# Patient Record
Sex: FEMALE | Race: WHITE | NOT HISPANIC OR LATINO | Employment: OTHER | URBAN - METROPOLITAN AREA
[De-identification: names, ages, dates, MRNs, and addresses within clinical notes are randomized per-mention and may not be internally consistent; named-entity substitution may affect disease eponyms.]

---

## 2017-01-04 ENCOUNTER — THERAPY VISIT (OUTPATIENT)
Dept: PHYSICAL THERAPY | Facility: CLINIC | Age: 67
End: 2017-01-04
Payer: COMMERCIAL

## 2017-01-04 DIAGNOSIS — M25.511 ACUTE PAIN OF RIGHT SHOULDER: Primary | ICD-10-CM

## 2017-01-04 PROCEDURE — 97035 APP MDLTY 1+ULTRASOUND EA 15: CPT | Mod: GP

## 2017-01-04 PROCEDURE — 97140 MANUAL THERAPY 1/> REGIONS: CPT | Mod: GP

## 2017-01-04 PROCEDURE — 97110 THERAPEUTIC EXERCISES: CPT | Mod: GP

## 2017-01-11 ENCOUNTER — THERAPY VISIT (OUTPATIENT)
Dept: PHYSICAL THERAPY | Facility: CLINIC | Age: 67
End: 2017-01-11
Payer: COMMERCIAL

## 2017-01-11 DIAGNOSIS — M25.511 ACUTE PAIN OF RIGHT SHOULDER: Primary | ICD-10-CM

## 2017-01-11 PROCEDURE — 97110 THERAPEUTIC EXERCISES: CPT | Mod: GP

## 2017-01-11 PROCEDURE — 97035 APP MDLTY 1+ULTRASOUND EA 15: CPT | Mod: GP

## 2017-01-11 PROCEDURE — 97140 MANUAL THERAPY 1/> REGIONS: CPT | Mod: GP

## 2017-01-18 ENCOUNTER — THERAPY VISIT (OUTPATIENT)
Dept: PHYSICAL THERAPY | Facility: CLINIC | Age: 67
End: 2017-01-18
Payer: COMMERCIAL

## 2017-01-18 DIAGNOSIS — M25.511 ACUTE PAIN OF RIGHT SHOULDER: Primary | ICD-10-CM

## 2017-01-18 PROCEDURE — 97110 THERAPEUTIC EXERCISES: CPT | Mod: GP | Performed by: PHYSICAL THERAPIST

## 2017-01-18 PROCEDURE — 97035 APP MDLTY 1+ULTRASOUND EA 15: CPT | Mod: GP | Performed by: PHYSICAL THERAPIST

## 2017-01-18 PROCEDURE — 97112 NEUROMUSCULAR REEDUCATION: CPT | Mod: GP | Performed by: PHYSICAL THERAPIST

## 2017-01-25 ENCOUNTER — THERAPY VISIT (OUTPATIENT)
Dept: PHYSICAL THERAPY | Facility: CLINIC | Age: 67
End: 2017-01-25
Payer: COMMERCIAL

## 2017-01-25 DIAGNOSIS — M25.511 ACUTE PAIN OF RIGHT SHOULDER: Primary | ICD-10-CM

## 2017-01-25 PROCEDURE — 97112 NEUROMUSCULAR REEDUCATION: CPT | Mod: GP

## 2017-01-25 PROCEDURE — 97110 THERAPEUTIC EXERCISES: CPT | Mod: GP

## 2017-01-25 PROCEDURE — 97035 APP MDLTY 1+ULTRASOUND EA 15: CPT | Mod: GP

## 2017-02-15 ENCOUNTER — THERAPY VISIT (OUTPATIENT)
Dept: PHYSICAL THERAPY | Facility: CLINIC | Age: 67
End: 2017-02-15
Payer: COMMERCIAL

## 2017-02-15 DIAGNOSIS — M25.511 ACUTE PAIN OF RIGHT SHOULDER: ICD-10-CM

## 2017-02-15 PROCEDURE — 97110 THERAPEUTIC EXERCISES: CPT | Mod: GP | Performed by: PHYSICAL THERAPIST

## 2017-02-15 PROCEDURE — 97035 APP MDLTY 1+ULTRASOUND EA 15: CPT | Mod: GP | Performed by: PHYSICAL THERAPIST

## 2017-02-15 PROCEDURE — 97112 NEUROMUSCULAR REEDUCATION: CPT | Mod: GP | Performed by: PHYSICAL THERAPIST

## 2017-02-15 NOTE — MR AVS SNAPSHOT
After Visit Summary   2/15/2017    Isabel Carroll    MRN: [id number]           Patient Information     Date Of Birth          1950        Visit Information        Provider Department      2/15/2017 11:30 AM Liliana Tolentino PT [facility]        Today's Diagnoses     Acute pain of right shoulder           Follow-ups after your visit        Your next 10 appointments already scheduled     Apr 13, 2017  1:30 PM CDT   Office Visit with PFT LAB   [facility]    [address] [place] [address] [id number] [phone]           Bring a current list of meds and any records pertaining to this visit.  For Physicals, please bring immunization records and any forms needing to be filled out.  Please arrive 10 minutes early to complete paperwork.            Apr 13, 2017  2:30 PM CDT   New Visit with Natalia Morrow MD   [facility]    [address] [place] [address] [phone]              Who to contact     If you have questions or need follow up information about today's clinic visit or your schedule please contact [facility] directly at [phone].  Normal or non-critical lab and imaging results will be communicated to you by MyChart, letter or phone within 4 business days after the clinic has received the results. If you do not hear from us within 7 days, please contact the clinic through MyChart or phone. If you have a critical or abnormal lab result, we will notify you by phone as soon as possible.  Submit refill requests through Search to Phone or call your pharmacy and they will forward the refill request to us. Please allow 3 business days for your refill to be completed.          Additional Information About Your Visit        Search to Phone Information     Search to Phone gives you secure access to your electronic  health record. If you see a primary care provider, you can also send messages to your care team and make appointments. If you have questions, please call your primary care clinic.  If you do not have a primary care provider, please call [phone] and they will assist you.        Care EveryWhere ID     This is your Care EveryWhere ID. This could be used by other organizations to access your [place] medical records  [id number]         Blood Pressure from Last 3 Encounters:   12/15/16 130/83   11/04/16 132/80   10/24/16 115/79    Weight from Last 3 Encounters:   10/24/16 68.9 kg (152 lb)   10/12/16 67.9 kg (149 lb 9.6 oz)   10/03/16 67.8 kg (149 lb 6.4 oz)              We Performed the Following     MARILY PROGRESS NOTES REPORT     NEUROMUSCULAR RE-EDUCATION     THERAPEUTIC EXERCISES     ULTRASOUND THERAPY        Primary Care Provider Office Phone # Fax #    Marry Naida Gonzalez [phone]       [facility] [address] [facility] [address]        Thank you!     Thank you for choosing [facility]  for your care. Our goal is always to provide you with excellent care. Hearing back from our patients is one way we can continue to improve our services. Please take a few minutes to complete the written survey that you may receive in the mail after your visit with us. Thank you!             Your Updated Medication List - Protect others around you: Learn how to safely use, store and throw away your medicines at www.disposemymeds.org.          This list is accurate as of: 2/15/17 12:34 PM.  Always use your most recent med list.                   Brand Name Dispense Instructions for use    aspirin 81 MG tablet      Take 1 tablet by mouth daily.       CALCIUM + D PO      Take  by mouth.       diclofenac 1 % Gel topical gel    VOLTAREN    100 g    2 grams to hands four times daily using enclosed dosing card.       FISH OIL          fluticasone 50 MCG/ACT  spray    FLONASE    1 Package    Spray 1-2 sprays into both nostrils daily       guaiFENesin-codeine 100-10 MG/5ML Soln solution    ROBITUSSIN AC    120 mL    Take 5-10 mLs by mouth nightly as needed for cough       metoprolol 25 MG 24 hr tablet    TOPROL-XL    90 tablet    Take 1 tablet (25 mg) by mouth daily       MULTIVITAMIN & MINERAL PO      Take  by mouth.       simvastatin 10 MG tablet    ZOCOR    90 tablet    Take 1 tablet (10 mg) by mouth At Bedtime       SUDAFED 12 HOUR PO      Take  by mouth.

## 2017-02-15 NOTE — PROGRESS NOTES
Subjective:    HPI                    Objective:    System    Physical Exam    General     ROS    Assessment/Plan:      DISCHARGE REPORT    Progress reporting period is from 12/26/16 to 2/15/17.       SUBJECTIVE  Subjective changes noted by patient:   Pt notes that she still has trouble sleeping/laying on her R side, wakes sometimes w/PLs of 6-7/10, but not sure how long she had been laying on her R side by then, either.    Current pain level is  2/10.     Previous pain level was   Initial Pain level: 9/10 (at worst).   Changes in function:  Yes (See Goal flowsheet attached for changes in current functional level)  Adverse reaction to treatment or activity: None    OBJECTIVE  Changes noted in objective findings:     R shoulder AROM:  WNL for flex, abd, ER, mild restrictions w/IR, pain only w/the IR movement.  Strength grossly 4+/5 to 5/5 in R shoulder/arm.  Worst pain elicited w/positioning her arm in combination of ext and abd.     ASSESSMENT/PLAN  Updated problem list and treatment plan: Diagnosis 1:  R shoulder RC strain  Pain -  self management and home program  Impaired muscle performance - home program  STG/LTGs have been met or progress has been made towards goals:  Yes (See Goal flow sheet completed today.)  Assessment of Progress: The patient has met all of their long term goals.  Self Management Plans:  Patient has been instructed in a home treatment program.  Patient is independent in a home treatment program.  Patient  has been instructed in self management of symptoms.  Patient is independent in self management of symptoms.    Isabel continues to require the following intervention to meet STG and LTG's:  PT intervention is no longer required to meet STG/LTG.    Recommendations:  This patient is ready to be discharged from therapy and continue their home treatment program.    Please refer to the daily flowsheet for treatment today, total treatment time and time spent performing 1:1 timed codes.

## 2017-02-23 ENCOUNTER — OFFICE VISIT (OUTPATIENT)
Dept: FAMILY MEDICINE | Facility: CLINIC | Age: 67
End: 2017-02-23
Payer: COMMERCIAL

## 2017-02-23 VITALS
OXYGEN SATURATION: 96 % | BODY MASS INDEX: 27.6 KG/M2 | TEMPERATURE: 97.2 F | HEIGHT: 62 IN | HEART RATE: 72 BPM | SYSTOLIC BLOOD PRESSURE: 120 MMHG | WEIGHT: 150 LBS | DIASTOLIC BLOOD PRESSURE: 82 MMHG

## 2017-02-23 DIAGNOSIS — S00.81XA ABRASION OF FOREHEAD, INITIAL ENCOUNTER: Primary | ICD-10-CM

## 2017-02-23 PROCEDURE — 99213 OFFICE O/P EST LOW 20 MIN: CPT | Performed by: INTERNAL MEDICINE

## 2017-02-23 RX ORDER — MUPIROCIN CALCIUM 20 MG/G
CREAM TOPICAL 2 TIMES DAILY
Qty: 30 G | Refills: 5 | Status: SHIPPED | OUTPATIENT
Start: 2017-02-23 | End: 2017-04-13

## 2017-02-23 NOTE — NURSING NOTE
"Chief Complaint   Patient presents with     Musculoskeletal Problem     hit forehead with car door       Initial /82 (BP Location: Left arm, Patient Position: Chair, Cuff Size: Adult Regular)  Pulse 72  Temp 97.2  F (36.2  C) (Oral)  Ht 5' 1.75\" (1.568 m)  Wt 150 lb (68 kg)  SpO2 96%  BMI 27.66 kg/m2 Estimated body mass index is 27.66 kg/(m^2) as calculated from the following:    Height as of this encounter: 5' 1.75\" (1.568 m).    Weight as of this encounter: 150 lb (68 kg).  Medication Reconciliation: complete     Cuong Fraga MA      "

## 2017-02-23 NOTE — PATIENT INSTRUCTIONS
This summary includes the important diagnoses, test, medications and other important parts of your medical history.  Below are a few good we sites you can use to learn more about these.     Www.THE ICONIC.org : Up to date and easily searchable information on multiple topics.  Www.THE ICONIC.org/Pharmacy/c_539084.asp : [facility] $4.99 medications  Www.medlineplus.gov : medication info, interactive tutorials, watch real surgeries online  Www.familydoctor.org : good info from the Academy of Family Physicians  Www.mayoPÃºbliKoinic.com : good info from the [facility]  Www.cdc.gov : public health info, travel advisories, epidemics (H1N1)  Www.aap.org : children's health info, normal development, vaccinations  Www.health.[facility].[place].us : [place] dept of heat, public health issues in [place], N1N1    Based on your medical history and these are the current health maintenance or preventive care services that you are due for (some may have been done at this visit:)  There are no preventive care reminders to display for this patient.  =================================================================================  Normal Values   Blood pressure  <140/90 for most adults    <130/80 for some chronic diseases (ask your care team about yours)    BMI (body mass index)  18.5-25 kg/m2 (based on height and weight)     Thank you for visiting [facility]    Normal or non-critical lab and imaging results will be communicated to you by MyChart, letter or phone within 7 days.  If you do not hear from us within 10 days, please call the clinic. If you have a critical or abnormal lab result, we will notify you by phone as soon as possible.     If you have any questions regarding your visit please contact:     Team Comfort:   Clinic Hours Telephone Number   Dr. Earle Edwards   7am-5pm  Monday - Friday [phone]  Will [facility]   Pharmacy 8am-8pm  Monday-Thursday      8am-6pm Friday  9am-5pm Saturday-Sunday [phone]   Urgent Care 11am-8pm Monday-Friday        9am-5pm Saturday-Sunday [phone]     After hours, weekend or if you need to make an appointment with your primary provider please call [phone].   After Hours nurse advise: call [place] [facility]: [phone]    Medication Refills:  Call your pharmacy and they will forward the refill to us. Please allow 3 business days for your refills to be completed.    Use [employer] (secure email communication and access to your chart) to send your primary care provider a message or make an appointment. Ask someone on your Team how to sign up for [employer]. To log on to CleanBeeBaby or for more information in NearbyNow please visit the website at [email or web address].  As of October 8, 2013, all password changes, disabled accounts, or ID changes in [employer]/MyHealth will be done by our Access Services Department.   If you need help with your account or password, call: [phone]. Clinic staff no longer has the ability to change passwords.

## 2017-02-23 NOTE — MR AVS SNAPSHOT
After Visit Summary   2/23/2017    Isabel Carroll    MRN: [id number]           Patient Information     Date Of Birth          1950        Visit Information        Provider Department      2/23/2017 1:40 PM Guillermo Franklin MD [facility]        Today's Diagnoses     Abrasion of forehead, initial encounter    -  1      Care Instructions    This summary includes the important diagnoses, test, medications and other important parts of your medical history.  Below are a few good we sites you can use to learn more about these.     Www.Attunity.i.Meter : Up to date and easily searchable information on multiple topics.  Www.Attunity.i.Meter/Pharmacy/c_539084.asp : [facility] $4.99 medications  Www.Westcrete.gov : medication info, interactive tutorials, watch real surgeries online  Www.familydoctor.org : good info from the Academy of Family Physicians  Www.op5.ADstruc : good info from the [facility]  Www.cdc.gov : public health info, travel advisories, epidemics (H1N1)  Www.aap.org : children's health info, normal development, vaccinations  Www.health.[facility].[place].us : [place] dept of heatlh, public health issues in [place], N1N1    Based on your medical history and these are the current health maintenance or preventive care services that you are due for (some may have been done at this visit:)  There are no preventive care reminders to display for this patient.  =================================================================================  Normal Values   Blood pressure  <140/90 for most adults    <130/80 for some chronic diseases (ask your care team about yours)    BMI (body mass index)  18.5-25 kg/m2 (based on height and weight)     Thank you for visiting [facility]    Normal or non-critical lab and imaging results will be communicated to you by MyChart, letter or phone within 7 days.  If you do not hear from us within 10 days, please call the clinic. If  you have a critical or abnormal lab result, we will notify you by phone as soon as possible.     If you have any questions regarding your visit please contact:     Team Comfort:   Clinic Hours Telephone Number   Dr. Earle Alatorre  [facility]   7am-5pm  Monday - Friday [phone]  [facility]   Pharmacy 8am-8pm Monday-Thursday      8am-6pm Friday  9am-5pm Saturday-Sunday [phone]   Urgent Care 11am-8pm Monday-Friday        9am-5pm Saturday-Sunday [phone]     After hours, weekend or if you need to make an appointment with your primary provider please call [phone].   After Hours nurse advise: call [place] [facility]: [phone]    Medication Refills:  Call your pharmacy and they will forward the refill to us. Please allow 3 business days for your refills to be completed.    Use [employer] (secure email communication and access to your chart) to send your primary care provider a message or make an appointment. Ask someone on your Team how to sign up for [employer]. To log on to LSAT Freedom or for more information in Seeder please visit the website at [email or web address].  As of October 8, 2013, all password changes, disabled accounts, or ID changes in [employer]/MyHealth will be done by our Access Services Department.   If you need help with your account or password, call: [phone]. Clinic staff no longer has the ability to change passwords.           Follow-ups after your visit        Your next 10 appointments already scheduled     Apr 13, 2017  1:30 PM CDT   [facility]FT LAB   [facility]    [id number] [place] [id number] [phone]           Bring a current list of meds and any records pertaining to this visit.  For Physicals, please bring immunization records and any forms needing to be filled out.  Please arrive 10 minutes early to complete paperwork.  "           Apr 13, 2017  2:30 PM CDT   New Visit with Natalia Morrow MD   [facility] ([facility])    [address] [place] [id number] [phone]              Who to contact     If you have questions or need follow up information about today's clinic visit or your schedule please contact [facility] directly at [phone].  Normal or non-critical lab and imaging results will be communicated to you by RACTIVhart, letter or phone within 4 business days after the clinic has received the results. If you do not hear from us within 7 days, please contact the clinic through RACTIVhart or phone. If you have a critical or abnormal lab result, we will notify you by phone as soon as possible.  Submit refill requests through Cardiva Medical or call your pharmacy and they will forward the refill request to us. Please allow 3 business days for your refill to be completed.          Additional Information About Your Visit        RACTIVharBeneStream Information     Cardiva Medical gives you secure access to your electronic health record. If you see a primary care provider, you can also send messages to your care team and make appointments. If you have questions, please call your primary care clinic.  If you do not have a primary care provider, please call [phone] and they will assist you.        Care EveryWhere ID     This is your Care EveryWhere ID. This could be used by other organizations to access your [place] medical records  [id number]        Your Vitals Were     Pulse Temperature Height Pulse Oximetry BMI (Body Mass Index)       72 97.2  F (36.2  C) (Oral) 5' 1.75\" (1.568 m) 96% 27.66 kg/m2        Blood Pressure from Last 3 Encounters:   02/23/17 120/82   12/15/16 130/83   11/04/16 132/80    Weight from Last 3 Encounters:   02/23/17 150 lb (68 kg)   10/24/16 152 lb (68.9 kg)   10/12/16 149 lb 9.6 oz (67.9 kg)              Today, you had the following     No orders " found for display         Today's Medication Changes          These changes are accurate as of: 2/23/17  2:37 PM.  If you have any questions, ask your nurse or doctor.               Start taking these medicines.        Dose/Directions    mupirocin 2 % cream   Commonly known as:  BACTROBAN   Used for:  Abrasion of forehead, initial encounter   Started by:  Guillermo Franklin MD        Apply topically 2 times daily Apply occlusive dressing.   Quantity:  30 g   Refills:  5            Where to get your medicines      These medications were sent to [facility] [address] [place] [address] [address] [facility]  [address] [facility] [address]     Phone:  [phone]     mupirocin 2 % cream                Primary Care Provider Office Phone # Fax #    Marry Naida Gonzalez [phone]       [facility] [address]  [facility] [address]        Thank you!     Thank you for choosing [facility]  for your care. Our goal is always to provide you with excellent care. Hearing back from our patients is one way we can continue to improve our services. Please take a few minutes to complete the written survey that you may receive in the mail after your visit with us. Thank you!             Your Updated Medication List - Protect others around you: Learn how to safely use, store and throw away your medicines at www.disposemymeds.org.          This list is accurate as of: 2/23/17  2:37 PM.  Always use your most recent med list.                   Brand Name Dispense Instructions for use    aspirin 81 MG tablet      Take 1 tablet by mouth daily.       CALCIUM + D PO      Take  by mouth.       diclofenac 1 % Gel topical gel    VOLTAREN    100 g    2 grams to hands four times daily using enclosed dosing card.       FISH OIL          fluticasone 50 MCG/ACT spray    FLONASE    1 Package    Spray 1-2 sprays into both nostrils daily       guaiFENesin-codeine 100-10 MG/5ML  Soln solution    ROBITUSSIN AC    120 mL    Take 5-10 mLs by mouth nightly as needed for cough       metoprolol 25 MG 24 hr tablet    TOPROL-XL    90 tablet    Take 1 tablet (25 mg) by mouth daily       MULTIVITAMIN & MINERAL PO      Take  by mouth.       mupirocin 2 % cream    BACTROBAN    30 g    Apply topically 2 times daily Apply occlusive dressing.       simvastatin 10 MG tablet    ZOCOR    90 tablet    Take 1 tablet (10 mg) by mouth At Bedtime       SUDAFED 12 HOUR PO      Take  by mouth.

## 2017-02-23 NOTE — PROGRESS NOTES
SUBJECTIVE:                                                    Isabel Carroll is a 66 year old female who presents to clinic today for the following health issues:    Joint Pain     Onset: yesterday    Description:   Location: right side forehead  Character: Dull ache    Intensity: mild    Progression of Symptoms: better    Accompanying Signs & Symptoms:  Other symptoms: none   History:   Previous similar pain: no       Precipitating factors:   Trauma or overuse: YES- hit forehead on car door when trying to shut it while carrying multiple groceries.    Alleviating factors:  Improved by: nothing       Therapies Tried and outcome: neosporin, little relief          Problem list and histories reviewed & adjusted, as indicated.  Additional history: as documented    Patient Active Problem List   Diagnosis     Overweight     Chronic rhinitis     Primary osteoarthritis of both hands     Basal cell carcinoma of skin     Coronary atherosclerosis due to calcified coronary lesion     Elevated blood pressure (not hypertension)     Hyperlipidemia     Impaired fasting glucose     Benign neoplasm of colon     Chorioretinal scar of left eye     Sinus tachycardia     Advance care planning     Pes planus     Right shoulder strain, initial encounter     Bronchiectasis without complication (H)     Past Surgical History   Procedure Laterality Date      section       x 2     Liver surgery       Alex large cyst     Mini arc sling operation for stress incontinence  2007     Salpingo-oophorectomy bilateral  ,      Benign prophylactic for FHx ov cancer     Lengthen tendon achilles  2011     Procedure:LENGTHEN TENDON ACHILLES; Surgeon:AARON CÁRDENAS; Location:[facility]     Osteotomy ankle  2011     Procedure:OSTEOTOMY ANKLE; Calcaneous,  Flexor Digitorum Longus Transfer       Colonoscopy       3 polyps       Social History   Substance Use Topics     Smoking status: Former Smoker     Types:  Cigarettes     Smokeless tobacco: Former User     Quit date: 1/1/1982     Alcohol use 0.0 oz/week     0 Standard drinks or equivalent per week      Comment: 2-7 glasses of wine per week     Family History   Problem Relation Age of Onset     Other Cancer Sister 51     ovarian cancer     CANCER Sister      Colon Cancer Father      Macular Degeneration Mother      DIABETES Maternal Aunt      DIABETES Maternal Uncle      CANCER Paternal Aunt      DIABETES Maternal Grandmother      Hypertension No family hx of      CEREBROVASCULAR DISEASE No family hx of      Thyroid Disease No family hx of      Glaucoma No family hx of          Allergies   Allergen Reactions     Codeine Phosphate Nausea and Vomiting     Pentazocine      Propoxyphene      Darvon     Penicillins Rash     Recent Labs   Lab Test  10/14/16   0815  05/22/16   1708  10/06/15   0818  04/01/14 09/28/13 09/22/12 08/17/12   A1C  5.5  5.8   --    --    --   5.7  6.0   --    LDL  106*   --   113   --   80   --   111   --    HDL  48*   --   47*   --   40*   --   54   --    TRIG  210*   --   209*   --   180*   --   156*   --    ALT   --   39   --    --    --    --    --    --    CR  0.74  0.63  0.72   < >   --    --    --    --    GFRESTIMATED  78  >90  Non  GFR Calc    82   < >   --    --    --    --    GFRESTBLACK  >90   GFR Calc    >90   GFR Calc    >90   GFR Calc     < >   --    --    --    --    POTASSIUM   --   4.1  4.1   --    --    --    --    --    TSH   --    --    --    --    --    --   0.506  0.829    < > = values in this interval not displayed.      BP Readings from Last 3 Encounters:   02/23/17 120/82   12/15/16 130/83   11/04/16 132/80    Wt Readings from Last 3 Encounters:   02/23/17 150 lb (68 kg)   10/24/16 152 lb (68.9 kg)   10/12/16 149 lb 9.6 oz (67.9 kg)                  Labs reviewed in EPIC  Problem list, Medication list, Allergies, and Medical/Social/Surgical histories reviewed in  "EPIC and updated as appropriate.    ROS:  C: NEGATIVE for fever, chills, change in weight  INTEGUMENTARY/SKIN: As above.  E: NEGATIVE for vision changes or irritation  E/M: NEGATIVE for ear, mouth and throat problems  R: NEGATIVE for significant cough or SOB  B: NEGATIVE for masses, tenderness or discharge  CV: NEGATIVE for chest pain, palpitations or peripheral edema  GI: NEGATIVE for nausea, abdominal pain, heartburn, or change in bowel habits  : NEGATIVE for frequency, dysuria, or hematuria  M: NEGATIVE for significant arthralgias or myalgia  N: NEGATIVE for weakness, dizziness or paresthesias  E: NEGATIVE for temperature intolerance, skin/hair changes  H: NEGATIVE for bleeding problems  P: NEGATIVE for changes in mood or affect    OBJECTIVE:                                                    /82 (BP Location: Left arm, Patient Position: Chair, Cuff Size: Adult Regular)  Pulse 72  Temp 97.2  F (36.2  C) (Oral)  Ht 5' 1.75\" (1.568 m)  Wt 150 lb (68 kg)  SpO2 96%  BMI 27.66 kg/m2  Body mass index is 27.66 kg/(m^2).  GENERAL: healthy, alert and no distress  EYES: Eyes grossly normal to inspection  SKIN: Abrasion at right side of forehead that approximately measures 2.5 cm.  NEURO: Normal strength and tone, mentation intact and speech normal  PSYCH: mentation appears normal, affect normal/bright    Diagnostic Test Results:  none      ASSESSMENT/PLAN:                                                            (S00.81XA) Abrasion of forehead, initial encounter  (primary encounter diagnosis)  Comment: The patient is mainly worried about cosmetic implications of her forehead abrasion. I explained to the patient not to scrape her abrasion and apply different antibiotic ointment, with occlusive dressing. Continue over-the-counter Neosporin ointment due to higher risk of allergic reaction to it.  Plan: mupirocin (BACTROBAN) 2 % cream          Follow-up visit if condition worsens.        Guillermo Frnaklin, " MD  [facility]

## 2017-02-26 PROBLEM — S00.81XA ABRASION OF FOREHEAD, INITIAL ENCOUNTER: Status: ACTIVE | Noted: 2017-02-26

## 2017-02-28 ENCOUNTER — TELEPHONE (OUTPATIENT)
Dept: FAMILY MEDICINE | Facility: CLINIC | Age: 67
End: 2017-02-28

## 2017-02-28 NOTE — TELEPHONE ENCOUNTER
Reason for Call:  Other     Detailed comments: Pt was seen on 2/23/2017 for gash on forehead and asking if she needs to keep Band-Aid on the cut?    Phone Number Patient can be reached at: Home number on file [phone] (home)    Best Time: after 2 pm is best    Can we leave a detailed message on this number? YES    Call taken on 2/28/2017 at 10:51 AM by Padmini Rock

## 2017-03-24 ENCOUNTER — PRE VISIT (OUTPATIENT)
Dept: PULMONOLOGY | Facility: CLINIC | Age: 67
End: 2017-03-24

## 2017-03-24 NOTE — TELEPHONE ENCOUNTER
PULMONARY NEW PATIENT PRE-VISIT ASSESSMENT    Date Patient Contacted: 3/24/17    Confirmed appointment times and location     1. Patient is scheduled to see Dr. Morrow on 4/13/17  2. Reason for visit: Bronchiectasis without complication (H) [J47.9]   3. Referring provider PCP - Dr. Gonzalez    4. Has patient seen previous specialist? Yes.  Name of provider Dr. King Noel, Clinic/Facility [facility]. Pt states she saw for several years -  from approximately 2008 to 2012. She states she has not seen anyone since that time. Pt states [facility] and will call them to fax us records. Advised her that we can also have her sign a Cloak form when she gets here if no records have been received.      5. Previous Imaging: In Epic: Last CXR done: none, Last Chest CT done: none      Outside Imaging: Location/Date/Type/Status (Requested, Received, Patient will hand carry disc, Pushed to [employer]read, Disc will be mailed) Chest CT done 12/5/16 at [facility] - report in Cloak, image in PACs. Pt reports she had Chest CT done at the same place in 7/13/10 - report is in Cloak. Encounter routed to admin coordinators to request image be pushed to our system.        6.  Pulmonary Function Tests: Scheduled at  4/13/17       Outside PFT Lab:  Location/Date/Status (Requested, Received, Patient will hand carry) Pt denies.    Oxygen? Pt denies    Previsit review complete.  Patient will see provider at future scheduled appointment.     Patient Reminders Given:  Please, make sure you bring an updated list of your medications.   If you need to cancel or reschedule, please call [phone].      Prudence Reyes, RN, BSN  Pulmonary Care Coordinator

## 2017-03-27 NOTE — TELEPHONE ENCOUNTER
Spoke with Imaging Archive team at Pending sale to [facility].     Image of Chest CT from 7-13-10 has been pushed to our system.  Faxed report and archive form has been filled out and given to our imaging team in [place]. Priscilla Valdivia CMA,

## 2017-04-12 ENCOUNTER — OFFICE VISIT (OUTPATIENT)
Dept: PODIATRY | Facility: CLINIC | Age: 67
End: 2017-04-12
Payer: COMMERCIAL

## 2017-04-12 VITALS — HEART RATE: 80 BPM | WEIGHT: 150 LBS | BODY MASS INDEX: 27.66 KG/M2 | OXYGEN SATURATION: 98 %

## 2017-04-12 DIAGNOSIS — M67.40 GANGLION OF JOINT: Primary | ICD-10-CM

## 2017-04-12 PROCEDURE — 20612 ASPIRATE/INJ GANGLION CYST: CPT | Mod: RT | Performed by: PODIATRIST

## 2017-04-12 PROCEDURE — 99203 OFFICE O/P NEW LOW 30 MIN: CPT | Mod: 25 | Performed by: PODIATRIST

## 2017-04-12 NOTE — PATIENT INSTRUCTIONS
We wish you continued good healing. If you have any questions or concerns, please do not hesitate to contact us at [phone].      Please remember to call and schedule a follow up appointment if one was recommended at your earliest convenience.   PODIATRY CLINIC HOURS  TELEPHONE NUMBER    Dr. Anil Balderas D.P.M [facility]    Clinics:  [facility]        Ann Cárdenas MA  Medical Assistant  Tuesday 1PM-6PM  [facility]  Wednesday 7AM-2PM  [place]  Thursday 10AM-6PM  [place] Friday 7AM-345PM  [place]  Specialty schedulers:   [phone] to make an appointment with any Specialty Provider.        Urgent Care locations:    [facility] Monday-Friday 5 pm - 9 pm. Saturday-Sunday 9 am -5pm    Monday-Friday 11 am - 9 pm Saturday 9 am - 5 pm     Monday-Sunday 12 noon-8PM [phone]     If you need a medication refill, please contact us you may need lab work and/or a follow up visit prior to your refill (i.e. Antifungal medications).    FonJaxhart (secure e-mail communication and access to your chart) to send a message or to make an appointment.    If MRI needed please call [facility] at [phone]        Weight management plan: Patient was referred to their PCP to discuss a diet and exercise plan.

## 2017-04-12 NOTE — MR AVS SNAPSHOT
After Visit Summary   4/12/2017    Isabel Carroll    MRN: [id number]           Patient Information     Date Of Birth          1950        Visit Information        Provider Department      4/12/2017 1:00 PM Anil Balderas DPM [facility]        Care Instructions    We wish you continued good healing. If you have any questions or concerns, please do not hesitate to contact us at [phone].      Please remember to call and schedule a follow up appointment if one was recommended at your earliest convenience.   PODIATRY CLINIC HOURS  TELEPHONE NUMBER    Dr. Anil Balderas D.P.M [facility]    Clinics:  [facility]        Ann Cárdenas MA  Medical Assistant  Tuesday 1PM-6PM  [facility]  Wednesday 7AM-2PM  [place]  Thursday 10AM-6PM  [place] Friday 7AM-345PM  [place]  Specialty schedulers:   [phone] to make an appointment with any Specialty Provider.        Urgent Care locations:    [facility] Monday-Friday 5 pm - 9 pm. Saturday-Sunday 9 am -5pm    Monday-Friday 11 am - 9 pm Saturday 9 am - 5 pm     Monday-Sunday 12 noon-8PM [phone]     If you need a medication refill, please contact us you may need lab work and/or a follow up visit prior to your refill (i.e. Antifungal medications).    Will[employer] Mediahart (secure e-mail communication and access to your chart) to send a message or to make an appointment.    If MRI needed please call [facility] at [phone]        Weight management plan: Patient was referred to their PCP to discuss a diet and exercise plan.          Follow-ups after your visit        Your next 10 appointments already scheduled     Apr 13, 2017  1:30 PM CDT   Office Visit with PFT LAB   [facility]    [id number] [place] [id number]    [phone]           Bring a current list of meds and any records pertaining to this visit.  For Physicals, please bring immunization records and any forms needing to be filled out.  Please arrive 10 minutes early to complete paperwork.            Apr 13, 2017  2:30 PM CDT   New Visit with Natalia Morrow MD   [facility]    [address] [place] [id number] [phone]              Who to contact     If you have questions or need follow up information about today's clinic visit or your schedule please contact [facility] directly at [phone].  Normal or non-critical lab and imaging results will be communicated to you by MyChart, letter or phone within 4 business days after the clinic has received the results. If you do not hear from us within 7 days, please contact the clinic through ShootHomehart or phone. If you have a critical or abnormal lab result, we will notify you by phone as soon as possible.  Submit refill requests through Odnoklassniki or call your pharmacy and they will forward the refill request to us. Please allow 3 business days for your refill to be completed.          Additional Information About Your Visit        MyCharTaxizu Information     Odnoklassniki gives you secure access to your electronic health record. If you see a primary care provider, you can also send messages to your care team and make appointments. If you have questions, please call your primary care clinic.  If you do not have a primary care provider, please call [phone] and they will assist you.        Care EveryWhere ID     This is your Care EveryWhere ID. This could be used by other organizations to access your [facility] medical records  [id number]        Your Vitals Were     Pulse Pulse Oximetry BMI (Body Mass Index)             80 98% 27.66 kg/m2          Blood Pressure from Last 3 Encounters:   02/23/17 120/82   12/15/16 130/83   11/04/16  132/80    Weight from Last 3 Encounters:   04/12/17 68 kg (150 lb)   02/23/17 68 kg (150 lb)   10/24/16 68.9 kg (152 lb)              Today, you had the following     No orders found for display       Primary Care Provider Office Phone # Fax #    Marry Naida Gonzalez [phone]       [facility] [address]  [facility] [address]        Thank you!     Thank you for choosing [facility]  for your care. Our goal is always to provide you with excellent care. Hearing back from our patients is one way we can continue to improve our services. Please take a few minutes to complete the written survey that you may receive in the mail after your visit with us. Thank you!             Your Updated Medication List - Protect others around you: Learn how to safely use, store and throw away your medicines at www.disposemymeds.org.          This list is accurate as of: 4/12/17  1:10 PM.  Always use your most recent med list.                   Brand Name Dispense Instructions for use    aspirin 81 MG tablet      Take 1 tablet by mouth daily.       CALCIUM + D PO      Take  by mouth.       diclofenac 1 % Gel topical gel    VOLTAREN    100 g    2 grams to hands four times daily using enclosed dosing card.       FISH OIL          fluticasone 50 MCG/ACT spray    FLONASE    1 Package    Spray 1-2 sprays into both nostrils daily       guaiFENesin-codeine 100-10 MG/5ML Soln solution    ROBITUSSIN AC    120 mL    Take 5-10 mLs by mouth nightly as needed for cough       metoprolol 25 MG 24 hr tablet    TOPROL-XL    90 tablet    Take 1 tablet (25 mg) by mouth daily       MULTIVITAMIN & MINERAL PO      Take  by mouth.       mupirocin 2 % cream    BACTROBAN    30 g    Apply topically 2 times daily Apply occlusive dressing.       simvastatin 10 MG tablet    ZOCOR    90 tablet    Take 1 tablet (10 mg) by mouth At Bedtime       SUDAFED 12 HOUR PO      Take  by mouth.

## 2017-04-12 NOTE — NURSING NOTE
"Chief Complaint   Patient presents with     Foot Problems     right big toe lump on it. callus on side , fissure on heel. Scar from surgery 2011 bothersome       Initial Pulse 80  Wt 68 kg (150 lb)  SpO2 98%  BMI 27.66 kg/m2 Estimated body mass index is 27.66 kg/(m^2) as calculated from the following:    Height as of 2/23/17: 1.568 m (5' 1.75\").    Weight as of this encounter: 68 kg (150 lb).  Medication Reconciliation: complete  "

## 2017-04-12 NOTE — LETTER
4/12/2017       RE: Isabel Carroll  [address] [address]           Dear Colleague,    Thank you for referring your patient, Isabel Carroll, to the [facility]. Please see a copy of my visit note below.    S:  Patient complains of mass on the right foot.  Point to the dorsal medial hallux IPJ.  Has never had this before.  Patient has had this for 3 months. Has a burning pain with this.  Aggravated by activity and tight shoes and relieved by rest.  Denies mass on contralateral foot.  History of right flatfoot surgery.           ROS:  Denies edema, erythema, ecchymosis, numbness, or drainage.  Denies any trauma.       Allergies   Allergen Reactions     Codeine Phosphate Nausea and Vomiting     Pentazocine      Propoxyphene      Darvon     Penicillins Rash       Current Outpatient Prescriptions   Medication Sig Dispense Refill     mupirocin (BACTROBAN) 2 % cream Apply topically 2 times daily Apply occlusive dressing. 30 g 5     guaiFENesin-codeine (ROBITUSSIN AC) 100-10 MG/5ML SOLN Take 5-10 mLs by mouth nightly as needed for cough 120 mL 0     simvastatin (ZOCOR) 10 MG tablet Take 1 tablet (10 mg) by mouth At Bedtime 90 tablet 3     metoprolol (TOPROL-XL) 25 MG 24 hr tablet Take 1 tablet (25 mg) by mouth daily 90 tablet 3     diclofenac (VOLTAREN) 1 % GEL 2 grams to hands four times daily using enclosed dosing card. 100 g 1     fluticasone (FLONASE) 50 MCG/ACT nasal spray Spray 1-2 sprays into both nostrils daily 1 Package 11     FISH OIL        Pseudoephedrine HCl (SUDAFED 12 HOUR PO) Take  by mouth.       Calcium-Vitamin D (CALCIUM + D PO) Take  by mouth.       Multiple Vitamins-Minerals (MULTIVITAMIN & MINERAL PO) Take  by mouth.       aspirin 81 MG tablet Take 1 tablet by mouth daily.         Patient Active Problem List   Diagnosis     Overweight     Chronic rhinitis     Primary osteoarthritis of both hands     Basal cell carcinoma of skin     Coronary  atherosclerosis due to calcified coronary lesion     Elevated blood pressure (not hypertension)     Hyperlipidemia     Impaired fasting glucose     Benign neoplasm of colon     Chorioretinal scar of left eye     Sinus tachycardia     Advance care planning     Pes planus     Right shoulder strain, initial encounter     Bronchiectasis without complication (H)     Abrasion of forehead, initial encounter       Past Medical History:   Diagnosis Date     Arthritis      Basal cell carcinoma of skin 2011     Benign neoplasm of colon 10/26/2011    Overview:  Colonoscopy every 5 years  Oct 2011      Chorioretinal scar of left eye 2015     Chronic rhinitis 2015     Coronary artery disease      Coronary atherosclerosis due to calcified coronary lesion 2011    Overview:  A)  CT Chest-Date: 07/13/10, There is coronary artery and mild aortic calcification present. B)  Nuclear Stress Test on 10/01/10, No scintigraphic evidence of ischemia or infarction. EF: 71%.       Elevated blood pressure (not hypertension) 2006     Hypercholesteremia      Hyperlipidemia 2011    Overview:  See results review for lipid profile.      Impaired fasting glucose 2006     PONV (postoperative nausea and vomiting)      Primary osteoarthritis of both hands 2015     Shingles 2009     Sinus tachycardia 2016     Skin cancer ,     Basal cell cancer     Vitamin D deficiency 2010       Past Surgical History:   Procedure Laterality Date      SECTION      x 2     COLONOSCOPY      3 polyps     LENGTHEN TENDON ACHILLES  2011    Procedure:LENGTHEN TENDON ACHILLES; Surgeon:AARON CÁRDENAS; Location:[facility]     LIVER SURGERY      Alex large cyst     MINI ARC SLING OPERATION FOR STRESS INCONTINENCE  2007     OSTEOTOMY ANKLE  2011    Procedure:OSTEOTOMY ANKLE; Calcaneous,  Flexor Digitorum Longus Transfer       SALPINGO-OOPHORECTOMY BILATERAL  ,     Benign prophylactic  for FHx ov cancer       Family History   Problem Relation Age of Onset     Other Cancer Sister 51     ovarian cancer     CANCER Sister      Colon Cancer Father      Macular Degeneration Mother      DIABETES Maternal Aunt      DIABETES Maternal Uncle      CANCER Paternal Aunt      DIABETES Maternal Grandmother      Hypertension No family hx of      CEREBROVASCULAR DISEASE No family hx of      Thyroid Disease No family hx of      Glaucoma No family hx of        Social History   Substance Use Topics     Smoking status: Former Smoker     Types: Cigarettes     Smokeless tobacco: Former User     Quit date: 1/1/1982     Alcohol use 0.0 oz/week     0 Standard drinks or equivalent per week      Comment: 2-7 glasses of wine per week         O:  Pulse 80  Wt 68 kg (150 lb)  SpO2 98%  BMI 27.66 kg/m2.  Patient good historian.  A&O X3.  Pulses DP, PT 2/4 b/l.  CRT < 3 seconds X 10 digits.  No edema or varicosities noted.  Sensation to light touch intact b/l.  Reflexes 2/4 b/l.  Skin has normal texture and turgor b/l.  Pronated arch with weightbearing.  No forefoot or rear foot deformities noted.  MS 5/5 all compartments.  Normal ROM all fore foot and rearfoot joints.  No equinus.  There is a mass on the dorsal medial right hallux IPJ.  It is well encapsulated.   Negative pain with palpation.  Nonpulsitile.  No pain with ROM or stressing any tendons.        A:Ganglion cyst     P:  Discussed cause of this with patient.  She would like this exsanguinated.  Risks complications, and efficacy of ganglion cyst exsanguination discussed.  Patient understands that if we exsanguinate this it could come back immediately.  After written consent we blocked the area proximal to cyst with 3 cc 1% lidocaine plain and prepped in a normal sterile maner.  18 gauge needle was inserted into cyst and all fluid was removed.  Dressed with a bandaid.  Warned patient not to try to remove fluid and squeeze this herself as it could become infected.  If  this drains at all return to clinic asap. If returns and painful we discussed surgical removal.  Even with this there is a chance this could return.   Will  call if desires surgical removal.   Return to clinic prn.      Anil Balderas DPM, FACFAS      Again, thank you for allowing me to participate in the care of your patient.        Sincerely,              Anil Balderas DPM

## 2017-04-13 ENCOUNTER — OFFICE VISIT (OUTPATIENT)
Dept: PULMONOLOGY | Facility: CLINIC | Age: 67
End: 2017-04-13
Attending: FAMILY MEDICINE
Payer: COMMERCIAL

## 2017-04-13 ENCOUNTER — OFFICE VISIT (OUTPATIENT)
Dept: NURSING | Facility: CLINIC | Age: 67
End: 2017-04-13
Payer: COMMERCIAL

## 2017-04-13 VITALS — BODY MASS INDEX: 25.63 KG/M2 | WEIGHT: 144.66 LBS | OXYGEN SATURATION: 98 % | HEART RATE: 78 BPM | HEIGHT: 63 IN

## 2017-04-13 DIAGNOSIS — R05.3 CHRONIC COUGH: Primary | ICD-10-CM

## 2017-04-13 DIAGNOSIS — J47.9 BRONCHIECTASIS WITHOUT COMPLICATION (H): ICD-10-CM

## 2017-04-13 DIAGNOSIS — J47.9 BRONCHIECTASIS (H): Primary | ICD-10-CM

## 2017-04-13 LAB
DLCOUNC-%PRED-PRE: 117 %
DLCOUNC-PRE: 23.93 ML/MIN/MMHG
DLCOUNC-PRED: 20.29 ML/MIN/MMHG
ERV-%PRED-PRE: 107 %
ERV-PRE: 0.72 L
ERV-PRED: 0.67 L
EXPTIME-PRE: 6.52 SEC
FEF2575-%PRED-PRE: 138 %
FEF2575-PRE: 2.65 L/SEC
FEF2575-PRED: 1.91 L/SEC
FEFMAX-%PRED-PRE: 101 %
FEFMAX-PRE: 5.73 L/SEC
FEFMAX-PRED: 5.64 L/SEC
FEV1-%PRED-PRE: 117 %
FEV1-PRE: 2.58 L
FEV1FEV6-PRE: 81 %
FEV1FEV6-PRED: 80 %
FEV1FVC-PRE: 81 %
FEV1FVC-PRED: 79 %
FEV1SVC-PRE: 77 %
FEV1SVC-PRED: 74 %
FIFMAX-PRE: 4.71 L/SEC
FRCPLETH-%PRED-PRE: 105 %
FRCPLETH-PRE: 2.78 L
FRCPLETH-PRED: 2.64 L
FVC-%PRED-PRE: 112 %
FVC-PRE: 3.16 L
FVC-PRED: 2.8 L
IC-%PRED-PRE: 114 %
IC-PRE: 2.62 L
IC-PRED: 2.29 L
RVPLETH-%PRED-PRE: 105 %
RVPLETH-PRE: 2.06 L
RVPLETH-PRED: 1.96 L
TLCPLETH-%PRED-PRE: 114 %
TLCPLETH-PRE: 5.4 L
TLCPLETH-PRED: 4.73 L
VA-%PRED-PRE: 103 %
VA-PRE: 5.01 L
VC-%PRED-PRE: 112 %
VC-PRE: 3.34 L
VC-PRED: 2.96 L

## 2017-04-13 PROCEDURE — 99207 HC DIFFUSING CAPACITY: CPT | Performed by: INTERNAL MEDICINE

## 2017-04-13 PROCEDURE — 99204 OFFICE O/P NEW MOD 45 MIN: CPT | Mod: 25 | Performed by: INTERNAL MEDICINE

## 2017-04-13 RX ORDER — GABAPENTIN 300 MG/1
CAPSULE ORAL
Qty: 270 CAPSULE | Refills: 1 | Status: SHIPPED | OUTPATIENT
Start: 2017-04-13 | End: 2017-07-27

## 2017-04-13 NOTE — MR AVS SNAPSHOT
After Visit Summary   4/13/2017    Isabel Carroll    MRN: [id number]           Patient Information     Date Of Birth          1950        Visit Information        Provider Department      4/13/2017 1:30 PM PFT LAB [facility]        Today's Diagnoses     Bronchiectasis (H)    -  1       Follow-ups after your visit        Who to contact     If you have questions or need follow up information about today's clinic visit or your schedule please contact [facility] directly at [phone].  Normal or non-critical lab and imaging results will be communicated to you by Yieldbothart, letter or phone within 4 business days after the clinic has received the results. If you do not hear from us within 7 days, please contact the clinic through [employer] or phone. If you have a critical or abnormal lab result, we will notify you by phone as soon as possible.  Submit refill requests through KeepT[employer]in or call your pharmacy and they will forward the refill request to us. Please allow 3 business days for your refill to be completed.          Additional Information About Your Visit        MyChart Information     [employer] gives you secure access to your electronic health record. If you see a primary care provider, you can also send messages to your care team and make appointments. If you have questions, please call your primary care clinic.  If you do not have a primary care provider, please call [phone] and they will assist you.      [employer]in is an electronic gateway that provides easy, online access to your medical records. With KeepTruckin, you can request a clinic appointment, read your test results, renew a prescription or communicate with your care team.     To access your existing account, please contact your [facility] or call [phone] for assistance.        Care EveryWhere ID     This is your Care EveryWhere ID. This could be used by other  organizations to access your [place] medical records  [id number]         Blood Pressure from Last 3 Encounters:   04/13/17 (P) 119/76   02/23/17 120/82   12/15/16 130/83    Weight from Last 3 Encounters:   04/13/17 65.6 kg (144 lb 10.6 oz)   04/12/17 68 kg (150 lb)   02/23/17 68 kg (150 lb)              We Performed the Following     General PFT Lab (Please always keep checked)     HC DIFFUSING CAPACITY     HC PLETHYSMOGRAPHY LUNG VOLUMES W/WO AIRWAY RESIST     Pulmonary Function Test     RESPIRATORY FLOW VOLUME LOOP        Primary Care Provider Office Phone # Fax #    Marry Naida Gonzalez [phone]       [facility] [address] [facility] [address]        Thank you!     Thank you for choosing [facility]  for your care. Our goal is always to provide you with excellent care. Hearing back from our patients is one way we can continue to improve our services. Please take a few minutes to complete the written survey that you may receive in the mail after your visit with us. Thank you!             Your Updated Medication List - Protect others around you: Learn how to safely use, store and throw away your medicines at www.disposemymeds.org.          This list is accurate as of: 4/13/17  2:30 PM.  Always use your most recent med list.                   Brand Name Dispense Instructions for use    aspirin 81 MG tablet      Take 1 tablet by mouth daily.       CALCIUM + D PO      Take  by mouth.       diclofenac 1 % Gel topical gel    VOLTAREN    100 g    2 grams to hands four times daily using enclosed dosing card.       FISH OIL          fluticasone 50 MCG/ACT spray    FLONASE    1 Package    Spray 1-2 sprays into both nostrils daily       guaiFENesin-codeine 100-10 MG/5ML Soln solution    ROBITUSSIN AC    120 mL    Take 5-10 mLs by mouth nightly as needed for cough       metoprolol 25 MG 24 hr tablet    TOPROL-XL    90 tablet    Take 1 tablet (25 mg) by mouth daily        MULTIVITAMIN & MINERAL PO      Take  by mouth.       simvastatin 10 MG tablet    ZOCOR    90 tablet    Take 1 tablet (10 mg) by mouth At Bedtime       SUDAFED 12 HOUR PO      Take  by mouth.

## 2017-04-13 NOTE — MR AVS SNAPSHOT
After Visit Summary   4/13/2017    Isabel Carroll    MRN: [id number]           Patient Information     Date Of Birth          1950        Visit Information        Provider Department      4/13/2017 2:30 PM Natalia Morrow MD [facility]        Today's Diagnoses     Chronic cough    -  1      Care Instructions    It was nice to meet you today.    Let's try gabapentin- follow instructions on the bottle.    I placed a referral for Speech therapy- they can help you with strategies to terminate the cough.    Natalia Morrow         Follow-ups after your visit        Additional Services     SPEECH THERAPY REFERRAL       *This therapy referral will be filtered to a centralized scheduling office at [facility] and the patient will receive a call to schedule an appointment at a [facility] location most convenient for them. *     [facility] provides Speech Therapy evaluation and treatment and many specialty services across the [facility] system.  If requesting a specialty program, please choose from the list below.  If you have not heard from the scheduling office within 2 business days, please call [phone] for all locations, with the exception of [place], please call [phone].       Treatment: Evaluation & Treatment  Speech Treatment Diagnosis: chronic cough  Special Instructions: none  Special Programs: None    Please be aware that coverage of these services is subject to the terms and limitations of your health insurance plan.  Call member services at your health plan with any benefit or coverage questions.      **Note to Provider:  If you are referring outside of [facility] for the therapy appointment, please list the name of the location in the  special instructions  above, print the referral and give to the patient to schedule the appointment.                  Follow-up notes from your care team     Return in  about 3 months (around 7/13/2017).      Your next 10 appointments already scheduled     Jul 20, 2017  1:30 PM CDT   [facility] Visit with Natalia Morrow MD   [facility]    [id number] [place] [address] [id number] [phone]              Who to contact     If you have questions or need follow up information about today's clinic visit or your schedule please contact [facility] directly at [phone].  Normal or non-critical lab and imaging results will be communicated to you by Harvesthart, letter or phone within 4 business days after the clinic has received the results. If you do not hear from us within 7 days, please contact the clinic through [employer] or phone. If you have a critical or abnormal lab result, we will notify you by phone as soon as possible.  Submit refill requests through W[employer] or call your pharmacy and they will forward the refill request to us. Please allow 3 business days for your refill to be completed.          Additional Information About Your Visit        HarvestharBRAINDIGIT Information     [employer] gives you secure access to your electronic health record. If you see a primary care provider, you can also send messages to your care team and make appointments. If you have questions, please call your primary care clinic.  If you do not have a primary care provider, please call [phone] and they will assist you.      Wattvision is an electronic gateway that provides easy, online access to your medical records. With Wattvision, you can request a clinic appointment, read your test results, renew a prescription or communicate with your care team.     To access your existing account, please contact your [facility] or call [phone] for assistance.        Care EveryWhere ID     This is your Care EveryWhere ID. This could be used by other organizations to access your [facility]  "records  [id number]        Your Vitals Were     Pulse Height Pulse Oximetry BMI (Body Mass Index)          78 1.594 m (5' 2.75\") 98% 25.83 kg/m2         Blood Pressure from Last 3 Encounters:   04/13/17 (P) 119/76   02/23/17 120/82   12/15/16 130/83    Weight from Last 3 Encounters:   04/13/17 65.6 kg (144 lb 10.6 oz)   04/12/17 68 kg (150 lb)   02/23/17 68 kg (150 lb)              We Performed the Following     SPEECH THERAPY REFERRAL          Today's Medication Changes          These changes are accurate as of: 4/13/17  3:28 PM.  If you have any questions, ask your nurse or doctor.               Start taking these medicines.        Dose/Directions    gabapentin 300 MG capsule   Commonly known as:  NEURONTIN   Used for:  Chronic cough   Started by:  Natalia Morrow MD        Start with 300mg once daily, if tolerated well increase to 300mg twice daily; if tolerated well increase to 300mg three times dialy.   Quantity:  270 capsule   Refills:  1            Where to get your medicines      These medications were sent to [facility] [address] [place] [id number]     Phone:  [phone]     gabapentin 300 MG capsule                Primary Care Provider Office Phone # Fax #    Marry Naida Gonzalez [phone]       [facility] [address]  [place] [address]        Thank you!     Thank you for choosing [facility]  for your care. Our goal is always to provide you with excellent care. Hearing back from our patients is one way we can continue to improve our services. Please take a few minutes to complete the written survey that you may receive in the mail after your visit with us. Thank you!             Your Updated Medication List - Protect others around you: Learn how to safely use, store and throw away your medicines at www.disposemymeds.org.          This list is accurate as of: " 4/13/17  3:28 PM.  Always use your most recent med list.                   Brand Name Dispense Instructions for use    aspirin 81 MG tablet      Take 1 tablet by mouth daily.       CALCIUM + D PO      Take  by mouth.       diclofenac 1 % Gel topical gel    VOLTAREN    100 g    2 grams to hands four times daily using enclosed dosing card.       FISH OIL          fluticasone 50 MCG/ACT spray    FLONASE    1 Package    Spray 1-2 sprays into both nostrils daily       gabapentin 300 MG capsule    NEURONTIN    270 capsule    Start with 300mg once daily, if tolerated well increase to 300mg twice daily; if tolerated well increase to 300mg three times dialy.       guaiFENesin-codeine 100-10 MG/5ML Soln solution    ROBITUSSIN AC    120 mL    Take 5-10 mLs by mouth nightly as needed for cough       metoprolol 25 MG 24 hr tablet    TOPROL-XL    90 tablet    Take 1 tablet (25 mg) by mouth daily       MULTIVITAMIN & MINERAL PO      Take  by mouth.       simvastatin 10 MG tablet    ZOCOR    90 tablet    Take 1 tablet (10 mg) by mouth At Bedtime       SUDAFED 12 HOUR PO      Take  by mouth.

## 2017-04-13 NOTE — PROGRESS NOTES
S:  Patient complains of mass on the right foot.  Point to the dorsal medial hallux IPJ.  Has never had this before.  Patient has had this for 3 months. Has a burning pain with this.  Aggravated by activity and tight shoes and relieved by rest.  Denies mass on contralateral foot.  History of right flatfoot surgery.           ROS:  Denies edema, erythema, ecchymosis, numbness, or drainage.  Denies any trauma.       Allergies   Allergen Reactions     Codeine Phosphate Nausea and Vomiting     Pentazocine      Propoxyphene      Darvon     Penicillins Rash       Current Outpatient Prescriptions   Medication Sig Dispense Refill     mupirocin (BACTROBAN) 2 % cream Apply topically 2 times daily Apply occlusive dressing. 30 g 5     guaiFENesin-codeine (ROBITUSSIN AC) 100-10 MG/5ML SOLN Take 5-10 mLs by mouth nightly as needed for cough 120 mL 0     simvastatin (ZOCOR) 10 MG tablet Take 1 tablet (10 mg) by mouth At Bedtime 90 tablet 3     metoprolol (TOPROL-XL) 25 MG 24 hr tablet Take 1 tablet (25 mg) by mouth daily 90 tablet 3     diclofenac (VOLTAREN) 1 % GEL 2 grams to hands four times daily using enclosed dosing card. 100 g 1     fluticasone (FLONASE) 50 MCG/ACT nasal spray Spray 1-2 sprays into both nostrils daily 1 Package 11     FISH OIL        Pseudoephedrine HCl (SUDAFED 12 HOUR PO) Take  by mouth.       Calcium-Vitamin D (CALCIUM + D PO) Take  by mouth.       Multiple Vitamins-Minerals (MULTIVITAMIN & MINERAL PO) Take  by mouth.       aspirin 81 MG tablet Take 1 tablet by mouth daily.         Patient Active Problem List   Diagnosis     Overweight     Chronic rhinitis     Primary osteoarthritis of both hands     Basal cell carcinoma of skin     Coronary atherosclerosis due to calcified coronary lesion     Elevated blood pressure (not hypertension)     Hyperlipidemia     Impaired fasting glucose     Benign neoplasm of colon     Chorioretinal scar of left eye     Sinus tachycardia     Advance care planning     Pes  planus     Right shoulder strain, initial encounter     Bronchiectasis without complication (H)     Abrasion of forehead, initial encounter       Past Medical History:   Diagnosis Date     Arthritis      Basal cell carcinoma of skin 2011     Benign neoplasm of colon 10/26/2011    Overview:  Colonoscopy every 5 years  Oct 2011      Chorioretinal scar of left eye 2015     Chronic rhinitis 2015     Coronary artery disease      Coronary atherosclerosis due to calcified coronary lesion 2011    Overview:  A)  CT Chest-Date: 07/13/10, There is coronary artery and mild aortic calcification present. B)  Nuclear Stress Test on 10/01/10, No scintigraphic evidence of ischemia or infarction. EF: 71%.       Elevated blood pressure (not hypertension) 2006     Hypercholesteremia      Hyperlipidemia 2011    Overview:  See results review for lipid profile.      Impaired fasting glucose 2006     PONV (postoperative nausea and vomiting)      Primary osteoarthritis of both hands 2015     Shingles 2009     Sinus tachycardia 2016     Skin cancer ,     Basal cell cancer     Vitamin D deficiency 2010       Past Surgical History:   Procedure Laterality Date      SECTION      x 2     COLONOSCOPY      3 polyps     LENGTHEN TENDON ACHILLES  2011    Procedure:LENGTHEN TENDON ACHILLES; Surgeon:AARON CÁRDENAS; Location:[facility]     LIVER SURGERY      Alex large cyst     MINI ARC SLING OPERATION FOR STRESS INCONTINENCE       OSTEOTOMY ANKLE  2011    Procedure:OSTEOTOMY ANKLE; Calcaneous,  Flexor Digitorum Longus Transfer       SALPINGO-OOPHORECTOMY BILATERAL  ,     Benign prophylactic for FHx ov cancer       Family History   Problem Relation Age of Onset     Other Cancer Sister 51     ovarian cancer     CANCER Sister      Colon Cancer Father      Macular Degeneration Mother      DIABETES Maternal Aunt      DIABETES Maternal Uncle      CANCER  Paternal Aunt      DIABETES Maternal Grandmother      Hypertension No family hx of      CEREBROVASCULAR DISEASE No family hx of      Thyroid Disease No family hx of      Glaucoma No family hx of        Social History   Substance Use Topics     Smoking status: Former Smoker     Types: Cigarettes     Smokeless tobacco: Former User     Quit date: 1/1/1982     Alcohol use 0.0 oz/week     0 Standard drinks or equivalent per week      Comment: 2-7 glasses of wine per week         O:  Pulse 80  Wt 68 kg (150 lb)  SpO2 98%  BMI 27.66 kg/m2.  Patient good historian.  A&O X3.  Pulses DP, PT 2/4 b/l.  CRT < 3 seconds X 10 digits.  No edema or varicosities noted.  Sensation to light touch intact b/l.  Reflexes 2/4 b/l.  Skin has normal texture and turgor b/l.  Pronated arch with weightbearing.  No forefoot or rear foot deformities noted.  MS 5/5 all compartments.  Normal ROM all fore foot and rearfoot joints.  No equinus.  There is a mass on the dorsal medial right hallux IPJ.  It is well encapsulated.   Negative pain with palpation.  Nonpulsitile.  No pain with ROM or stressing any tendons.        A:Ganglion cyst     P:  Discussed cause of this with patient.  She would like this exsanguinated.  Risks complications, and efficacy of ganglion cyst exsanguination discussed.  Patient understands that if we exsanguinate this it could come back immediately.  After written consent we blocked the area proximal to cyst with 3 cc 1% lidocaine plain and prepped in a normal sterile maner.  18 gauge needle was inserted into cyst and all fluid was removed.  Dressed with a bandaid.  Warned patient not to try to remove fluid and squeeze this herself as it could become infected.  If this drains at all return to clinic asap. If returns and painful we discussed surgical removal.  Even with this there is a chance this could return.   Will  call if desires surgical removal.   Return to clinic prn.      Anil Balderas DPM, FACFAS

## 2017-04-13 NOTE — PATIENT INSTRUCTIONS
It was nice to meet you today.    Let's try gabapentin- follow instructions on the bottle.    I placed a referral for Speech therapy- they can help you with strategies to terminate the cough.    Natalia Morrow

## 2017-04-14 NOTE — PROGRESS NOTES
CHIEF COMPLAINT:  Cough.      HISTORY OF PRESENT ILLNESS:  Isabel Carroll is a 66-year-old woman here for evaluation of chronic cough.  She says that she has had a chronic cough for approximately 20 years.  She initially saw Dr. Reynolds in 2009 and 2012.  She says that she has been using a reasonably stable medication package of Sudafed and intranasal Flonase since then.  She says that she has significant issues with postnasal drip, and continues to have mucous running down the back of her throat on most days.  She was referred to [facility] in [place].  She said she had an x-ray of her sinuses that did not show significant sinusitis or additional considerations worsening her chronic cough.  She says that her cough starts as a tickle.  It becomes severe and she has difficulty terminating it.  She finds that it is very disruptive, and she has a hard time stopping it once it starts.  She says that because of her history of smoking, she was enrolled in COPD gene (a longitudinal study of smokers and former smokers).  She says that she had a CT scan in 2010, and was called again in 2016 for a repeat CT scan.  The report from the repeat CT scan indicated that she has cylindrical bronchiectasis.  She is wondering if that might be contributing to her chronic cough.  She says that she cannot think of very many things that worsen or improve her cough.  She says sometimes it is worse when she is cleaning or dusting.  It is not waking her up at night and she is not having any problems with shortness of breath.  She does not bring up sputum most of the time.  She says that she had an acid test done at [facility].  She had a probe placed for 24 hours, and was told that her acid was normal and not contributing to her chronic cough.      PAST MEDICAL HISTORY:   1.  Basal cell carcinoma.   2.  Osteoarthritis.   3.  Hyperlipidemia.   4.  Coronary artery disease on CT scan.   5.  Possible bronchiectasis.       FAMILY HISTORY:  Mother had macular degeneration.  Father had colon cancer.  Sister had ovarian cancer.  She has a brother with asthma.  She had a grandmother who  of pneumonia in her 30s.      SOCIAL HISTORY:  The patient is a former smoker.  She has an approximately 16-pack-year smoking history, quit in .  She is currently retired.  She does Jazzercise for exercise.  She has season tickets to the [employer] and [employer].      REVIEW OF SYSTEMS:  A full 14-point review of systems was done and is negative except as noted above in the HPI.      PHYSICAL EXAM:   VITAL SIGNS:  Blood pressure 119/76.  Pulse is 78.  Respiratory rate not recorded.  SpO2 98% on room air.  BMI 25.83.   GENERAL APPEARANCE:  Well-developed, well-nourished, in no distress.   EYES:  PERRL.  No conjunctival injection.   ENT:  Nasal mucosa is mildly erythematous.  Ulceration with a modest amount of associated blood noted on intranasal septum bilaterally.  Otherwise within normal limits.   MOUTH:  Oral mucosa is moist.  No posterior oropharyngeal erythema or exudate.   NECK:  Supple with no masses.   LYMPHATICS:  No cervical or supraclavicular lymphadenopathy.   RESPIRATORY:  Good air movement bilaterally.  No wheezes, rales or rhonchi.   CARDIAC:  Regular rate and rhythm, normal S1-S2.  No murmurs, rubs or gallops.  JVP not appreciated with the patient sitting upright at 90 degrees.  No lower extremity edema is noted.   MUSCULOSKELETAL:  No clubbing or cyanosis.   SKIN:  No rash on limited exam.   NEUROLOGIC:  Mentation appears intact and speech is fluent.   PSYCHIATRIC:  Affect appears appropriate.      RESULTS:  Pulmonary function testing from today reviewed by me.  Normal spirometry, lung volumes and DLCO.      Outside records from Dr. Reynolds reviewed, Care Everywhere records, and CT chest reviewed personally by me.  CT scan, from 2016, formal impression indicates the presence of cylindrical bronchiectasis.  On my  review, the findings of bronchiectasis are less striking.  Comparison to previous CT scan from 2010 shows minimal interval change, by my evaluation.      ASSESSMENT AND PLAN:  The patient is a 66-year-old woman here for evaluation of chronic cough.  The patient and I spent the majority of our time together today talking about chronic cough.  We discussed the most common causes of chronic cough including upper airway cough syndrome, asthma, and gastroesophageal reflux disease.  The patient does have postnasal drip causing upper airway cough syndrome, but may have additional contributors to her chronic cough.      She has previous methacholine challenge testing ruling out asthma as a contributor to her chronic cough.      She has had 24-hour pH probe testing which, per her report from the gastroenterologist, suggestd that there is no evidence of GERD contributing to her chronic cough.  I do note, however, a patulous esophagus on her CT scan.  The relationship between GERD and chronic cough is complex, and there are not clear agreements regarding what is and is not an association between GERD and cough (pulm and gi providers generally disagree, with GI definitions requiring a higher proportion of reflux/reported cough events than pulm definitions).  It is possible that she has nonacid reflux or some disorder of her esophagus that could be causing reflux, chronic cough.  PPI as a treatment is relatively ineffective for GERD-associated chronic cough (unless used in conjunction with additional lifestyle modifications such as HOB elevation), I would hold off on additional interventions for this at this time.      Bronchiectasis is a reasonably common cause of chronic cough; however, her CT scan shows subtle findings of bronchiectasis.  I discussed her CT scan with one of our thoracic radiologists, who agrees that there is evidence of bronchiectasis (bronchi larger in diameter than pulmonary arteries) but clinical  significance is unclear.  Further, it was likely present on the CT Scan from  although the quality of images was lower which likely explains why bronchiectasis was not mentioned on the initial CT. Given subtlety of findings and unclear clinical significance, I will hold off on further evaluation (rheum workup, sputum culture, etc) at this time.       In ongoing discussion of idiopathic chronic cough, there is consideration given to potential treatment with gabapentin and Speech Language Pathology.  I discussed possible treatment with gabapentin with her.  I think this would be a reasonable trial for her, given the overall effect on her quality of life.  I wrote a prescription for this today, and asked her to start a low dose of 300 mg once daily, and to gradually increase that to 300 mg three times daily.  I also placed a referral to Speech Therapy to help with exercises to terminate her chronic cough.  This was discussed with her in detail.      I will have her come back and see me in approximately three months to discuss our therapeutic trials.         MAURICIO NGO MD             D: 2017 16:48   T: 2017 00:30   MT: RADHA#[id number]      Name:     KASI MCCLURE   MRN:      [id number]        Account:      [id number]   :      1950           Visit Date:   2017      Document: [id number]       cc: Marry Gonzalez MD

## 2017-04-28 ENCOUNTER — HOSPITAL ENCOUNTER (OUTPATIENT)
Dept: SPEECH THERAPY | Facility: CLINIC | Age: 67
Setting detail: THERAPIES SERIES
End: 2017-04-28
Attending: INTERNAL MEDICINE
Payer: COMMERCIAL

## 2017-04-28 PROCEDURE — 92524 BEHAVRAL QUALIT ANALYS VOICE: CPT | Mod: GN | Performed by: STUDENT IN AN ORGANIZED HEALTH CARE EDUCATION/TRAINING PROGRAM

## 2017-04-28 PROCEDURE — 40000251 ZZH STATISTIC VOICE CENTER VISIT: Performed by: STUDENT IN AN ORGANIZED HEALTH CARE EDUCATION/TRAINING PROGRAM

## 2017-04-28 PROCEDURE — 92507 TX SP LANG VOICE COMM INDIV: CPT | Mod: GN | Performed by: STUDENT IN AN ORGANIZED HEALTH CARE EDUCATION/TRAINING PROGRAM

## 2017-05-02 NOTE — PROGRESS NOTES
"[facility] OP Chronic Cough Evaluation       04/28/17 1400   General Information   Type Of Visit Initial   Start Of Care Date 04/28/17   Referring Physician Natalia Morrow MD  (Pulmonology)   Orders Evaluate And Treat   Medical Diagnosis Chronic cough   Onset Of Illness/injury Or Date Of Surgery 04/13/17  (order date)   Precautions/Limitations no known precautions/limitations   Hearing WFL for evaluation   Surgical/Medical history reviewed Yes   Pertinent History Of Current Problem 65yo female with 20+ year history of chronic cough.  Cough of uncertain origin began initially around 1996, worsening in the early 2000s.  Pt reports she experiences a strong tickle sensation in her upper chest which will then lead to \"coughing jags\" lasting 2-3 minutes in duration that she is unable to control.  She also reports intermittent brief coughs throughout the day with no obvious triggers.  Cough is mostly dry.  Pt denies voice, swallow, and breathing problems.  Pt had thorough workup with Dr. Morrow for chronic cough.  Pt does have mild cylindrical bronchiectasis, but Dr. Morrow feels that this does not fully account for the severity of the cough.  Pt takes Flonase and Sudafed for PND, but feels like she still has unmanaged PND.  Pt has been taking gabapentin since her visit with Dr. Morrow, and feels that this has helped to reduce the intensity of the tickle sensation, but has not resolved the cough.  Workup for reflux was negative, but nonacid reflux has not been ruled out.   Prior Level of Functioning Others noticed my problems.   Prior Level Of Function Comment Pt is frequently asked if she is sick because of the cough.  Pt also has subscriptions to the [employer], and the cough has been disruptive during performances to the point where she feels that she has to step out.  Pt would also like to sing in her [employer], but hasn't joined because she is afraid that she will begin " "coughing when the choir is singing in [place].   Patient Role/employment History Retired  (Former school counselor)   General Observations After given a list of common chronic cough triggers, pt feels that PND, cold air (i.e. subzero temperatures), and harsh  odors may trigger her cough.  Pt denies voice use and strong emotions/stress as triggers for the cough.   Patient/family Goals To stop the cough as much as possible   General Information Comments Pt reports that sips of water, cough drops, and chewing gum all help to reduce the cough, but she still may experience a \"coughing jag\" even when using these techniques.   Evaluation Results   Voice Observations COUGH/THROAT CLEAR: 4-5 instances of brief coughing throughout session, about 1-3 seconds in duration.  Cough is dry.  Locus of cough sounds consistent with upper airway.   Voice Profile during conversation, 1 min monologue and paragraph reading   Voice Quality WNL   Voice quality severity rating continuum (1=Severe, 7=WNL) 7   Breath control WNL   Breath control severity rating continuum (1=Severe, 7=WNL) 7   Voice Use / Effort Throat push   Voice Use / Effort comments Increased phonatory effort immediately preceding cough.  Effort is WFL when not coughing.   Voice use / Effort severity rating continuum (1=Severe, 7=WNL) 6   Pitch /Frequency Description WNL   Pitch / Frequency severity rating continuum (1=Severe, 7=WNL) 7   Volume comments WFL and appropriate for conversational speech in a quiet room.   Volume severity rating continuum (1=Severe, 7=WNL) 7   Neuromuscular Control WNL   Neuromuscular Control severity rating continuum (1=Severe, 7=WNL) 7   Resonance WNL   Resonance severity rating continuum (1=Severe, 7=WNL) 7   Comments Moderate, dry chronic cough in the presence of WNL voice production.   Videostroboscopy / Endoscopy   Other observations Not completed at this time.  May be warranted if pt does not adequately improve in therapy.   General " Therapy Interventions   Planned Therapy Interventions Voice   Voice Throat clearing elimination plan;Relaxed breath sequence techniques  (Chronic cough reduction)   Impressions and Recommendations   Communication Diagnosis Chronic cough   Summary Ms. Carroll presents with a 20+ year history of chronic cough that is likely multifactorial in nature.  Cough is likely accounted for by a combination of post nasal drainage, mild bronchiectasis, irritable larynx syndrome, and possible nonacid reflux.  Pt finds the cough to be very frustrating, and the cough has a significant impact on her quality of life as it prevents her from participating fully in some of her daily activities.   Recommendations A course of skilled speech therapy is recommended in order to train techniques to reduce the irritable larynx syndrome component of the cough/throat clear, and to reduce laryngeal irritation/hypersensitivity.   Frequency and Duration 6 sessions, 1x/week every other week   Prognosis  Good with intervention   Risks and Benefits of Treatment have been explained. Yes   Patient & /or Caregiver  in agreement with plan of care Yes   Patient Education SLP provided education regarding chronic cough and irritable larynx syndrome.  First therapy session was completed today so that pt could immediately begin implementing techniques to stop the cough, to improve pt safety and comfort.   Educational Assessment   Barriers to Learning No barriers   Preferred Learning Style Listening;Reading;Demonstration;Pictures / Video   Voice Goals   Voice Goals 1   Voice Goal 1   Goal Identifier Cough   Goal Description Patient will learn, implement, and demonstrate techniques to reduce the chronic cough, so that she reports a week of typical activities with no more than 5 instances of coughing daily, each lasting no more than 5 seconds in duration.   Target Date 07/28/17   Total Session Time   Total Session Time 50   Total Evaluation Time 25   Therapy  Certification   Certification date from 04/28/17   Certification date to 07/28/17   Medical Diagnosis Chronic cough   Certification I certify the need for these services furnished under this plan of treatment and while under my care.  (Physician co-signature of this document indicates review and certification of the therapy plan).     Thank you for the referral of this patient.    Allison Alpers, B.A. (music), M.A., CCC-SLP  Speech-Language Pathologist  Certificate of Vocology  [facility]  [phone]

## 2017-05-02 NOTE — ADDENDUM NOTE
Encounter addended by: Alpers, Allison E, SLP on: 5/2/2017  9:18 AM<BR>     Actions taken: Sign clinical note, Flowsheet accepted

## 2017-05-19 ENCOUNTER — HOSPITAL ENCOUNTER (OUTPATIENT)
Dept: SPEECH THERAPY | Facility: CLINIC | Age: 67
Setting detail: THERAPIES SERIES
End: 2017-05-19
Attending: INTERNAL MEDICINE
Payer: COMMERCIAL

## 2017-05-19 PROCEDURE — 40000251 ZZH STATISTIC VOICE CENTER VISIT: Performed by: STUDENT IN AN ORGANIZED HEALTH CARE EDUCATION/TRAINING PROGRAM

## 2017-05-19 PROCEDURE — 92507 TX SP LANG VOICE COMM INDIV: CPT | Mod: GN | Performed by: STUDENT IN AN ORGANIZED HEALTH CARE EDUCATION/TRAINING PROGRAM

## 2017-05-19 NOTE — DISCHARGE INSTRUCTIONS
Voice Therapy 5/19/2017      Stressful situations  o Diaphragmatic breathing pattern  - Abdomen inflates when you breathe in  - Breathe in through nose and out through mouth (preferable) or in through rounded/pursed lips  o Three main patterns:  - Even in and even out, slowing the rate of breathing  - 2:5 (in on a count of 2, out on a count of %)  - Box breathing    Work on slowing your rate of breathing by doing box breathing                     - Breathe on equal count for all sides of the box (e.g. count of 4)

## 2017-05-25 ENCOUNTER — OFFICE VISIT (OUTPATIENT)
Dept: FAMILY MEDICINE | Facility: CLINIC | Age: 67
End: 2017-05-25
Payer: COMMERCIAL

## 2017-05-25 VITALS
OXYGEN SATURATION: 98 % | HEIGHT: 63 IN | DIASTOLIC BLOOD PRESSURE: 77 MMHG | SYSTOLIC BLOOD PRESSURE: 129 MMHG | TEMPERATURE: 97.1 F | BODY MASS INDEX: 25.87 KG/M2 | HEART RATE: 65 BPM | WEIGHT: 146 LBS

## 2017-05-25 DIAGNOSIS — R05.3 CHRONIC COUGH: ICD-10-CM

## 2017-05-25 DIAGNOSIS — L65.8 FEMALE PATTERN HAIR LOSS: ICD-10-CM

## 2017-05-25 DIAGNOSIS — J47.9 BRONCHIECTASIS WITHOUT COMPLICATION (H): ICD-10-CM

## 2017-05-25 DIAGNOSIS — M75.41 IMPINGEMENT SYNDROME, SHOULDER, RIGHT: Primary | ICD-10-CM

## 2017-05-25 PROCEDURE — 99214 OFFICE O/P EST MOD 30 MIN: CPT | Performed by: FAMILY MEDICINE

## 2017-05-25 ASSESSMENT — PAIN SCALES - GENERAL: PAINLEVEL: MODERATE PAIN (5)

## 2017-05-25 NOTE — MR AVS SNAPSHOT
After Visit Summary   5/25/2017    Isabel Carroll    MRN: [id number]           Patient Information     Date Of Birth          1950        Visit Information        Provider Department      5/25/2017 11:00 AM Marry Gonzalez MD [facility] Andreea Dinero        Today's Diagnoses     Impingement syndrome, shoulder, right    -  1      Care Instructions    How to contact your care team: [phone] Pharmacy [phone]   OZ LYNN MD KATYA GEORGIEV, PA-C CHRIS JONES, PA-C NAM HO, MD JONATHAN BATES, MD ARVIN VOCAL, MD    Clinic hours M-Th 7am-7pm Fri 7am-5pm.   Urgent care M-F 11am-9pm  Sat/Sun 9am-5pm.   Pharmacy   Mon-Th:  8:00am-8pm   Fri:  8:00am-6:00pm  Sat/Sun  8:00am-5:00 pm       What Is Impingement Syndrome?  Shoulder pain when raising your arm may mean you have impingement syndrome. This is pinching within your shoulder. The problem may have been caused by repeating an overhead motion. In some cases, you may feel a nagging pain even when you re not using your shoulder.     A forceful action repeated day after day without rest can cause a repetitive motion injury (RMI). Shoulder impingement is often due to an RMI.      Symptoms of impingement  You may feel pain, pinching, or stiffness in your shoulder. Pain often comes with movement. But you may also feel it when you re not using your shoulder. For example, you may feel pain while trying to sleep.    Causes of impingement  Shoulder impingement is often caused by making repeated overhead movements. Constant shoulder use can irritate the tendons and bursa, leading to swelling. Swollen parts of the shoulder take up more room, making the joint space smaller:    Bursitis is inflammation of the bursa, a sac of fluid that cushions shoulder parts as they move. The bursa fills up with too much fluid, filling and squeezing the joint space.    Tendinitis is inflammation of the tendons, fibrous tissues  that connect muscle to bone.    Bone problems can make impingement worse. The acromion is part of the shoulder bone. It may be flat or hooked. If your acromion is hooked, the joint space may be smaller than normal. This makes you more prone to shoulder problems. Bone spurs (growths on the bone) can also narrow the joint space.        8078-3928 [employer]. [address] [place] [address]. All rights reserved. This information is not intended as a substitute for professional medical care. Always follow your healthcare professional's instructions.        Shoulder Pain with Uncertain Cause  Shoulder pain can have many causes. Pain often comes from the structures that surround the shoulder joint. These are the joint capsule, ligaments, tendons, muscles, and bursa. Pain can also come from cartilage in the joint. Cartilage can become worn out or injured. It s important to know what s causing your pain so the health care provider can use the correct treatment. But sometimes it s difficult to find the exact cause of shoulder pain. You may need to see a specialist (orthopedist). You may also need special tests such as a CT scan or MRI. The provider may need to use special tools to look inside the joint (arthroscopy).  Shoulder pain can be treated with a sling or a device that keeps your shoulder from moving. You can take an anti-inflammatory medicine such as ibuprofen to ease pain. You may need to do special shoulder exercises. Follow-up with a specialist if the pain is severe or doesn t go away after a few weeks.  Home care  Follow these tips when caring for yourself at home:    If a sling was given to you, leave it in place for the time advised by your health care provider. If you aren t sure how long to wear it, ask for advice. If the sling becomes loose, adjust it so that your forearm is level with the ground. Your shoulder should feel well supported.    Put an ice pack on the injured area for 20  minutes every 1 to 2 hours the first day. You can make your own ice pack by putting ice cubes in a plastic bag. Wrap the bag in a thin towel. Continue with ice packs 3 to 4 times a day for the next 2 days. Then use the pack as needed to ease pain and swelling.    You may use acetaminophen or ibuprofen to control pain, unless another pain medicine was prescribed. If you have chronic liver or kidney disease, talk with your health care provider before using these medicines. Also talk with your provider if you ve had a stomach ulcer or GI bleeding.    Shoulder pain may seem worse at night, when there is less to distract you from the pain. If you sleep on your side, try to keep weight off your painful shoulder. Propping pillows behind you may stop you from rolling over onto that shoulder during sleep.     Shoulder joints can become stiff if left in a sling for too long. You should start range of motion exercises about 10 days after the injury. Talk with your provider to find out what type of exercises to do and how soon to start.    You can take the sling off to shower or bathe.  Follow-up care  Follow up with your health care provider if you don t start to get better in the next 5 days.  When to seek medical advice  Call your health care provider right away if any of these occur:    Pain or swelling gets worse or continues for more than a few days    Your hand or fingers become cold, blue, numb, or tingly    Large amount of bruising on your shoulder or upper arm    Difficulty moving your hand or fingers    Weakness in your hand or fingers    Your shoulder becomes stiff    It feels like your shoulder is popping out    You are less able to do your daily activities       5166-0275 [employer]. [address] [place] [address], [place] [address]. All rights reserved. This information is not intended as a substitute for professional medical care. Always follow your healthcare professional's instructions.                 Follow-ups after your visit        Additional Services     ORTHO  REFERRAL       [facility] is referring you to the Orthopedic  Services at [facility].       The  Representative will assist you in the coordination of your Orthopedic and Musculoskeletal Care as prescribed by your physician.    The  Representative will call you within 1 business day to help schedule your appointment, or you may contact the  Representative at:    All areas ~ [phone]     Type of Referral : Non Surgical - [facility]      Timeframe requested: Routine    Coverage of these services is subject to the terms and limitations of your health insurance plan.  Please call member services at your health plan with any benefit or coverage questions.      If X-rays, CT or MRI's have been performed, please contact the facility where they were done to arrange for , prior to your scheduled appointment.  Please bring this referral request to your appointment and present it to your specialist.                  Your next 10 appointments already scheduled     Jun 09, 2017  9:00 AM CDT   Voice Treatment with Allison E Alpers, SLP   [facility]    [address] [place] [id number] [phone]            Aug 03, 2017  4:00 PM CDT   Return Visit with Natalia Morrow MD   [facility] ([facility])    [address] [place] [id number] [phone]              Who to contact     If you have questions or need follow up information about today's clinic visit or your schedule please contact [facility] directly at [phone].  Normal or non-critical lab and imaging results will be communicated to you by MyChart, letter or phone within 4 business days after the clinic has received the results. If you do not hear from us  "within 7 days, please contact the clinic through [employer] or phone. If you have a critical or abnormal lab result, we will notify you by phone as soon as possible.  Submit refill requests through [employer] or call your pharmacy and they will forward the refill request to us. Please allow 3 business days for your refill to be completed.          Additional Information About Your Visit        [employer] Information     [employer] gives you secure access to your electronic health record. If you see a primary care provider, you can also send messages to your care team and make appointments. If you have questions, please call your primary care clinic.  If you do not have a primary care provider, please call [phone] and they will assist you.        Care EveryWhere ID     This is your Care EveryWhere ID. This could be used by other organizations to access your [place] medical records  [id number]        Your Vitals Were     Pulse Temperature Height Pulse Oximetry Breastfeeding? BMI (Body Mass Index)    65 97.1  F (36.2  C) (Oral) 5' 2.75\" (1.594 m) 98% No 26.07 kg/m2       Blood Pressure from Last 3 Encounters:   05/25/17 129/77   04/13/17 (P) 119/76   02/23/17 120/82    Weight from Last 3 Encounters:   05/25/17 146 lb (66.2 kg)   04/13/17 144 lb 10.6 oz (65.6 kg)   04/12/17 150 lb (68 kg)              We Performed the Following     ORTHO  REFERRAL        Primary Care Provider Office Phone # Fax #    Marry Naida Gonzalez [phone]       [facility] [address]  [facility] [address]        Thank you!     Thank you for choosing [facility]  for your care. Our goal is always to provide you with excellent care. Hearing back from our patients is one way we can continue to improve our services. Please take a few minutes to complete the written survey that you may receive in the mail after your visit with us. Thank you!             Your Updated Medication List - " Protect others around you: Learn how to safely use, store and throw away your medicines at www.disposemymeds.org.          This list is accurate as of: 5/25/17 11:50 AM.  Always use your most recent med list.                   Brand Name Dispense Instructions for use    aspirin 81 MG tablet      Take 1 tablet by mouth daily.       CALCIUM + D PO      Take  by mouth.       diclofenac 1 % Gel topical gel    VOLTAREN    100 g    2 grams to hands four times daily using enclosed dosing card.       FISH OIL          fluticasone 50 MCG/ACT spray    FLONASE    1 Package    Spray 1-2 sprays into both nostrils daily       gabapentin 300 MG capsule    NEURONTIN    270 capsule    Start with 300mg once daily, if tolerated well increase to 300mg twice daily; if tolerated well increase to 300mg three times dialy.       metoprolol 25 MG 24 hr tablet    TOPROL-XL    90 tablet    Take 1 tablet (25 mg) by mouth daily       MULTIVITAMIN & MINERAL PO      Take  by mouth.       simvastatin 10 MG tablet    ZOCOR    90 tablet    Take 1 tablet (10 mg) by mouth At Bedtime       SUDAFED 12 HOUR PO      Take  by mouth.

## 2017-05-25 NOTE — NURSING NOTE
"Chief Complaint   Patient presents with     Shoulder Injury     Right, worsened       Initial /77 (BP Location: Right arm, Patient Position: Chair, Cuff Size: Adult Regular)  Pulse 65  Temp 97.1  F (36.2  C) (Oral)  Ht 5' 2.75\" (1.594 m)  Wt 146 lb (66.2 kg)  SpO2 98%  Breastfeeding? No  BMI 26.07 kg/m2 Estimated body mass index is 26.07 kg/(m^2) as calculated from the following:    Height as of this encounter: 5' 2.75\" (1.594 m).    Weight as of this encounter: 146 lb (66.2 kg).  Medication Reconciliation: complete     Flakito Smart CMA    "

## 2017-05-30 ENCOUNTER — OFFICE VISIT (OUTPATIENT)
Dept: ORTHOPEDICS | Facility: CLINIC | Age: 67
End: 2017-05-30
Payer: COMMERCIAL

## 2017-05-30 VITALS
WEIGHT: 145 LBS | DIASTOLIC BLOOD PRESSURE: 78 MMHG | SYSTOLIC BLOOD PRESSURE: 124 MMHG | HEIGHT: 63 IN | BODY MASS INDEX: 25.69 KG/M2

## 2017-05-30 DIAGNOSIS — M67.911 TENDINOPATHY OF RIGHT ROTATOR CUFF: Primary | ICD-10-CM

## 2017-05-30 PROCEDURE — 20610 DRAIN/INJ JOINT/BURSA W/O US: CPT | Mod: RT | Performed by: PEDIATRICS

## 2017-05-30 PROCEDURE — 99203 OFFICE O/P NEW LOW 30 MIN: CPT | Mod: 25 | Performed by: PEDIATRICS

## 2017-05-30 RX ORDER — TRIAMCINOLONE ACETONIDE 40 MG/ML
40 INJECTION, SUSPENSION INTRA-ARTICULAR; INTRAMUSCULAR ONCE
Qty: 1 ML | Refills: 0 | OUTPATIENT
Start: 2017-05-30 | End: 2017-05-30

## 2017-05-30 NOTE — MR AVS SNAPSHOT
After Visit Summary   5/30/2017    Isabel Carroll    MRN: [id number]           Patient Information     Date Of Birth          1950        Visit Information        Provider Department      5/30/2017 3:20 PM Gilbert Amin DO [facility]        Today's Diagnoses     Tendinopathy of right rotator cuff    -  1      Care Instructions    Steroid injection of the right shoulder: subacromial space was performed today in clinic  Icing for the next 1-2 days may be helpful for pain. Injection may take 10-14 days to see the full effect.          Follow-ups after your visit        Follow-up notes from your care team     Return if symptoms worsen or fail to improve.      Your next 10 appointments already scheduled     Jun 09, 2017  9:00 AM CDT   Voice Treatment with Allison E Alpers, SLP   [facility] ([facility]    [address] [place] [address] [phone]            Aug 03, 2017  4:00 PM CDT   Return Visit with Natalia Morrow MD   [facility] ([facility])    [address] [place] [id number] [phone]              Who to contact     If you have questions or need follow up information about today's clinic visit or your schedule please contact [facility] directly at [phone].  Normal or non-critical lab and imaging results will be communicated to you by MyChart, letter or phone within 4 business days after the clinic has received the results. If you do not hear from us within 7 days, please contact the clinic through MyChart or phone. If you have a critical or abnormal lab result, we will notify you by phone as soon as possible.  Submit refill requests through [employer] or call your pharmacy and they will forward the refill request to us. Please allow 3 business days for your refill to be completed.          Additional  "Information About Your Visit        MyChart Information     [employer] gives you secure access to your electronic health record. If you see a primary care provider, you can also send messages to your care team and make appointments. If you have questions, please call your primary care clinic.  If you do not have a primary care provider, please call [phone] and they will assist you.        Care EveryWhere ID     This is your Care EveryWhere ID. This could be used by other organizations to access your [place] medical records  [id number]        Your Vitals Were     Height BMI (Body Mass Index)                5' 2.75\" (1.594 m) 25.89 kg/m2           Blood Pressure from Last 3 Encounters:   05/30/17 124/78   05/25/17 129/77   04/13/17 (P) 119/76    Weight from Last 3 Encounters:   05/30/17 145 lb (65.8 kg)   05/25/17 146 lb (66.2 kg)   04/13/17 144 lb 10.6 oz (65.6 kg)              We Performed the Following     Kenalog 40 MG  []     Large Joint/Bursa injection and/or drainage (Shoulder, Knee)          Today's Medication Changes          These changes are accurate as of: 5/30/17  4:31 PM.  If you have any questions, ask your nurse or doctor.               Start taking these medicines.        Dose/Directions    triamcinolone acetonide 40 MG/ML injection   Commonly known as:  KENALOG   Used for:  Tendinopathy of right rotator cuff   Started by:  Gilbert Amin, DO        Dose:  40 mg   1 mL (40 mg) by INTRA-ARTICULAR route once for 1 dose   Quantity:  1 mL   Refills:  0            Where to get your medicines      Some of these will need a paper prescription and others can be bought over the counter.  Ask your nurse if you have questions.     You don't need a prescription for these medications     triamcinolone acetonide 40 MG/ML injection                Primary Care Provider Office Phone # Fax #    Marry Gonzalez [phone]       [facility] [address]  [place] " [place] [address]        Thank you!     Thank you for choosing [facility]  for your care. Our goal is always to provide you with excellent care. Hearing back from our patients is one way we can continue to improve our services. Please take a few minutes to complete the written survey that you may receive in the mail after your visit with us. Thank you!             Your Updated Medication List - Protect others around you: Learn how to safely use, store and throw away your medicines at www.disposemymeds.org.          This list is accurate as of: 5/30/17  4:31 PM.  Always use your most recent med list.                   Brand Name Dispense Instructions for use    aspirin 81 MG tablet      Take 1 tablet by mouth daily.       CALCIUM + D PO      Take  by mouth.       diclofenac 1 % Gel topical gel    VOLTAREN    100 g    2 grams to hands four times daily using enclosed dosing card.       FISH OIL          fluticasone 50 MCG/ACT spray    FLONASE    1 Package    Spray 1-2 sprays into both nostrils daily       gabapentin 300 MG capsule    NEURONTIN    270 capsule    Start with 300mg once daily, if tolerated well increase to 300mg twice daily; if tolerated well increase to 300mg three times dialy.       metoprolol 25 MG 24 hr tablet    TOPROL-XL    90 tablet    Take 1 tablet (25 mg) by mouth daily       MULTIVITAMIN & MINERAL PO      Take  by mouth.       simvastatin 10 MG tablet    ZOCOR    90 tablet    Take 1 tablet (10 mg) by mouth At Bedtime       SUDAFED 12 HOUR PO      Take  by mouth.       triamcinolone acetonide 40 MG/ML injection    KENALOG    1 mL    1 mL (40 mg) by INTRA-ARTICULAR route once for 1 dose

## 2017-05-30 NOTE — NURSING NOTE
"Chief Complaint   Patient presents with     Musculoskeletal Problem     right shoulder pain       Initial /78  Ht 5' 2.75\" (1.594 m)  Wt 145 lb (65.8 kg)  BMI 25.89 kg/m2 Estimated body mass index is 25.89 kg/(m^2) as calculated from the following:    Height as of this encounter: 5' 2.75\" (1.594 m).    Weight as of this encounter: 145 lb (65.8 kg).  Medication Reconciliation: complete  "

## 2017-05-30 NOTE — Clinical Note
I had the opportunity to see Isabel Paris Glen in [facility] clinic for her rotator cuff tendinopathy. Injection done. Please see chart for details of the visit. Thanks.

## 2017-05-30 NOTE — PROGRESS NOTES
Sports Medicine Clinic Visit    PCP: Marry Gonzalez Paris Carroll is a 66 year old female who is seen  in consultation at the request of Marry Gonzalez MD presenting with right shoulder pain.  Pain began after a fall about 18 months ago.  Did have an injection in 2/2016 and PT.  This was helpful.   She has had intermittent pain since that time.  Did a second round of PT earlier this year.  Pain is mostly in the posterior shoulder.   Did have x rays and an MRI with original injury   Patient is left hand dominant.      Right shoulder subacromial injection by Dr. Arias on 2/17/16    **  Patient denies pain at rest. 2-3 pain level right now.  Her ROM is intact.  She feels that her strength has been maintained.  She is doing the home exercise programs from physical therapy. Exercise with resistance increases the patient's discomfort.        Injury: fall     Location of Pain: right posterior shoulder   Duration of Pain: 17 months month(s)  Rating of Pain at worst: 8/10  Rating of Pain Currently: 3/10  Symptoms are better with: Heat, soaking tub   Symptoms are worse with: laying on right side, lifting  Additional Features:   Positive: popping and weakness   Negative: swelling, bruising, grinding, catching, locking, instability, paresthesias, numbness, pain in other joints and systemic symptoms  Other evaluation and/or treatments so far consists of: X Ray, MRI, PT, Injection  Prior History of related problems: PT in the past    Social History: retired    Review of Systems  Musculoskeletal: as above  Remainder of review of systems is negative including constitutional, CV, pulmonary, GI, Skin and Neurologic except as noted in HPI or medical history.    Past Medical History:   Diagnosis Date     Arthritis      Basal cell carcinoma of skin 4/7/2011     Benign neoplasm of colon 10/26/2011    Overview:  Colonoscopy every 5 years  Oct 2011      Chorioretinal scar of left eye 12/2/2015     Chronic rhinitis 7/28/2015      Coronary artery disease      Coronary atherosclerosis due to calcified coronary lesion 2011    Overview:  A)  CT Chest-Date: 07/13/10, There is coronary artery and mild aortic calcification present. B)  Nuclear Stress Test on 10/01/10, No scintigraphic evidence of ischemia or infarction. EF: 71%.       Elevated blood pressure (not hypertension) 2006     Hypercholesteremia      Hyperlipidemia 2011    Overview:  See results review for lipid profile.      Impaired fasting glucose 2006     PONV (postoperative nausea and vomiting)      Primary osteoarthritis of both hands 2015     Shingles 2009     Sinus tachycardia 2016     Skin cancer ,     Basal cell cancer     Vitamin D deficiency 2010     Past Surgical History:   Procedure Laterality Date      SECTION      x 2     COLONOSCOPY      3 polyps     LENGTHEN TENDON ACHILLES  2011    Procedure:LENGTHEN TENDON ACHILLES; Surgeon:AARON CÁRDENAS; [facility]     LIVER SURGERY      Bengin large cyst     MINI ARC SLING OPERATION FOR STRESS INCONTINENCE       OSTEOTOMY ANKLE  2011    Procedure:OSTEOTOMY ANKLE; Calcaneous,  Flexor Digitorum Longus Transfer       SALPINGO-OOPHORECTOMY BILATERAL  ,     Benign prophylactic for FHx ov cancer     Family History   Problem Relation Age of Onset     Other Cancer Sister 51     ovarian cancer     CANCER Sister      Colon Cancer Father      Macular Degeneration Mother      DIABETES Maternal Aunt      DIABETES Maternal Uncle      CANCER Paternal Aunt      DIABETES Maternal Grandmother      Hypertension No family hx of      CEREBROVASCULAR DISEASE No family hx of      Thyroid Disease No family hx of      Glaucoma No family hx of      Social History     Social History     Marital status:      Spouse name: Darrian     Number of children: 2     Years of education: 17.5     Occupational History     Retired      School counselor, 2015  "    Social History Main Topics     Smoking status: Former Smoker     Years: 16.00     Types: Cigarettes     Quit date: 1/1/1982     Smokeless tobacco: Former User     Quit date: 1/1/1982     Alcohol use 0.0 oz/week     0 Standard drinks or equivalent per week      Comment: 2-7 glasses of wine per week     Drug use: No     Sexual activity: No     Other Topics Concern     Exercise Yes     jazzercise     Social History Narrative       Objective  Ht 5' 2.75\" (1.594 m)  Wt 145 lb (65.8 kg)  BMI 25.89 kg/m2      GENERAL APPEARANCE: healthy, alert and no distress   GAIT: NORMAL  SKIN: no suspicious lesions or rashes  NEURO: Normal strength and tone, mentation intact and speech normal  PSYCH:  mentation appears normal and affect normal/bright  HEENT: no scleral icterus  CV: no upper extremity edema  RESP: nonlabored breathing    Right Shoulder exam    ROM:        Full active and passive ROM with flexion, extension, abduction, internal and external rotation.    Tender:        Non-tender     Non Tender:       remainder of shoulder    Strength:        abduction 5/5       internal rotation 5/5       external rotation 5/5    Impingement testing:        neg (-) empty can        positive (+) crossover     Stability testing:  Not assessed     Skin:       no visible deformities       well perfused       capillary refill brisk    Sensation:        normal sensation over shoulder and upper extremity       Radiology  Visualized MRI of the right shoulder 2/12/16, and reviewed images and report with patient.  MRI demonstrates cuff tendinosis.      EXAMINATION: MRI of the right shoulder without contrast     DATE: 2/12/2016     HISTORY:  Persistent right shoulder pain after a fall, rotator cuff  tear suspected     TECHNIQUE:    Routine MRI of the right shoulder was performed with a  dedicated coil. Acquired sequences including T1-weighted, T2-weighted  present densities.     FINDINGS:    Comparison radiographs dated 1/3/2016 were " reviewed,  which demonstrate degenerative changes in the glenohumeral and  acromioclavicular joints without acute abnormality.     There is moderate to high-grade articular sided partial thickness for  with tearing of the supraspinatus at the footprint with medial  retraction of torn articular sided fibers for approximately 1.9 cm.  There is associated severe tendinosis. There is extension of tearing  posteriorly to involve the anterior fibers of the infraspinatus where  there is severe tendinosis with associated at least low grade  articular sided tearing near the footprint. Teres minor tendon is  intact. There is tendinosis of the subscapularis tendon. Muscle bulk  rotator cuff is preserved. There is small amount of  subacromial/subdeltoid bursal fluid with associated synovitis.     Coracoacromial ligament is not thickened. There is moderate  degenerative changes of acromioclavicular joint. Acromion is type II  in sagittal morphology.     There is mild irregularity of the glenoid labrum without focal tear.  There is tendinosis intra-articular and bicipital pulley portion of  the long head of the biceps tendon. The articular cartilage shows  areas of moderate grade thinning in the glenoid with question mild  subjacent marrow edema. Subchondral cystic change is noted posteriorly  in the humeral head, nonspecific. Bone marrow signal intensity is  otherwise within the limits.      There is a small glenohumeral joint effusion. There is mild thickening  of the axillary pouch and fluid within the subscapular recess with  associated synovitis, and effacement of rotator interval.         IMPRESSION:  1. Severe supraspinatus tendinosis with moderate to high-grade partial  thickness articular sided tearing with medial retraction of torn  fibers No associated muscle atrophy.  Tear extends into the anterior  portion of infraspinatus as low grade articular sided tearing near the  footprint.  2. Moderate subscapularis tendinosis  without tear.  3. Mild to moderate biceps long head tendinosis.  4. Mild glenohumeral joint degenerative changes.  5. Mild thickening of the axillary pouch and fluid and synovitis  within the subscapular recess would be consistent with adhesive  capsulitis in the appropriate clinical setting.     I have personally reviewed the examination and initial interpretation  and I agree with the findings.     SAVANAH PAULINO  =========================================================  RIGHT SHOULDER 2 VIEWS  1/3/2016 11:30 AM     HISTORY:  Pain after injury.     COMPARISON:  None.     FINDINGS:  No fracture or osseous lesion is seen. The joint spaces are  well preserved. No adjacent soft tissue pathology is seen.     IMPRESSION  IMPRESSION:  Unremarkable examination.          QUINTEN MOCTEZUMA MD    Assessment:  1. Tendinopathy of right rotator cuff         Plan:  Discussed the assessment with the patient.     Radiologic images reviewed and discussed with patient today    We discussed the following treatment options: symptom treatment, activity modification/rest, imaging, rehab, injection therapy and referral to surgeon. Following discussion, plan:    Topical Treatments: Ice prn  Over the counter medication: Patient's preferred OTC medication as directed on packaging.  Has done PT in past; will continue with comfortable HEP.  Steroid injection of the right shoulder: subacromial space was performed today in clinic  Icing for the next 1-2 days may be helpful for pain. Injection may take 10-14 days to see the full effect.    Discussed steroid injection, including risks, potential benefits, and alternatives.  The patient expressed understanding.  Obtained verbal and written consent and the patient elected to proceed.  Procedure: A steroid injection was performed under aseptic technique at right subacromial space using 1% plain Lidocaine and 40 mg of Kenalog. Bandage applied. This was well tolerated.    Follow up: will leave  as needed. Pending course, additional considerations may be return to PT (though not likely to provide significant improvement compared to what she has already had), trial other injection therapy (e.g., PRP), and return to see ortho surgery.  Questions answered. The patient indicates understanding of these issues and agrees with the plan.    Gilbert Amin DO, CAQ    CC: Marry Gonzalez        Disclaimer: This note consists of symbols derived from keyboarding, dictation and/or voice recognition software. As a result, there may be errors in the script that have gone undetected. Please consider this when interpreting information found in this chart.    The information in this document, created by the medical scribe for me, accurately reflects the services I personally performed and the decisions made by me. I have reviewed and approved this document for accuracy.   Gilbert Amin DO, CAQ

## 2017-06-06 ENCOUNTER — OFFICE VISIT (OUTPATIENT)
Dept: FAMILY MEDICINE | Facility: CLINIC | Age: 67
End: 2017-06-06
Payer: COMMERCIAL

## 2017-06-06 VITALS
TEMPERATURE: 98.3 F | WEIGHT: 145 LBS | DIASTOLIC BLOOD PRESSURE: 79 MMHG | SYSTOLIC BLOOD PRESSURE: 126 MMHG | BODY MASS INDEX: 25.69 KG/M2 | HEIGHT: 63 IN | OXYGEN SATURATION: 95 % | HEART RATE: 68 BPM

## 2017-06-06 DIAGNOSIS — B88.0 CHIGGER BITES: Primary | ICD-10-CM

## 2017-06-06 DIAGNOSIS — R05.3 PERSISTENT COUGH: ICD-10-CM

## 2017-06-06 PROCEDURE — 99213 OFFICE O/P EST LOW 20 MIN: CPT | Performed by: FAMILY MEDICINE

## 2017-06-06 ASSESSMENT — PAIN SCALES - GENERAL: PAINLEVEL: MODERATE PAIN (4)

## 2017-06-06 NOTE — NURSING NOTE
"Chief Complaint   Patient presents with     Shingles       Initial /79 (BP Location: Right arm, Patient Position: Chair, Cuff Size: Adult Regular)  Pulse 68  Temp 98.3  F (36.8  C) (Oral)  Ht 5' 2.75\" (1.594 m)  Wt 145 lb (65.8 kg)  SpO2 95%  Breastfeeding? No  BMI 25.89 kg/m2 Estimated body mass index is 25.89 kg/(m^2) as calculated from the following:    Height as of this encounter: 5' 2.75\" (1.594 m).    Weight as of this encounter: 145 lb (65.8 kg).  Medication Reconciliation: complete     Flakito Smart CMA    "

## 2017-06-06 NOTE — PROGRESS NOTES
SUBJECTIVE:                                                    Isabel Carroll is a 66 year old female who presents to clinic today for the following health issues:    Concern - Shingles     Onset: x1week ago    Description:   Patient started with itching on left thigh and thought it may have been a few bug bites but worsened.    Intensity: mild    Progression of Symptoms:  worsened    Accompanying Signs & Symptoms:  Itchy       Previous history of similar problem:   Yes, history of shingles in 2009 right thigh    Precipitating factors:   Worsened by: Scratching, went swimming in [place].     Alleviating factors:  Improved by: None       Therapies Tried and outcome: Bacitracin      Chronic cough with bronchiectasis and started on gabapentin but this is not helping and is making her feel too tired. Will call pulmonary to stop this. Will try over the counter omeprazole to see if this helps.     Problem list and histories reviewed & adjusted, as indicated.  Additional history: as documented    Patient Active Problem List   Diagnosis     Overweight     Chronic rhinitis     Primary osteoarthritis of both hands     Basal cell carcinoma of skin     Coronary atherosclerosis due to calcified coronary lesion     Elevated blood pressure (not hypertension)     Hyperlipidemia     Impaired fasting glucose     Benign neoplasm of colon     Chorioretinal scar of left eye     Sinus tachycardia     Advance care planning     Pes planus     Right shoulder strain, initial encounter     Bronchiectasis without complication (H)     Abrasion of forehead, initial encounter     Chronic cough     Female pattern hair loss     Past Surgical History:   Procedure Laterality Date      SECTION      x 2     COLONOSCOPY      3 polyps     LENGTHEN TENDON ACHILLES  2011    Procedure:LENGTHEN TENDON ACHILLES; Surgeon:AARON CÁRDENAS; [facility]     LIVER SURGERY      Alex large cyst     MINI ARC SLING OPERATION  FOR STRESS INCONTINENCE  2007     OSTEOTOMY ANKLE  5/25/2011    Procedure:OSTEOTOMY ANKLE; Calcaneous,  Flexor Digitorum Longus Transfer       SALPINGO-OOPHORECTOMY BILATERAL  1998, 2000    Benign prophylactic for FHx ov cancer       Social History   Substance Use Topics     Smoking status: Former Smoker     Years: 16.00     Types: Cigarettes     Quit date: 1/1/1982     Smokeless tobacco: Former User     Quit date: 1/1/1982     Alcohol use 0.0 oz/week     0 Standard drinks or equivalent per week      Comment: 2-7 glasses of wine per week     Family History   Problem Relation Age of Onset     Other Cancer Sister 51     ovarian cancer     CANCER Sister      Colon Cancer Father      Macular Degeneration Mother      DIABETES Maternal Aunt      DIABETES Maternal Uncle      CANCER Paternal Aunt      DIABETES Maternal Grandmother      Hypertension No family hx of      CEREBROVASCULAR DISEASE No family hx of      Thyroid Disease No family hx of      Glaucoma No family hx of          Current Outpatient Prescriptions   Medication Sig Dispense Refill     gabapentin (NEURONTIN) 300 MG capsule Start with 300mg once daily, if tolerated well increase to 300mg twice daily; if tolerated well increase to 300mg three times dialy. 270 capsule 1     simvastatin (ZOCOR) 10 MG tablet Take 1 tablet (10 mg) by mouth At Bedtime 90 tablet 3     metoprolol (TOPROL-XL) 25 MG 24 hr tablet Take 1 tablet (25 mg) by mouth daily 90 tablet 3     diclofenac (VOLTAREN) 1 % GEL 2 grams to hands four times daily using enclosed dosing card. 100 g 1     fluticasone (FLONASE) 50 MCG/ACT nasal spray Spray 1-2 sprays into both nostrils daily 1 Package 11     FISH OIL        Pseudoephedrine HCl (SUDAFED 12 HOUR PO) Take  by mouth.       Calcium-Vitamin D (CALCIUM + D PO) Take  by mouth.       Multiple Vitamins-Minerals (MULTIVITAMIN & MINERAL PO) Take  by mouth.       aspirin 81 MG tablet Take 1 tablet by mouth daily.       Allergies   Allergen Reactions      "Codeine Phosphate Nausea and Vomiting     Pentazocine      Propoxyphene      Darvon     Penicillins Rash     Recent Labs   Lab Test  10/14/16   0815  05/22/16   1708  10/06/15   0818  04/01/14 09/28/13 09/22/12 08/17/12   A1C  5.5  5.8   --    --    --   5.7  6.0   --    LDL  106*   --   113   --   80   --   111   --    HDL  48*   --   47*   --   40*   --   54   --    TRIG  210*   --   209*   --   180*   --   156*   --    ALT   --   39   --    --    --    --    --    --    CR  0.74  0.63  0.72   < >   --    --    --    --    GFRESTIMATED  78  >90  Non  GFR Calc    82   < >   --    --    --    --    GFRESTBLACK  >90   GFR Calc    >90   GFR Calc    >90   GFR Calc     < >   --    --    --    --    POTASSIUM   --   4.1  4.1   --    --    --    --    --    TSH   --    --    --    --    --    --   0.506  0.829    < > = values in this interval not displayed.      BP Readings from Last 3 Encounters:   06/06/17 126/79   05/30/17 124/78   05/25/17 129/77    Wt Readings from Last 3 Encounters:   06/06/17 145 lb (65.8 kg)   05/30/17 145 lb (65.8 kg)   05/25/17 146 lb (66.2 kg)                  Labs reviewed in EPIC    Reviewed and updated as needed this visit by clinical staff  Tobacco  Allergies  Meds  Med Hx  Surg Hx  Fam Hx  Soc Hx      Reviewed and updated as needed this visit by Provider         ROS:  Constitutional, HEENT, cardiovascular, pulmonary, gi and gu systems are negative, except as otherwise noted.    OBJECTIVE:                                                    /79 (BP Location: Right arm, Patient Position: Chair, Cuff Size: Adult Regular)  Pulse 68  Temp 98.3  F (36.8  C) (Oral)  Ht 5' 2.75\" (1.594 m)  Wt 145 lb (65.8 kg)  SpO2 95%  Breastfeeding? No  BMI 25.89 kg/m2  Body mass index is 25.89 kg/(m^2).  GENERAL: healthy, alert and no distress  EYES: Eyes grossly normal to inspection, conjunctivae and sclerae normal  MS: no gross " musculoskeletal defects noted, no edema  SKIN: no suspicious lesions or rashes, several small excoriated lesions on left thigh with no erythema or papules. Look more like small bite marks or neurodermatitis than shingles.   NEURO: Normal strength and tone, gait and speech normal  PSYCH: mentation appears normal, affect normal/bright Diagnostic Test Results:  none   Diagnostic Test Results:  none      ASSESSMENT/PLAN:                                                              ICD-10-CM    1. Chigger bites B88.0 Discussed treatment and avoidance, continue bacitracin ointment. Does not need treatment for shingles. Not contagious.    2. Persistent cough R05 Follow up with pulmonary. Agree with over the counter omeprazole for empiric treatment of cough.        FUTURE APPOINTMENTS:       - Follow-up for annual visit or as needed  See Patient Instructions    Marry Gonzalez MD  [facility]

## 2017-06-06 NOTE — PATIENT INSTRUCTIONS
How to contact your care team: [phone] Pharmacy [phone]   OZ LYNN MD KATYA GEORGIEV, PA-C CHRIS JONES, PA-C NAM HO, MD JONATHAN BATES, MD ARVIN VOCAL, MD    Clinic hours M-Th 7am-7pm Fri 7am-5pm.   Urgent care M-F 11am-9pm  Sat/Sun 9am-5pm.   Pharmacy   Mon-Th:  8:00am-8pm   Fri:  8:00am-6:00pm  Sat/Sun  8:00am-5:00 pm       Chigger Bite  Chiggers are tiny mites that attach themselves to the skin to feed on skin cells. It is only the larvae (babies) of the chigger that bite people. When a chigger bites you, it attaches tiny mouth parts to the skin, usually near a skin pore or hair follicle. The bite is painless. They do not burrow under your skin or suck your blood.  After biting, they inject saliva (spit) into the skin. The saliva has an enzyme that breaks down skin cells into a liquid. This is what they eat. To prevent the saliva from spreading, the skin cells around the saliva harden. This causes the redness, swelling, and severe itching. In [place], chigger bites do not transmit disease. But they do cause significant itching as the bite heals. This may take a week or longer.     Chiggers often bite on the feet or around the waist.   Home care  The health care provider may prescribe over-the-counter (OTC) medicines to help relieve itching and swelling. Use each medicine according to the directions on the package. If the bite becomes infected, an antibiotic will be prescribed. This may be in pill form taken by mouth or as an ointment or cream put directly on the skin. Be sure to use this medicine exactly as prescribed. You should continue using your antibiotic until you are told you can stop, even if you re feeling better.  The following are general care guidelines:      Symptoms usually go away on their own within 1 to 2 weeks.    Wash all the clothing you were using when you were bitten, since there may still be mites in them. The same goes for blankets or  sleeping bags. Use hot water.    To help prevent infection, avoid scratching and picking as much as possible.    To help relieve itching and swelling, apply ice wrapped in a thin towel to the bites. Do this for up to 10 minutes at a time. Be careful not to freeze your skin, as this will damage it.    An OTC anti-itch cream like calamine or hydrocortisone may be helpful.    Avoid hot baths or showers. This can make itching worse.    Contrary to popular belief, chiggers do not burrow in the skin. Do not apply alcohol, heat, or other home remedies to the skin in an attempt to remove chiggers.  Avoiding chigger bites  These are things you can do to prevent chigger bites:    Wear socks, long sleeves and long pants when you are outdoors where chiggers have likely gathered, such as in tall grass. Also use insect repellent. The most effective repellents contain DEET (10% to 30%). Children should not use more than 10% strength of DEET. Infants and pregnant women should not use DEET.    You can spray your clothing with a repellent containing DEET or permethrin. If you do this, you do not need to put repellent on the skin under clothing that has been sprayed. [place] it around the openings of your clothes. This includes the cuffs, waistband, shirt neck, and tops of boots.    Sulfur powder can also be applied to clothing. But sulfur can have a strong odor, especially when mixed with sweat. It can also irritate sensitive skin.  Follow-up care  Follow up with your health care provider, or as advised.  When to seek medical advice  Call your health care provider right away if any of the following occur:    Shortness of breath or difficulty breathing    Fever of 100.4 F (38 C) or higher    Dizziness, weakness, or fainting    Headache, fever, chills, muscle or joint aching, or vomiting    New rash    Signs of infection, such as increased swelling and pain, warmth, red streaks, or drainage    Drainage from the bite area    [id number] [employer]. [address] [place] [address], [place] [address]. All rights reserved. This information is not intended as a substitute for professional medical care. Always follow your healthcare professional's instructions.

## 2017-06-06 NOTE — MR AVS SNAPSHOT
After Visit Summary   6/6/2017    Isabel Carroll    MRN: [id number]           Patient Information     Date Of Birth          1950        Visit Information        Provider Department      6/6/2017 11:40 AM Marry Gonzalez MD [facility] Andreea Dinero        Today's Diagnoses     Chigger bites    -  1    Persistent cough          Care Instructions    How to contact your care team: [phone] Pharmacy [phone]   OZ LYNN MD KATYA GEORGIEV, PA-C CHRIS JONES, PA-C NAM HO, MD JONATHAN BATES, MD ARVIN VOCAL, MD    Clinic hours M-Th 7am-7pm Fri 7am-5pm.   Urgent care M-F 11am-9pm  Sat/Sun 9am-5pm.   Pharmacy   Mon-Th:  8:00am-8pm   Fri:  8:00am-6:00pm  Sat/Sun  8:00am-5:00 pm       Chigger Bite  Chiggers are tiny mites that attach themselves to the skin to feed on skin cells. It is only the larvae (babies) of the chigger that bite people. When a chigger bites you, it attaches tiny mouth parts to the skin, usually near a skin pore or hair follicle. The bite is painless. They do not burrow under your skin or suck your blood.  After biting, they inject saliva (spit) into the skin. The saliva has an enzyme that breaks down skin cells into a liquid. This is what they eat. To prevent the saliva from spreading, the skin cells around the saliva harden. This causes the redness, swelling, and severe itching. In [place], chigger bites do not transmit disease. But they do cause significant itching as the bite heals. This may take a week or longer.     Chiggers often bite on the feet or around the waist.   Home care  The health care provider may prescribe over-the-counter (OTC) medicines to help relieve itching and swelling. Use each medicine according to the directions on the package. If the bite becomes infected, an antibiotic will be prescribed. This may be in pill form taken by mouth or as an ointment or cream put directly on the skin. Be sure to  use this medicine exactly as prescribed. You should continue using your antibiotic until you are told you can stop, even if you re feeling better.  The following are general care guidelines:      Symptoms usually go away on their own within 1 to 2 weeks.    Wash all the clothing you were using when you were bitten, since there may still be mites in them. The same goes for blankets or sleeping bags. Use hot water.    To help prevent infection, avoid scratching and picking as much as possible.    To help relieve itching and swelling, apply ice wrapped in a thin towel to the bites. Do this for up to 10 minutes at a time. Be careful not to freeze your skin, as this will damage it.    An OTC anti-itch cream like calamine or hydrocortisone may be helpful.    Avoid hot baths or showers. This can make itching worse.    Contrary to popular belief, chiggers do not burrow in the skin. Do not apply alcohol, heat, or other home remedies to the skin in an attempt to remove chiggers.  Avoiding chigger bites  These are things you can do to prevent chigger bites:    Wear socks, long sleeves and long pants when you are outdoors where chiggers have likely gathered, such as in tall grass. Also use insect repellent. The most effective repellents contain DEET (10% to 30%). Children should not use more than 10% strength of DEET. Infants and pregnant women should not use DEET.    You can spray your clothing with a repellent containing DEET or permethrin. If you do this, you do not need to put repellent on the skin under clothing that has been sprayed. Senatobia it around the openings of your clothes. This includes the cuffs, waistband, shirt neck, and tops of boots.    Sulfur powder can also be applied to clothing. But sulfur can have a strong odor, especially when mixed with sweat. It can also irritate sensitive skin.  Follow-up care  Follow up with your health care provider, or as advised.  When to seek medical advice  Call your health care  provider right away if any of the following occur:    Shortness of breath or difficulty breathing    Fever of 100.4 F (38 C) or higher    Dizziness, weakness, or fainting    Headache, fever, chills, muscle or joint aching, or vomiting    New rash    Signs of infection, such as increased swelling and pain, warmth, red streaks, or drainage    Drainage from the bite area    7648-9470 [employer]. [address] [place] [address]. All rights reserved. This information is not intended as a substitute for professional medical care. Always follow your healthcare professional's instructions.                Follow-ups after your visit        Follow-up notes from your care team     Return if symptoms worsen or fail to improve.      Your next 10 appointments already scheduled     Jun 09, 2017  9:00 AM CDT   Voice Treatment with Allison E Alpers, SLP   [place] [facility] ([facility])    [address] [facility] [address] [phone]              Who to contact     If you have questions or need follow up information about today's clinic visit or your schedule please contact [facility] directly at [phone].  Normal or non-critical lab and imaging results will be communicated to you by MyChart, letter or phone within 4 business days after the clinic has received the results. If you do not hear from us within 7 days, please contact the clinic through MyChart or phone. If you have a critical or abnormal lab result, we will notify you by phone as soon as possible.  Submit refill requests through [employer] or call your pharmacy and they will forward the refill request to us. Please allow 3 business days for your refill to be completed.          Additional Information About Your Visit        MyChart Information     [employer] gives you secure access to your electronic health record. If you see a primary care provider, you can also send messages to your care team  "and make appointments. If you have questions, please call your primary care clinic.  If you do not have a primary care provider, please call [phone] and they will assist you.        Care EveryWhere ID     This is your Care EveryWhere ID. This could be used by other organizations to access your [facility] medical records  [id number]        Your Vitals Were     Pulse Temperature Height Pulse Oximetry Breastfeeding? BMI (Body Mass Index)    68 98.3  F (36.8  C) (Oral) 5' 2.75\" (1.594 m) 95% No 25.89 kg/m2       Blood Pressure from Last 3 Encounters:   06/06/17 126/79   05/30/17 124/78   05/25/17 129/77    Weight from Last 3 Encounters:   06/06/17 145 lb (65.8 kg)   05/30/17 145 lb (65.8 kg)   05/25/17 146 lb (66.2 kg)              Today, you had the following     No orders found for display       Primary Care Provider Office Phone # Fax #    Marry Naida Gonzalez [phone]       [facility] [address]  [facility] [address]        Thank you!     Thank you for choosing [facility]  for your care. Our goal is always to provide you with excellent care. Hearing back from our patients is one way we can continue to improve our services. Please take a few minutes to complete the written survey that you may receive in the mail after your visit with us. Thank you!             Your Updated Medication List - Protect others around you: Learn how to safely use, store and throw away your medicines at www.disposemymeds.org.          This list is accurate as of: 6/6/17 12:31 PM.  Always use your most recent med list.                   Brand Name Dispense Instructions for use    aspirin 81 MG tablet      Take 1 tablet by mouth daily.       CALCIUM + D PO      Take  by mouth.       diclofenac 1 % Gel topical gel    VOLTAREN    100 g    2 grams to hands four times daily using enclosed dosing card.       FISH OIL          fluticasone 50 MCG/ACT spray    FLONASE    1 Package    " Spray 1-2 sprays into both nostrils daily       gabapentin 300 MG capsule    NEURONTIN    270 capsule    Start with 300mg once daily, if tolerated well increase to 300mg twice daily; if tolerated well increase to 300mg three times dialy.       metoprolol 25 MG 24 hr tablet    TOPROL-XL    90 tablet    Take 1 tablet (25 mg) by mouth daily       MULTIVITAMIN & MINERAL PO      Take  by mouth.       simvastatin 10 MG tablet    ZOCOR    90 tablet    Take 1 tablet (10 mg) by mouth At Bedtime       SUDAFED 12 HOUR PO      Take  by mouth.

## 2017-06-09 ENCOUNTER — HOSPITAL ENCOUNTER (OUTPATIENT)
Dept: SPEECH THERAPY | Facility: CLINIC | Age: 67
Setting detail: THERAPIES SERIES
End: 2017-06-09
Attending: INTERNAL MEDICINE
Payer: COMMERCIAL

## 2017-06-09 PROCEDURE — 40000251 ZZH STATISTIC VOICE CENTER VISIT: Performed by: STUDENT IN AN ORGANIZED HEALTH CARE EDUCATION/TRAINING PROGRAM

## 2017-06-09 PROCEDURE — 92507 TX SP LANG VOICE COMM INDIV: CPT | Mod: GN | Performed by: STUDENT IN AN ORGANIZED HEALTH CARE EDUCATION/TRAINING PROGRAM

## 2017-06-09 NOTE — DISCHARGE INSTRUCTIONS
Voice Therapy 6/9/17      Deep, belly/diaphragmatic breathing  o Abdomen should inflate when you breathe in and deflate when you breathe out     Cup and bubbles exercise (tissue mobilization)  o Put 1 in. of water in a cup  o Start just blowing bubbles through the straw WITHOUT voice  - It should feel easy in your throat  o Add voice at a comfortable pitch, making sure that the bubbles stay continuous and your throat is still relaxed  o Toggle between two pitches  o Do gentle glides  o Do repeated accents, like you re revving the engine of a car  o Sing the tune of Happy Birthday or other songs    **Practice these exercises several times every day, for 2-3 minutes at a time

## 2017-06-30 ENCOUNTER — HOSPITAL ENCOUNTER (OUTPATIENT)
Dept: SPEECH THERAPY | Facility: CLINIC | Age: 67
Setting detail: THERAPIES SERIES
End: 2017-06-30
Attending: INTERNAL MEDICINE
Payer: COMMERCIAL

## 2017-06-30 PROCEDURE — 40000251 ZZH STATISTIC VOICE CENTER VISIT: Performed by: STUDENT IN AN ORGANIZED HEALTH CARE EDUCATION/TRAINING PROGRAM

## 2017-06-30 PROCEDURE — 92507 TX SP LANG VOICE COMM INDIV: CPT | Mod: GN | Performed by: STUDENT IN AN ORGANIZED HEALTH CARE EDUCATION/TRAINING PROGRAM

## 2017-06-30 NOTE — PROGRESS NOTES
Outpatient Speech Language Pathology Discharge Note     Patient: Isabel Carroll  : 1950    Beginning/End Dates of Reporting Period:  2017 to 2017, 4 sessions since evaluation    Referring Provider: Dr. Natalia Morrow    Therapy Diagnosis: Chronic cough    Client Self Report: Patient reports she has been taking OTC omeprazole for 15 days now, and has noticed an improvement in the cough.  While the cough is still present and irritating, she feels that she has better control over it with the strategies learned in therapy.    Objective Measurements: see below         Goals:  Goal Identifier Cough   Goal Description Patient will learn, implement, and demonstrate techniques to reduce the chronic cough, so that she reports a week of typical activities with no more than 5 instances of coughing daily, each lasting no more than 5 seconds in duration.   Target Date 17   Date Met   Partially met 17   Progress: Goal partially met.  Today, patient demonstrated 6 instances of brief coughing at the beginning of the session (no more than 2 seconds in duration) that calmed with sips of water and use of previously learned breathing techniques.  Patient reports she was able to avoid leaving [place] in [place] with use of therapy strategies learned.       Progress Toward Goals:    Progress this reporting period: Good, adequate for discharge.  Patient has demonstrated consistent improvement in her cough since starting cough reduction therapy and through medication management with her PCP.  While she still typically has more than 5 instances of coughing in a day, she is able to implement breathing and voiceless glottic coup techniques learned in therapy to shorten the duration of the coughing episodes to no more than 2 seconds in duration.  She is also sometimes able to suppress the cough completely with use of therapy techniques when she is highly motivated to do so (e.g. Not wanting to  leave a concert).  Patient feels that she has the tools and knowledge to continue to implement therapy techniques on her own to completely met her goal, and thus chooses to discontinue therapy at this time.  She will continue to manage medications with her PCP and will follow-up with Dr. Morrow regarding the cough.    Plan:  Discharge from therapy.    Discharge:    Reason for Discharge: Patient chooses to discontinue therapy.      Discharge Plan: Patient to continue home program.  Patient will follow-up with Dr. Morrow.      Allison Alpers, B.A. (margareth), M.A., CCC-SLP  Speech-Language Pathologist  Certificate of Vocology  [facility]  [phone]

## 2017-07-10 ENCOUNTER — TELEPHONE (OUTPATIENT)
Dept: FAMILY MEDICINE | Facility: CLINIC | Age: 67
End: 2017-07-10

## 2017-07-10 DIAGNOSIS — R05.3 CHRONIC COUGH: ICD-10-CM

## 2017-07-10 DIAGNOSIS — K21.9 GASTROESOPHAGEAL REFLUX DISEASE WITHOUT ESOPHAGITIS: Primary | ICD-10-CM

## 2017-07-10 NOTE — TELEPHONE ENCOUNTER
Patient Omeprazole delayed release 20 mg once a day.   Omeprazole is not listed on patient's medication list    Patient wants to verify it is okay waiting to see Dr. Morrow until September 14th.    Okay to speak to Darrian, , or okay to leave a message.    Routing to provider. Please advise.  Rebecca Amin RN

## 2017-07-10 NOTE — TELEPHONE ENCOUNTER
Reason for Call:    Patient called because she was put on Gabapentin by Dr. Morrow.  She stopped taking it because she felt sluggish.  She started taking OTC Omeprazole and her coughing is much better.  She had appointments scheduled with Dr. Morrow for July 20, 27 and August 3rd but they were all canceled.  She is scheduled to see Dr. Morrow on 9/14.  Patient is requesting a prescription for omeprazole until she can see Dr. Morrow.   Please let patient know if it is ok to take Omeprazole.    Detailed comments: Please call patient to advise.     Phone Number Patient can be reached at: Home number on file [phone] (home)    Best Time: Please call tomorrow morning between 8:00 - 9:00.    Can we leave a detailed message on this number? YES     Thank you,    Call taken on 7/10/2017 at 11:54 AM by Lucero Edwards

## 2017-07-12 PROBLEM — S00.81XA ABRASION OF FOREHEAD, INITIAL ENCOUNTER: Status: RESOLVED | Noted: 2017-02-26 | Resolved: 2017-07-12

## 2017-07-27 ENCOUNTER — OFFICE VISIT (OUTPATIENT)
Dept: FAMILY MEDICINE | Facility: CLINIC | Age: 67
End: 2017-07-27
Payer: COMMERCIAL

## 2017-07-27 VITALS
DIASTOLIC BLOOD PRESSURE: 78 MMHG | SYSTOLIC BLOOD PRESSURE: 116 MMHG | HEART RATE: 80 BPM | TEMPERATURE: 98.6 F | OXYGEN SATURATION: 96 %

## 2017-07-27 DIAGNOSIS — E78.2 MIXED HYPERLIPIDEMIA: ICD-10-CM

## 2017-07-27 DIAGNOSIS — R05.3 CHRONIC COUGH: ICD-10-CM

## 2017-07-27 DIAGNOSIS — I25.84 CORONARY ATHEROSCLEROSIS DUE TO CALCIFIED CORONARY LESION: ICD-10-CM

## 2017-07-27 DIAGNOSIS — J47.9 BRONCHIECTASIS WITHOUT COMPLICATION (H): ICD-10-CM

## 2017-07-27 DIAGNOSIS — J11.1 INFLUENZA-LIKE SYNDROME: ICD-10-CM

## 2017-07-27 DIAGNOSIS — I25.10 CORONARY ATHEROSCLEROSIS DUE TO CALCIFIED CORONARY LESION: ICD-10-CM

## 2017-07-27 DIAGNOSIS — J06.9 VIRAL URI WITH COUGH: Primary | ICD-10-CM

## 2017-07-27 PROCEDURE — 99214 OFFICE O/P EST MOD 30 MIN: CPT | Performed by: FAMILY MEDICINE

## 2017-07-27 RX ORDER — CODEINE PHOSPHATE AND GUAIFENESIN 10; 100 MG/5ML; MG/5ML
1-2 SOLUTION ORAL
Qty: 120 ML | Refills: 1 | Status: SHIPPED | OUTPATIENT
Start: 2017-07-27 | End: 2019-02-19

## 2017-07-27 ASSESSMENT — PAIN SCALES - GENERAL: PAINLEVEL: NO PAIN (0)

## 2017-07-27 NOTE — PROGRESS NOTES
SUBJECTIVE:                                                    Isabel Carroll is a 66 year old female who presents to clinic today for the following health issues:    Acute Illness- started with aches and fever, chills.    Acute illness concerns: Fever, URI symptoms  Onset: 7/23/17    Fever: YES- 7/24 and 7/25 worse sweaty, clammy, hot, achy    Chills/Sweats: YES    Headache (location?): YES    Sinus Pressure:YES    Conjunctivitis:  no    Ear Pain: no    Rhinorrhea: YES    Congestion: YES    Sore Throat: YES     Cough: YES-productive of sputum, clear, with some chest pain but that is better now.     Wheeze: no     Decreased Appetite: YES    Nausea: no     Vomiting: no     Diarrhea:  no     Dysuria/Freq.: no     Fatigue/Achiness: YES    Sick/Strep Exposure: no      Therapies Tried and outcome: cough medicine with codeine left over        Hyperlipidemia Follow-Up      Rate your low fat/cholesterol diet?: good    Taking statin?  Yes simvastatin without any problems    Other lipid medications/supplements?:  none    Vascular Disease Follow-up:  Coronary Artery Disease (CAD)      Chest pain or pressure, left side neck or arm pain: No    Shortness of breath/increased sweats/nausea with exertion: No    Pain in calves walking 1-2 blocks: No    Worsened or new symptoms since last visit: No    Nitroglycerin use: no    Daily aspirin use: Yes            Problem list and histories reviewed & adjusted, as indicated.  Additional history: as documented    Patient Active Problem List   Diagnosis     Overweight     Chronic rhinitis     Primary osteoarthritis of both hands     Basal cell carcinoma of skin     Coronary atherosclerosis due to calcified coronary lesion     Hyperlipidemia     Impaired fasting glucose     Benign neoplasm of colon     Chorioretinal scar of left eye     Sinus tachycardia     Advance care planning     Pes planus     Right shoulder strain, initial encounter     Bronchiectasis without complication (H)      Chronic cough     Female pattern hair loss     Past Surgical History:   Procedure Laterality Date      SECTION      x 2     COLONOSCOPY      3 polyps     LENGTHEN TENDON ACHILLES  2011    Procedure:LENGTHEN TENDON ACHILLES; Surgeon:AARON CÁRDENAS; Location:[facility]     LIVER SURGERY      Bengin large cyst     MINI ARC SLING OPERATION FOR STRESS INCONTINENCE       OSTEOTOMY ANKLE  2011    Procedure:OSTEOTOMY ANKLE; Calcaneous,  Flexor Digitorum Longus Transfer       SALPINGO-OOPHORECTOMY BILATERAL  ,     Benign prophylactic for FHx ov cancer       Social History   Substance Use Topics     Smoking status: Former Smoker     Years: 16.00     Types: Cigarettes     Quit date: 1982     Smokeless tobacco: Former User     Quit date: 1982     Alcohol use 0.0 oz/week     0 Standard drinks or equivalent per week      Comment: 2-7 glasses of wine per week     Family History   Problem Relation Age of Onset     Other Cancer Sister 51     ovarian cancer     CANCER Sister      Colon Cancer Father      Macular Degeneration Mother      DIABETES Maternal Aunt      DIABETES Maternal Uncle      CANCER Paternal Aunt      DIABETES Maternal Grandmother      Hypertension No family hx of      CEREBROVASCULAR DISEASE No family hx of      Thyroid Disease No family hx of      Glaucoma No family hx of          Current Outpatient Prescriptions   Medication Sig Dispense Refill     guaiFENesin-codeine (ROBITUSSIN AC) 100-10 MG/5ML SOLN solution Take 5-10 mLs by mouth nightly as needed for cough 120 mL 1     omeprazole (PRILOSEC) 20 MG CR capsule Take 1 capsule (20 mg) by mouth daily 30 capsule 1     simvastatin (ZOCOR) 10 MG tablet Take 1 tablet (10 mg) by mouth At Bedtime 90 tablet 3     metoprolol (TOPROL-XL) 25 MG 24 hr tablet Take 1 tablet (25 mg) by mouth daily 90 tablet 3     diclofenac (VOLTAREN) 1 % GEL 2 grams to hands four times daily using enclosed dosing card. 100 g 1     fluticasone  (FLONASE) 50 MCG/ACT nasal spray Spray 1-2 sprays into both nostrils daily 1 Package 11     FISH OIL        Pseudoephedrine HCl (SUDAFED 12 HOUR PO) Take  by mouth.       Calcium-Vitamin D (CALCIUM + D PO) Take  by mouth.       Multiple Vitamins-Minerals (MULTIVITAMIN & MINERAL PO) Take  by mouth.       aspirin 81 MG tablet Take 1 tablet by mouth daily.       Allergies   Allergen Reactions     Codeine Phosphate Nausea and Vomiting     Pentazocine      Propoxyphene      Darvon     Penicillins Rash     Recent Labs   Lab Test  10/14/16   0815  05/22/16   1708  10/06/15   0818  04/01/14 09/28/13 09/22/12 08/17/12   A1C  5.5  5.8   --    --    --   5.7  6.0   --    LDL  106*   --   113   --   80   --   111   --    HDL  48*   --   47*   --   40*   --   54   --    TRIG  210*   --   209*   --   180*   --   156*   --    ALT   --   39   --    --    --    --    --    --    CR  0.74  0.63  0.72   < >   --    --    --    --    GFRESTIMATED  78  >90  Non  GFR Calc    82   < >   --    --    --    --    GFRESTBLACK  >90   GFR Calc    >90   GFR Calc    >90   GFR Calc     < >   --    --    --    --    POTASSIUM   --   4.1  4.1   --    --    --    --    --    TSH   --    --    --    --    --    --   0.506  0.829    < > = values in this interval not displayed.      BP Readings from Last 3 Encounters:   07/27/17 116/78   06/06/17 126/79   05/30/17 124/78    Wt Readings from Last 3 Encounters:   06/06/17 145 lb (65.8 kg)   05/30/17 145 lb (65.8 kg)   05/25/17 146 lb (66.2 kg)                  Labs reviewed in EPIC          [facility]  [facility]       Reviewed and updated as needed this visit by Provider         ROS:  Constitutional, HEENT, cardiovascular, pulmonary, gi and gu systems are negative, except as otherwise noted.      OBJECTIVE:   /78 (BP Location: Right arm, Patient Position: Chair, Cuff Size: Adult  "Regular)  Pulse 80  Temp 98.6  F (37  C) (Oral)  SpO2 96%  Breastfeeding? No  There is no height or weight on file to calculate BMI.  GENERAL: mild acutely ill, alert and no distress  EYES: Eyes grossly normal to inspection, PERRL and conjunctivae and sclerae normal  HENT: ear canals and TM's normal, nose and mouth without ulcers or lesions, throat is erythematous without exudate.   NECK: anterior cervical adenopathy, no asymmetry, masses, or scars and thyroid normal to palpation  RESP: lungs clear to auscultation - no rales, rhonchi or wheezes  CV: regular rate and rhythm, normal S1 S2, no S3 or S4, no murmur, click or rub, no peripheral edema and peripheral pulses strong  ABDOMEN: soft, nontender, no hepatosplenomegaly, no masses and bowel sounds normal  MS: no gross musculoskeletal defects noted, no edema  SKIN: no suspicious lesions or rashes  NEURO: Normal strength and tone, mentation intact and speech normal  PSYCH: mentation appears normal, affect normal/bright     Diagnostic Test Results:  none     ASSESSMENT/PLAN:         Tobacco Cessation:   reports that she quit smoking about 35 years ago. Her smoking use included Cigarettes. She quit after 16.00 years of use. She quit smokeless tobacco use about 35 years ago.      BMI:   Estimated body mass index is 25.89 kg/(m^2) as calculated from the following:    Height as of 6/6/17: 5' 2.75\" (1.594 m).    Weight as of 6/6/17: 145 lb (65.8 kg).   Weight management plan: Discussed healthy diet and exercise guidelines and patient will follow up in 6 months in clinic to re-evaluate.            ICD-10-CM    1. Viral URI with cough J06.9 4 days of symptoms with some improvement and no physical findings of pneumonia or bronchitis.     B97.89    2. Influenza-like syndrome J11.1 guaiFENesin-codeine (ROBITUSSIN AC) 100-10 MG/5ML SOLN solution   3. Coronary atherosclerosis due to calcified coronary lesion I25.10 Taking aspirin once a day and statin    I25.84    4. " Bronchiectasis without complication (H) J47.9 Better now and no green or bloody sputum   5. Chronic cough R05 Improved with both speech therapy and omeprazole but did not like the gabapentin side effects.    6. Mixed hyperlipidemia E78.2 stable       CONSULTATION/REFERRAL to pulmonary as scheduled for follow up chronic cough although symptoms much better now  FUTURE LABS:       - Schedule fasting labs in 6 months  FUTURE APPOINTMENTS:       - Follow-up visit in 6 months or sooner if any questions or concerns.   Work on weight loss  Regular exercise  See Patient Instructions    Marry Gonzalez MD  [facility]

## 2017-07-27 NOTE — MR AVS SNAPSHOT
After Visit Summary   7/27/2017    Isabel Carroll    MRN: [id number]           Patient Information     Date Of Birth          1950        Visit Information        Provider Department      7/27/2017 1:20 PM Marry Gonzalez MD [facility]        Today's Diagnoses     Viral URI with cough    -  1    Influenza-like syndrome        Coronary atherosclerosis due to calcified coronary lesion        Bronchiectasis without complication (H)        Chronic cough        Mixed hyperlipidemia          Care Instructions      Viral Upper Respiratory Illness (Adult)  You have a viral upper respiratory illness (URI), which is another term for the common cold. This illness is contagious during the first few days. It is spread through the air by coughing and sneezing. It may also be spread by direct contact (touching the sick person and then touching your own eyes, nose, or mouth). Frequent handwashing will decrease risk of spread. Most viral illnesses go away within 7 to 10 days with rest and simple home remedies. Sometimes the illness may last for several weeks. Antibiotics will not kill a virus, and they are generally not prescribed for this condition.    Home care    If symptoms are severe, rest at home for the first 2 to 3 days. When you resume activity, don't let yourself get too tired.    Avoid being exposed to cigarette smoke (yours or others ).    You may use acetaminophen or ibuprofen to control pain and fever, unless another medicine was prescribed. (Note: If you have chronic liver or kidney disease, have ever had a stomach ulcer or gastrointestinal bleeding, or are taking blood-thinning medicines, talk with your healthcare provider before using these medicines.) Aspirin should never be given to anyone under 18 years of age who is ill with a viral infection or fever. It may cause severe liver or brain damage.    Your appetite may be poor, so a light diet is fine. Avoid  dehydration by drinking 6 to 8 glasses of fluids per day (water, soft drinks, juices, tea, or soup). Extra fluids will help loosen secretions in the nose and lungs.    Over-the-counter cold medicines will not shorten the length of time you re sick, but they may be helpful for the following symptoms: cough, sore throat, and nasal and sinus congestion. (Note: Do not use decongestants if you have high blood pressure.)  Follow-up care  Follow up with your healthcare provider, or as advised.  When to seek medical advice  Call your healthcare provider right away if any of these occur:    Cough with lots of colored sputum (mucus)    Severe headache; face, neck, or ear pain    Difficulty swallowing due to throat pain    Fever of 100.4 F (38 C)  Call 911, or get immediate medical care  Call emergency services right away if any of these occur:    Chest pain, shortness of breath, wheezing, or difficulty breathing    Coughing up blood    Inability to swallow due to throat pain  Date Last Reviewed: 9/13/2015 2000-2017 [employer]. [address] [place] [address]. All rights reserved. This information is not intended as a substitute for professional medical care. Always follow your healthcare professional's instructions.                Follow-ups after your visit        Follow-up notes from your care team     Return in about 6 months (around 1/27/2018) for Physical Exam, Lab Work, medication follow up.      Your next 10 appointments already scheduled     Sep 14, 2017  3:30 PM CDT   Return Visit with Natalia Morrow MD   [facility]    [id number] [place] [id number] [phone]              Who to contact     If you have questions or need follow up information about today's clinic visit or your schedule please contact [facility] directly at [phone].  Normal or non-critical lab and imaging results will  be communicated to you by Twitpayhart, letter or phone within 4 business days after the clinic has received the results. If you do not hear from us within 7 days, please contact the clinic through Katango or phone. If you have a critical or abnormal lab result, we will notify you by phone as soon as possible.  Submit refill requests through Katango or call your pharmacy and they will forward the refill request to us. Please allow 3 business days for your refill to be completed.          Additional Information About Your Visit        Katango Information     Katango gives you secure access to your electronic health record. If you see a primary care provider, you can also send messages to your care team and make appointments. If you have questions, please call your primary care clinic.  If you do not have a primary care provider, please call [phone] and they will assist you.        Care EveryWhere ID     This is your Care EveryWhere ID. This could be used by other organizations to access your [facility] medical records  [id number]        Your Vitals Were     Pulse Temperature Pulse Oximetry Breastfeeding?          80 98.6  F (37  C) (Oral) 96% No         Blood Pressure from Last 3 Encounters:   07/27/17 116/78   06/06/17 126/79   05/30/17 124/78    Weight from Last 3 Encounters:   06/06/17 145 lb (65.8 kg)   05/30/17 145 lb (65.8 kg)   05/25/17 146 lb (66.2 kg)              Today, you had the following     No orders found for display         Today's Medication Changes          These changes are accurate as of: 7/27/17  2:24 PM.  If you have any questions, ask your nurse or doctor.               Start taking these medicines.        Dose/Directions    guaiFENesin-codeine 100-10 MG/5ML Soln solution   Commonly known as:  ROBITUSSIN AC   Used for:  Influenza-like syndrome   Started by:  Marry Gonzalez MD        Dose:  1-2 tsp.   Take 5-10 mLs by mouth nightly as needed for cough   Quantity:  120 mL   Refills:  1             Where to get your medicines      Some of these will need a paper prescription and others can be bought over the counter.  Ask your nurse if you have questions.     Bring a paper prescription for each of these medications     guaiFENesin-codeine 100-10 MG/5ML Soln solution                Primary Care Provider Office Phone # Fax #    Marry Naida Gonzalez [phone]       [facility] [address]  [facility] [address]        Equal Access to Services     CRIS PYLE : Hadii aad ku hadasho Soomaali, waaxda luqadaha, qaybta kaalmada adeegyada, waxay idiin hayaan adeeg kharash la'pearl . So [facility] [phone].    ATENCIÓN: Si habla español, tiene a demarco disposición servicios gratuitos de asistencia lingüística. [facility] [phone].    We comply with applicable federal civil rights laws and [place] laws. We do not discriminate on the basis of race, color, national origin, age, disability sex, sexual orientation or gender identity.            Thank you!     Thank you for choosing [facility]  for your care. Our goal is always to provide you with excellent care. Hearing back from our patients is one way we can continue to improve our services. Please take a few minutes to complete the written survey that you may receive in the mail after your visit with us. Thank you!             Your Updated Medication List - Protect others around you: Learn how to safely use, store and throw away your medicines at www.disposemymeds.org.          This list is accurate as of: 7/27/17  2:24 PM.  Always use your most recent med list.                   Brand Name Dispense Instructions for use Diagnosis    aspirin 81 MG tablet      Take 1 tablet by mouth daily.        CALCIUM + D PO      Take  by mouth.        diclofenac 1 % Gel topical gel    VOLTAREN    100 g    2 grams to hands four times daily using enclosed dosing card.    Primary osteoarthritis of both hands       FISH OIL            fluticasone 50 MCG/ACT spray    FLONASE    1 Package    Spray 1-2 sprays into both nostrils daily    Chronic rhinitis       guaiFENesin-codeine 100-10 MG/5ML Soln solution    ROBITUSSIN AC    120 mL    Take 5-10 mLs by mouth nightly as needed for cough    Influenza-like syndrome       metoprolol 25 MG 24 hr tablet    TOPROL-XL    90 tablet    Take 1 tablet (25 mg) by mouth daily    Sinus tachycardia       MULTIVITAMIN & MINERAL PO      Take  by mouth.        omeprazole 20 MG CR capsule    priLOSEC    30 capsule    Take 1 capsule (20 mg) by mouth daily    Gastroesophageal reflux disease without esophagitis, Chronic cough       simvastatin 10 MG tablet    ZOCOR    90 tablet    Take 1 tablet (10 mg) by mouth At Bedtime    Coronary atherosclerosis due to calcified coronary lesion       SUDAFED 12 HOUR PO      Take  by mouth.

## 2017-07-27 NOTE — PATIENT INSTRUCTIONS

## 2017-09-14 ENCOUNTER — OFFICE VISIT (OUTPATIENT)
Dept: PULMONOLOGY | Facility: CLINIC | Age: 67
End: 2017-09-14
Payer: COMMERCIAL

## 2017-09-14 VITALS
HEIGHT: 62 IN | BODY MASS INDEX: 26.92 KG/M2 | DIASTOLIC BLOOD PRESSURE: 86 MMHG | HEART RATE: 88 BPM | SYSTOLIC BLOOD PRESSURE: 127 MMHG | OXYGEN SATURATION: 97 % | RESPIRATION RATE: 16 BRPM | WEIGHT: 146.3 LBS

## 2017-09-14 DIAGNOSIS — R05.3 CHRONIC COUGH: Primary | ICD-10-CM

## 2017-09-14 PROCEDURE — 99213 OFFICE O/P EST LOW 20 MIN: CPT | Performed by: INTERNAL MEDICINE

## 2017-09-14 NOTE — PATIENT INSTRUCTIONS
It was nice to see you again today.    I'm glad your cough is better. Your plan sounds great. Remember that each intervention can take time to work, and it may take time to see an effect if you stop one or the other treatment.    Try nasacort rather than flonase, it may be less drying.     I'm glad the omeprazole has been working.

## 2017-09-14 NOTE — PROGRESS NOTES
"CHIEF COMPLAINT:  Chronic cough.      HISTORY OF PRESENT ILLNESS:  Isabel Carroll is a 66-year-old woman here for followup visit.  At her last visit, we talked about chronic cough.  We elected a trial of gabapentin.  There was also some consideration of some mild cylindrical bronchiectasis noted on CT scan done through the COPD gene program.  I did confirm that there is some mild bronchiectasis with one of our radiologists; however, it was essentially unchanged from previous based on differences in techniques.  Regardless, I did prescribe her a trial of gabapentin.  She said it made her feel \"blah.\"  When she tried to titrate up, the feeling intensified.  She noted that we had talked about a dilated esophagus on the CT scan and she went to her primary care provider and started on low dose of omeprazole.  She has been on that medication for approximately 9 weeks.  She thinks that her cough has improved dramatically.  She does not have the tickle in her throat anymore that makes her feel like she needs to constantly drink water.  She is not coughing quite as much as she was before.  She is continuing to use intranasal saline rinse and fluticasone nasal spray.  She has been using Sudafed for a decongestant as well.      PAST MEDICAL HISTORY:   1.  Basal cell carcinoma.   2.  Osteoarthritis.   3.  Hyperlipidemia.   4.  Coronary artery disease based on CT scan.   5.  Mild bronchiectasis by CT scan.      FAMILY HISTORY:  Mother had macular degeneration.  Father had colon cancer.  Sister had ovarian cancer.  Brother had asthma.      SOCIAL HISTORY:  She is a former smoker, approximately 16-pack-year smoking history, quit in 1982.  Does [employer] for exercise.  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      REVIEW OF SYSTEMS:  A full 14-point review was done and is negative except as noted above in the HPI.      PHYSICAL EXAMINATION:   VITAL SIGNS:  Blood pressure 127/86, pulse is 88, " respiratory rate is 16, SpO2 is 97% on room air, BMI is 26.58.   GENERAL APPEARANCE:  Well-developed, well-nourished, no distress.   HEENT:  Eyes PERRL.  No conjunctival injection.  Nasal mucosa mildly erythematous, otherwise within normal limits.  Mouth:  Oral mucosa is moist.  No posterior oropharyngeal erythema or exudate.   RESPIRATORY:  Good air movement bilaterally.  No wheezes, rales or rhonchi.   CARDIAC:  Regular rhythm, normal S1, S2, no murmurs, rubs or gallops.  JVP not appreciated with patient sitting upright at 90 degrees.  No lower extremity edema is noted.      RESULTS:  Reviewed.  Pulmonary function testing from 04/2017 shows normal spirometry, lung volumes and DLCO.  Rereviewed outside records from Dr. King Noel and [facility].  Rereviewed CT scan with patient in the room.  Mild cylindrical bronchiectasis.  Patulous esophagus.      Previous methacholine challenge testing showed no evidence of bronchial hyperreactivity.      Most recent CBC 05/2016:  Normal white count, elevated hemoglobin, no peripheral eosinophilia.      ASSESSMENT AND PLAN:  The patient is a 66-year-old woman here for evaluation of chronic cough.  Improvement in symptoms after starting treatment with omeprazole in the setting of patulous esophagus.  She had a previous pH probe done at [facility] that did not suggest gastroesophageal reflux disease as a contributor to her chronic cough.  We went over this again today.  There is no clear consensus between pulmonologists and gastroenterologists regarding reflux events and associated chronic cough as far as a threshold for gastroesophageal reflux disease-associated cough (for example: if less than 50% of cough events are associated with cough, the cough is not considered to be GERD associated cough, but should you expect some improvement with treatment or not- these are largely open questions).  She has had improvement in her symptoms with initiation of omeprazole and  I think that that is reasonably good evidence that she should continue this medication at least for now.  She asked about long-term side effects and we talked a little bit about calcium absorption, infectious risks, etc.  She is planning on continuing this medication for now.  I think it is reasonable not to use gabapentin given that she did not like the side effects associated.  She does continue to treat her chronic postnasal drip with fluticasone intranasal spray and intranasal saline rinse.  I would encourage her to continue this for now.  She is thinking about seeing a gastroenterologist for ongoing discussion regarding gastroesophageal reflux disease-associated chronic cough and I think this is reasonable to do in the future.         MAURICIO NGO MD             D: 2017 17:13   T: 2017 17:47   MT: TS      Name:     KASI MCCLURE   MRN:      [id number]        Account:      [id number]   :      1950           Visit Date:   2017      Document: [id number]

## 2017-09-14 NOTE — MR AVS SNAPSHOT
After Visit Summary   9/14/2017    Isabel Carroll    MRN: [id number]           Patient Information     Date Of Birth          1950        Visit Information        Provider Department      9/14/2017 3:30 PM Natalia Morrow MD [facility]        Today's Diagnoses     Chronic cough    -  1      Care Instructions    It was nice to see you again today.    I'm glad your cough is better. Your plan sounds great. Remember that each intervention can take time to work, and it may take time to see an effect if you stop one or the other treatment.    Try nasacort rather than flonase, it may be less drying.     I'm glad the omeprazole has been working.           Follow-ups after your visit        Follow-up notes from your care team     Return if symptoms worsen or fail to improve.      Your next 10 appointments already scheduled     Sep 19, 2017  1:00 PM CDT   MA SCREENING DIGITAL BILATERAL with [id number]   [facility] ([facility]    [address] [facility] [id number] [phone]           Do not use any powder, lotion or deodorant under your arms or on your breast. If you do, we will ask you to remove it before your exam.  Wear comfortable, two-piece clothing.  If you have any allergies, tell your care team.  Bring any previous mammograms from other facilities or have them mailed to the breast center.            Oct 13, 2017  9:30 AM CDT   PHYSICAL with Anil Giles MD   [facility] ([facility])    [address] [facility] [id number] [phone]              Who to contact     If you have questions or need follow up information about today's clinic visit or your schedule please contact [facility] directly at [phone].  Normal or non-critical lab and imaging results will be communicated to you by MyChart, letter or phone within 4  "business days after the clinic has received the results. If you do not hear from us within 7 days, please contact the clinic through Medafor or phone. If you have a critical or abnormal lab result, we will notify you by phone as soon as possible.  Submit refill requests through Medafor or call your pharmacy and they will forward the refill request to us. Please allow 3 business days for your refill to be completed.          Additional Information About Your Visit        Medafor Information     Medafor gives you secure access to your electronic health record. If you see a primary care provider, you can also send messages to your care team and make appointments. If you have questions, please call your primary care clinic.  If you do not have a primary care provider, please call [phone] and they will assist you.      Medafor is an electronic gateway that provides easy, online access to your medical records. With Medafor, you can request a clinic appointment, read your test results, renew a prescription or communicate with your care team.     To access your existing account, please contact your [facility] or call [phone] for assistance.        Care EveryWhere ID     This is your Care EveryWhere ID. This could be used by other organizations to access your [place] medical records  [id number]        Your Vitals Were     Pulse Respirations Height Pulse Oximetry BMI (Body Mass Index)       88 16 1.58 m (5' 2.21\") 97% 26.58 kg/m2        Blood Pressure from Last 3 Encounters:   09/14/17 127/86   07/27/17 116/78   06/06/17 126/79    Weight from Last 3 Encounters:   09/14/17 66.4 kg (146 lb 4.8 oz)   06/06/17 65.8 kg (145 lb)   05/30/17 65.8 kg (145 lb)              Today, you had the following     No orders found for display       Primary Care Provider Office Phone # Fax #    Marry Gonzalez [phone]       [address]  [facility] [address]        Equal " Access to Services     [facility]: Hadii antonio cardona jett [place], waholleyda luqadaha, qaybta kaalaarti keybraydonofelia zhangbryannarosa isela coon. So [facility] [phone].    ATENCIÓN: Si gabila dina, tiene a demarco disposición servicios gratuitos de asistencia lingüística. Llame al [phone].    We comply with applicable federal civil rights laws and [place] laws. We do not discriminate on the basis of race, color, national origin, age, disability sex, sexual orientation or gender identity.            Thank you!     Thank you for choosing [facility]  for your care. Our goal is always to provide you with excellent care. Hearing back from our patients is one way we can continue to improve our services. Please take a few minutes to complete the written survey that you may receive in the mail after your visit with us. Thank you!             Your Updated Medication List - Protect others around you: Learn how to safely use, store and throw away your medicines at www.disposemymeds.org.          This list is accurate as of: 9/14/17  4:08 PM.  Always use your most recent med list.                   Brand Name Dispense Instructions for use Diagnosis    aspirin 81 MG tablet      Take 1 tablet by mouth daily.        CALCIUM + D PO      Take  by mouth.        diclofenac 1 % Gel topical gel    VOLTAREN    100 g    2 grams to hands four times daily using enclosed dosing card.    Primary osteoarthritis of both hands       FISH OIL           fluticasone 50 MCG/ACT spray    FLONASE    1 Package    Spray 1-2 sprays into both nostrils daily    Chronic rhinitis       guaiFENesin-codeine 100-10 MG/5ML Soln solution    ROBITUSSIN AC    120 mL    Take 5-10 mLs by mouth nightly as needed for cough    Influenza-like syndrome       metoprolol 25 MG 24 hr tablet    TOPROL-XL    90 tablet    Take 1 tablet (25 mg) by mouth daily    Sinus tachycardia       MULTIVITAMIN & MINERAL PO      Take  by mouth.         omeprazole 20 MG CR capsule    priLOSEC    30 capsule    Take 1 capsule (20 mg) by mouth daily    Gastroesophageal reflux disease without esophagitis, Chronic cough       simvastatin 10 MG tablet    ZOCOR    90 tablet    Take 1 tablet (10 mg) by mouth At Bedtime    Coronary atherosclerosis due to calcified coronary lesion       SUDAFED 12 HOUR PO      Take  by mouth.

## 2017-09-14 NOTE — LETTER
"9/14/2017       RE: Isabel Carroll  [address] [facility] [address]     Dear Colleague,    Thank you for referring your patient, Isabel Carroll, to the [facility] at [facility]. Please see a copy of my visit note below.    CHIEF COMPLAINT:  Chronic cough.      HISTORY OF PRESENT ILLNESS:  Isabel Carroll is a 66-year-old woman here for followup visit.  At her last visit, we talked about chronic cough.  We elected a trial of gabapentin.  There was also some consideration of some mild cylindrical bronchiectasis noted on CT scan done through the COPD gene program.  I did confirm that there is some mild bronchiectasis with one of our radiologists; however, it was essentially unchanged from previous based on differences in techniques.  Regardless, I did prescribe her a trial of gabapentin.  She said it made her feel \"blah.\"  When she tried to titrate up, the feeling intensified.  She noted that we had talked about a dilated esophagus on the CT scan and she went to her primary care provider and started on low dose of omeprazole.  She has been on that medication for approximately 9 weeks.  She thinks that her cough has improved dramatically.  She does not have the tickle in her throat anymore that makes her feel like she needs to constantly drink water.  She is not coughing quite as much as she was before.  She is continuing to use intranasal saline rinse and fluticasone nasal spray.  She has been using Sudafed for a decongestant as well.      PAST MEDICAL HISTORY:   1.  Basal cell carcinoma.   2.  Osteoarthritis.   3.  Hyperlipidemia.   4.  Coronary artery disease based on CT scan.   5.  Mild bronchiectasis by CT scan.      FAMILY HISTORY:  Mother had macular degeneration.  Father had colon cancer.  Sister had ovarian cancer.  Brother had asthma.      SOCIAL HISTORY:  She is a former smoker, approximately 16-pack-year smoking history, " quit in 1982.  Does Jazzercise for exercise.  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      REVIEW OF SYSTEMS:  A full 14-point review was done and is negative except as noted above in the HPI.      PHYSICAL EXAMINATION:   VITAL SIGNS:  Blood pressure 127/86, pulse is 88, respiratory rate is 16, SpO2 is 97% on room air, BMI is 26.58.   GENERAL APPEARANCE:  Well-developed, well-nourished, no distress.   HEENT:  Eyes PERRL.  No conjunctival injection.  Nasal mucosa mildly erythematous, otherwise within normal limits.  Mouth:  Oral mucosa is moist.  No posterior oropharyngeal erythema or exudate.   RESPIRATORY:  Good air movement bilaterally.  No wheezes, rales or rhonchi.   CARDIAC:  Regular rhythm, normal S1, S2, no murmurs, rubs or gallops.  JVP not appreciated with patient sitting upright at 90 degrees.  No lower extremity edema is noted.      RESULTS:  Reviewed.  Pulmonary function testing from 04/2017 shows normal spirometry, lung volumes and DLCO.  Rereviewed outside records from Dr. King Noel and [facility]where.  Rereviewed CT scan with patient in the room.  Mild cylindrical bronchiectasis.  Patulous esophagus.      Previous methacholine challenge testing showed no evidence of bronchial hyperreactivity.      Most recent CBC 05/2016:  Normal white count, elevated hemoglobin, no peripheral eosinophilia.      ASSESSMENT AND PLAN:  The patient is a 66-year-old woman here for evaluation of chronic cough.  Improvement in symptoms after starting treatment with omeprazole in the setting of patulous esophagus.  She had a previous pH probe done at [facility] that did not suggest gastroesophageal reflux disease as a contributor to her chronic cough.  We went over this again today.  There is no clear consensus between pulmonologists and gastroenterologists regarding reflux events and associated chronic cough as far as a threshold for gastroesophageal reflux disease-associated  cough (for example: if less than 50% of cough events are associated with cough, the cough is not considered to be GERD associated cough, but should you expect some improvement with treatment or not- these are largely open questions).  She has had improvement in her symptoms with initiation of omeprazole and I think that that is reasonably good evidence that she should continue this medication at least for now.  She asked about long-term side effects and we talked a little bit about calcium absorption, infectious risks, etc.  She is planning on continuing this medication for now.  I think it is reasonable not to use gabapentin given that she did not like the side effects associated.  She does continue to treat her chronic postnasal drip with fluticasone intranasal spray and intranasal saline rinse.  I would encourage her to continue this for now.  She is thinking about seeing a gastroenterologist for ongoing discussion regarding gastroesophageal reflux disease-associated chronic cough and I think this is reasonable to do in the future.         NATALIA NGO MD             D: 2017 17:13   T: 2017 17:47   MT: TS      Name:     KASI MCCLURE   MRN:      [id number]        Account:      [id number]   :      1950           Visit Date:   2017      Document: [id number]        Again, thank you for allowing me to participate in the care of your patient.      Sincerely,    Natalia Ngo MD

## 2017-09-14 NOTE — NURSING NOTE
"Isabel Carroll's goals for this visit include:   Chief Complaint   Patient presents with     RECHECK     cough        She requests these members of her care team be copied on today's visit information: yes    PCP: Marry Gonzalez    Referring Provider:  No referring provider defined for this encounter.    Chief Complaint   Patient presents with     RECHECK     cough        Initial /86  Pulse 88  Resp 16  Ht 1.58 m (5' 2.21\")  Wt 66.4 kg (146 lb 4.8 oz)  SpO2 97%  BMI 26.58 kg/m2 Estimated body mass index is 26.58 kg/(m^2) as calculated from the following:    Height as of this encounter: 1.58 m (5' 2.21\").    Weight as of this encounter: 66.4 kg (146 lb 4.8 oz).  Medication Reconciliation: complete    Do you need any medication refills at today's visit? No     Amorrae AMENA White        "

## 2017-09-19 ENCOUNTER — RADIANT APPOINTMENT (OUTPATIENT)
Dept: MAMMOGRAPHY | Facility: CLINIC | Age: 67
End: 2017-09-19
Attending: FAMILY MEDICINE
Payer: COMMERCIAL

## 2017-09-19 DIAGNOSIS — Z12.31 VISIT FOR SCREENING MAMMOGRAM: ICD-10-CM

## 2017-09-19 PROCEDURE — G0202 SCR MAMMO BI INCL CAD: HCPCS | Mod: TC

## 2017-10-13 ENCOUNTER — OFFICE VISIT (OUTPATIENT)
Dept: OBGYN | Facility: CLINIC | Age: 67
End: 2017-10-13
Payer: COMMERCIAL

## 2017-10-13 VITALS
SYSTOLIC BLOOD PRESSURE: 124 MMHG | BODY MASS INDEX: 26.53 KG/M2 | WEIGHT: 146 LBS | HEART RATE: 68 BPM | TEMPERATURE: 97.5 F | DIASTOLIC BLOOD PRESSURE: 78 MMHG

## 2017-10-13 DIAGNOSIS — R73.01 IMPAIRED FASTING GLUCOSE: ICD-10-CM

## 2017-10-13 DIAGNOSIS — E78.2 MIXED HYPERLIPIDEMIA: ICD-10-CM

## 2017-10-13 DIAGNOSIS — Z01.419 ENCOUNTER FOR GYNECOLOGICAL EXAMINATION WITHOUT ABNORMAL FINDING: Primary | ICD-10-CM

## 2017-10-13 PROCEDURE — 99397 PER PM REEVAL EST PAT 65+ YR: CPT | Performed by: OBSTETRICS & GYNECOLOGY

## 2017-10-13 NOTE — MR AVS SNAPSHOT
After Visit Summary   10/13/2017    Isabel Carroll    MRN: [id number]           Patient Information     Date Of Birth          1950        Visit Information        Provider Department      10/13/2017 9:30 AM Anil Giles MD [facility]        Care Instructions                                                        If you have any questions regarding your visit, Please contact your care team.     [facility] Access Services: [phone]    [facility] CLINIC HOURS TELEPHONE NUMBER   CARLEEN Fernández-    Trinity Duncan-GLADYS Brown-Medical Assistant   Monday-[place]  8:00a.m-4:45 p.m  Wednesday-[place] 8:00a.m-4:45 p.m.  Thursday-[place]  8:00a.m-4:45 p.m.  Friday-[place]  8:00a.m-4:45 p.m. [facility]  [address] [place] [address]  [phone] ask for [facility]  [phone] Fax  Imaging [phone]    [facility]  [address] [place] [address] [place] [address]  [phone]    [facility]  [address] [place] [address]  [phone] ask [facility] [place]  [phone] Fax  Imaging [phone]     Urgent Care locations:    [facility] Monday-Friday  5 pm - 9 pm  Saturday and Sunday   9 am - 5 pm    Monday-Friday   11 am - 9 pm  Saturday and Sunday   9 am - 5 pm   [phone]       If you need a medication refill, please contact your pharmacy. Please allow 3 business days for your refill to be completed.  As always, Thank you for trusting us with your healthcare needs!            Follow-ups after your visit        Who to contact     If you have questions or need follow up information about today's clinic visit or your schedule please contact [facility] directly at [phone].  Normal or non-critical lab and imaging results will be communicated to you  by [employer], letter or phone within 4 business days after the clinic has received the results. If you do not hear from us within 7 days, please contact the clinic through [employer] or phone. If you have a critical or abnormal lab result, we will notify you by phone as soon as possible.  Submit refill requests through [employer] or call your pharmacy and they will forward the refill request to us. Please allow 3 business days for your refill to be completed.          Additional Information About Your Visit        [employer]Pag[employer] Information     [employer] gives you secure access to your electronic health record. If you see a primary care provider, you can also send messages to your care team and make appointments. If you have questions, please call your primary care clinic.  If you do not have a primary care provider, please call [phone] and they will assist you.        Care EveryWhere ID     This is your Care EveryWhere ID. This could be used by other organizations to access your [place] medical records  [id number]        Your Vitals Were     Pulse Temperature Breastfeeding? BMI (Body Mass Index)          68 97.5  F (36.4  C) (Oral) No 26.53 kg/m2         Blood Pressure from Last 3 Encounters:   10/13/17 124/78   09/14/17 127/86   07/27/17 116/78    Weight from Last 3 Encounters:   10/13/17 146 lb (66.2 kg)   09/14/17 146 lb 4.8 oz (66.4 kg)   06/06/17 145 lb (65.8 kg)              Today, you had the following     No orders found for display       Primary Care Provider Office Phone # Fax #    Marry Naida Gonzalez [phone]       [address]  [facility] [address]        Equal Access to Services     CRIS PYLE : Hadii antonio cardona hadashconnor Sojosh, waaxda luqadaha, qaybta kaalmada ofelia rudd. So [facility] [phone].    ATENCIÓN: Si habla español, tiene a demarco disposición servicios gratuitos de asistencia lingüística. Llame al [phone].    We comply with applicable  federal civil rights laws and [place] laws. We do not discriminate on the basis of race, color, national origin, age, disability, sex, sexual orientation, or gender identity.            Thank you!     Thank you for choosing [facility]  for your care. Our goal is always to provide you with excellent care. Hearing back from our patients is one way we can continue to improve our services. Please take a few minutes to complete the written survey that you may receive in the mail after your visit with us. Thank you!             Your Updated Medication List - Protect others around you: Learn how to safely use, store and throw away your medicines at www.disposemymeds.org.          This list is accurate as of: 10/13/17  9:47 AM.  Always use your most recent med list.                   Brand Name Dispense Instructions for use Diagnosis    aspirin 81 MG tablet      Take 1 tablet by mouth daily.        CALCIUM + D PO      Take  by mouth.        diclofenac 1 % Gel topical gel    VOLTAREN    100 g    2 grams to hands four times daily using enclosed dosing card.    Primary osteoarthritis of both hands       FISH OIL           fluticasone 50 MCG/ACT spray    FLONASE    1 Package    Spray 1-2 sprays into both nostrils daily    Chronic rhinitis       guaiFENesin-codeine 100-10 MG/5ML Soln solution    ROBITUSSIN AC    120 mL    Take 5-10 mLs by mouth nightly as needed for cough    Influenza-like syndrome       metoprolol 25 MG 24 hr tablet    TOPROL-XL    90 tablet    Take 1 tablet (25 mg) by mouth daily    Sinus tachycardia       MULTIVITAMIN & MINERAL PO      Take  by mouth.        omeprazole 20 MG CR capsule    priLOSEC    30 capsule    Take 1 capsule (20 mg) by mouth daily    Gastroesophageal reflux disease without esophagitis, Chronic cough       simvastatin 10 MG tablet    ZOCOR    90 tablet    Take 1 tablet (10 mg) by mouth At Bedtime    Coronary atherosclerosis due to calcified coronary lesion        SUDAFED 12 HOUR PO      Take  by mouth.

## 2017-10-13 NOTE — PATIENT INSTRUCTIONS
If you have any questions regarding your visit, Please contact your care team.     [facility] Access Services: [phone]    [facility] CLINIC HOURS TELEPHONE NUMBER   CARLEEN Fernández-    Trinity Duncan-GLADYS Brown-Medical Assistant   Monday-[place]  8:00a.m-4:45 p.m  Wednesday-[place] 8:00a.m-4:45 p.m.  Thursday-[place]  8:00a.m-4:45 p.m.  Friday-[place]  8:00a.m-4:45 p.m. [facility]  [address]  [place], [place] [address] [phone] ask [facility]  [phone] Fax  Imaging [email or web address]-[phone]    [facility] Labor and Delivery  [address] [place] [address] Dr.  [place], [place] [address] [phone]    [facility]  [address] [place] [address]  [phone] ask [facility]  [phone] Fax  Imaging Husihcinec-[phone]     Urgent Care locations:    [place]        [place] Monday-Friday  5 pm - 9 pm  Saturday and Sunday   9 am - 5 pm    Monday-Friday   11 am - 9 pm  Saturday and Sunday   9 am - 5 pm   [phone]       If you need a medication refill, please contact your pharmacy. Please allow 3 business days for your refill to be completed.  As always, Thank you for trusting us with your healthcare needs!

## 2017-10-13 NOTE — NURSING NOTE
"Chief Complaint   Patient presents with     Physical     AFE       Initial /78 (BP Location: Left arm, Patient Position: Chair, Cuff Size: Adult Regular)  Pulse 68  Temp 97.5  F (36.4  C) (Oral)  Wt 146 lb (66.2 kg)  Breastfeeding? No  BMI 26.53 kg/m2 Estimated body mass index is 26.53 kg/(m^2) as calculated from the following:    Height as of 9/14/17: 5' 2.21\" (1.58 m).    Weight as of this encounter: 146 lb (66.2 kg).  Medication Reconciliation: complete   Kevin Zapien CMA      "

## 2017-10-17 DIAGNOSIS — E78.2 MIXED HYPERLIPIDEMIA: ICD-10-CM

## 2017-10-17 DIAGNOSIS — R73.01 IMPAIRED FASTING GLUCOSE: ICD-10-CM

## 2017-10-17 LAB
CHOLEST SERPL-MCNC: 213 MG/DL
GLUCOSE SERPL-MCNC: 118 MG/DL (ref 70–99)
HBA1C MFR BLD: 5.8 % (ref 4.3–6)
HDLC SERPL-MCNC: 44 MG/DL
LDLC SERPL CALC-MCNC: 119 MG/DL
NONHDLC SERPL-MCNC: 169 MG/DL
TRIGL SERPL-MCNC: 251 MG/DL

## 2017-10-17 PROCEDURE — 82947 ASSAY GLUCOSE BLOOD QUANT: CPT | Performed by: OBSTETRICS & GYNECOLOGY

## 2017-10-17 PROCEDURE — 83036 HEMOGLOBIN GLYCOSYLATED A1C: CPT | Performed by: OBSTETRICS & GYNECOLOGY

## 2017-10-17 PROCEDURE — 80061 LIPID PANEL: CPT | Performed by: OBSTETRICS & GYNECOLOGY

## 2017-10-17 PROCEDURE — 36415 COLL VENOUS BLD VENIPUNCTURE: CPT | Performed by: OBSTETRICS & GYNECOLOGY

## 2017-11-09 ENCOUNTER — ALLIED HEALTH/NURSE VISIT (OUTPATIENT)
Dept: NURSING | Facility: CLINIC | Age: 67
End: 2017-11-09
Payer: COMMERCIAL

## 2017-11-09 DIAGNOSIS — Z23 NEED FOR PROPHYLACTIC VACCINATION AND INOCULATION AGAINST INFLUENZA: Primary | ICD-10-CM

## 2017-11-09 PROCEDURE — 90662 IIV NO PRSV INCREASED AG IM: CPT

## 2017-11-09 PROCEDURE — G0009 ADMIN PNEUMOCOCCAL VACCINE: HCPCS

## 2017-11-09 PROCEDURE — 90732 PPSV23 VACC 2 YRS+ SUBQ/IM: CPT

## 2017-11-09 PROCEDURE — G0008 ADMIN INFLUENZA VIRUS VAC: HCPCS

## 2017-11-09 PROCEDURE — 99207 ZZC NO CHARGE NURSE ONLY: CPT

## 2017-11-09 NOTE — MR AVS SNAPSHOT
After Visit Summary   11/9/2017    Isabel Carroll    MRN: [id number]           Patient Information     Date Of Birth          1950        Visit Information        Provider Department      11/9/2017 1:00 PM [facility]        Today's Diagnoses     Need for prophylactic vaccination and inoculation against influenza    -  1       Follow-ups after your visit        Your next 10 appointments already scheduled     Dec 04, 2017  2:00 PM CST   New Visit [facility] Mj Frederick OD   [facility]    [address] [place] [id number] [phone]              Who to contact     If you have questions or need follow up information about today's clinic visit or your schedule please contact [facility] directly at [phone].  Normal or non-critical lab and imaging results will be communicated to you by MyChart, letter or phone within 4 business days after the clinic has received the results. If you do not hear from us within 7 days, please contact the clinic through MyChart or phone. If you have a critical or abnormal lab result, we will notify you by phone as soon as possible.  Submit refill requests through [email or web address] or call your pharmacy and they will forward the refill request to us. Please allow 3 business days for your refill to be completed.          Additional Information About Your Visit        MyChart Information     [email or web address] gives you secure access to your electronic health record. If you see a primary care provider, you can also send messages to your care team and make appointments. If you have questions, please call your primary care clinic.  If you do not have a primary care provider, please call [phone] and they will assist you.        Care EveryWhere ID     This is your Care EveryWhere ID. This could be used by other organizations to access your [facility]  records  [id number]         Blood Pressure from Last 3 Encounters:   10/13/17 124/78   09/14/17 127/86   07/27/17 116/78    Weight from Last 3 Encounters:   10/13/17 146 lb (66.2 kg)   09/14/17 146 lb 4.8 oz (66.4 kg)   06/06/17 145 lb (65.8 kg)              We Performed the Following     ADMIN: Vaccine, Initial (14303)     FLU VACCINE, INCREASED ANTIGEN, PRESV FREE, AGE 65+ [22423]     Pneumococcal vaccine 23 valent PPSV23  (Pneumovax) [45057]        Primary Care Provider Office Phone # Fax #    Marry Naida Gonzalez [phone]       [address] [place]  [facility] [address]        Equal Access to Services     [facility] Hospital[facility] : Hadii antonio cardona hadasho Soomaali, waaxda luqadaha, qaybta kaalmada adeegyada, ofelia recinos . So [facility] [phone].    ATENCIÓN: Si habla español, tiene a demarco disposición servicios gratuitos de asistencia lingüística. Roe al [phone].    We comply with applicable federal civil rights laws and [place] laws. We do not discriminate on the basis of race, color, national origin, age, disability, sex, sexual orientation, or gender identity.            Thank you!     Thank you for choosing [facility]  for your care. Our goal is always to provide you with excellent care. Hearing back from our patients is one way we can continue to improve our services. Please take a few minutes to complete the written survey that you may receive in the mail after your visit with us. Thank you!             Your Updated Medication List - Protect others around you: Learn how to safely use, store and throw away your medicines at www.disposemymeds.org.          This list is accurate as of: 11/9/17  1:16 PM.  Always use your most recent med list.                   Brand Name Dispense Instructions for use Diagnosis    aspirin 81 MG tablet      Take 1 tablet by mouth daily.        CALCIUM + D PO      Take  by mouth.        diclofenac 1 % Gel topical gel     VOLTAREN    100 g    2 grams to hands four times daily using enclosed dosing card.    Primary osteoarthritis of both hands       FISH OIL           fluticasone 50 MCG/ACT spray    FLONASE    1 Package    Spray 1-2 sprays into both nostrils daily    Chronic rhinitis       guaiFENesin-codeine 100-10 MG/5ML Soln solution    ROBITUSSIN AC    120 mL    Take 5-10 mLs by mouth nightly as needed for cough    Influenza-like syndrome       metoprolol 25 MG 24 hr tablet    TOPROL-XL    90 tablet    Take 1 tablet (25 mg) by mouth daily    Sinus tachycardia       MULTIVITAMIN & MINERAL PO      Take  by mouth.        omeprazole 20 MG CR capsule    priLOSEC    30 capsule    Take 1 capsule (20 mg) by mouth daily    Gastroesophageal reflux disease without esophagitis, Chronic cough       simvastatin 10 MG tablet    ZOCOR    90 tablet    Take 1 tablet (10 mg) by mouth At Bedtime    Coronary atherosclerosis due to calcified coronary lesion       SUDAFED 12 HOUR PO      Take  by mouth.

## 2017-11-09 NOTE — PROGRESS NOTES

## 2017-12-04 ENCOUNTER — OFFICE VISIT (OUTPATIENT)
Dept: OPTOMETRY | Facility: CLINIC | Age: 67
End: 2017-12-04
Payer: COMMERCIAL

## 2017-12-04 DIAGNOSIS — H52.13 MYOPIA OF BOTH EYES: Primary | ICD-10-CM

## 2017-12-04 DIAGNOSIS — H02.839 DERMATOCHALASIS OF EYELID: ICD-10-CM

## 2017-12-04 DIAGNOSIS — H52.223 REGULAR ASTIGMATISM OF BOTH EYES: ICD-10-CM

## 2017-12-04 DIAGNOSIS — H25.13 AGE-RELATED NUCLEAR CATARACT OF BOTH EYES: ICD-10-CM

## 2017-12-04 DIAGNOSIS — H04.123 INSUFFICIENCY OF TEAR FILM OF BOTH EYES: ICD-10-CM

## 2017-12-04 DIAGNOSIS — H02.403 PTOSIS OF BOTH EYELIDS: ICD-10-CM

## 2017-12-04 PROCEDURE — 92015 DETERMINE REFRACTIVE STATE: CPT | Performed by: OPTOMETRIST

## 2017-12-04 PROCEDURE — 92014 COMPRE OPH EXAM EST PT 1/>: CPT | Performed by: OPTOMETRIST

## 2017-12-04 ASSESSMENT — REFRACTION_MANIFEST
OS_ADD: +2.50
OD_AXIS: 090
OS_CYLINDER: +3.25
OS_SPHERE: -6.75
OD_SPHERE: -5.75
OD_ADD: +2.50
OD_CYLINDER: +2.75
OS_AXIS: 079

## 2017-12-04 ASSESSMENT — CUP TO DISC RATIO
OS_RATIO: 0.3
OD_RATIO: 0.3

## 2017-12-04 ASSESSMENT — CONF VISUAL FIELD
OD_NORMAL: 1
OS_NORMAL: 1

## 2017-12-04 ASSESSMENT — TONOMETRY
OD_IOP_MMHG: 17
IOP_METHOD: TONOPEN
OS_IOP_MMHG: 17

## 2017-12-04 ASSESSMENT — SLIT LAMP EXAM - LIDS
COMMENTS: UPPER LID DERMATOCHALASIS, PTOSIS
COMMENTS: UPPER LID DERMATOCHALASIS, PTOSIS

## 2017-12-04 ASSESSMENT — REFRACTION_WEARINGRX
OS_CYLINDER: +3.25
OD_SPHERE: -6.00
OD_SPHERE: -5.75
OD_ADD: +2.50
OD_AXIS: 089
OS_AXIS: 082
OD_ADD: +2.50
OS_AXIS: 085
OS_SPHERE: -6.75
OS_ADD: +2.50
OD_AXIS: 085
OD_CYLINDER: +2.75
OD_CYLINDER: +2.75
OS_CYLINDER: +3.50
SPECS_TYPE: PAL
OS_SPHERE: -6.75
OS_ADD: +2.50

## 2017-12-04 ASSESSMENT — VISUAL ACUITY
OD_CC+: -2
OS_CC: 20/30
OS_SC: 20/400-
CORRECTION_TYPE: GLASSES
OS_CC+: -2
OD_CC: 20/40
OS_CC: 20/30
OD_CC: 20/30
OD_SC: 20/400-
METHOD: SNELLEN - LINEAR

## 2017-12-04 ASSESSMENT — EXTERNAL EXAM - RIGHT EYE: OD_EXAM: NORMAL

## 2017-12-04 ASSESSMENT — EXTERNAL EXAM - LEFT EYE: OS_EXAM: NORMAL

## 2017-12-04 NOTE — PROGRESS NOTES
Chief Complaint   Patient presents with     COMPREHENSIVE EYE EXAM         Last Eye Exam: 12-2-2015  Dilated Previously: Yes    What are you currently using to see?  glasses       Distance Vision Acuity: Noticed gradual change in both eyes    Near Vision Acuity: Satisfied with vision while reading  with glasses    Eye Comfort: burn sometimes  Do you use eye drops? : No  Occupation or Hobbies: retired    Razia Baez Optometric Assistant, A.B.O.C.          Medical, surgical and family histories reviewed and updated 12/4/2017.       OBJECTIVE: See Ophthalmology exam    ASSESSMENT:    ICD-10-CM    1. Myopia of both eyes H52.13 REFRACTION   2. Regular astigmatism of both eyes H52.223 REFRACTION   3. Dermatochalasis of eyelid H02.839 EYE EXAM (SIMPLE-NONBILLABLE)     OPHTHALMOLOGY ADULT REFERRAL   4. Age-related nuclear cataract of both eyes H25.13 EYE EXAM (SIMPLE-NONBILLABLE)   5. Ptosis of both eyelids H02.403 EYE EXAM (SIMPLE-NONBILLABLE)     OPHTHALMOLOGY ADULT REFERRAL   6. 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    You have the start of mild cataracts.  You may notice some blurred vision or glare with night driving.  It is important that you wear good sunglasses to protect your eyes from the ultraviolet light from the sun.  Taking a supplement with at least 300 mg/day of vitamin C appears to help prevent cataract development.  I recommend that you return in 1 year for an eye exam unless there are any sudden changes in your vision.       Referral to Dr. Eldon Prather at the [facility] for lid evaluation- [phone].    Refresh Advanced Optive- 1 drop both eyes 4 x day.    Return in 1 year for a complete eye exam or sooner if needed.    Mj Frederick, OD

## 2017-12-04 NOTE — PATIENT INSTRUCTIONS
Eyeglass prescription given.    You have the start of mild cataracts.  You may notice some blurred vision or glare with night driving.  It is important that you wear good sunglasses to protect your eyes from the ultraviolet light from the sun.  Taking a supplement with at least 300 mg/day of vitamin C appears to help prevent cataract development.  I recommend that you return in 1 year for an eye exam unless there are any sudden changes in your vision.       Referral to [facility]. Eldon Prather at the [facility] for lid evaluation- [phone].    Refresh Advanced Optive- 1 drop both eyes 4 x day.  Use one drop of artificial tears both eyes 3-4 x daily.  Continue to use the drops regardless if your eyes are comfortable.  Artificial tears work best as a preventative and not as well after your eyes are starting to bother you.  It may take 4- 6 weeks of using the drops before you notice improvement.  If after that time you are still having problems schedule an appointment for an evaluation and discussion of different treatments.  Dry eyes are a chronic condition and you may have more symptoms at certain times of the year.    Return in 1 year for a complete eye exam or sooner if needed.    Mj Frederick, OD    The affects of the dilating drops last for 4- 6 hours.  You will be more sensitive to light and vision will be blurry up close.  Mydriatic sunglasses were given if needed.    .opotvu

## 2017-12-04 NOTE — MR AVS SNAPSHOT
After Visit Summary   12/4/2017    Isabel Carroll    MRN: [id number]           Patient Information     Date Of Birth          1950        Visit Information        Provider Department      12/4/2017 2:00 PM Mj Frederick OD [facility]        Today's Diagnoses     Myopia of both eyes    -  1    Regular astigmatism of both eyes        Dermatochalasis of eyelid        Age-related nuclear cataract of both eyes        Ptosis of both eyelids        Insufficiency of tear film of both eyes          Care Instructions    Eyeglass prescription given.    You have the start of mild cataracts.  You may notice some blurred vision or glare with night driving.  It is important that you wear good sunglasses to protect your eyes from the ultraviolet light from the sun.  Taking a supplement with at least 300 mg/day of vitamin C appears to help prevent cataract development.  I recommend that you return in 1 year for an eye exam unless there are any sudden changes in your vision.       Referral to Dr. Eldon Prather at the [facility] for lid evaluation- [phone].    Refresh Advanced Optive- 1 drop both eyes 4 x day.  Use one drop of artificial tears both eyes 3-4 x daily.  Continue to use the drops regardless if your eyes are comfortable.  Artificial tears work best as a preventative and not as well after your eyes are starting to bother you.  It may take 4- 6 weeks of using the drops before you notice improvement.  If after that time you are still having problems schedule an appointment for an evaluation and discussion of different treatments.  Dry eyes are a chronic condition and you may have more symptoms at certain times of the year.    Return in 1 year for a complete eye exam or sooner if needed.    Mj Frederick, TIP    The affects of the dilating drops last for 4- 6 hours.  You will be more sensitive to light and vision will be blurry up close.  Mydriatic sunglasses were given if  needed.    .opotm              Follow-ups after your visit        Additional Services     OPHTHALMOLOGY ADULT REFERRAL       Your provider has referred you to:  [facility]: [facility] [phone]   [email or web address]  Referral to Dr. Eldon Prather at the [facility] for lid evaluation- [phone].        Please be aware that coverage of these services is subject to the terms and limitations of your health insurance plan.  Call member services at your health plan with any benefit or coverage questions.      Please bring the following to your appointment:  >>   Any x-rays, CTs or MRIs which have been performed.  Contact the facility where they were done to arrange for  prior to your scheduled appointment.  Any new CT, MRI or other procedures ordered by your specialist must be performed at a [place] facility or coordinated by your clinic's referral office.    >>   List of current medications   >>   This referral request   >>   Any documents/labs given to you for this referral                  Follow-up notes from your care team     Return in about 1 year (around 12/4/2018) for Annual Visit.      Who to contact     If you have questions or need follow up information about today's clinic visit or your schedule please contact [facility] directly at [phone].  Normal or non-critical lab and imaging results will be communicated to you by MyChart, letter or phone within 4 business days after the clinic has received the results. If you do not hear from us within 7 days, please contact the clinic through MyChart or phone. If you have a critical or abnormal lab result, we will notify you by phone as soon as possible.  Submit refill requests through [employer] or call your pharmacy and they will forward the refill request to us. Please allow 3 business days for your refill to be completed.          Additional  Information About Your Visit        [employer] Information     [employer] gives you secure access to your electronic health record. If you see a primary care provider, you can also send messages to your care team and make appointments. If you have questions, please call your primary care clinic.  If you do not have a primary care provider, please call [phone] and they will assist you.        Care EveryWhere ID     This is your Care EveryWhere ID. This could be used by other organizations to access your [place] medical records  [id number]         Blood Pressure from Last 3 Encounters:   10/13/17 124/78   09/14/17 127/86   07/27/17 116/78    Weight from Last 3 Encounters:   10/13/17 66.2 kg (146 lb)   09/14/17 66.4 kg (146 lb 4.8 oz)   06/06/17 65.8 kg (145 lb)              We Performed the Following     EYE EXAM (SIMPLE-NONBILLABLE)     OPHTHALMOLOGY ADULT REFERRAL     REFRACTION        Primary Care Provider Office Phone # Fax #    Marry Naida Gonzalez [phone]       [address] [facility] [address]        Equal Access to Services     LEIDA [facility] CenterMARLA : Hadii aad ku hadasho Soomaali, waaxda luqadaha, qaybta kaalmada adeegyada, ofelia carter haypearl recinos . So [facility] [phone].    ATENCIÓN: Si habla español, tiene a demarco disposición servicios gratdevikaos de asistencia lingüística. LlSelect Medical [facility] [phone].    We comply with applicable federal civil rights laws and [place] laws. We do not discriminate on the basis of race, color, national origin, age, disability, sex, sexual orientation, or gender identity.            Thank you!     Thank you for choosing [facility]  for your care. Our goal is always to provide you with excellent care. Hearing back from our patients is one way we can continue to improve our services. Please take a few minutes to complete the written survey that you may receive in the mail after your visit with us. Thank you!             Your Updated  Medication List - Protect others around you: Learn how to safely use, store and throw away your medicines at www.disposemymeds.org.          This list is accurate as of: 12/4/17  3:09 PM.  Always use your most recent med list.                   Brand Name Dispense Instructions for use Diagnosis    aspirin 81 MG tablet      Take 1 tablet by mouth daily.        CALCIUM + D PO      Take  by mouth.        diclofenac 1 % Gel topical gel    VOLTAREN    100 g    2 grams to hands four times daily using enclosed dosing card.    Primary osteoarthritis of both hands       FISH OIL           fluticasone 50 MCG/ACT spray    FLONASE    1 Package    Spray 1-2 sprays into both nostrils daily    Chronic rhinitis       guaiFENesin-codeine 100-10 MG/5ML Soln solution    ROBITUSSIN AC    120 mL    Take 5-10 mLs by mouth nightly as needed for cough    Influenza-like syndrome       metoprolol 25 MG 24 hr tablet    TOPROL-XL    90 tablet    Take 1 tablet (25 mg) by mouth daily    Sinus tachycardia       MULTIVITAMIN & MINERAL PO      Take  by mouth.        omeprazole 20 MG CR capsule    priLOSEC    30 capsule    Take 1 capsule (20 mg) by mouth daily    Gastroesophageal reflux disease without esophagitis, Chronic cough       simvastatin 10 MG tablet    ZOCOR    90 tablet    Take 1 tablet (10 mg) by mouth At Bedtime    Coronary atherosclerosis due to calcified coronary lesion       SUDAFED 12 HOUR PO      Take  by mouth.

## 2018-01-03 ENCOUNTER — OFFICE VISIT (OUTPATIENT)
Dept: OPHTHALMOLOGY | Facility: CLINIC | Age: 68
End: 2018-01-03
Attending: OPTOMETRIST
Payer: COMMERCIAL

## 2018-01-03 DIAGNOSIS — H02.833 DERMATOCHALASIS OF EYELIDS OF BOTH EYES: ICD-10-CM

## 2018-01-03 DIAGNOSIS — H02.836 DERMATOCHALASIS OF EYELIDS OF BOTH EYES: ICD-10-CM

## 2018-01-03 DIAGNOSIS — H02.403 ACQUIRED PTOSIS OF EYELID OF BOTH EYES: Primary | ICD-10-CM

## 2018-01-03 PROCEDURE — 99203 OFFICE O/P NEW LOW 30 MIN: CPT | Performed by: OPHTHALMOLOGY

## 2018-01-03 ASSESSMENT — SLIT LAMP EXAM - LIDS
COMMENTS: UPPER LID DERMATOCHALASIS, PTOSIS
COMMENTS: UPPER LID DERMATOCHALASIS, PTOSIS

## 2018-01-03 ASSESSMENT — EXTERNAL EXAM - RIGHT EYE: OD_EXAM: NORMAL

## 2018-01-03 ASSESSMENT — VISUAL ACUITY
OS_CC: 20/20
METHOD: SNELLEN - LINEAR
CORRECTION_TYPE: GLASSES
OD_CC: 20/25

## 2018-01-03 NOTE — LETTER
1/3/2018         RE:  :  MRN: Isabel Carroll  1950  [id number]     Dear Dr. Mj Frederick,    Thank you for asking me to see your patient, Isabel Carroll, for an oculoplastic   consultation.  My assessment and plan are below.  For further details, please see my attached clinic note.               HPI:     Isabel Carroll is a 67 year old female who has noted gradual onset of droopy eyelids over the past years. The droopy eyelid is starting to interfere with night  Driving. The patient denies double vision, variability of the eyelid position. She does have some dry eye symptoms.     EXAM:     MRD1: 2.5 ou  Dermatochalasis, true ptosis, and left brow ptosis  VISUAL FIELD:  Right eye untaped:45 degrees Right eye taped:not done  Left eye untaped:60 degrees Left eye taped:not done    Assessment & Plan     Isabel Carroll is a 67 year old female with the following diagnoses:   1. Acquired ptosis of eyelid of both eyes    2. Dermatochalasis of eyelids of both eyes       We discussed the current findings. While she has dermatochalasis, ptosis, and left brow ptosis, the visual field does not demonstrate it obscuring her superior visual field.     Options: observation, or surgery. She will discuss with financial counselor    Both upper eyelid blepharoplasty , Bilateral ptosis repair MMCR 7.5 ou and Internal browpexy left      ANTICOAGULATION:  Aspirin 81 can hold      Again, thank you for allowing me to participate in the care of your patient.      Sincerely,    Eldon Prather MD  Department of Ophthalmology and Visual Neurosciences  [facility]    CC: [place]  [address]  [facility] [address]  VIA In Basket

## 2018-01-03 NOTE — NURSING NOTE
Patient presents with:  Dermatochalasis Evaluation: Referred by Dr. Frederick      [place]erring Provider[place]  [address] [place] [address]    HPI    Affected eye(s):  Both   Location:  Upper   Symptoms:     Difficulty with driving   Burning      Duration:  1 year   Frequency:  Intermittent       Do you have eye pain now?:  No      Comments:  Trouble driving at night.  Pt using ATs BID OU. Pt has been noticing the OS lid is drooping.  OS also twitches at times .

## 2018-01-03 NOTE — MR AVS SNAPSHOT
After Visit Summary   1/3/2018    Isabel Carroll    MRN: [id number]           Patient Information     Date Of Birth          1950        Visit Information        Provider Department      1/3/2018 2:15 PM Eldon Prather MD [facility]        Today's Diagnoses     Acquired ptosis of eyelid of both eyes    -  1    Dermatochalasis of eyelids of both eyes           Follow-ups after your visit        Who to contact     If you have questions or need follow up information about today's clinic visit or your schedule please contact [facility] directly at [phone].  Normal or non-critical lab and imaging results will be communicated to you by [employer], letter or phone within 4 business days after the clinic has received the results. If you do not hear from us within 7 days, please contact the clinic through [employer] or phone. If you have a critical or abnormal lab result, we will notify you by phone as soon as possible.  Submit refill requests through [employer] or call your pharmacy and they will forward the refill request to us. Please allow 3 business days for your refill to be completed.          Additional Information About Your Visit        MyChart Information     [employer] gives you secure access to your electronic health record. If you see a primary care provider, you can also send messages to your care team and make appointments. If you have questions, please call your primary care clinic.  If you do not have a primary care provider, please call [phone] and they will assist you.      [employer] is an electronic gateway that provides easy, online access to your medical records. With [employer], you can request a clinic appointment, read your test results, renew a prescription or communicate with your care team.     To access your existing account, please contact your [facility] or call [phone] for assistance.        Care  EveryWhere ID     This is your Care EveryWhere ID. This could be used by other organizations to access your [place] medical records  [id number]         Blood Pressure from Last 3 Encounters:   10/13/17 124/78   09/14/17 127/86   07/27/17 116/78    Weight from Last 3 Encounters:   10/13/17 66.2 kg (146 lb)   09/14/17 66.4 kg (146 lb 4.8 oz)   06/06/17 65.8 kg (145 lb)              We Performed the Following     [place]am-Operative Worksheet (Plastics)        Primary Care Provider Office Phone # Fax #    Marry Naida Gonzalez [phone]       [address]  [facility] [address]        Equal Access to Services     CRIS PYLE : Hadii aad ku hadasho Soomaali, waaxda luqadaha, qaybta kaalmada adeegyada, waxay idiin haypearl recinos . So [facility] [phone].    ATENCIÓN: Si habla español, tiene a demarco disposición servicios gratuitos de asistencia lingüística. LlElyria Memorial Hospital [phone].    We comply with applicable federal civil rights laws and [place] laws. We do not discriminate on the basis of race, color, national origin, age, disability, sex, sexual orientation, or gender identity.            Thank you!     Thank you for choosing [facility]  for your care. Our goal is always to provide you with excellent care. Hearing back from our patients is one way we can continue to improve our services. Please take a few minutes to complete the written survey that you may receive in the mail after your visit with us. Thank you!             Your Updated Medication List - Protect others around you: Learn how to safely use, store and throw away your medicines at www.disposemymeds.org.          This list is accurate as of: 1/3/18  2:57 PM.  Always use your most recent med list.                   Brand Name Dispense Instructions for use Diagnosis    aspirin 81 MG tablet      Take 1 tablet by mouth daily.        CALCIUM + D PO      Take  by mouth.        diclofenac 1 % Gel topical gel     VOLTAREN    100 g    2 grams to hands four times daily using enclosed dosing card.    Primary osteoarthritis of both hands       FISH OIL           fluticasone 50 MCG/ACT spray    FLONASE    1 Package    Spray 1-2 sprays into both nostrils daily    Chronic rhinitis       guaiFENesin-codeine 100-10 MG/5ML Soln solution    ROBITUSSIN AC    120 mL    Take 5-10 mLs by mouth nightly as needed for cough    Influenza-like syndrome       metoprolol 25 MG 24 hr tablet    TOPROL-XL    90 tablet    Take 1 tablet (25 mg) by mouth daily    Sinus tachycardia       MULTIVITAMIN & MINERAL PO      Take  by mouth.        omeprazole 20 MG CR capsule    priLOSEC    30 capsule    Take 1 capsule (20 mg) by mouth daily    Gastroesophageal reflux disease without esophagitis, Chronic cough       simvastatin 10 MG tablet    ZOCOR    90 tablet    Take 1 tablet (10 mg) by mouth At Bedtime    Coronary atherosclerosis due to calcified coronary lesion       SUDAFED 12 HOUR PO      Take  by mouth.

## 2018-01-03 NOTE — PROGRESS NOTES
Oculoplastic Clinic New Patient    Patient: Isabel Carroll MRN# [id number]   YOB: 1950 Age: 67 year old   Date of Visit: [place] 3, 2018    CC: Droopy eyelids obstructing vision.  Chief Complaints and History of Present Illnesses   Patient presents with     Dermatochalasis Evaluation     Referred by Dr. Frederick                 HPI:     Isabel Carroll is a 67 year old female who has noted gradual onset of droopy eyelids over the past years. The droopy eyelid is starting to interfere with night  Driving. The patient denies double vision, variability of the eyelid position. She does have some dry eye symptoms.     EXAM:     MRD1: 2.5 ou  Dermatochalasis, true ptosis, and left brow ptosis  VISUAL FIELD:  Right eye untaped:45 degrees Right eye taped:not done  Left eye untaped:60 degrees Left eye taped:not done    Assessment & Plan     Isabel Carroll is a 67 year old female with the following diagnoses:   1. Acquired ptosis of eyelid of both eyes    2. Dermatochalasis of eyelids of both eyes       We discussed the current findings. While she has dermatochalasis, ptosis, and left brow ptosis, the visual field does not demonstrate it obscuring her superior visual field.     Options: observation, or surgery. She will discuss with financial counselor    Both upper eyelid blepharoplasty , Bilateral ptosis repair MMCR 7.5 ou and Internal browpexy left      ANTICOAGULATION:  Aspirin 81 can hold       PHOTOS DEMONSTRATE:    dermatochalasis aponeurotic blepharoptosis  Brow ptosis on the left      Complete documentation of historical and exam elements from today's encounter can be found in the full encounter summary report (not reduplicated in this progress note). I personally obtained the chief complaint(s) and history of present illness.  I confirmed and edited as necessary the review of systems, past medical/surgical history, family history, social history, and examination findings as documented by  others; and I examined the patient myself. I personally reviewed the relevant tests, images, and reports as documented above. I formulated and edited as necessary the assessment and plan and discussed the findings and management plan with the patient and family. - Eldon Prather MD      Today with Isabel Carroll , I reviewed the indications, risks, benefits, and alternatives of the proposed surgical procedure including, but not limited to, failure obtain the desired result  and need for additional surgery, bleeding, infection, loss of vision, loss of the eye, and the remote possibility of permanent damage to any organ system or death with the use of anesthesia.  I provided multiple opportunities for the questions, answered all questions to the best of my ability, and confirmed that my answers and my discussion were understood.

## 2018-01-04 ENCOUNTER — TELEPHONE (OUTPATIENT)
Dept: OPHTHALMOLOGY | Facility: CLINIC | Age: 68
End: 2018-01-04

## 2018-01-26 NOTE — TELEPHONE ENCOUNTER
She doesn't meet insurance criteria.   I think surgeon fee should be about 3k. Can you tell me what facility/anes fee will be? She is considering waiting until she is closer to meeting insurance criteria but did want a quote.     Thanks!   Eldon saldaña, she needs a quote for both upper eyelid blepharoplasty, ptosis repair, left browpexy.     We can discuss number before you give her actual quote.   Thanks,   Eldon

## 2018-01-26 NOTE — TELEPHONE ENCOUNTER
Quote:  Upper eyelids blepharoplasty Rt, Lt side each $1,581.00 + $1,581.00  Lower brow-plexy  Lt side $1,296.00  Total $$4,458.00 if insurance is ran, if cosmetic $3,000.00    One hour of OR time $1,400.00    So one hour of OR time and surgery would be $4,400.00     Gave quote 01/26/18    After playing phone tag with Isabel for the last three weeks I got to talk with her to give the quote. Isabel would like to wait a year and then come back to see you in hopes this will be able to run under her insurance by then.

## 2018-05-17 ENCOUNTER — TRANSFERRED RECORDS (OUTPATIENT)
Dept: HEALTH INFORMATION MANAGEMENT | Facility: CLINIC | Age: 68
End: 2018-05-17

## 2018-05-22 ENCOUNTER — TRANSFERRED RECORDS (OUTPATIENT)
Dept: HEALTH INFORMATION MANAGEMENT | Facility: CLINIC | Age: 68
End: 2018-05-22

## 2018-05-22 LAB
ALT SERPL-CCNC: 45 IU/L (ref 12–68)
AST SERPL-CCNC: 20 IU/L (ref 12–37)
CHOLEST SERPL-MCNC: 132 MG/DL
HDLC SERPL-MCNC: 48 MG/DL
LDLC SERPL CALC-MCNC: 48 MG/DL
TRIGL SERPL-MCNC: 182 MG/DL

## 2018-07-26 ENCOUNTER — OFFICE VISIT (OUTPATIENT)
Dept: PODIATRY | Facility: CLINIC | Age: 68
End: 2018-07-26
Payer: COMMERCIAL

## 2018-07-26 VITALS
HEIGHT: 62 IN | BODY MASS INDEX: 26.68 KG/M2 | DIASTOLIC BLOOD PRESSURE: 60 MMHG | SYSTOLIC BLOOD PRESSURE: 132 MMHG | HEART RATE: 68 BPM | WEIGHT: 145 LBS

## 2018-07-26 PROCEDURE — 99213 OFFICE O/P EST LOW 20 MIN: CPT | Performed by: PODIATRIST

## 2018-07-26 RX ORDER — ATORVASTATIN CALCIUM 40 MG/1
40 TABLET, FILM COATED ORAL
COMMUNITY
Start: 2018-05-02 | End: 2019-11-14

## 2018-07-26 NOTE — MR AVS SNAPSHOT
After Visit Summary   7/26/2018    Isabel Carroll    MRN: [id number]           Patient Information     Date Of Birth          1950        Visit Information        Provider Department      7/26/2018 2:00 PM Anil Balderas, MORGAN [facility]        Today's Diagnoses     Avulsion of nail plate, initial encounter    -  1      Care Instructions    We wish you continued good healing. If you have any questions or concerns, please do not hesitate to contact us at [phone]    Please remember to call and schedule a follow up appointment if one was recommended at your earliest convenience.   PODIATRY CLINIC HOURS  TELEPHONE NUMBER    Dr. Anil FORDPRAQUEL [facility]    Clinics:  [facility]    Ann Cárdenas [facility]   Tuesday 1PM-6PM  [facility]  Wednesday 7AM-2PM  [place]  Thursday 10AM-6PM  [place]  Friday 7AM-3PM  [place]  Specialty schedulers:   [phone] to make an appointment with any Specialty Provider.        Urgent Care locations:    [facility] Monday-Friday 5 pm - 9 pm. Saturday-Sunday 9 am -5pm    Monday-Friday 11 am - 9 pm Saturday 9 am - 5 pm     Monday-Sunday 12 noon-8PM [phone]     If you need a medication refill, please contact us you may need lab work and/or a follow up visit prior to your refill (i.e. Antifungal medications).    Kwanjihart (secure e-mail communication and access to your chart) to send a message or to make an appointment.    If MRI needed please call Richi Peacock at [phone]        Weight management plan: Patient was referred to their PCP to discuss a diet and exercise plan.     Patient to follow up with Primary Care provider regarding elevated blood pressure.              Follow-ups after your visit        Your next 10 appointments already scheduled     Oct 16, 2018 11:30 AM CDT   PHYSICAL with  "Anil Giles MD   [facility] ([facility])    [address] [place] [id number] [phone]              Who to contact     If you have questions or need follow up information about today's clinic visit or your schedule please contact [facility] directly at [phone].  Normal or non-critical lab and imaging results will be communicated to you by MyChart, letter or phone within 4 business days after the clinic has received the results. If you do not hear from us within 7 days, please contact the clinic through [employer]art or phone. If you have a critical or abnormal lab result, we will notify you by phone as soon as possible.  Submit refill requests through [employer] INNOVA or call your pharmacy and they will forward the refill request to us. Please allow 3 business days for your refill to be completed.          Additional Information About Your Visit        [employer] Information     [employer] gives you secure access to your electronic health record. If you see a primary care provider, you can also send messages to your care team and make appointments. If you have questions, please call your primary care clinic.  If you do not have a primary care provider, please call [phone] and they will assist you.        Care EveryWhere ID     This is your Care EveryWhere ID. This could be used by other organizations to access your [place] medical records  [id number]        Your Vitals Were     Pulse Height BMI (Body Mass Index)             68 5' 2\" (1.575 m) 26.52 kg/m2          Blood Pressure from Last 3 Encounters:   07/26/18 132/60   10/13/17 124/78   09/14/17 127/86    Weight from Last 3 Encounters:   07/26/18 145 lb (65.8 kg)   10/13/17 146 lb (66.2 kg)   09/14/17 146 lb 4.8 oz (66.4 kg)              Today, you had the following     No orders found for display       Primary Care Provider Office Phone # Fax #    Marry Gonzalez [phone] " [phone]       [address]  [facility] [address]        Equal Access to Services     CRIS PYLE : Hadii aad ku hadvandanao Sofloridalmaali, waaxda luqadaha, qaybta kaalmada adebraydonda, ofelia chynain hayaan keyurbna booth jorje coon. So [facility] [phone].    ATENCIÓN: Si habla español, tiene a demarco disposición servicios gratuitos de asistencia lingüística. Llame al [phone].    We comply with applicable federal civil rights laws and [place] laws. We do not discriminate on the basis of race, color, national origin, age, disability, sex, sexual orientation, or gender identity.            Thank you!     Thank you for choosing [facility]  for your care. Our goal is always to provide you with excellent care. Hearing back from our patients is one way we can continue to improve our services. Please take a few minutes to complete the written survey that you may receive in the mail after your visit with us. Thank you!             Your Updated Medication List - Protect others around you: Learn how to safely use, store and throw away your medicines at www.disposemymeds.org.          This list is accurate as of 7/26/18  2:39 PM.  Always use your most recent med list.                   Brand Name Dispense Instructions for use Diagnosis    aspirin 81 MG tablet      Take 1 tablet by mouth daily.        atorvastatin 40 MG tablet    LIPITOR     40 mg        CALCIUM + D PO      Take  by mouth.        diclofenac 1 % Gel topical gel    VOLTAREN    100 g    2 grams to hands four times daily using enclosed dosing card.    Primary osteoarthritis of both hands       FISH OIL           fluticasone 50 MCG/ACT spray    FLONASE    1 Package    Spray 1-2 sprays into both nostrils daily    Chronic rhinitis       guaiFENesin-codeine 100-10 MG/5ML Soln solution    ROBITUSSIN AC    120 mL    Take 5-10 mLs by mouth nightly as needed for cough    Influenza-like syndrome       metoprolol succinate 25 MG 24 hr tablet    TOPROL-XL    90  tablet    Take 1 tablet (25 mg) by mouth daily    Sinus tachycardia       MULTIVITAMIN & MINERAL PO      Take  by mouth.        omeprazole 20 MG CR capsule    priLOSEC    30 capsule    Take 1 capsule (20 mg) by mouth daily    Gastroesophageal reflux disease without esophagitis, Chronic cough       SUDAFED 12 HOUR PO      Take  by mouth.

## 2018-07-26 NOTE — PATIENT INSTRUCTIONS
We wish you continued good healing. If you have any questions or concerns, please do not hesitate to contact us at [phone]    Please remember to call and schedule a follow up appointment if one was recommended at your earliest convenience.   PODIATRY CLINIC HOURS  TELEPHONE NUMBER    Dr. Anil Balderas D.P.M [facility]    Clinics:  [facility]    Ann Cárdenas [facility]   Tuesday 1PM-6PM  [place]  Wednesday 7AM-2PM  [facility]  Thursday 10AM-6PM  [place]  Friday 7AM-3PM  [place]  Specialty schedulers:   [phone] to make an appointment with any Specialty Provider.        Urgent Care locations:    [facility] Monday-Friday 5 pm - 9 pm. Saturday-Sunday 9 am -5pm    Monday-Friday 11 am - 9 pm Saturday 9 am - 5 pm     Monday-Sunday 12 noon-8PM [phone]     If you need a medication refill, please contact us you may need lab work and/or a follow up visit prior to your refill (i.e. Antifungal medications).    Speakapt (secure e-mail communication and access to your chart) to send a message or to make an appointment.    If MRI needed please call Richi Peacock at [phone]        Weight management plan: Patient was referred to their PCP to discuss a diet and exercise plan.     Patient to follow up with Primary Care provider regarding elevated blood pressure.

## 2018-07-26 NOTE — PROGRESS NOTES
S: patient complains of pain and discoloration in the right first toe.  Noticed this 1 weeks ago.  Can't remember any history of blunt trauma.   Has pain and swelling which are aggravated by activity and relieved by rest.  Describes as burning pain.  Has not had an xray and did not see anyone for this yet.  Negative drainage.    PMH, meds, allergies, PSH, PFH, and soc hx were reviewed  ROS:  Denies echymosis, numbness, weakness, or loss of strength.    O:  Patient good historian.  A&O X3.  Pulses DP, PT 2/4 b/l.  CRT < 3 seconds X 10 digits.  No edema or varicosities noted.  Sensation to light touch intact b/l.  Reflexes 2/4 b/l.  Skin has normal texture and turgor b/l.  Normal arch with weightbearing.  No forefoot or rear foot deformities noted.  MS 5/5 all compartments.  Normal ROM all fore foot and rearfoot joints.  No equinus.  right first toe medial border loose and can see where new nail growing in underneath.  No edema erythema drainage or pain.      A: nail avulsion from repetative trauma    P:   Discussed cause with patient.   Removed loose nail.  Avoid repetative trauma in future.  If becomes more painful will avulse nail.  Return to clinic prn.    Dr. Balderas

## 2018-07-26 NOTE — LETTER
7/26/2018         RE: Isabel Carroll  [address] [address]        Dear Colleague,    Thank you for referring your patient, Isabel Carroll, to the [facility]. Please see a copy of my visit note below.    S: patient complains of pain and discoloration in the right first toe.  Noticed this 1 weeks ago.  Can't remember any history of blunt trauma.   Has pain and swelling which are aggravated by activity and relieved by rest.  Describes as burning pain.  Has not had an xray and did not see anyone for this yet.  Negative drainage.    PMH, meds, allergies, PSH, PFH, and soc hx were reviewed  ROS:  Denies echymosis, numbness, weakness, or loss of strength.    O:  Patient good historian.  A&O X3.  Pulses DP, PT 2/4 b/l.  CRT < 3 seconds X 10 digits.  No edema or varicosities noted.  Sensation to light touch intact b/l.  Reflexes 2/4 b/l.  Skin has normal texture and turgor b/l.  Normal arch with weightbearing.  No forefoot or rear foot deformities noted.  MS 5/5 all compartments.  Normal ROM all fore foot and rearfoot joints.  No equinus.  right first toe medial border loose and can see where new nail growing in underneath.  No edema erythema drainage or pain.      A: nail avulsion from repetative trauma    P:   Discussed cause with patient.   Removed loose nail.  Avoid repetative trauma in future.  If becomes more painful will avulse nail.  Return to clinic prn.    Dr. Balderas          Again, thank you for allowing me to participate in the care of your patient.        Sincerely,        Anil Balderas, MORGAN

## 2018-08-07 ENCOUNTER — TRANSFERRED RECORDS (OUTPATIENT)
Dept: HEALTH INFORMATION MANAGEMENT | Facility: CLINIC | Age: 68
End: 2018-08-07

## 2018-09-22 ENCOUNTER — RADIANT APPOINTMENT (OUTPATIENT)
Dept: MAMMOGRAPHY | Facility: CLINIC | Age: 68
End: 2018-09-22
Attending: OBSTETRICS & GYNECOLOGY
Payer: COMMERCIAL

## 2018-09-22 DIAGNOSIS — Z12.31 VISIT FOR SCREENING MAMMOGRAM: ICD-10-CM

## 2018-09-22 PROCEDURE — 77067 SCR MAMMO BI INCL CAD: CPT | Mod: TC

## 2018-10-16 ENCOUNTER — OFFICE VISIT (OUTPATIENT)
Dept: OBGYN | Facility: CLINIC | Age: 68
End: 2018-10-16
Payer: COMMERCIAL

## 2018-10-16 VITALS
WEIGHT: 146.2 LBS | HEART RATE: 68 BPM | BODY MASS INDEX: 27.6 KG/M2 | OXYGEN SATURATION: 98 % | DIASTOLIC BLOOD PRESSURE: 82 MMHG | HEIGHT: 61 IN | TEMPERATURE: 98 F | SYSTOLIC BLOOD PRESSURE: 123 MMHG

## 2018-10-16 DIAGNOSIS — Z01.419 ENCOUNTER FOR GYNECOLOGICAL EXAMINATION WITHOUT ABNORMAL FINDING: Primary | ICD-10-CM

## 2018-10-16 PROCEDURE — 99397 PER PM REEVAL EST PAT 65+ YR: CPT | Performed by: OBSTETRICS & GYNECOLOGY

## 2018-10-16 NOTE — MR AVS SNAPSHOT
After Visit Summary   10/16/2018    Isabel Carroll    MRN: [id number]           Patient Information     Date Of Birth          1950        Visit Information        Provider Department      10/16/2018 11:30 AM Anil Giles MD [facility]        Today's Diagnoses     Encounter for gynecological examination without abnormal finding    -  1      Care Instructions              If you have any questions regarding your visit, Please contact your care team.      [facility] Access Services: [phone]     [facility] CLINIC HOURS TELEPHONE NUMBER   Anil Giles M.D.        Sondra-     Latricia Brown-Medical Assistant Monday-[place]  8:00a.m-4:45 p.m  Wednesday-[place] 8:00a.m-4:45 p.m.  Thursday-[place] 8:00a.m-4:45 p.m.  Friday-[place]  8:00a.m-4:45 p.m. [facility]  [address]  [place], [place] [address] [phone] ask [facility]  [phone] Fax  Imaging [phone]     [facility] Labor and Delivery  [address]  [place], [place] [address] [phone]     [facility]  [address] [facility] [place] [address] [phone] ask [facility]  [phone] Fax  Imaging [phone]      Urgent Care locations:    [facility] Monday-Friday  5 pm - 9 pm  Saturday and Sunday   9 am - 5 pm     Monday-Friday   11 am - 9 pm  Saturday and Sunday  9 am - 5 pm    [phone]         If you need a medication refill, please contact your pharmacy. Please allow 3 business days for your refill to be completed.  As always, Thank you for trusting us with your healthcare needs!                 Follow-ups after your visit        Follow-up notes from your care team     Return in about 1 year (around 10/16/2019).      Your next 10 appointments already scheduled     Dec 10, 2018  2:00 PM CST   New Visit with Mj Frederick, OD  "  [facility]    [address] [place] [id number] [phone]              Who to contact     If you have questions or need follow up information about today's clinic visit or your schedule please contact [facility] directly at [phone].  Normal or non-critical lab and imaging results will be communicated to you by MyChart, letter or phone within 4 business days after the clinic has received the results. If you do not hear from us within 7 days, please contact the clinic through Siamosocihart or phone. If you have a critical or abnormal lab result, we will notify you by phone as soon as possible.  Submit refill requests through [employer] or call your pharmacy and they will forward the refill request to us. Please allow 3 business days for your refill to be completed.          Additional Information About Your Visit        Siamos[employer]hart Information     [employer] gives you secure access to your electronic health record. If you see a primary care provider, you can also send messages to your care team and make appointments. If you have questions, please call your primary care clinic.  If you do not have a primary care provider, please call [phone] and they will assist you.        Care EveryWhere ID     This is your Care EveryWhere ID. This could be used by other organizations to access your [place] medical records  [id number]        Your Vitals Were     Pulse Temperature Height Pulse Oximetry BMI (Body Mass Index)       68 98  F (36.7  C) (Oral) 5' 1.42\" (1.56 m) 98% 27.25 kg/m2        Blood Pressure from Last 3 Encounters:   10/16/18 123/82   07/26/18 132/60   10/13/17 124/78    Weight from Last 3 Encounters:   10/16/18 146 lb 3.2 oz (66.3 kg)   07/26/18 145 lb (65.8 kg)   10/13/17 146 lb (66.2 kg)              Today, you had the following     No orders found for display       Primary Care Provider Office Phone # Fax #    Marry " Naida Gonzalez [phone]       [address]  [facility] [address]        Equal Access to Services     CRIS PYLE : Hadalyssa antonio cardona jett Sojosh, waholleyda luqbri, qaisaurota kaalmada blaire, ofelia chynakarin booth jorje coon. So [facility] [phone].    ATENCIÓN: Si habla español, tiene a demarco disposición servicios gratuitos de asistencia lingüística. Llame al [phone].    We comply with applicable federal civil rights laws and [place] laws. We do not discriminate on the basis of race, color, national origin, age, disability, sex, sexual orientation, or gender identity.            Thank you!     Thank you for choosing [facility]  for your care. Our goal is always to provide you with excellent care. Hearing back from our patients is one way we can continue to improve our services. Please take a few minutes to complete the written survey that you may receive in the mail after your visit with us. Thank you!             Your Updated Medication List - Protect others around you: Learn how to safely use, store and throw away your medicines at www.disposemymeds.org.          This list is accurate as of 10/16/18 12:24 PM.  Always use your most recent med list.                   Brand Name Dispense Instructions for use Diagnosis    aspirin 81 MG tablet      Take 1 tablet by mouth daily.        atorvastatin 40 MG tablet    LIPITOR     40 mg        CALCIUM + D PO      Take  by mouth.        diclofenac 1 % Gel topical gel    VOLTAREN    100 g    2 grams to hands four times daily using enclosed dosing card.    Primary osteoarthritis of both hands       FISH OIL           fluticasone 50 MCG/ACT spray    FLONASE    1 Package    Spray 1-2 sprays into both nostrils daily    Chronic rhinitis       guaiFENesin-codeine 100-10 MG/5ML Soln solution    ROBITUSSIN AC    120 mL    Take 5-10 mLs by mouth nightly as needed for cough    Influenza-like syndrome       MAGNESIUM OXIDE PO       Take 200 mg by mouth daily        metoprolol succinate 25 MG 24 hr tablet    TOPROL-XL    90 tablet    Take 1 tablet (25 mg) by mouth daily    Sinus tachycardia       MULTIVITAMIN & MINERAL PO      Take  by mouth.        omeprazole 20 MG CR capsule    priLOSEC    30 capsule    Take 1 capsule (20 mg) by mouth daily    Gastroesophageal reflux disease without esophagitis, Chronic cough       SUDAFED 12 HOUR PO      Take  by mouth.

## 2018-10-16 NOTE — PATIENT INSTRUCTIONS
If you have any questions regarding your visit, Please contact your care team.      "[employer] Access Services: [phone]     [facility] CLINIC HOURS TELEPHONE NUMBER   CARLEEN Fernández-     Latricia Brown-Medical Assistant Monday-[place]  8:00a.m-4:45 p.m  Wednesday-[place] 8:00a.m-4:45 p.m.  Thursday-[place] 8:00a.m-4:45 p.m.  Friday-[place]  8:00a.m-4:45 p.m. [facility]  [address] [place] [address]  [phone] ask [facility]  [phone] Fax  Imaging [phone]     [facility] Labor and Delivery  [address] [place] [address] [place] [address]  [phone]     [facility]  [address] [place] [address]  [phone] [facility]  [phone] Fax  Imaging [phone]      Urgent Care locations:    [place]          [place] Monday-Friday  5 pm - 9 pm  Saturday and Sunday   9 am - 5 pm     Monday-Friday   11 am - 9 pm  Saturday and Sunday  9 am - 5 pm    [phone]         If you need a medication refill, please contact your pharmacy. Please allow 3 business days for your refill to be completed.  As always, Thank you for trusting us with your healthcare needs!

## 2018-10-16 NOTE — PROGRESS NOTES
"Isabel Carroll is a 67 year old year old who presents for annual exam. No LMP recorded. Patient is postmenopausal.    HPI: Doing well.    Significant interval changes: none.  Current significant symptoms: none. Still some right shoulder limitation and completed Physical Therapy. Chol/TG improved with med change.  Other problems or concerns: discussed cancer screening.   PHQ-9 = 0.    Past medical, obstetrical, surgical, family and social history reviewed and as noted or updated in chart.     Allergies, meds and supplements are as noted or updated in chart.     ROS:     Systems reviewed include constitutional; breast;                 cardiac; respiratory; gastrointestinal; genitourinary;                                musculoskeletal; integumentary; psychological;                                hematologic/lymphatic and endocrine.                  These systems were negative for significant symptoms except                 for the following additional: none ; see HPI.                                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      Assessment: Gyn exam. Problem List updated.    Plan and Recommendations: Symptoms, problems and concerns reviewed. Health habits and vaccinations reviewed. Calcium/Vitamin D and multi-vitamin supplements instructions given. Breast/skin self-exam awareness. Screening tests including limitations discussed. Follow-up visits discussed. See orders. Continue yearly mammogram. Suggest another recheck colonoscopy at 4 yrs in 2021. Continue other gen med care.     Isabel was seen today for physical.    Diagnoses and all orders for this " visit:    Encounter for gynecological examination without abnormal finding        Anil Giles MD

## 2018-10-17 ASSESSMENT — PATIENT HEALTH QUESTIONNAIRE - PHQ9: SUM OF ALL RESPONSES TO PHQ QUESTIONS 1-9: 0

## 2018-10-31 ENCOUNTER — ALLIED HEALTH/NURSE VISIT (OUTPATIENT)
Dept: NURSING | Facility: CLINIC | Age: 68
End: 2018-10-31
Payer: COMMERCIAL

## 2018-10-31 DIAGNOSIS — Z23 NEED FOR PROPHYLACTIC VACCINATION AND INOCULATION AGAINST INFLUENZA: Primary | ICD-10-CM

## 2018-10-31 PROCEDURE — G0008 ADMIN INFLUENZA VIRUS VAC: HCPCS

## 2018-10-31 PROCEDURE — 99207 ZZC NO CHARGE NURSE ONLY: CPT

## 2018-10-31 PROCEDURE — 90662 IIV NO PRSV INCREASED AG IM: CPT

## 2018-10-31 NOTE — MR AVS SNAPSHOT
After Visit Summary   10/31/2018    Isabel Carroll    MRN: [id number]           Patient Information     Date Of Birth          1950        Visit Information        Provider Department      10/31/2018 1:40 PM [facility]        Today's Diagnoses     Need for prophylactic vaccination and inoculation against influenza    -  1       Follow-ups after your visit        Your next 10 appointments already scheduled     Dec 10, 2018  2:00 PM CST   New Visit with Mj Frederick OD   [facility] ([facility])    [address] [place] [id number] [phone]              Who to contact     If you have questions or need follow up information about today's clinic visit or your schedule please contact [facility] directly at [phone].  Normal or non-critical lab and imaging results will be communicated to you by MyChart, letter or phone within 4 business days after the clinic has received the results. If you do not hear from us within 7 days, please contact the clinic through MyChart or phone. If you have a critical or abnormal lab result, we will notify you by phone as soon as possible.  Submit refill requests through [employer] or call your pharmacy and they will forward the refill request to us. Please allow 3 business days for your refill to be completed.          Additional Information About Your Visit        MyChart Information     [employer] gives you secure access to your electronic health record. If you see a primary care provider, you can also send messages to your care team and make appointments. If you have questions, please call your primary care clinic.  If you do not have a primary care provider, please call [phone] and they will assist you.        Care EveryWhere ID     This is your Care EveryWhere ID. This could be used by other organizations to access your [facility]  records  [id number]         Blood Pressure from Last 3 Encounters:   10/16/18 123/82   07/26/18 132/60   10/13/17 124/78    Weight from Last 3 Encounters:   10/16/18 146 lb 3.2 oz (66.3 kg)   07/26/18 145 lb (65.8 kg)   10/13/17 146 lb (66.2 kg)              We Performed the Following     FLU VACCINE, INCREASED ANTIGEN, PRESV FREE, AGE 65+ [20892]     Vaccine Administration, Initial [81187]        Primary Care Provider Office Phone # Fax #    Marry Naida Gonzalez [phone]       [address]  [facility] [address]        Equal Access to Services     CRIS PYLE : Hadii antonio cardona hadasho Sofloridalmaali, waaxda luqadaha, qaybta kaalmada adeegyada, ofelia recinos . So [facility] [phone].    ATENCIÓN: Si habla español, tiene a demarco disposición servicios gratuitos de asistencia lingüística. [facility] [phone].    We comply with applicable federal civil rights laws and [place] laws. We do not discriminate on the basis of race, color, national origin, age, disability, sex, sexual orientation, or gender identity.            Thank you!     Thank you for choosing [facility]  for your care. Our goal is always to provide you with excellent care. Hearing back from our patients is one way we can continue to improve our services. Please take a few minutes to complete the written survey that you may receive in the mail after your visit with us. Thank you!             Your Updated Medication List - Protect others around you: Learn how to safely use, store and throw away your medicines at www.disposemymeds.org.          This list is accurate as of 10/31/18  1:49 PM.  Always use your most recent med list.                   Brand Name Dispense Instructions for use Diagnosis    aspirin 81 MG tablet      Take 1 tablet by mouth daily.        atorvastatin 40 MG tablet    LIPITOR     40 mg        CALCIUM + D PO      Take  by mouth.        diclofenac 1 % Gel topical gel     VOLTAREN    100 g    2 grams to hands four times daily using enclosed dosing card.    Primary osteoarthritis of both hands       FISH OIL           fluticasone 50 MCG/ACT spray    FLONASE    1 Package    Spray 1-2 sprays into both nostrils daily    Chronic rhinitis       guaiFENesin-codeine 100-10 MG/5ML Soln solution    ROBITUSSIN AC    120 mL    Take 5-10 mLs by mouth nightly as needed for cough    Influenza-like syndrome       MAGNESIUM OXIDE PO      Take 200 mg by mouth daily        metoprolol succinate 25 MG 24 hr tablet    TOPROL-XL    90 tablet    Take 1 tablet (25 mg) by mouth daily    Sinus tachycardia       MULTIVITAMIN & MINERAL PO      Take  by mouth.        omeprazole 20 MG CR capsule    priLOSEC    30 capsule    Take 1 capsule (20 mg) by mouth daily    Gastroesophageal reflux disease without esophagitis, Chronic cough       SUDAFED 12 HOUR PO      Take  by mouth.

## 2018-10-31 NOTE — PROGRESS NOTES

## 2018-11-04 ENCOUNTER — OFFICE VISIT (OUTPATIENT)
Dept: URGENT CARE | Facility: URGENT CARE | Age: 68
End: 2018-11-04
Payer: COMMERCIAL

## 2018-11-04 ENCOUNTER — RADIANT APPOINTMENT (OUTPATIENT)
Dept: GENERAL RADIOLOGY | Facility: CLINIC | Age: 68
End: 2018-11-04
Attending: INTERNAL MEDICINE
Payer: COMMERCIAL

## 2018-11-04 VITALS
TEMPERATURE: 99.2 F | BODY MASS INDEX: 27.18 KG/M2 | SYSTOLIC BLOOD PRESSURE: 123 MMHG | OXYGEN SATURATION: 97 % | WEIGHT: 145.8 LBS | HEART RATE: 87 BPM | DIASTOLIC BLOOD PRESSURE: 82 MMHG

## 2018-11-04 DIAGNOSIS — R10.32 ABDOMINAL PAIN, LEFT LOWER QUADRANT: ICD-10-CM

## 2018-11-04 DIAGNOSIS — R82.90 NONSPECIFIC FINDING ON EXAMINATION OF URINE: ICD-10-CM

## 2018-11-04 DIAGNOSIS — N30.00 ACUTE CYSTITIS WITHOUT HEMATURIA: Primary | ICD-10-CM

## 2018-11-04 LAB
ALBUMIN UR-MCNC: NEGATIVE MG/DL
APPEARANCE UR: ABNORMAL
BACTERIA #/AREA URNS HPF: ABNORMAL /HPF
BASOPHILS # BLD AUTO: 0 10E9/L (ref 0–0.2)
BASOPHILS NFR BLD AUTO: 0.3 %
BILIRUB UR QL STRIP: NEGATIVE
COLOR UR AUTO: YELLOW
DIFFERENTIAL METHOD BLD: ABNORMAL
EOSINOPHIL # BLD AUTO: 0.1 10E9/L (ref 0–0.7)
EOSINOPHIL NFR BLD AUTO: 0.9 %
ERYTHROCYTE [DISTWIDTH] IN BLOOD BY AUTOMATED COUNT: 12.3 % (ref 10–15)
GLUCOSE UR STRIP-MCNC: NEGATIVE MG/DL
HCT VFR BLD AUTO: 40.6 % (ref 35–47)
HGB BLD-MCNC: 13.8 G/DL (ref 11.7–15.7)
HGB UR QL STRIP: NEGATIVE
KETONES UR STRIP-MCNC: NEGATIVE MG/DL
LEUKOCYTE ESTERASE UR QL STRIP: ABNORMAL
LYMPHOCYTES # BLD AUTO: 2.1 10E9/L (ref 0.8–5.3)
LYMPHOCYTES NFR BLD AUTO: 17.5 %
MCH RBC QN AUTO: 32.4 PG (ref 26.5–33)
MCHC RBC AUTO-ENTMCNC: 34 G/DL (ref 31.5–36.5)
MCV RBC AUTO: 95 FL (ref 78–100)
MONOCYTES # BLD AUTO: 1.1 10E9/L (ref 0–1.3)
MONOCYTES NFR BLD AUTO: 9.3 %
NEUTROPHILS # BLD AUTO: 8.6 10E9/L (ref 1.6–8.3)
NEUTROPHILS NFR BLD AUTO: 72 %
NITRATE UR QL: NEGATIVE
NON-SQ EPI CELLS #/AREA URNS LPF: ABNORMAL /LPF
PH UR STRIP: 8 PH (ref 5–7)
PLATELET # BLD AUTO: 219 10E9/L (ref 150–450)
RBC # BLD AUTO: 4.26 10E12/L (ref 3.8–5.2)
RBC #/AREA URNS AUTO: ABNORMAL /HPF
SOURCE: ABNORMAL
SP GR UR STRIP: 1.01 (ref 1–1.03)
UROBILINOGEN UR STRIP-ACNC: 0.2 EU/DL (ref 0.2–1)
WBC # BLD AUTO: 11.9 10E9/L (ref 4–11)
WBC #/AREA URNS AUTO: ABNORMAL /HPF

## 2018-11-04 PROCEDURE — 99214 OFFICE O/P EST MOD 30 MIN: CPT | Performed by: INTERNAL MEDICINE

## 2018-11-04 PROCEDURE — 85025 COMPLETE CBC W/AUTO DIFF WBC: CPT | Performed by: INTERNAL MEDICINE

## 2018-11-04 PROCEDURE — 87086 URINE CULTURE/COLONY COUNT: CPT | Performed by: INTERNAL MEDICINE

## 2018-11-04 PROCEDURE — 81001 URINALYSIS AUTO W/SCOPE: CPT | Performed by: INTERNAL MEDICINE

## 2018-11-04 PROCEDURE — 74019 RADEX ABDOMEN 2 VIEWS: CPT | Mod: FY

## 2018-11-04 PROCEDURE — 36415 COLL VENOUS BLD VENIPUNCTURE: CPT | Performed by: INTERNAL MEDICINE

## 2018-11-04 RX ORDER — SULFAMETHOXAZOLE/TRIMETHOPRIM 800-160 MG
1 TABLET ORAL 2 TIMES DAILY
Qty: 14 TABLET | Refills: 0 | Status: SHIPPED | OUTPATIENT
Start: 2018-11-04 | End: 2019-02-19

## 2018-11-04 ASSESSMENT — ENCOUNTER SYMPTOMS
DYSURIA: 0
DIAPHORESIS: 0
DIZZINESS: 0
VOMITING: 0
SORE THROAT: 0
EYE DISCHARGE: 0
MYALGIAS: 1
FEVER: 0
ADENOPATHY: 0
RECTAL PAIN: 0
BLOOD IN STOOL: 0
NAUSEA: 1
APPETITE CHANGE: 1
COUGH: 0
HEADACHES: 0
SHORTNESS OF BREATH: 0
DIFFICULTY URINATING: 0
CHILLS: 1

## 2018-11-04 NOTE — PROGRESS NOTES
SUBJECTIVE:   Isabel Carroll is a 68 year old female presenting with a chief complaint of   Chief Complaint   Patient presents with     Abdominal Pain     Patient complains of pain in lower left side of abdomen        She is an established patient of [facility].    Abdominal Pain    Location: LLQ   Radiation: None.    Pain character: sharp and stabbing,   Severity: 8 on a scale of 1-10.    Duration: 1 hour(s)   Course of Illness: fluctuating. Off and on  Exacerbated by: movement/walking  Relieved by: nothing.  Associated Symptoms: nausea and myalgias.  Female : Not applicable  Surgical History: cholecystectomy and liver cyst resection  laparoscopy  S/p tubal & ovaries removed bilateral   Csections x 2      Had flu shot wed &   Had flu symptoms Friday with achy, nausea    Colonoscopy 2016 - no diverticula  normal     Also, she has additional complaints of lab results    Component      Latest Ref Rng & Units 5/22/2018   Cholesterol      <200 mg/dL 132   Triglycerides      <150 mg/dL 182 (H)   HDL Cholesterol      >40 mg/dL 48   LDL Cholesterol Calculated      <100 mg/dL 48   ALT      12 - 68 IU/L 45   AST      12 - 37 IU/L 20   .   Review of Systems   Constitutional: Positive for appetite change and chills. Negative for diaphoresis and fever.   HENT: Negative for sore throat.    Eyes: Negative for discharge.   Respiratory: Negative for cough and shortness of breath.    Cardiovascular: Negative for chest pain.   Gastrointestinal: Positive for nausea. Negative for blood in stool, rectal pain and vomiting.        Left lower quadrant pains but bothers her with walking.    normal bowel movements  gasey   Genitourinary: Negative for difficulty urinating and dysuria.   Musculoskeletal: Positive for myalgias.   Skin: Negative for rash.   Allergic/Immunologic: Negative for environmental allergies.   Neurological: Negative for dizziness and headaches.   Hematological: Negative for adenopathy.       Past Medical History:    Diagnosis Date     Arthritis      Basal cell carcinoma of skin 4/7/2011     Benign neoplasm of colon 10/26/2011    Overview:  Colonoscopy every 5 years  Oct 2011      Bronchiectasis without complication (H) 12/15/2016    CT chest done 2016 through COPDgene study, formal report indicates cylindrical bronchiectasis. Reviewed CT with [facility] radiologists; bronchiectasis is likely present but of unclear clinical significance, and was likely present on CT from 2010 as well (different scanner and different technique).     Chorioretinal scar of left eye 12/2/2015     Chronic rhinitis 7/28/2015     Coronary artery disease      Coronary atherosclerosis due to calcified coronary lesion 12/22/2011    Overview:  A)  CT Chest-Date: 07/13/10, There is coronary artery and mild aortic calcification present. B)  Nuclear Stress Test on 10/01/10, No scintigraphic evidence of ischemia or infarction. EF: 71%.       Elevated blood pressure (not hypertension) 9/19/2006     Hypercholesteremia      Hyperlipidemia 12/22/2011    Overview:  See results review for lipid profile.      Impaired fasting glucose 9/18/2006     PONV (postoperative nausea and vomiting)      Primary osteoarthritis of both hands 7/28/2015     Shingles 2009     Sinus tachycardia 7/21/2016     Skin cancer 2013, 2015    Basal cell cancer     Vitamin D deficiency 9/16/2010     Family History   Problem Relation Age of Onset     Other Cancer Sister 51     ovarian cancer     Cancer Sister      Thyroid Disease Sister      Colon Cancer Father 76     Brain Hemorrhage Father      accident     Macular Degeneration Mother      Diabetes Maternal Aunt      Diabetes Maternal Uncle      Cancer Paternal Aunt      Diabetes Maternal Grandmother      Hypertension No family hx of      Cerebrovascular Disease No family hx of      Glaucoma No family hx of      Current Outpatient Prescriptions   Medication Sig Dispense Refill     aspirin 81 MG tablet Take 1 tablet by mouth daily.        atorvastatin (LIPITOR) 40 MG tablet 40 mg       Calcium-Vitamin D (CALCIUM + D PO) Take  by mouth.       diclofenac (VOLTAREN) 1 % GEL 2 grams to hands four times daily using enclosed dosing card. 100 g 1     FISH OIL        MAGNESIUM OXIDE PO Take 200 mg by mouth daily       metoprolol (TOPROL-XL) 25 MG 24 hr tablet Take 1 tablet (25 mg) by mouth daily 90 tablet 3     Multiple Vitamins-Minerals (MULTIVITAMIN & MINERAL PO) Take  by mouth.       Pseudoephedrine HCl (SUDAFED 12 HOUR PO) Take  by mouth.       sulfamethoxazole-trimethoprim (BACTRIM DS/SEPTRA DS) 800-160 MG per tablet Take 1 tablet by mouth 2 times daily 14 tablet 0     fluticasone (FLONASE) 50 MCG/ACT nasal spray Spray 1-2 sprays into both nostrils daily (Patient not taking: Reported on 10/16/2018) 1 Package 11     guaiFENesin-codeine (ROBITUSSIN AC) 100-10 MG/5ML SOLN solution Take 5-10 mLs by mouth nightly as needed for cough (Patient not taking: Reported on 10/16/2018) 120 mL 1     omeprazole (PRILOSEC) 20 MG CR capsule Take 1 capsule (20 mg) by mouth daily (Patient not taking: Reported on 10/16/2018) 30 capsule 1     Social History   Substance Use Topics     Smoking status: Former Smoker     Years: 16.00     Types: Cigarettes     Quit date: 1/1/1982     Smokeless tobacco: Never Used     Alcohol use 0.0 oz/week     0 Standard drinks or equivalent per week      Comment: 2-7 glasses of wine per week       OBJECTIVE  /82 (BP Location: Left arm, Patient Position: Chair, Cuff Size: Adult Regular)  Pulse 87  Temp 99.2  F (37.3  C) (Oral)  Wt 145 lb 12.8 oz (66.1 kg)  SpO2 97%  BMI 27.18 kg/m2    Physical Exam   Constitutional: She appears well-developed and well-nourished.   Appears comfortable   HENT:   Mouth/Throat: Oropharynx is clear and moist.   Cardiovascular: Normal rate, regular rhythm and normal heart sounds.    Pulmonary/Chest: Effort normal and breath sounds normal.   Abdominal: Soft. Bowel sounds are normal. There is no rebound and  no guarding.   Tenderness noted left mid and lower abdomen   Musculoskeletal:   No CVA or spinal tenderness   Skin: No rash noted.   Vitals reviewed.      Labs:    Results for orders placed or performed in visit on 11/04/18 (from the past 24 hour(s))   CBC with platelets and differential   Result Value Ref Range    WBC 11.9 (H) 4.0 - 11.0 10e9/L    RBC Count 4.26 3.8 - 5.2 10e12/L    Hemoglobin 13.8 11.7 - 15.7 g/dL    Hematocrit 40.6 35.0 - 47.0 %    MCV 95 78 - 100 fl    MCH 32.4 26.5 - 33.0 pg    MCHC 34.0 31.5 - 36.5 g/dL    RDW 12.3 10.0 - 15.0 %    Platelet Count 219 150 - 450 10e9/L    % Neutrophils 72.0 %    % Lymphocytes 17.5 %    % Monocytes 9.3 %    % Eosinophils 0.9 %    % Basophils 0.3 %    Absolute Neutrophil 8.6 (H) 1.6 - 8.3 10e9/L    Absolute Lymphocytes 2.1 0.8 - 5.3 10e9/L    Absolute Monocytes 1.1 0.0 - 1.3 10e9/L    Absolute Eosinophils 0.1 0.0 - 0.7 10e9/L    Absolute Basophils 0.0 0.0 - 0.2 10e9/L    Diff Method Automated Method    *UA reflex to Microscopic and Culture ([place] and [facility] (except [place] and [place])   Result Value Ref Range    Color Urine Yellow     Appearance Urine Slightly Cloudy     Glucose Urine Negative NEG^Negative mg/dL    Bilirubin Urine Negative NEG^Negative    Ketones Urine Negative NEG^Negative mg/dL    Specific Gravity Urine 1.015 1.003 - 1.035    Blood Urine Negative NEG^Negative    pH Urine 8.0 (H) 5.0 - 7.0 pH    Protein Albumin Urine Negative NEG^Negative mg/dL    Urobilinogen Urine 0.2 0.2 - 1.0 EU/dL    Nitrite Urine Negative NEG^Negative    Leukocyte Esterase Urine Moderate (A) NEG^Negative    Source Midstream Urine    Urine Microscopic   Result Value Ref Range    WBC Urine 10-25 (A) OTO5^0 - 5 /HPF    RBC Urine O - 2 OTO2^O - 2 /HPF    Squamous Epithelial /LPF Urine Few FEW^Few /LPF    Bacteria Urine Many (A) NEG^Negative /HPF       X-Ray was done, my findings are: No obstructive findings.  No stones.    ASSESSMENT:      ICD-10-CM    1. Acute  cystitis without hematuria N30.00 sulfamethoxazole-trimethoprim (BACTRIM DS/SEPTRA DS) 800-160 MG per tablet   2. Nonspecific finding on examination of urine R82.90 Urine Culture Aerobic Bacterial   3. Abdominal pain, left lower quadrant R10.32 XR Abdomen 2 Views     *UA reflex to Microscopic and Culture ([place] [facility] (except [place] and [place])     CBC with platelets and differential     Urine Microscopic        Medical Decision Making:    Differential Diagnosis:  Abdominal Pain: Constipation, Diverticular Disease, UTI, Pyelonephritis, Kidney Stone,   Bowel Obstruction and Adhesions -with multiple abdominal surgeries    Serious Comorbid Conditions:  Adult:  hx mult abd surgeries    PLAN:  Patient to recheck with her primary early next week.  Discussed if symptoms worsen or other concerns may go to the emergency room    Patient Instructions             Urine tests suggest urine infection  Bactrim 2 x day for 1 week    Xray - no signs of obstruction.  No kidney stones in kidney area.    Last colonoscopy - showed no diverticulitis.    Fluids  Rest    Recheck with primary next week for symptoms     Call or return to clinic if symptoms worsen or fail to improve as anticipated.

## 2018-11-04 NOTE — PATIENT INSTRUCTIONS
Urine tests suggest urine infection  Bactrim 2 x day for 1 week    Xray - no signs of obstruction.  No kidney stones in kidney area.    Last colonoscopy - showed no diverticulitis.    Fluids  Rest    Recheck with primary next week for symptoms     Call or return to clinic if symptoms worsen or fail to improve as anticipated.

## 2018-11-04 NOTE — MR AVS SNAPSHOT
After Visit Summary   11/4/2018    Isabel Carroll    MRN: [id number]           Patient Information     Date Of Birth          1950        Visit Information        Provider Department      11/4/2018 11:00 AM Patty Frausto MD [facility]        Today's Diagnoses     Acute cystitis without hematuria    -  1    Nonspecific finding on examination of urine        Abdominal pain, left lower quadrant          Care Instructions              Urine tests suggest urine infection  Bactrim 2 x day for 1 week    Xray - no signs of obstruction.  No kidney stones in kidney area.    Last colonoscopy - showed no diverticulitis.    Fluids  Rest    Recheck with primary next week for symptoms     Call or return to clinic if symptoms worsen or fail to improve as anticipated.                          Follow-ups after your visit        Your next 10 appointments already scheduled     Dec 10, 2018  2:00 PM CST   [facility] Visit with Mj [place] OD   [facility]    [address] [place] [address] [id number] [phone]              Who to contact     If you have questions or need follow up information about today's clinic visit or your schedule please contact [facility] directly at [phone].  Normal or non-critical lab and imaging results will be communicated to you by MyChart, letter or phone within 4 business days after the clinic has received the results. If you do not hear from us within 7 days, please contact the clinic through MyChart or phone. If you have a critical or abnormal lab result, we will notify you by phone as soon as possible.  Submit refill requests through [employer] or call your pharmacy and they will forward the refill request to us. Please allow 3 business days for your refill to be completed.          Additional Information About Your Visit        MyChart Information     [employer] gives  you secure access to your electronic health record. If you see a primary care provider, you can also send messages to your care team and make appointments. If you have questions, please call your primary care clinic.  If you do not have a primary care provider, please call [phone] and they will assist you.        Care EveryWhere ID     This is your Care EveryWhere ID. This could be used by other organizations to access your [facility] medical records  [id number]        Your Vitals Were     Pulse Temperature Pulse Oximetry BMI (Body Mass Index)          87 99.2  F (37.3  C) (Oral) 97% 27.18 kg/m2         Blood Pressure from Last 3 Encounters:   11/04/18 123/82   10/16/18 123/82   07/26/18 132/60    Weight from Last 3 Encounters:   11/04/18 145 lb 12.8 oz (66.1 kg)   10/16/18 146 lb 3.2 oz (66.3 kg)   07/26/18 145 lb (65.8 kg)              We Performed the Following     *UA reflex to Microscopic and Culture ([place] and [facility] (except [place] and [place])     CBC with platelets and differential     Urine Culture Aerobic Bacterial     Urine Microscopic          Today's Medication Changes          These changes are accurate as of 11/4/18 12:35 PM.  If you have any questions, ask your nurse or doctor.               Start taking these medicines.        Dose/Directions    sulfamethoxazole-trimethoprim 800-160 MG per tablet   Commonly known as:  BACTRIM DS/SEPTRA DS   Used for:  Acute cystitis without hematuria   Started by:  Patty Frausto MD        Dose:  1 tablet   Take 1 tablet by mouth 2 times daily   Quantity:  14 tablet   Refills:  0            Where to get your medicines      These medications were sent to [facility] [place] - [place], [place] - [address]  [address], [facility] [address]     Phone:  [phone]     sulfamethoxazole-trimethoprim 800-160 MG per tablet                Primary Care Provider Office Phone # Fax #    Marry Gonzalez MD  [phone]       [address]  [facility] [address]        Equal Access to Services     CRIS PYLE : Hadii antonio cardona jett Sojosh, waholleyda luqadaha, qaybta kaalmada blaire, ofelia sosaciera shaggy. So [facility] -[phone].    ATENCIÓN: Si habla español, tiene a demarco disposición servicios gratuitos de asistencia lingüística. Llame al [phone].    We comply with applicable federal civil rights laws and [place] laws. We do not discriminate on the basis of race, color, national origin, age, disability, sex, sexual orientation, or gender identity.            Thank you!     Thank you for choosing [facility]  for your care. Our goal is always to provide you with excellent care. Hearing back from our patients is one way we can continue to improve our services. Please take a few minutes to complete the written survey that you may receive in the mail after your visit with us. Thank you!             Your Updated Medication List - Protect others around you: Learn how to safely use, store and throw away your medicines at www.disposemymeds.org.          This list is accurate as of 11/4/18 12:35 PM.  Always use your most recent med list.                   Brand Name Dispense Instructions for use Diagnosis    aspirin 81 MG tablet      Take 1 tablet by mouth daily.        atorvastatin 40 MG tablet    LIPITOR     40 mg        CALCIUM + D PO      Take  by mouth.        diclofenac 1 % Gel topical gel    VOLTAREN    100 g    2 grams to hands four times daily using enclosed dosing card.    Primary osteoarthritis of both hands       FISH OIL           fluticasone 50 MCG/ACT spray    FLONASE    1 Package    Spray 1-2 sprays into both nostrils daily    Chronic rhinitis       guaiFENesin-codeine 100-10 MG/5ML Soln solution    ROBITUSSIN AC    120 mL    Take 5-10 mLs by mouth nightly as needed for cough    Influenza-like syndrome       MAGNESIUM OXIDE PO      Take 200 mg by  mouth daily        metoprolol succinate 25 MG 24 hr tablet    TOPROL-XL    90 tablet    Take 1 tablet (25 mg) by mouth daily    Sinus tachycardia       MULTIVITAMIN & MINERAL PO      Take  by mouth.        omeprazole 20 MG CR capsule    priLOSEC    30 capsule    Take 1 capsule (20 mg) by mouth daily    Gastroesophageal reflux disease without esophagitis, Chronic cough       SUDAFED 12 HOUR PO      Take  by mouth.        sulfamethoxazole-trimethoprim 800-160 MG per tablet    BACTRIM DS/SEPTRA DS    14 tablet    Take 1 tablet by mouth 2 times daily    Acute cystitis without hematuria

## 2018-11-05 ENCOUNTER — TELEPHONE (OUTPATIENT)
Dept: FAMILY MEDICINE | Facility: CLINIC | Age: 68
End: 2018-11-05

## 2018-11-05 LAB
BACTERIA SPEC CULT: NORMAL
BACTERIA SPEC CULT: NORMAL
SPECIMEN SOURCE: NORMAL

## 2018-11-05 NOTE — TELEPHONE ENCOUNTER
Patient was into Urgent Care yesterday for Acute cystitis without hematuria. She was prescribed sulfamethoxazole-trimethoprim (BACTRIM DS/SEPTRA DS) 800-160 MG per tablet.   She is reporting that she today continues to have LLQ pain rated 6/10. Yesterday she rated the pain 8/10.    She denies having any problems with urination and bowel patterns. She denies fever. She cannot think of any other symptoms worth noting.   She notes a small improvement in her condition from yesterday, but is still concerned about the abdominal pain.  Urgent Care plan states to follow up early next week with PCP.    Patient is wondering if she should return to the clinic now or wait for a few more days to see if she improves?    Provider please review and advise.  Arminda Beyer RN

## 2018-11-05 NOTE — TELEPHONE ENCOUNTER
Reason for call:  Patient reporting a symptom    Symptom or request: Symptoms    Duration (how long have symptoms been present): on going    Have you been treated for this before? Yes    Additional comments: Pt was seen in Urgent Care yesterday with pain on left side of her abdomen where Pt was put on bactrim.  However , her pain has not gone away but was told by Urgent Care provider to have a cat scan to check her left side.     Phone Number patient can be reached at:  Home number on file [phone] (home) and can also be reached at cell phone # at [phone]    Best Time:  anytime    Can we leave a detailed message on this number:  YES    Call taken on 11/5/2018 at 9:17 AM by Augie Whittington

## 2018-11-05 NOTE — TELEPHONE ENCOUNTER
Urinary tract infections usually do not cause any abdominal pain unless there is a complication or different diagnosis going on. Isabel Carroll needs to be reevaluated for diverticulitis or other cause of acute abdomen. She should go to the emergency department if not feeling better.   Marry Gonzalez MD

## 2018-11-06 ENCOUNTER — OFFICE VISIT (OUTPATIENT)
Dept: FAMILY MEDICINE | Facility: CLINIC | Age: 68
End: 2018-11-06
Payer: COMMERCIAL

## 2018-11-06 VITALS
SYSTOLIC BLOOD PRESSURE: 109 MMHG | DIASTOLIC BLOOD PRESSURE: 74 MMHG | RESPIRATION RATE: 12 BRPM | OXYGEN SATURATION: 99 % | TEMPERATURE: 98.4 F | HEART RATE: 75 BPM | HEIGHT: 61 IN | BODY MASS INDEX: 27.38 KG/M2 | WEIGHT: 145 LBS

## 2018-11-06 DIAGNOSIS — R10.32 LLQ ABDOMINAL PAIN: Primary | ICD-10-CM

## 2018-11-06 DIAGNOSIS — I25.119 CORONARY ARTERY DISEASE INVOLVING NATIVE HEART WITH ANGINA PECTORIS, UNSPECIFIED VESSEL OR LESION TYPE (H): ICD-10-CM

## 2018-11-06 LAB
ALBUMIN SERPL-MCNC: 3.6 G/DL (ref 3.4–5)
ALP SERPL-CCNC: 116 U/L (ref 40–150)
ALT SERPL W P-5'-P-CCNC: 40 U/L (ref 0–50)
ANION GAP SERPL CALCULATED.3IONS-SCNC: 3 MMOL/L (ref 3–14)
AST SERPL W P-5'-P-CCNC: 20 U/L (ref 0–45)
BASOPHILS # BLD AUTO: 0 10E9/L (ref 0–0.2)
BASOPHILS NFR BLD AUTO: 0.7 %
BILIRUB SERPL-MCNC: 0.3 MG/DL (ref 0.2–1.3)
BUN SERPL-MCNC: 12 MG/DL (ref 7–30)
CALCIUM SERPL-MCNC: 9.4 MG/DL (ref 8.5–10.1)
CHLORIDE SERPL-SCNC: 104 MMOL/L (ref 94–109)
CO2 SERPL-SCNC: 33 MMOL/L (ref 20–32)
CREAT SERPL-MCNC: 0.72 MG/DL (ref 0.52–1.04)
DIFFERENTIAL METHOD BLD: NORMAL
EOSINOPHIL # BLD AUTO: 0.2 10E9/L (ref 0–0.7)
EOSINOPHIL NFR BLD AUTO: 3.1 %
ERYTHROCYTE [DISTWIDTH] IN BLOOD BY AUTOMATED COUNT: 12.2 % (ref 10–15)
GFR SERPL CREATININE-BSD FRML MDRD: 80 ML/MIN/1.7M2
GLUCOSE SERPL-MCNC: 114 MG/DL (ref 70–99)
HCT VFR BLD AUTO: 41.5 % (ref 35–47)
HGB BLD-MCNC: 14.2 G/DL (ref 11.7–15.7)
LIPASE SERPL-CCNC: 110 U/L (ref 73–393)
LYMPHOCYTES # BLD AUTO: 1.7 10E9/L (ref 0.8–5.3)
LYMPHOCYTES NFR BLD AUTO: 29.8 %
MCH RBC QN AUTO: 32.3 PG (ref 26.5–33)
MCHC RBC AUTO-ENTMCNC: 34.2 G/DL (ref 31.5–36.5)
MCV RBC AUTO: 94 FL (ref 78–100)
MONOCYTES # BLD AUTO: 0.6 10E9/L (ref 0–1.3)
MONOCYTES NFR BLD AUTO: 9.6 %
NEUTROPHILS # BLD AUTO: 3.3 10E9/L (ref 1.6–8.3)
NEUTROPHILS NFR BLD AUTO: 56.8 %
PLATELET # BLD AUTO: 273 10E9/L (ref 150–450)
POTASSIUM SERPL-SCNC: 4.2 MMOL/L (ref 3.4–5.3)
PROT SERPL-MCNC: 7.5 G/DL (ref 6.8–8.8)
RBC # BLD AUTO: 4.4 10E12/L (ref 3.8–5.2)
SODIUM SERPL-SCNC: 140 MMOL/L (ref 133–144)
WBC # BLD AUTO: 5.7 10E9/L (ref 4–11)

## 2018-11-06 PROCEDURE — 36415 COLL VENOUS BLD VENIPUNCTURE: CPT | Performed by: NURSE PRACTITIONER

## 2018-11-06 PROCEDURE — 85025 COMPLETE CBC W/AUTO DIFF WBC: CPT | Performed by: NURSE PRACTITIONER

## 2018-11-06 PROCEDURE — 83690 ASSAY OF LIPASE: CPT | Performed by: NURSE PRACTITIONER

## 2018-11-06 PROCEDURE — 99214 OFFICE O/P EST MOD 30 MIN: CPT | Performed by: NURSE PRACTITIONER

## 2018-11-06 PROCEDURE — 80053 COMPREHEN METABOLIC PANEL: CPT | Performed by: NURSE PRACTITIONER

## 2018-11-06 ASSESSMENT — PAIN SCALES - GENERAL: PAINLEVEL: SEVERE PAIN (6)

## 2018-11-06 NOTE — PATIENT INSTRUCTIONS
WBC is better but this could be because of the Bactrim  Further blood work is pending  Please schedule CT scan for today  Further plan pending results of CT    At [facility], we strive to deliver an exceptional experience to you, every time we see you.  If you receive a survey in the mail, please send us back your thoughts. We really do value your feedback.    Based on your medical history, these are the current health maintenance/preventive care services that you are due for (some may have been done at this visit.)  Health Maintenance Due   Topic Date Due     FALL RISK ASSESSMENT  05/25/2018         Suggested websites for health information:  Www.[employer]rendalytics.org : Up to date and easily searchable information on multiple topics.  Www.medlineplus.gov : medication info, interactive tutorials, watch real surgeries online  Www.familydoctor.org : good info from the Academy of Family Physicians  Www.cdc.gov : public health info, travel advisories, epidemics (H1N1)  Www.aap.org : children's health info, normal development, vaccinations  Www.health.[facility].[place].us : [place] dept of health, public health issues in [place], N1N1    Your care team:                            Family Medicine Internal Medicine   MD Guillermo Faustin MD Shantel Branch-Fleming, MD Katya Georgiev PA-C Nam Ho, MD Pediatrics   OZ Boo, LISANDRA Guerra APRTRACEE CNP   MD Lindsay Torrez MD Deborah Mielke, MD Kim Thein, APRN [facility]      Clinic hours: Monday - Thursday 7 am-7 pm; Fridays 7 am-5 pm.   Urgent care: Monday - Friday 11 am-9 pm; Saturday and Sunday 9 am-5 pm.  Pharmacy : Monday -Thursday 8 am-8 pm; Friday 8 am-6 pm; Saturday and Sunday 9 am-5 pm.     Clinic: [phone]   Pharmacy: [phone]

## 2018-11-06 NOTE — MR AVS SNAPSHOT
After Visit Summary   11/6/2018    Isabel Carroll    MRN: [id number]           Patient Information     Date Of Birth          1950        Visit Information        Provider Department      11/6/2018 10:40 AM Alisia Boyd APRN CNP [facility]        Today's Diagnoses     LLQ abdominal pain    -  1      Care Instructions    WBC is better but this could be because of the Bactrim  Further blood work is pending  Please schedule CT scan for today  Further plan pending results of CT    At [facility], we strive to deliver an exceptional experience to you, every time we see you.  If you receive a survey in the mail, please send us back your thoughts. We really do value your feedback.    Based on your medical history, these are the current health maintenance/preventive care services that you are due for (some may have been done at this visit.)  Health Maintenance Due   Topic Date Due     FALL RISK ASSESSMENT  05/25/2018         Suggested websites for health information:  Www.[employer] : Up to date and easily searchable information on multiple topics.  Www.medlineplus.gov : medication info, interactive tutorials, watch real surgeries online  Www.familydoctor.org : good info from the Academy of Family Physicians  Www.cdc.gov : public health info, travel advisories, epidemics (H1N1)  Www.aap.org : children's health info, normal development, vaccinations  Www.health.state.[place] : [place] dept of health, public health issues in [place], N1N1    Your care team:                            Family Medicine Internal Medicine   MD Guillermo Faustin MD Shantel Branch-Fleming, MD Katya Georgiev PA-C Nam Ho, MD Pediatrics   OZ Boo CNP Amelia Massimini APRN CNP Shaista Malik, MD Bethany Templen, MD Deborah Mielke, MD Kim Thein, APRN CNP      Clinic hours: Monday - Thursday 7 am-7 pm; Fridays 7 am-5 pm.   Urgent care: Monday  - Friday 11 am-9 pm; Saturday and Sunday 9 am-5 pm.  Pharmacy : Monday -Thursday 8 am-8 pm; Friday 8 am-6 pm; Saturday and Sunday 9 am-5 pm.     Clinic: [phone]   Pharmacy: [phone]            Follow-ups after your visit        Your next 10 appointments already scheduled     Nov 07, 2018  3:00 PM CST   (Arrive by 2:30 PM)   CT ABDOMEN PELVIS W CONTRAST with MGCT1   [facility]    [address] [address] [place] [id number] [phone]           How do I prepare for my exam? (Food and drink instructions) To prepare: Do not eat or drink for 2 hours before your exam. If you need to take medicine, you may take it with small sips of water. (We may ask you to take liquid medicine as well.)  How do I prepare for my exam? (Other instructions) Please arrive 30 minutes early for your CT.  Once in the department you might be asked to drink water 15-20 minutes prior to your exam.  If indicated you may be asked to drink an oral contrast in advance of your CT.  If this is the case, the imaging team will let you know or be in contact with you prior to your appointment  Patients over 70 or patients with diabetes or kidney problems: If you haven t had a blood test (creatinine test) within the last 30 days, the Cardiologist/Radiologist may require you to get this test prior to your exam.  If you have diabetes:  Continue to take your metformin medication on the day of your exam  What should I wear: Please wear loose clothing, such as a sweat suit or jogging clothes. Avoid snaps, zippers and other metal. We may ask you to undress and put on a hospital gown.  How long does the exam take: Most scans take less than 20 minutes.  What should I bring: Please bring any scans or X-rays taken at other hospitals, if similar tests were done. Also bring a list of your medicines, including vitamins, minerals and over-the-counter drugs. It is safest to leave personal items at home.   Do I need a : No  is needed.  What do I need to tell my doctor? Be sure to tell your doctor: * If you have any allergies. * If there s any chance you are pregnant. * If you are breastfeeding.  What should I do after the exam: No restrictions, You may resume normal activities.  What is this test: A CT (computed tomography) scan is a series of pictures that allows us to look inside your body. The scanner creates images of the body in cross sections, much like slices of bread. This helps us see any problems more clearly. You may receive contrast (X-ray dye) before or during your scan. You will be asked to drink the contrast.  Who should I call with questions: If you have any questions, please call the Imaging Department where you will have your exam. Directions, parking instructions, and other information is available on our website, [email or web address].            Dec 10, 2018  2:00 PM CST   New Visit with Mj Frederick OD   [facility] ([facility]    [address] [place] [address] [phone]              Future tests that were ordered for you today     Open Future Orders        Priority Expected Expires Ordered    CT Abdomen Pelvis w Contrast Routine  11/6/2019 11/6/2018            Who to contact     If you have questions or need follow up information about today's clinic visit or your schedule please contact [facility] directly at [phone].  Normal or non-critical lab and imaging results will be communicated to you by MyChart, letter or phone within 4 business days after the clinic has received the results. If you do not hear from us within 7 days, please contact the clinic through MyChart or phone. If you have a critical or abnormal lab result, we will notify you by phone as soon as possible.  Submit refill requests through [employer] or call your pharmacy and they will forward the refill request to us. Please allow  "3 business days for your refill to be completed.          Additional Information About Your Visit        MyChart Information     [employer] gives you secure access to your electronic health record. If you see a primary care provider, you can also send messages to your care team and make appointments. If you have questions, please call your primary care clinic.  If you do not have a primary care provider, please call [phone] and they will assist you.        Care EveryWhere ID     This is your Care EveryWhere ID. This could be used by other organizations to access your [place] medical records  [id number]        Your Vitals Were     Pulse Temperature Respirations Height Last Period Pulse Oximetry    75 98.4  F (36.9  C) (Oral) 12 5' 1.42\" (1.56 m) (LMP Unknown) 99%    Breastfeeding? BMI (Body Mass Index)                No 27.02 kg/m2           Blood Pressure from Last 3 Encounters:   11/06/18 109/74   11/04/18 123/82   10/16/18 123/82    Weight from Last 3 Encounters:   11/06/18 145 lb (65.8 kg)   11/04/18 145 lb 12.8 oz (66.1 kg)   10/16/18 146 lb 3.2 oz (66.3 kg)              We Performed the Following     CBC with platelets differential     Comprehensive metabolic panel (BMP + Alb, Alk Phos, ALT, AST, Total. Bili, TP)     Lipase        Primary Care Provider Office Phone # Fax #    Marry Naida Gonzalez [phone]       [address] [address] [facility] [address]        Equal Access to Services     [facility]: Hadii aad ku hadasho Soomaali, waaxda luqadaha, qaybta kaalmada adeegyada, waxay idiin hayaan jonny gutierrezararosa isela la'aan . So [facility] [phone].    ATENCIÓN: Si habla español, tiene a demarco disposición servicios gratuitos de asistencia lingüística. Llame al [phone].    We comply with applicable federal civil rights laws and [place] laws. We do not discriminate on the basis of race, color, national origin, age, disability, sex, sexual orientation, or gender identity.            Thank you!     " Thank you for choosing [facility]  for your care. Our goal is always to provide you with excellent care. Hearing back from our patients is one way we can continue to improve our services. Please take a few minutes to complete the written survey that you may receive in the mail after your visit with us. Thank you!             Your Updated Medication List - Protect others around you: Learn how to safely use, store and throw away your medicines at www.disposemymeds.org.          This list is accurate as of 11/6/18 11:44 AM.  Always use your most recent med list.                   Brand Name Dispense Instructions for use Diagnosis    aspirin 81 MG tablet      Take 1 tablet by mouth daily.        atorvastatin 40 MG tablet    LIPITOR     40 mg        CALCIUM + D PO      Take  by mouth.        diclofenac 1 % Gel topical gel    VOLTAREN    100 g    2 grams to hands four times daily using enclosed dosing card.    Primary osteoarthritis of both hands       FISH OIL           fluticasone 50 MCG/ACT spray    FLONASE    1 Package    Spray 1-2 sprays into both nostrils daily    Chronic rhinitis       guaiFENesin-codeine 100-10 MG/5ML Soln solution    ROBITUSSIN AC    120 mL    Take 5-10 mLs by mouth nightly as needed for cough    Influenza-like syndrome       MAGNESIUM OXIDE PO      Take 200 mg by mouth daily        metoprolol succinate 25 MG 24 hr tablet    TOPROL-XL    90 tablet    Take 1 tablet (25 mg) by mouth daily    Sinus tachycardia       MULTIVITAMIN & MINERAL PO      Take  by mouth.        omeprazole 20 MG CR capsule    priLOSEC    30 capsule    Take 1 capsule (20 mg) by mouth daily    Gastroesophageal reflux disease without esophagitis, Chronic cough       SUDAFED 12 HOUR PO      Take  by mouth.        sulfamethoxazole-trimethoprim 800-160 MG per tablet    BACTRIM DS/SEPTRA DS    14 tablet    Take 1 tablet by mouth 2 times daily    Acute cystitis without hematuria

## 2018-11-06 NOTE — TELEPHONE ENCOUNTER
I called patient back and she is feeling somewhat better but still having some left lower quadrant pain. No fever and appetite, bowel movements are normal. Previous colonoscopy was normal. She could still be having symptoms of diverticulitis, kidney stones or appendicitis. She will go to emergency department if feeling any worse and follow up tomorrow in [place] clinic if not better by tomorrow. Recommend repeat abdominal exam with CBC and consideration of abdominal CT if results indicate this is needed.  Marry Gonzalez MD

## 2018-11-08 ENCOUNTER — TELEPHONE (OUTPATIENT)
Dept: FAMILY MEDICINE | Facility: CLINIC | Age: 68
End: 2018-11-08

## 2018-11-08 ENCOUNTER — RADIANT APPOINTMENT (OUTPATIENT)
Dept: CT IMAGING | Facility: CLINIC | Age: 68
End: 2018-11-08
Attending: NURSE PRACTITIONER
Payer: COMMERCIAL

## 2018-11-08 DIAGNOSIS — R10.32 LLQ ABDOMINAL PAIN: ICD-10-CM

## 2018-11-08 DIAGNOSIS — K57.92 DIVERTICULITIS: Primary | ICD-10-CM

## 2018-11-08 LAB — RADIOLOGIST FLAGS: ABNORMAL

## 2018-11-08 PROCEDURE — 74177 CT ABD & PELVIS W/CONTRAST: CPT | Performed by: RADIOLOGY

## 2018-11-08 RX ORDER — CIPROFLOXACIN 500 MG/1
500 TABLET, FILM COATED ORAL EVERY 12 HOURS
Qty: 14 TABLET | Refills: 0 | Status: SHIPPED | OUTPATIENT
Start: 2018-11-08 | End: 2018-12-13

## 2018-11-08 RX ORDER — METRONIDAZOLE 500 MG/1
500 TABLET ORAL EVERY 8 HOURS
Qty: 21 TABLET | Refills: 0 | Status: SHIPPED | OUTPATIENT
Start: 2018-11-08 | End: 2018-12-13

## 2018-11-08 RX ORDER — IOPAMIDOL 755 MG/ML
89 INJECTION, SOLUTION INTRAVASCULAR ONCE
Status: COMPLETED | OUTPATIENT
Start: 2018-11-08 | End: 2018-11-08

## 2018-11-08 RX ADMIN — IOPAMIDOL 89 ML: 755 INJECTION, SOLUTION INTRAVASCULAR at 13:28

## 2018-11-08 NOTE — TELEPHONE ENCOUNTER
Reason for call:  Other     Patient called regarding (reason for call): prescription    Additional comments: patient got the 2 antibiotics but she is also currently on Bactrim  since Sunday and has 2 more days  Is she to take 3 antibiotics now, or stop the bactrim    Phone number to reach patient:  Home number on file [phone]  Best Time:  any    Can we leave a detailed message on this number?  YES

## 2018-11-08 NOTE — PROGRESS NOTES
Please CALL the patient and let her know her CT showed diverticulitis. I have sent 2 antibiotics to the pharmacy. Please take them both until they are done. Please follow up with Dr. Gonzalez next week.    I sent [employer] message as well but it's very important that she get the message and start antibiotics today.

## 2018-11-08 NOTE — TELEPHONE ENCOUNTER
Alisia Boyd, VIC CNP  P Dyad 3 Rn Pool                   Please CALL the patient and let her know her CT showed diverticulitis. I have sent 2 antibiotics to the pharmacy. Please take them both until they are done. Please follow up with Dr. Gonzalez next week.     I sent MyChart message as well but it's very important that she get the message and start antibiotics today.       Patient will  Rx's today. She will call clinic early next week to check in about her symptoms - declining scheduling appointment at this time.     Patient is wondering if she experiences constipation, is it appropriate/safe for her to take Miralax as directed or what would be recommended she use for constipation symptoms?    Nena Ernst RN

## 2018-11-09 NOTE — TELEPHONE ENCOUNTER
Called and spoke with patient, informed to stop taking Bactrim as per provider recommendations below. Patient verbalized understanding.     Nasrin Sotelo RN  [facility]

## 2018-12-10 ENCOUNTER — OFFICE VISIT (OUTPATIENT)
Dept: OPTOMETRY | Facility: CLINIC | Age: 68
End: 2018-12-10
Payer: COMMERCIAL

## 2018-12-10 DIAGNOSIS — H31.002 CHORIORETINAL SCAR OF LEFT EYE: ICD-10-CM

## 2018-12-10 DIAGNOSIS — H52.223 REGULAR ASTIGMATISM OF BOTH EYES: ICD-10-CM

## 2018-12-10 DIAGNOSIS — H04.123 DRY EYE SYNDROME OF BOTH EYES: ICD-10-CM

## 2018-12-10 DIAGNOSIS — Z01.00 EXAMINATION OF EYES AND VISION: Primary | ICD-10-CM

## 2018-12-10 DIAGNOSIS — H25.13 AGE-RELATED NUCLEAR CATARACT OF BOTH EYES: ICD-10-CM

## 2018-12-10 DIAGNOSIS — H52.13 MYOPIA OF BOTH EYES: ICD-10-CM

## 2018-12-10 DIAGNOSIS — H52.4 PRESBYOPIA: ICD-10-CM

## 2018-12-10 PROCEDURE — 92014 COMPRE OPH EXAM EST PT 1/>: CPT | Performed by: OPTOMETRIST

## 2018-12-10 PROCEDURE — 92015 DETERMINE REFRACTIVE STATE: CPT | Performed by: OPTOMETRIST

## 2018-12-10 ASSESSMENT — REFRACTION_WEARINGRX
OS_AXIS: 079
OS_ADD: +2.50
OD_ADD: +2.50
OD_SPHERE: -6.00
OD_SPHERE: -5.75
OS_SPHERE: -6.75
OD_AXIS: 089
OS_CYLINDER: +3.25
OD_ADD: +2.50
OD_CYLINDER: +2.75
OD_CYLINDER: +2.75
OS_SPHERE: -6.75
OS_SPHERE: -6.75
OD_CYLINDER: +2.75
OS_ADD: +2.50
OD_AXIS: 085
OS_CYLINDER: +3.50
OS_ADD: +2.50
OD_ADD: +2.50
OS_AXIS: 082
SPECS_TYPE: PAL
OD_SPHERE: -5.75
OD_AXIS: 090
OS_CYLINDER: +3.25
OS_AXIS: 085

## 2018-12-10 ASSESSMENT — REFRACTION_MANIFEST
OS_ADD: +2.75
OD_SPHERE: -6.00
OD_AXIS: 090
OS_CYLINDER: +3.75
OS_AXIS: 079
OD_CYLINDER: +2.75
OD_ADD: +2.75
OS_SPHERE: -7.50

## 2018-12-10 ASSESSMENT — VISUAL ACUITY
OS_CC: 20/30
OS_CC+: -1
OS_CC: 20/20
OD_CC+: -2
OD_CC: 20/30
OD_CC: 20/20
METHOD: SNELLEN - LINEAR
CORRECTION_TYPE: GLASSES

## 2018-12-10 ASSESSMENT — KERATOMETRY
OD_K1POWER_DIOPTERS: 42.00
OS_K2POWER_DIOPTERS: 45.25
OS_K1POWER_DIOPTERS: 42.00
OD_K2POWER_DIOPTERS: 44.50
OS_AXISANGLE2_DEGREES: 169
OD_AXISANGLE2_DEGREES: 178

## 2018-12-10 ASSESSMENT — CUP TO DISC RATIO
OD_RATIO: 0.3
OS_RATIO: 0.3

## 2018-12-10 ASSESSMENT — CONF VISUAL FIELD
METHOD: COUNTING FINGERS
OD_NORMAL: 1
OS_NORMAL: 1

## 2018-12-10 ASSESSMENT — TONOMETRY
OS_IOP_MMHG: 20
OD_IOP_MMHG: 18
IOP_METHOD: TONOPEN

## 2018-12-10 ASSESSMENT — EXTERNAL EXAM - LEFT EYE: OS_EXAM: NORMAL

## 2018-12-10 ASSESSMENT — EXTERNAL EXAM - RIGHT EYE: OD_EXAM: NORMAL

## 2018-12-10 NOTE — PATIENT INSTRUCTIONS
Eyeglass prescription given.    Stable chorioretinal scar left eye.    You have cataracts which are affecting your vision.  A change in glasses will not give you much improvement.  A cataract evaluation can be scheduled with the eye surgeon to discuss with you the option of cataract surgery.   Referral to Dr. Coulter at [facility] for cataract evaluation.       Refresh Advanced Optive- 1 drop both eyes 2-4 x day.    Return in 1 year for a complete eye exam or sooner if needed.    Mj Frederick, OD    The affects of the dilating drops last for 4- 6 hours.  You will be more sensitive to light and vision will be blurry up close.  Mydriatic sunglasses were given if needed.      Optometry Providers       Clinic Locations                                 Telephone Number   Dr. Monika Miranda [facility]   [place] [phone]     [place] [facility] Hours:                [facility] Hours:       [place] [facility] Hours:   [address]   [address] [address]     [address] [place] [address]   [place] [address]    [place] [address]  Phone: [phone]                    Phone: [phone]     Phone: [phone]                      Monday 8:00-7:00                          Monday 8:00-7:00                          Monday 8:00-7:00              Tuesday 8:00-6:00                          Tuesday 8:00-7:00                          Tuesday 8:00-7:00              Wednesday 8:00-6:00                  Wednesday 8:00-7:00                   Wednesday 8:00-7:00      Thursday 8:00-6:00                        Thursday 8:00-7:00                         Thursday 8:00-7:00            Friday 8:00-5:00                              Friday 8:00-5:00                              Friday 8:00-5:00    [facility] Hours:   [address] [place]  [address]  [phone]    Monday 8:00-7:00  Tuesday 8:00-7:00  Wednesday 8:00-7:00  Thursday 8:00-7:00  Friday 8:00-5:00  Please log on to [email or web address] to order your contact lenses.  The link is found on the Eye Care and Vision Services page.  As always, Thank you for trusting us with your health care needs!

## 2018-12-10 NOTE — LETTER
12/10/2018         RE: Isabel Carroll  [address] [address]        Dear Colleague,    Thank you for referring your patient, Isabel Carroll, to the [facility]. Please see a copy of my visit note below.    Chief Complaint   Patient presents with     Annual Eye Exam         Last Eye Exam: 12/4/2017  Dilated Previously: Yes    What are you currently using to see?  Glasses, wears them all of the time        Distance Vision Acuity: Satisfied with vision, no changes other tan she seem to be having a little more trouble with her night vision     Near Vision Acuity: Not satisfied, not as good.     Eye Comfort: dry  Do you use eye drops? : Yes: Uses refresh daily   Occupation or Hobbies: retired     [employer] Assistant           Medical, surgical and family histories reviewed and updated 12/10/2018.       OBJECTIVE: See Ophthalmology exam    ASSESSMENT:    ICD-10-CM    1. Examination of eyes and vision Z01.00    2. Myopia of both eyes H52.13 REFRACTION   3. Presbyopia H52.4 REFRACTION   4. Regular astigmatism of both eyes H52.223 REFRACTION   5. 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 Age-related nuclear cataract of both eyes H25.13 EYE EXAM (SIMPLE-NONBILLABLE)     OPHTHALMOLOGY ADULT REFERRAL   7. Dry eye syndrome of both eyes H04.123 EYE EXAM (SIMPLE-NONBILLABLE)      PLAN:     Patient Instructions   Eyeglass prescription given.    Stable chorioretinal scar right eye.    You have cataracts which are affecting your vision.  A change in glasses will not give you much improvement.  A cataract evaluation can be scheduled with the eye surgeon to discuss with you the option of cataract surgery.   Referral to [facility] Coulter at [facility] for cataract evaluation.       Refresh Advanced Optive- 1 drop both eyes 2-4 x day.    Return in 1 year for a complete eye exam or sooner if needed.    Mj Frederick,  OD                  Again, thank you for allowing me to participate in the care of your patient.        Sincerely,        Mj Frederick OD

## 2018-12-10 NOTE — PROGRESS NOTES
Chief Complaint   Patient presents with     Annual Eye Exam         Last Eye Exam: 12/4/2017  Dilated Previously: Yes    What are you currently using to see?  Glasses, wears them all of the time        Distance Vision Acuity: Satisfied with vision, no changes other tan she seem to be having a little more trouble with her night vision     Near Vision Acuity: Not satisfied, not as good.     Eye Comfort: dry  Do you use eye drops? : Yes: Uses refresh daily   Occupation or Hobbies: retired     [employer] Assistant           Medical, surgical and family histories reviewed and updated 12/10/2018.       OBJECTIVE: See Ophthalmology exam    ASSESSMENT:    ICD-10-CM    1. Examination of eyes and vision Z01.00    2. Myopia of both eyes H52.13 REFRACTION   3. Presbyopia H52.4 REFRACTION   4. Regular astigmatism of both eyes H52.223 REFRACTION   5. 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 Age-related nuclear cataract of both eyes H25.13 EYE EXAM (SIMPLE-NONBILLABLE)     OPHTHALMOLOGY ADULT REFERRAL   7. Dry eye syndrome of both eyes H04.123 EYE EXAM (SIMPLE-NONBILLABLE)      PLAN:     Patient Instructions   Eyeglass prescription given.    Stable chorioretinal scar left eye.    You have cataracts which are affecting your vision.  A change in glasses will not give you much improvement.  A cataract evaluation can be scheduled with the eye surgeon to discuss with you the option of cataract surgery.   Referral to Dr. Coulter at [facility] for cataract evaluation.       Refresh Advanced Optive- 1 drop both eyes 2-4 x day.    Return in 1 year for a complete eye exam or sooner if needed.    Mj Frederick, OD

## 2018-12-11 ENCOUNTER — TELEPHONE (OUTPATIENT)
Dept: FAMILY MEDICINE | Facility: CLINIC | Age: 68
End: 2018-12-11

## 2018-12-11 NOTE — TELEPHONE ENCOUNTER
Reason for call:  Patient reporting a symptom    Symptom or request: poss flu now diarrhea    Duration (how long have symptoms been present): since sunday    Have you been treated for this before? No    Additional comments: Needs diarrhea medication ([facility] is ok)    Phone Number patient can be reached at:  Home number on file [phone] (home)    Best Time:  any    Can we leave a detailed message on this number:  YES    Call taken on 12/11/2018 at 12:43 PM by Yohana Drake

## 2018-12-11 NOTE — TELEPHONE ENCOUNTER
Called the patient and both her and her  have had diarrhea and weakness since Sunday. She is having multiple watery stools per day. Patient was on antibiotics last month.    Patient was advised to be seen for assessment. She was given the option on e-visit if she does not want to come into the clinic. She was informed of the other online options also.    Patient was wanting to know if she can take imodium. Informed that she will be able to ask that of the provider. Her and her  did eat out over the weekend.    Odilia Malone RN, [facility] Triage

## 2018-12-12 ENCOUNTER — OFFICE VISIT (OUTPATIENT)
Dept: FAMILY MEDICINE | Facility: CLINIC | Age: 68
End: 2018-12-12
Payer: COMMERCIAL

## 2018-12-12 VITALS
TEMPERATURE: 97.4 F | DIASTOLIC BLOOD PRESSURE: 84 MMHG | OXYGEN SATURATION: 98 % | BODY MASS INDEX: 27 KG/M2 | HEART RATE: 83 BPM | HEIGHT: 61 IN | SYSTOLIC BLOOD PRESSURE: 136 MMHG | WEIGHT: 143 LBS

## 2018-12-12 DIAGNOSIS — R19.7 DIARRHEA, UNSPECIFIED TYPE: Primary | ICD-10-CM

## 2018-12-12 DIAGNOSIS — R19.7 DIARRHEA, UNSPECIFIED TYPE: ICD-10-CM

## 2018-12-12 LAB
C DIFF TOX B STL QL: POSITIVE
SPECIMEN SOURCE: ABNORMAL

## 2018-12-12 PROCEDURE — 99213 OFFICE O/P EST LOW 20 MIN: CPT | Performed by: NURSE PRACTITIONER

## 2018-12-12 PROCEDURE — 87493 C DIFF AMPLIFIED PROBE: CPT | Mod: 59 | Performed by: NURSE PRACTITIONER

## 2018-12-12 PROCEDURE — 87506 IADNA-DNA/RNA PROBE TQ 6-11: CPT | Performed by: NURSE PRACTITIONER

## 2018-12-12 ASSESSMENT — MIFFLIN-ST. JEOR: SCORE: 1117.68

## 2018-12-12 ASSESSMENT — PAIN SCALES - GENERAL: PAINLEVEL: NO PAIN (0)

## 2018-12-12 NOTE — PATIENT INSTRUCTIONS
At [facility], we strive to deliver an exceptional experience to you, every time we see you.  If you receive a survey in the mail, please send us back your thoughts. We really do value your feedback.    Based on your medical history, these are the current health maintenance/preventive care services that you are due for (some may have been done at this visit.)  Health Maintenance Due   Topic Date Due     DTAP/TDAP/TD IMMUNIZATION (3 - Td) 12/21/1996     ZOSTER IMMUNIZATION (2 of 3) 06/24/2013     FALL RISK ASSESSMENT  05/25/2018         Suggested websites for health information:  Www.[employer]Health Elements : Up to date and easily searchable information on multiple topics.  Www.medlineplus.gov : medication info, interactive tutorials, watch real surgeries online  Www.familydoctor.org : good info from the Academy of Family Physicians  Www.cdc.gov : public health info, travel advisories, epidemics (H1N1)  Www.aap.org : children's health info, normal development, vaccinations  Www.health.[facility].[place].us : [place] dept of health, public health issues in [place], N1N1    Your care team:                            Family Medicine Internal Medicine   MD Guillermo Faustin MD Shantel Branch-Fleming, MD Katya Georgiev PA-C Nam Ho, MD Pediatrics   OZ Boo, LISANDRA Guerra APRN MD Lindsay Cagle MD Deborah Mielke, MD Kim Thein, APRN [facility]      Clinic hours: Monday - Thursday 7 am-7 pm; Fridays 7 am-5 pm.   Urgent care: Monday - Friday 11 am-9 pm; Saturday and Sunday 9 am-5 pm.  Pharmacy : Monday -Thursday 8 am-8 pm; Friday 8 am-6 pm; Saturday and Sunday 9 am-5 pm.     Clinic: [phone]   Pharmacy: [phone]    Patient Education     Diarrhea with Uncertain Cause (Adult)    Diarrhea is when stools are loose and watery. This can be caused by:    Viral infections    Bacterial infections    Food poisoning    Parasites    Irritable bowel syndrome  (IBS)    Inflammatory bowel diseases such as ulcerative colitis, Crohn's disease, and celiac disease    Food intolerance, such as to lactose, the sugar found in milk and milk products    Reaction to medicines like antibiotics, laxatives, cancer drugs, and antacids  Along with diarrhea, you may also have:    Abdominal pain and cramping    Nausea and vomiting    Loss of bowel control    Fever and chills    Bloody stools  In some cases, antibiotics may help to treat diarrhea. You may have a stool sample test. This is done to see what is causing your diarrhea, and if antibiotics will help treat it. The results of a stool sample test may take up to 2 days. The healthcare provider may not give you antibiotics until he or she has the stool test results.  Diarrhea can cause dehydration. This is the loss of too much water and other fluids from the body. When this occurs, body fluid must be replaced. This can be done with oral rehydration solutions. Oral rehydration solutions are available at drugstores and grocery stores without a prescription. Sports drinks are not the best choice if you are very dehydrated. They have too much sugar and not enough electrolytes.  Home care  Follow all instructions given by your healthcare provider. Rest at home for the next 24 hours, or until you feel better. Avoid caffeine, tobacco, and alcohol. These can make diarrhea, cramping, and pain worse.  If taking medicines:    Over-the-counter nausea and diarrhea medicines are generally OK unless you experience fever or blood stool. Check with your doctor first in those circumstances.    You may use acetaminophen or NSAID medicines like ibuprofen or naproxen to reduce pain and fever. Don t use these if you have chronic liver or kidney disease, or ever had a stomach ulcer or gastrointestinal bleeding. Don't use NSAID medicines if you are already taking one for another condition (like arthritis) or are on daily aspirin therapy (such as for heart  disease or after a stroke). Talk with your healthcare provider first.    If antibiotics were prescribed, be sure you take them until they are finished. Don t stop taking them even when you feel better. Antibiotics must be taken as a full course.  To prevent the spread of illness:    Remember that washing with soap and water and using alcohol-based  is the best way to prevent the spread of infection. Dry your hands with a single use towel (like a paper towel).    Clean the toilet after each use.    Wash your hands before eating.    Wash your hands before and after preparing food. Keep in mind that people with diarrhea or vomiting should not prepare food for others.    Wash your hands after using cutting boards, countertops, and knives that have been in contact with raw foods.    Wash and then peel fruits and vegetables.    Keep uncooked meats away from cooked and ready-to-eat foods.    Use a food thermometer when cooking. Cook poultry to at least 165 F (74 C). Cook ground meat (beef, veal, pork, lamb) to at least 160 F (71 C). Cook fresh beef, veal, lamb, and pork to at least 145 F (63 C).    Don t eat raw or undercooked eggs (poached or dulce side up), poultry, meat, or unpasteurized milk and juices.  Food and drinks  The main goal while treating vomiting or diarrhea is to prevent dehydration. This is done by taking small amounts of liquids often.    Keep in mind that liquids are more important than food right now.    Drink only small amounts of liquids at a time.    Don t force yourself to eat, especially if you are having cramping, vomiting, or diarrhea. Don t eat large amounts at a time, even if you are hungry.    If you eat, avoid fatty, greasy, spicy, or fried foods.    Don t eat dairy foods or drink milk if you have diarrhea. These can make diarrhea worse.  During the first 24 hours you can try:    Oral rehydration solutions.  Sports drinks may be used if you are not too dehydrated and are otherwise  healthy.    Soft drinks without caffeine    Ginger ale    Water (plain or flavored)    Decaf tea or coffee    Clear broth, consommé, or bouillon    Gelatin, popsicles, or frozen fruit juice bars  The second 24 hours, if you are feeling better, you can add:    Hot cereal, plain toast, bread, rolls, or crackers    Plain noodles, rice, mashed potatoes, chicken noodle soup, or rice soup    Unsweetened canned fruit (no pineapple)    Bananas  As you recover:    Limit fat intake to less than 15 grams per day. Don t eat margarine, butter, oils, mayonnaise, sauces, gravies, fried foods, peanut butter, meat, poultry, or fish.    Limit fiber. Don t eat raw or cooked vegetables, fresh fruits except bananas, or bran cereals.    Limit caffeine and chocolate.    Limit dairy.    Don t use spices or seasonings except salt.    Go back to your normal diet over time, as you feel better and your symptoms improve.    If the symptoms come back, go back to a simple diet or clear liquids.  Follow-up care  Follow up with your healthcare provider, or as advised. If a stool sample was taken or cultures were done, call the healthcare provider for the results as instructed.  Call 911  Call 911 if you have any of these symptoms:    Trouble breathing    Confusion    Extreme drowsiness or trouble walking    Loss of consciousness    Rapid heart rate    Chest pain    Stiff neck    Seizure   When to seek medical advice  Call your healthcare provider right away if any of these occur:    Abdominal pain that gets worse    Constant lower right abdominal pain    Continued vomiting and inability to keep liquids down    Diarrhea more than 5 times a day    Blood in vomit or stool    Dark urine or no urine for 8 hours, dry mouth and tongue, tiredness, weakness, or dizziness    Drowsiness    New rash    You don t get better in 2 to 3 days    Fever of 100.4 F (38 C) or higher, or as directed by your healthcare provider  Date Last Reviewed: 6/1/2018 2000-2018  [employer]. [address] [place] [address], [place] [address]. All rights reserved. This information is not intended as a substitute for professional medical care. Always follow your healthcare professional's instructions.           Patient Education     Treating Diarrhea    Diarrhea happens when you have loose, watery, or frequent bowel movements. It is a common problem with many causes. Most cases of diarrhea clear up on their own. But certain cases may need treatment. Be sure to see your healthcare provider if your symptoms do not improve within a few days.  Getting relief  Treatment of diarrhea depends on its cause. Diarrhea caused by bacterial or parasite infection is often treated with antibiotics. Diarrhea caused by other factors, such as a stomach virus, often improves with simple home treatment. The tips below may also help relieve your symptoms.    Drink plenty of fluids. This helps prevent too much fluid loss (dehydration). Water, clear soups, and electrolyte solutions are good choices. Avoid alcohol, coffee, tea, and milk. These can irritate your intestines and make symptoms worse.    Suck on ice chips if drinking makes you queasy.    Return to your normal diet slowly. You may want to eat bland foods at first, such as rice and toast. Also, you may need to avoid certain foods for a while, such as dairy products. These can make symptoms worse. Ask your healthcare provider if there are any other foods you should avoid.    If you were prescribed antibiotics, take them as directed.    Do not take anti-diarrhea medicines without asking your healthcare provider first.  Call your healthcare provider   Call your healthcare provider if you have any of the following:     A fever of 100.4 F (38.0 C) or higher, or as directed by your healthcare provider    Severe pain    Worsening diarrhea or diarrhea for more than 2 days    Bloody vomit or stool    Signs of dehydration (dizziness, dry mouth and tongue, rapid  pulse, dark urine)  Date Last Reviewed: 7/1/2016 2000-2018 [employer]. [address] [place] [address], [place] [address]. All rights reserved. This information is not intended as a substitute for professional medical care. Always follow your healthcare professional's instructions.

## 2018-12-12 NOTE — PROGRESS NOTES
SUBJECTIVE:   Isabel Carroll is a 68 year old female who presents to clinic today for the following health issues:      Diarrhea  Onset: 3 days ago    Description:   Consistency of stool: watery  Blood in stool: no   Number of loose stools in past 24 hours: 6-8    Progression of Symptoms:  improving    Accompanying Signs & Symptoms:  Fever: no   Nausea or vomiting; no   Abdominal pain: no   Episodes of constipation: no   Weight loss: YES- 2lbs    History:   Ill contacts: YES  Recent use of antibiotics: YES- last month   Recent travels: no          Recent medication-new or changes(Rx or OTC): no     Precipitating factors:   None    Alleviating factors:   Imodium    Therapies Tried and outcome:  Imodium AD; Outcome: decrease number of stools    Pleasant 68 year old female presents with the above concerns. On antibiotics last month for diverticulitis and UTI (flagyl, cipro and bactrim). No abdominal pain. No nausea or vomiting. Stools are watery or very loose. No fever. Feels achy all over.  with similar symptoms. Ate out at [facility] and not long after they got home they both got symptoms. No blood in stool.     Problem list and histories reviewed & adjusted, as indicated.  Additional history: as documented    Patient Active Problem List   Diagnosis     Overweight     Chronic rhinitis     Primary osteoarthritis of both hands     Basal cell carcinoma of skin     Coronary artery disease involving native heart with angina pectoris, unspecified vessel or lesion type (H)     Hyperlipidemia     Impaired fasting glucose     Chorioretinal scar of left eye     Sinus tachycardia     Advance care planning     Pes planus     Right shoulder strain, initial encounter     Bronchiectasis without complication (H)     Chronic cough     Female pattern hair loss     FHx: colon cancer     Past Surgical History:   Procedure Laterality Date      SECTION      x 2     COLONOSCOPY      3 polyps     COLONOSCOPY   2016    neg     ENDOSCOPY  2016     LENGTHEN TENDON ACHILLES  2011    Procedure:LENGTHEN TENDON ACHILLES; Surgeon:AARON CÁRDENAS; Location:[facility]     LIVER SURGERY      Bengin large cyst     MINI ARC SLING OPERATION FOR STRESS INCONTINENCE       OSTEOTOMY ANKLE  2011    Procedure:OSTEOTOMY ANKLE; Calcaneous,  Flexor Digitorum Longus Transfer       SALPINGO-OOPHORECTOMY BILATERAL  ,     Benign prophylactic for FHx ov cancer       Social History     Tobacco Use     Smoking status: Former Smoker     Years: 16.00     Types: Cigarettes     Last attempt to quit: 1982     Years since quittin.9     Smokeless tobacco: Never Used   Substance Use Topics     Alcohol use: Yes     Alcohol/week: 0.0 oz     Comment: 2-7 glasses of wine per week     Family History   Problem Relation Age of Onset     Other Cancer Sister 51        ovarian cancer     Cancer Sister      Thyroid Disease Sister      Colon Cancer Father 76     Brain Hemorrhage Father         accident     Macular Degeneration Mother      Diabetes Maternal Aunt      Diabetes Maternal Uncle      Cancer Paternal Aunt      Diabetes Maternal Grandmother      Hypertension No family hx of      Cerebrovascular Disease No family hx of      Glaucoma No family hx of          Current Outpatient Medications   Medication Sig Dispense Refill     aspirin 81 MG tablet Take 1 tablet by mouth daily.       atorvastatin (LIPITOR) 40 MG tablet 40 mg       Calcium-Vitamin D (CALCIUM + D PO) Take  by mouth.       diclofenac (VOLTAREN) 1 % GEL 2 grams to hands four times daily using enclosed dosing card. 100 g 1     FISH OIL        MAGNESIUM OXIDE PO Take 200 mg by mouth daily       metoprolol (TOPROL-XL) 25 MG 24 hr tablet Take 1 tablet (25 mg) by mouth daily 90 tablet 3     Multiple Vitamins-Minerals (MULTIVITAMIN & MINERAL PO) Take  by mouth.       Pseudoephedrine HCl (SUDAFED 12 HOUR PO) Take  by mouth.       ciprofloxacin (CIPRO) 500 MG tablet Take 1  "tablet (500 mg) by mouth every 12 hours (Patient not taking: Reported on 12/12/2018) 14 tablet 0     fluticasone (FLONASE) 50 MCG/ACT nasal spray Spray 1-2 sprays into both nostrils daily (Patient not taking: Reported on 10/16/2018) 1 Package 11     guaiFENesin-codeine (ROBITUSSIN AC) 100-10 MG/5ML SOLN solution Take 5-10 mLs by mouth nightly as needed for cough (Patient not taking: Reported on 10/16/2018) 120 mL 1     metroNIDAZOLE (FLAGYL) 500 MG tablet Take 1 tablet (500 mg) by mouth every 8 hours (Patient not taking: Reported on 12/12/2018) 21 tablet 0     omeprazole (PRILOSEC) 20 MG CR capsule Take 1 capsule (20 mg) by mouth daily (Patient not taking: Reported on 10/16/2018) 30 capsule 1     sulfamethoxazole-trimethoprim (BACTRIM DS/SEPTRA DS) 800-160 MG per tablet Take 1 tablet by mouth 2 times daily (Patient not taking: Reported on 12/12/2018) 14 tablet 0     Allergies   Allergen Reactions     Codeine Phosphate Nausea and Vomiting     Pentazocine      Propoxyphene      Darvon     Penicillins Rash     rash and itching         Reviewed and updated as needed this visit by clinical staff  Tobacco  Allergies  Meds  Problems  Med Hx  Surg Hx  Fam Hx  Soc Hx        Reviewed and updated as needed this visit by Provider  Tobacco  Allergies  Meds  Problems  Med Hx  Surg Hx  Fam Hx         ROS:  Constitutional, HEENT, cardiovascular, pulmonary, GI, , musculoskeletal, neuro, skin, endocrine and psych systems are negative, except as otherwise noted.    OBJECTIVE:     /84 (BP Location: Right arm, Patient Position: Chair, Cuff Size: Adult Regular)   Pulse 83   Temp 97.4  F (36.3  C) (Oral)   Ht 1.56 m (5' 1.42\")   Wt 64.9 kg (143 lb)   LMP  (LMP Unknown)   SpO2 98%   BMI 26.65 kg/m    Body mass index is 26.65 kg/m .  GENERAL: healthy, alert and no distress  NECK: no adenopathy, no asymmetry, masses, or scars and thyroid normal to palpation  RESP: lungs clear to auscultation - no rales, rhonchi " or wheezes  CV: regular rate and rhythm, normal S1 S2, no S3 or S4, no murmur, click or rub, no peripheral edema and peripheral pulses strong  ABDOMEN: soft, nontender, no hepatosplenomegaly, no masses and bowel sounds normal  MS: no gross musculoskeletal defects noted, no edema  BACK: no CVA tenderness, no paralumbar tenderness  PSYCH: mentation appears normal, affect normal/bright    Diagnostic Test Results:  none     ASSESSMENT/PLAN:     1. Diarrhea, unspecified type  Labs pending. Continue to stay well hydrated with frequent sips of fluids. I cautioned her about using anti-diarrhea medication as it could make a potential infection worse. Further plan pending results of labs  - Clostridium difficile Toxin B PCR; Future  - Enteric Bacteria and Virus Panel by DAVID Stool; Future    See Patient Instructions    Patient verbalizes understanding and agrees with plan of care. Patient stable for discharge.      VIC Nayak [facility]

## 2018-12-13 ENCOUNTER — TELEPHONE (OUTPATIENT)
Dept: FAMILY MEDICINE | Facility: CLINIC | Age: 68
End: 2018-12-13

## 2018-12-13 DIAGNOSIS — A49.8 CLOSTRIDIUM DIFFICILE INFECTION: Primary | ICD-10-CM

## 2018-12-13 RX ORDER — METRONIDAZOLE 500 MG/1
500 TABLET ORAL 3 TIMES DAILY
Qty: 30 TABLET | Refills: 0 | Status: SHIPPED | OUTPATIENT
Start: 2018-12-13 | End: 2019-02-19

## 2018-12-13 RX ORDER — VANCOMYCIN HYDROCHLORIDE 25 MG/ML
125 KIT ORAL EVERY 6 HOURS
Qty: 200 ML | Refills: 0 | Status: SHIPPED | OUTPATIENT
Start: 2018-12-13 | End: 2018-12-13

## 2018-12-13 NOTE — TELEPHONE ENCOUNTER
Plan does not cover vancomycin (FIRVANQ) 25 MG/ML oral solution.  Please call [phone] to initiate Prior Auth or change med.    Insurance type and ID number: ID# [id number]      Additional Information:     Aisha Omer

## 2018-12-13 NOTE — TELEPHONE ENCOUNTER
Reason for Call:  Other prescription    Detailed comments: Pt calling to follow up to see if Alisia Boyd can send in a alternative Rx to Pharmacy as soon as possible as her current Rx is too costly. She would like a call back as soon as possible today for an update.    Phone Number Patient can be reached at: Home number on file [phone] (home)    Best Time: anytime    Can we leave a detailed message on this number? YES    Call taken on 12/13/2018 at 12:45 PM by Augie Whittington

## 2018-12-13 NOTE — TELEPHONE ENCOUNTER
It looks like this medication was discontinued by Alisia Boyd. Patient was put on metronidazole instead.  Marry Gonzalez MD

## 2018-12-13 NOTE — TELEPHONE ENCOUNTER
Reason for Call:  Other prescription    Detailed comments: patient was prescribed a medication for C-Diff and the medication cost $99 and asking if a cheaper medication can be sent to pharmacy.  [facility] [address] [place] [address] - [address] [place] [address]     Phone Number Patient can be reached at: Home number on file [phone] (home)    Best Time: any    Can we leave a detailed message on this number? YES    Call taken on 12/13/2018 at 11:06 AM by Padmini Rock

## 2018-12-13 NOTE — TELEPHONE ENCOUNTER
This writer attempted to contact Isabel Carroll on 12/13/18      Reason for call Medication and left message.      If patient calls back:   Patient contacted by 1st floor [place] Care Team (MA/TC). Inform patient that someone from the team will contact them, document that pt called and route to care team.         Cuong Smart MA

## 2018-12-13 NOTE — RESULT ENCOUNTER NOTE
Please CALL patient and be sure she understands c diff infection as below.    Your stool testing came back positive for  c diff. It is likely from the antibiotics used to treat the diverticulitis. I have sent vancomycin to the pharmacy. Your  should see his primary care provider and be treated as well. Alcohol based products will not treat this, but soap, bleach and chlorhexidine based products will. Be sure to wash your hands often, especially after using the restroom. You can also use a probiotic such as Culturelle (over the counter) but you shouldn't take it the same time as the vancomycin as it might make it less effective. Please reach out with any questions. Follow up with your primary care provider as needed.  Alisia

## 2018-12-18 ENCOUNTER — OFFICE VISIT (OUTPATIENT)
Dept: OPHTHALMOLOGY | Facility: CLINIC | Age: 68
End: 2018-12-18
Attending: OPTOMETRIST
Payer: COMMERCIAL

## 2018-12-18 ENCOUNTER — TELEPHONE (OUTPATIENT)
Dept: OPHTHALMOLOGY | Facility: CLINIC | Age: 68
End: 2018-12-18

## 2018-12-18 DIAGNOSIS — H25.13 AGE-RELATED NUCLEAR CATARACT OF BOTH EYES: Primary | ICD-10-CM

## 2018-12-18 PROCEDURE — 92012 INTRM OPH EXAM EST PATIENT: CPT | Performed by: OPHTHALMOLOGY

## 2018-12-18 PROCEDURE — 92136 OPHTHALMIC BIOMETRY: CPT | Performed by: OPHTHALMOLOGY

## 2018-12-18 ASSESSMENT — CUP TO DISC RATIO
OS_RATIO: 0.3
OD_RATIO: 0.3

## 2018-12-18 ASSESSMENT — REFRACTION_WEARINGRX
OD_SPHERE: -5.75
OS_SPHERE: -6.75
SPECS_TYPE: PAL
OS_CYLINDER: +3.25
OD_CYLINDER: +2.75
OD_AXIS: 085
OS_CYLINDER: +3.50
OS_ADD: +2.50
OD_ADD: +2.50
OS_ADD: +2.50
OD_SPHERE: -5.75
OD_CYLINDER: +2.75
OD_AXIS: 090
OS_ADD: +2.50
OD_AXIS: 089
OS_SPHERE: -6.75
OS_AXIS: 079
OD_SPHERE: -6.00
OS_AXIS: 082
OS_CYLINDER: +3.25
OS_AXIS: 085
OS_SPHERE: -6.75
OD_ADD: +2.50
OD_CYLINDER: +2.75
OD_ADD: +2.50

## 2018-12-18 ASSESSMENT — TONOMETRY
IOP_METHOD: TONOPEN
OS_IOP_MMHG: 19
OD_IOP_MMHG: 20

## 2018-12-18 ASSESSMENT — EXTERNAL EXAM - LEFT EYE: OS_EXAM: NORMAL

## 2018-12-18 ASSESSMENT — VISUAL ACUITY
CORRECTION_TYPE: GLASSES
OS_CC: 20/25
METHOD: SNELLEN - LINEAR
OD_CC: 20/25

## 2018-12-18 ASSESSMENT — EXTERNAL EXAM - RIGHT EYE: OD_EXAM: NORMAL

## 2018-12-18 NOTE — TELEPHONE ENCOUNTER
"Procedure: left Cataract  Facility: Other [facility]  Length: 0.5 hour(s)  Surgeon:Anthony Coulter MD  Anesthesia: Local with MAC  Diagnosis: Cataract  Out Patient or AM admit:  (Same day)  BMI:Estimated body mass index is 26.65 kg/m  as calculated from the following:    Height as of 12/12/18: 1.56 m (5' 1.42\").    Weight as of 12/12/18: 64.9 kg (143 lb). (If over 43 for general or 45 for MAC cannot be scheduled at [facility])   Pre-op Appointments needed: History & Physical within 30 days of surgery  Post-op appointments needed: Cataract 1 day 1 week    needed:No   Surgery packet/instructions given to patient?:  No  Pre op teaching done:  No  Lens Orders Needed: Yes, toric 13.0, with incision at      Referring provider: Dr. Frederick  Special Considerations:     Procedure: right Cataract  Facility: Other [facility]  Length: 0.5 hour(s)  Surgeon:Anthony Coulter MD  Anesthesia: Local with MAC  Diagnosis: Cataract  Out Patient or AM admit:  (Same day)  BMI:Estimated body mass index is 26.65 kg/m  as calculated from the following:    Height as of 12/12/18: 1.56 m (5' 1.42\").    Weight as of 12/12/18: 64.9 kg (143 lb). (If over 43 for general or 45 for MAC cannot be scheduled at [facility])   Pre-op Appointments needed: History & Physical within 30 days of surgery  Post-op appointments needed: Cataract 1 day 1 week 4 weeks   needed:No   Surgery packet/instructions given to patient?:  No  Pre op teaching done:  No  Lens Orders Needed: Yes, toric 14.0, with incision at      Referring provider: Dr. Frederick  Special Considerations:                 "

## 2018-12-18 NOTE — TELEPHONE ENCOUNTER
Surgery orders sent to Ginny in Dr. Coulter's [place] office.  Rosa Rubi, Surgery Scheduling Coordinator

## 2018-12-18 NOTE — PROGRESS NOTES
Assessment & Plan   Isabel Carroll is a 68 year old female who presents with:   Review of systems for the eyes was negative other than the pertinent positives and negatives noted in the HPI.  History is obtained from the patient.    Chief Complaint   Patient presents with     Cataract     Here at the request of Dr. Frederick.  Pt does use refresh 1-2 x day each eye.        Lab Results   Component Value Date    A1C 5.8 10/17/2017    A1C 5.5 10/14/2016    A1C 5.8 05/22/2016    A1C 5.7 09/28/2013    A1C 6.0 09/22/2012     Age-related nuclear cataract of both eyes  - visually significant left eye greater than right eye.  - A's and K's today - results reviewed and appropriate lenses chosen, see scanned documentation.  - Schedule surgery - at [facility]  Discussed lens options. Standard lens monofocal vs monovision. Multifocal vs Toric     Risks, benefits, an alternatives of intended procedure discussed. She wishes to proceed.    Pt would like to proceed with surgery starting with the left eye using a standard lens setting both eyes for distance.  Using a toric lens    She understands that She may still need to wear glasses after surgery.    Return for Surgery.    Documentation for today's encounter was performed by Valerie Pedroza COA. OSC. Acting as a scribe in my presence. I have reviewed and verified that it is an accurate recording of today's encounter.    Attending Physician Attestation:  Complete documentation of historical and exam elements from today's encounter can be found in the full encounter summary report (not reduplicated in this progress note).  I personally obtained the chief complaint(s) and history of present illness.  I confirmed and edited as necessary the review of systems, past medical/surgical history, family history, social history, and examination findings as documented by others; and I examined the patient myself.  I personally reviewed the relevant tests, images, and reports as documented above.   I formulated and edited as necessary the assessment and plan and discussed the findings and management plan with the patient and family. - Venkatesh Coulter MD

## 2018-12-18 NOTE — NURSING NOTE
Patient presents with:  Cataract: Here at the request of Dr. Frederick.  Pt does use refresh 1-2 x day each eye.       [place]erring Provider:  [place]  [address] [place] [address]        Katie Beard COT

## 2018-12-19 NOTE — TELEPHONE ENCOUNTER
...[facility]  Patient called said she has some questions about stomach pain. Patient said she need the provider to call due to having C diff.

## 2018-12-21 ENCOUNTER — OFFICE VISIT (OUTPATIENT)
Dept: FAMILY MEDICINE | Facility: CLINIC | Age: 68
End: 2018-12-21
Payer: COMMERCIAL

## 2018-12-21 VITALS
DIASTOLIC BLOOD PRESSURE: 81 MMHG | TEMPERATURE: 97.5 F | SYSTOLIC BLOOD PRESSURE: 127 MMHG | HEART RATE: 81 BPM | OXYGEN SATURATION: 97 % | RESPIRATION RATE: 16 BRPM

## 2018-12-21 DIAGNOSIS — A04.72 C. DIFFICILE COLITIS: Primary | ICD-10-CM

## 2018-12-21 PROCEDURE — 99212 OFFICE O/P EST SF 10 MIN: CPT | Performed by: NURSE PRACTITIONER

## 2018-12-21 ASSESSMENT — PAIN SCALES - GENERAL: PAINLEVEL: NO PAIN (0)

## 2018-12-21 NOTE — PROGRESS NOTES
SUBJECTIVE:   Isabel Carroll is a 68 year old female who presents to clinic today for the following health issues:      ABDOMINAL   PAIN     Onset: 18, ended 18    Description:   Character: Dull ache  Location: left lower quadrant  Radiation: None    Intensity: moderate    Progression of Symptoms:  improving    Accompanying Signs & Symptoms:  Fever/Chills?: no   Gas/Bloating: YES- gas  Nausea: no   Vomitting: no   Diarrhea?: no   Constipation:no   Dysuria or Hematuria: no    History:   Trauma: no   Previous similar pain: YES   Previous tests done: CT    Precipitating factors:   Does the pain change with:     Food: no      BM: no     Urination: no     Alleviating factors:  None    Therapies Tried and outcome: None    LMP:  Unknown       3 days ago had pain to LLQ which has since resolved. No more diarrhea. Now with just loose stools and improving daily. She is on day 6/10 of flagyl for c diff. No fevers. No nausea or vomiting. Leaving for [place] next week.    Problem list and histories reviewed & adjusted, as indicated.  Additional history: as documented    Patient Active Problem List   Diagnosis     Overweight     Chronic rhinitis     Primary osteoarthritis of both hands     Basal cell carcinoma of skin     Coronary artery disease involving native heart with angina pectoris, unspecified vessel or lesion type (H)     Hyperlipidemia     Impaired fasting glucose     Chorioretinal scar of left eye     Sinus tachycardia     Advance care planning     Pes planus     Right shoulder strain, initial encounter     Bronchiectasis without complication (H)     Chronic cough     Female pattern hair loss     FHx: colon cancer     Clostridium difficile infection     Past Surgical History:   Procedure Laterality Date      SECTION      x 2     COLONOSCOPY      3 polyps     COLONOSCOPY  2016    neg     ENDOSCOPY  2016     LENGTHEN TENDON ACHILLES  2011    Procedure:LENGTHEN TENDON ACHILLES;  Surgeon:AARON CÁRDENAS; [facility]     LIVER SURGERY      Bengin large cyst     MINI ARC SLING OPERATION FOR STRESS INCONTINENCE  2007     OSTEOTOMY ANKLE  2011    Procedure:OSTEOTOMY ANKLE; Calcaneous,  Flexor Digitorum Longus Transfer       SALPINGO-OOPHORECTOMY BILATERAL  ,     Benign prophylactic for FHx ov cancer       Social History     Tobacco Use     Smoking status: Former Smoker     Years: 16.00     Types: Cigarettes     Last attempt to quit: 1982     Years since quittin.9     Smokeless tobacco: Never Used   Substance Use Topics     Alcohol use: Yes     Alcohol/week: 0.0 oz     Comment: 2-7 glasses of wine per week     Family History   Problem Relation Age of Onset     Other Cancer Sister 51        ovarian cancer     Cancer Sister      Thyroid Disease Sister      Colon Cancer Father 76     Brain Hemorrhage Father         accident     Macular Degeneration Mother      Diabetes Maternal Aunt      Diabetes Maternal Uncle      Cancer Paternal Aunt      Diabetes Maternal Grandmother      Hypertension No family hx of      Cerebrovascular Disease No family hx of      Glaucoma No family hx of          Current Outpatient Medications   Medication Sig Dispense Refill     aspirin 81 MG tablet Take 1 tablet by mouth daily.       atorvastatin (LIPITOR) 40 MG tablet 40 mg       Calcium-Vitamin D (CALCIUM + D PO) Take  by mouth.       diclofenac (VOLTAREN) 1 % GEL 2 grams to hands four times daily using enclosed dosing card. 100 g 1     FISH OIL        MAGNESIUM OXIDE PO Take 200 mg by mouth daily       metoprolol (TOPROL-XL) 25 MG 24 hr tablet Take 1 tablet (25 mg) by mouth daily 90 tablet 3     metroNIDAZOLE (FLAGYL) 500 MG tablet Take 1 tablet (500 mg) by mouth 3 times daily for 10 days 30 tablet 0     Multiple Vitamins-Minerals (MULTIVITAMIN & MINERAL PO) Take  by mouth.       Pseudoephedrine HCl (SUDAFED 12 HOUR PO) Take  by mouth.       fluticasone (FLONASE) 50 MCG/ACT nasal spray  Spray 1-2 sprays into both nostrils daily (Patient not taking: Reported on 10/16/2018) 1 Package 11     guaiFENesin-codeine (ROBITUSSIN AC) 100-10 MG/5ML SOLN solution Take 5-10 mLs by mouth nightly as needed for cough (Patient not taking: Reported on 10/16/2018) 120 mL 1     omeprazole (PRILOSEC) 20 MG CR capsule Take 1 capsule (20 mg) by mouth daily (Patient not taking: Reported on 10/16/2018) 30 capsule 1     sulfamethoxazole-trimethoprim (BACTRIM DS/SEPTRA DS) 800-160 MG per tablet Take 1 tablet by mouth 2 times daily (Patient not taking: Reported on 12/12/2018) 14 tablet 0     Allergies   Allergen Reactions     Codeine Phosphate Nausea and Vomiting     Pentazocine      Propoxyphene      Darvon     Penicillins Rash     rash and itching         Reviewed and updated as needed this visit by clinical staff  Tobacco  Allergies  Meds  Problems  Med Hx  Surg Hx  Fam Hx  Soc Hx        Reviewed and updated as needed this visit by Provider  Tobacco  Allergies  Meds  Problems  Med Hx  Surg Hx  Fam Hx         ROS:  Constitutional, HEENT, cardiovascular, pulmonary, GI, , musculoskeletal, neuro, skin, endocrine and psych systems are negative, except as otherwise noted.    OBJECTIVE:     /81 (BP Location: Right arm, Patient Position: Chair, Cuff Size: Adult Regular)   Pulse 81   Temp 97.5  F (36.4  C) (Oral)   Resp 16   LMP  (LMP Unknown)   SpO2 97%   Breastfeeding? No   There is no height or weight on file to calculate BMI.  GENERAL: healthy, alert and no distress  NECK: no adenopathy, no asymmetry, masses, or scars and thyroid normal to palpation  RESP: lungs clear to auscultation - no rales, rhonchi or wheezes  CV: regular rate and rhythm, normal S1 S2, no S3 or S4, no murmur, click or rub, no peripheral edema and peripheral pulses strong  ABDOMEN: soft, nontender, no hepatosplenomegaly, no masses and bowel sounds normal  MS: no gross musculoskeletal defects noted, no edema  PSYCH: mentation  appears normal, affect normal/bright    Diagnostic Test Results:  none     ASSESSMENT/PLAN:         ICD-10-CM    1. C. difficile colitis A04.72      asymptomatic now. Doesn't want to extend flagyl  Because wants to have alcohol drink on [place]. Continue to stay well hydrated. She will let me know if that LLQ pain returns. If symptoms develop in [place] she reports she will go to [place].    See Patient Instructions    The benefits, risks and potential side effects were discussed in detail. Black box warnings discussed as relevant. All patient questions were answered. The patient was instructed to follow up immediately if any adverse reactions develop.    Patient verbalizes understanding and agrees with plan of care. Patient stable for discharge.      VIC Nayak [facility]

## 2019-02-06 NOTE — TELEPHONE ENCOUNTER
Date Scheduled: Left  Feb 21st                                     Rt March 7th   Facility: Other [facility]  Surgeon: Marylin   On google calendar?: Not Applicable  Post-op appointment scheduled:   Next 5 appointments (look out 90 days)    Feb 19, 2019  1:20 PM CST  Pre-Op physical with Marry Gonzalez MD  [facility] ([facility]) [address] [place] [id number]  [phone]   Feb 22, 2019 11:00 AM CST  Return Visit with Mj Frederick OD  [facility] [address] [place] [address]  [phone]   Feb 27, 2019 11:00 AM CST  Return Visit with Mj Frederick OD  [facility] ([facility]) [address] [place] [address]  [phone]   Mar 08, 2019 11:00 AM CST  Return Visit with Mj Frederick OD  [facility] ([facility]) [address] [place] [address]  [phone]   Mar 13, 2019 12:00 PM CDT  Return Visit with Mj Frederick OD  [facility]) [id number] [place] [address]  [phone]           scheduled?: No  Surgery packet/instructions confirmed received?  mailed by [place] office  Special Considerations:   Valerie BARCENAS O[facility]

## 2019-02-19 ENCOUNTER — OFFICE VISIT (OUTPATIENT)
Dept: FAMILY MEDICINE | Facility: CLINIC | Age: 69
End: 2019-02-19
Payer: COMMERCIAL

## 2019-02-19 VITALS
OXYGEN SATURATION: 98 % | TEMPERATURE: 98.8 F | BODY MASS INDEX: 28.17 KG/M2 | RESPIRATION RATE: 16 BRPM | WEIGHT: 149.2 LBS | HEIGHT: 61 IN | SYSTOLIC BLOOD PRESSURE: 119 MMHG | HEART RATE: 84 BPM | DIASTOLIC BLOOD PRESSURE: 78 MMHG

## 2019-02-19 DIAGNOSIS — H25.9 AGE-RELATED CATARACT OF BOTH EYES, UNSPECIFIED AGE-RELATED CATARACT TYPE: ICD-10-CM

## 2019-02-19 DIAGNOSIS — Z01.818 PREOP GENERAL PHYSICAL EXAM: Primary | ICD-10-CM

## 2019-02-19 DIAGNOSIS — J47.9 BRONCHIECTASIS WITHOUT COMPLICATION (H): ICD-10-CM

## 2019-02-19 DIAGNOSIS — I25.119 CORONARY ARTERY DISEASE INVOLVING NATIVE HEART WITH ANGINA PECTORIS, UNSPECIFIED VESSEL OR LESION TYPE (H): ICD-10-CM

## 2019-02-19 DIAGNOSIS — E78.2 MIXED HYPERLIPIDEMIA: ICD-10-CM

## 2019-02-19 PROCEDURE — 99214 OFFICE O/P EST MOD 30 MIN: CPT | Performed by: FAMILY MEDICINE

## 2019-02-19 RX ORDER — KETOROLAC TROMETHAMINE 4 MG/ML
SOLUTION/ DROPS OPHTHALMIC
COMMUNITY
Start: 2019-02-14 | End: 2019-07-12

## 2019-02-19 RX ORDER — PREDNISOLONE ACETATE 10 MG/ML
SUSPENSION/ DROPS OPHTHALMIC
COMMUNITY
Start: 2019-02-14 | End: 2019-07-12

## 2019-02-19 RX ORDER — OFLOXACIN 3 MG/ML
SOLUTION/ DROPS OPHTHALMIC
COMMUNITY
Start: 2019-02-14 | End: 2019-07-12

## 2019-02-19 ASSESSMENT — PAIN SCALES - GENERAL: PAINLEVEL: NO PAIN (0)

## 2019-02-19 ASSESSMENT — MIFFLIN-ST. JEOR: SCORE: 1142.64

## 2019-02-19 NOTE — PROGRESS NOTES
[address] [address]  [phone]  Dept: [phone]    PRE-OP EVALUATION:  Today's date: 2019    Isabel Carroll (: 1950) presents for pre-operative evaluation assessment as requested by Dr. Coulter.  She requires evaluation and anesthesia risk assessment prior to undergoing surgery/procedure for treatment of Cataracts bilateral .    Proposed Surgery/ Procedure: Cataract removal, lens implant and simple astigmatism package.   Date of Surgery/ Procedure: 2019, 3-7-2019  Time of Surgery/ Procedure: 1 pm  Hospital/Surgical Facility: [facility].  Fax number for surgical facility: [phone]  Primary Physician: Marry Gonzalez  Type of Anesthesia Anticipated: to be determined    Patient has a Health Care Directive or Living Will:  YES    1. NO - Do you have a history of heart attack, stroke, stent, bypass or surgery on an artery in the head, neck, heart or legs?  2. NO - Do you ever have any pain or discomfort in your chest?  3. NO - Do you have a history of  Heart Failure?  4. NO - Are you troubled by shortness of breath when: walking on the level, up a slight hill or at night?  5. NO - Do you currently have a cold, bronchitis or other respiratory infection?  6. YES - Do you have a cough, shortness of breath or wheezing? Dry cough is persistent  7. NO - Do you sometimes get pains in the calves of your legs when you walk?  8. NO - Do you or anyone in your family have previous history of blood clots?  9. NO - Do you or does anyone in your family have a serious bleeding problem such as prolonged bleeding following surgeries or cuts?  10. NO - Have you ever had problems with anemia or been told to take iron pills?  11. NO - Have you had any abnormal blood loss such as black, tarry or bloody stools, or abnormal vaginal bleeding?  12. NO - Have you ever had a blood transfusion?  13. YES - Have you or any of your  relatives ever had problems with anesthesia? Aunt had problem with an older anesthesia and difficulty waking up.   14. NO - Do you have sleep apnea, excessive snoring or daytime drowsiness?  15. NO - Do you have any prosthetic heart valves?  16. NO - Do you have prosthetic joints?  17. NO - Is there any chance that you may be pregnant?      HPI:     HPI related to upcoming procedure: Cataracts bilateral    In both eyes.  Associated symptoms include glare, blurred vision and haloes.  Severity is moderate.  Onset was gradual.  Duration of 5 months.  Frequency is constant.  Context:  night driving.  Treatments tried include eye drops- confirmed from ophthalmology notes 12- Marry Gonzalez MD        CAD - Patient has a longstanding history of moderate-severe CAD. Patient denies recent chest pain or NTG use, denies exercise induced dyspnea or PND. Last Stress test normal, EKG stable.                                                                                                                                                    .  HYPERLIPIDEMIA - Patient has a long history of significant Hyperlipidemia requiring medication for treatment with recent good control. Patient reports no problems or side effects with the medication.                                                                                                                                                       .    MEDICAL HISTORY:     Patient Active Problem List    Diagnosis Date Noted     Chronic cough 05/25/2017     Priority: High     Bronchiectasis without complication (H) 12/15/2016     Priority: High     CT chest done 2016 through COPDgene study, formal report indicates cylindrical bronchiectasis. Reviewed CT with [facility] radiologists; bronchiectasis is likely present but of unclear clinical significance, and was likely present on CT from 2010 as well (different scanner and different technique).       Coronary artery disease involving native  heart with angina pectoris, unspecified vessel or lesion type (H) 12/22/2011     Priority: High     Overview:   A)  CT Chest-Date: 07/13/10, There is coronary artery and mild aortic calcification present.  B)  Nuclear Stress Test on 10/01/10, No scintigraphic evidence of ischemia or infarction. EF: 71%.   Overview:   A)  CT Chest-Date: 07/13/10, There is coronary artery and mild aortic calcification present.  B)  Nuclear Stress Test on 10/01/10, No scintigraphic evidence of ischemia or infarction. EF: 71%.        Hyperlipidemia 12/22/2011     Priority: High     Overview:   See results review for lipid profile.       Clostridium difficile infection 12/13/2018     Priority: Medium     Female pattern hair loss 05/25/2017     Priority: Medium     Right shoulder strain, initial encounter 12/15/2016     Priority: Medium     FHx: colon cancer 10/17/2016     Priority: Medium     Sinus tachycardia 07/21/2016     Priority: Medium     Chorioretinal scar of left eye 12/02/2015     Priority: Medium     Overweight 07/28/2015     Priority: Medium     Problem list name updated by automated process. Provider to review       Chronic rhinitis 07/28/2015     Priority: Medium     Primary osteoarthritis of both hands 07/28/2015     Priority: Medium     Basal cell carcinoma of skin 04/07/2011     Priority: Medium     Pes planus 01/24/2011     Priority: Medium     Impaired fasting glucose 09/18/2006     Priority: Medium     Advance care planning 11/04/2016     Priority: Low     Advance Care Planning 11/4/2016: ACP Review of Chart / Resources Provided:  Reviewed chart for advance care plan.  Isabel Carroll has been provided information and resources to begin or update their advance care plan.  Added by Tamara Mendiola              Past Medical History:   Diagnosis Date     Arthritis      Basal cell carcinoma of skin 4/7/2011     Benign neoplasm of colon 10/26/2011    Overview:  Colonoscopy every 5 years  Oct 2011      Bronchiectasis  without complication (H) 12/15/2016    CT chest done 2016 through COPDgene study, formal report indicates cylindrical bronchiectasis. Reviewed CT with [facility] radiologists; bronchiectasis is likely present but of unclear clinical significance, and was likely present on CT from  as well (different scanner and different technique).     Chorioretinal scar of left eye 2015     Chronic rhinitis 2015     Coronary artery disease      Coronary atherosclerosis due to calcified coronary lesion 2011    Overview:  A)  CT Chest-Date: 07/13/10, There is coronary artery and mild aortic calcification present. B)  Nuclear Stress Test on 10/01/10, No scintigraphic evidence of ischemia or infarction. EF: 71%.       Elevated blood pressure (not hypertension) 2006     Hypercholesteremia      Hyperlipidemia 2011    Overview:  See results review for lipid profile.      Impaired fasting glucose 2006     Nonsenile cataract      PONV (postoperative nausea and vomiting)      Primary osteoarthritis of both hands 2015     Shingles 2009     Sinus tachycardia 2016     Skin cancer , 2015    Basal cell cancer     Vitamin D deficiency 2010     Past Surgical History:   Procedure Laterality Date      SECTION      x 2     COLONOSCOPY      3 polyps     COLONOSCOPY  2016    neg     ENDOSCOPY  2016     LENGTHEN TENDON ACHILLES  2011    Procedure:LENGTHEN TENDON ACHILLES; Surgeon:AARON CÁRDENAS; [facility]     LIVER SURGERY      Bengin large cyst     MINI ARC SLING OPERATION FOR STRESS INCONTINENCE       OSTEOTOMY ANKLE  2011    Procedure:OSTEOTOMY ANKLE; Calcaneous,  Flexor Digitorum Longus Transfer       SALPINGO-OOPHORECTOMY BILATERAL  ,     Benign prophylactic for FHx ov cancer     Current Outpatient Medications   Medication Sig Dispense Refill     aspirin 81 MG tablet Take 1 tablet by mouth daily.       atorvastatin (LIPITOR) 40 MG tablet 40 mg        "Calcium-Vitamin D (CALCIUM + D PO) Take  by mouth.       diclofenac (VOLTAREN) 1 % GEL 2 grams to hands four times daily using enclosed dosing card. 100 g 1     FISH OIL        MAGNESIUM OXIDE PO Take 200 mg by mouth daily       metoprolol (TOPROL-XL) 25 MG 24 hr tablet Take 1 tablet (25 mg) by mouth daily 90 tablet 3     Multiple Vitamins-Minerals (MULTIVITAMIN & MINERAL PO) Take  by mouth.       Pseudoephedrine HCl (SUDAFED 12 HOUR PO) Take  by mouth.       ketorolac tromethamine (ACULAR-LS) 0.4 % SOLN ophthalmic solution        ofloxacin (OCUFLOX) 0.3 % ophthalmic solution        prednisoLONE acetate (PRED FORTE) 1 % ophthalmic suspension        OTC products: None, except as noted above    Allergies   Allergen Reactions     Codeine Phosphate Nausea and Vomiting     Pentazocine      Propoxyphene      Darvon     Penicillins Rash     rash and itching        Latex Allergy: NO    Social History     Tobacco Use     Smoking status: Former Smoker     Years: 16.00     Types: Cigarettes     Last attempt to quit: 1982     Years since quittin.1     Smokeless tobacco: Never Used   Substance Use Topics     Alcohol use: Yes     Alcohol/week: 0.0 oz     Comment: 2-7 glasses of wine per week     History   Drug Use No       REVIEW OF SYSTEMS:   Constitutional, neuro, ENT, endocrine, pulmonary, cardiac, gastrointestinal, genitourinary, musculoskeletal, integument and psychiatric systems are negative, except as otherwise noted.    EXAM:   /78 (BP Location: Right arm, Patient Position: Sitting, Cuff Size: Adult Regular)   Pulse 84   Temp 98.8  F (37.1  C) (Oral)   Resp 16   Ht 1.555 m (5' 1.22\")   Wt 67.7 kg (149 lb 3.2 oz)   LMP  (LMP Unknown)   SpO2 98%   Breastfeeding? No   BMI 27.99 kg/m      GENERAL APPEARANCE: healthy, alert and no distress     EYES: EOMI, PERRL     HENT: ear canals and TM's normal and nose and mouth without ulcers or lesions     NECK: no adenopathy, no asymmetry, masses, or scars and " thyroid normal to palpation     RESP: lungs clear to auscultation - no rales, rhonchi or wheezes     CV: regular rates and rhythm, normal S1 S2, no S3 or S4 and no murmur, click or rub     ABDOMEN:  soft, nontender, no HSM or masses and bowel sounds normal     MS: extremities normal- no gross deformities noted, no evidence of inflammation in joints, FROM in all extremities.     SKIN: no suspicious lesions or rashes     NEURO: Normal strength and tone, sensory exam grossly normal, mentation intact and speech normal     PSYCH: mentation appears normal. and affect normal/bright     LYMPHATICS: No cervical adenopathy    DIAGNOSTICS:   No labs or EKG required for low risk surgery (cataract, skin procedure, breast biopsy, etc)    Recent Labs   Lab Test 11/06/18  1109 11/04/18  1149 10/17/17  0820 10/14/16  0815 05/22/16  1708   HGB 14.2 13.8  --   --  15.8*    219  --   --  271     --   --   --  141   POTASSIUM 4.2  --   --   --  4.1   CR 0.72  --   --  0.74 0.63   A1C  --   --  5.8 5.5 5.8        IMPRESSION:   Reason for surgery/procedure: Cataracts bilateral .    Proposed Surgery/ Procedure: Cataract removal, lens implant and simple astigmatism package.   Diagnosis/reason for consult: cardiac and anesthesia risk assessment     The proposed surgical procedure is considered LOW risk.    REVISED CARDIAC RISK INDEX  The patient has the following serious cardiovascular risks for perioperative complications such as (MI, PE, VFib and 3  AV Block):  No serious cardiac risks  INTERPRETATION: 0 risks: Class I (very low risk - 0.4% complication rate)    The patient has the following additional risks for perioperative complications:  No identified additional risks      ICD-10-CM    1. Preop general physical exam Z01.818 Approved for anesthesia and surgery   2. Age-related cataract of both eyes, unspecified age-related cataract type H25.9 Cataract surgery and lens implant   3. Bronchiectasis without complication (H)  J47.9 Stable mild cough   4. Coronary artery disease involving native heart with angina pectoris, unspecified vessel or lesion type (H) I25.119 stable   5. Mixed hyperlipidemia E78.2 Well controlled on medications        RECOMMENDATIONS:             APPROVAL GIVEN to proceed with proposed procedure, without further diagnostic evaluation       Signed Electronically by: Marry Gonzalez MD    Copy of this evaluation report is provided to requesting physician.    [facility] Preop Guidelines    Revised Cardiac Risk Index

## 2019-02-21 ENCOUNTER — TRANSFERRED RECORDS (OUTPATIENT)
Dept: HEALTH INFORMATION MANAGEMENT | Facility: CLINIC | Age: 69
End: 2019-02-21

## 2019-02-22 ENCOUNTER — OFFICE VISIT (OUTPATIENT)
Dept: OPTOMETRY | Facility: CLINIC | Age: 69
End: 2019-02-22
Payer: COMMERCIAL

## 2019-02-22 DIAGNOSIS — Z96.1 PSEUDOPHAKIA OF LEFT EYE: Primary | ICD-10-CM

## 2019-02-22 PROCEDURE — 99024 POSTOP FOLLOW-UP VISIT: CPT | Performed by: OPTOMETRIST

## 2019-02-22 ASSESSMENT — EXTERNAL EXAM - LEFT EYE: OS_EXAM: NORMAL

## 2019-02-22 ASSESSMENT — VISUAL ACUITY
OD_SC: FC
OS_SC: 20/40
OS_SC+: -2
METHOD: SNELLEN - LINEAR
OD_CC: 20/20

## 2019-02-22 ASSESSMENT — TONOMETRY
OS_IOP_MMHG: 27
IOP_METHOD: TONOPEN

## 2019-02-22 ASSESSMENT — SLIT LAMP EXAM - LIDS: COMMENTS: NORMAL

## 2019-02-22 NOTE — PATIENT INSTRUCTIONS
Continue with drops and scheduled follow up appointments.  Follow directions on your bottles as far as how many drops to use daily.    Wear shield while sleeping.       Please continue any glaucoma, dry eye, or other medications you were using prior to the surgery.        Mj Frederick, OD  [facility]  [address] [place] [address]  Tel- [phone]  [phone]

## 2019-02-22 NOTE — PROGRESS NOTES
CHIEF COMPLAINT:   Chief Complaint   Patient presents with     Cataract Follow-Up     1 day post op os eye       Type of surgery cataract -/FEMTO/TORIC IOL- 13.0 D SA6AT5    Date of surgery 2- os     Patient states woke up this am and could not see out of os eye,better now    Razia Baez, Optometric Assistant, A.B.O.C.     Drops reviewed.    OBJECTIVE:     See ophthalmology exam    ASSESSMENT:         ICD-10-CM    1. Pseudophakia of left eye Z96.1      PLAN:      Patient Instructions   Continue with drops and scheduled follow up appointments.  Follow directions on your bottles as far as how many drops to use daily.    Wear shield while sleeping.       Please continue any glaucoma, dry eye, or other medications you were using prior to the surgery.        Mj Frederick, OD  [facility]  [address] [place] [address]  Tel- [phone]  [phone]

## 2019-02-27 ENCOUNTER — OFFICE VISIT (OUTPATIENT)
Dept: OPTOMETRY | Facility: CLINIC | Age: 69
End: 2019-02-27
Payer: COMMERCIAL

## 2019-02-27 DIAGNOSIS — Z96.1 PSEUDOPHAKIA OF LEFT EYE: Primary | ICD-10-CM

## 2019-02-27 PROCEDURE — 99024 POSTOP FOLLOW-UP VISIT: CPT | Performed by: OPTOMETRIST

## 2019-02-27 ASSESSMENT — REFRACTION_WEARINGRX
OS_AXIS: 079
OD_SPHERE: -6.00
OD_CYLINDER: +2.75
OS_ADD: +2.75
OD_ADD: +2.75
OS_SPHERE: -7.50
OS_CYLINDER: +3.75
OD_AXIS: 090

## 2019-02-27 ASSESSMENT — TONOMETRY
OD_IOP_MMHG: 25
OS_IOP_MMHG: 21
IOP_METHOD: TONOPEN

## 2019-02-27 ASSESSMENT — VISUAL ACUITY
OD_SC: FC
OS_SC: 20/20
METHOD: SNELLEN - LINEAR

## 2019-02-27 ASSESSMENT — EXTERNAL EXAM - LEFT EYE: OS_EXAM: NORMAL

## 2019-02-27 ASSESSMENT — SLIT LAMP EXAM - LIDS: COMMENTS: NORMAL

## 2019-02-27 NOTE — PATIENT INSTRUCTIONS
Discontinue ofloxacin.  Continue prednisolone acetate and ketorolac for the full 21 days.    Ok to discontinue shield while sleeping.  All restrictions are lifted.    Start all 3 drops on surgical eye 2 days before surgery.    Keep follow up appointments as scheduled.       Please continue any glaucoma, dry eye, or other medications you were using prior to the surgery.        Mj Frederick, OD  [facility]  [address] [place] [address]  Tel- [phone]

## 2019-02-27 NOTE — PROGRESS NOTES
CHIEF COMPLAINT:   Chief Complaint   Patient presents with     Cataract Follow-Up   1 week post op cataract surgery left eye    Type of surgery cataract -/FEMTO/TORIC IOL- 13.0 D SA6AT5   Date of surgery 2- os     Vision distance is ok - cannot see close up.  Currently needs to take glasses off to read.     Drops reviewed. Last day for oflaxacin left eye drop.    OBJECTIVE:     See ophthalmology exam    ASSESSMENT:         ICD-10-CM    1. Pseudophakia of left eye Z96.1      PLAN:      Patient Instructions   Discontinue ofloxacin.  Continue prednisolone acetate and ketorolac for the full 21 days.    Ok to discontinue shield while sleeping.  All restrictions are lifted.    Start all 3 drops on surgical eye 2 days before surgery.    Keep follow up appointments as scheduled.       Please continue any glaucoma, dry eye, or other medications you were using prior to the surgery.        Mj Frederick, OD  [facility]  [address] [place] [address]  Tel- [phone]

## 2019-03-04 ENCOUNTER — OFFICE VISIT (OUTPATIENT)
Dept: FAMILY MEDICINE | Facility: CLINIC | Age: 69
End: 2019-03-04
Payer: COMMERCIAL

## 2019-03-04 VITALS
HEIGHT: 61 IN | OXYGEN SATURATION: 99 % | WEIGHT: 146 LBS | RESPIRATION RATE: 16 BRPM | HEART RATE: 74 BPM | DIASTOLIC BLOOD PRESSURE: 68 MMHG | TEMPERATURE: 97.7 F | BODY MASS INDEX: 27.56 KG/M2 | SYSTOLIC BLOOD PRESSURE: 100 MMHG

## 2019-03-04 DIAGNOSIS — R10.32 LLQ ABDOMINAL PAIN: Primary | ICD-10-CM

## 2019-03-04 LAB
ALBUMIN UR-MCNC: NEGATIVE MG/DL
APPEARANCE UR: CLEAR
BASOPHILS # BLD AUTO: 0 10E9/L (ref 0–0.2)
BASOPHILS NFR BLD AUTO: 0.3 %
BILIRUB UR QL STRIP: NEGATIVE
COLOR UR AUTO: YELLOW
DIFFERENTIAL METHOD BLD: NORMAL
EOSINOPHIL # BLD AUTO: 0.1 10E9/L (ref 0–0.7)
EOSINOPHIL NFR BLD AUTO: 1.5 %
ERYTHROCYTE [DISTWIDTH] IN BLOOD BY AUTOMATED COUNT: 12.4 % (ref 10–15)
GLUCOSE UR STRIP-MCNC: NEGATIVE MG/DL
HCT VFR BLD AUTO: 44.1 % (ref 35–47)
HGB BLD-MCNC: 15 G/DL (ref 11.7–15.7)
HGB UR QL STRIP: NEGATIVE
KETONES UR STRIP-MCNC: NEGATIVE MG/DL
LEUKOCYTE ESTERASE UR QL STRIP: NEGATIVE
LYMPHOCYTES # BLD AUTO: 1.7 10E9/L (ref 0.8–5.3)
LYMPHOCYTES NFR BLD AUTO: 26.1 %
MCH RBC QN AUTO: 32.7 PG (ref 26.5–33)
MCHC RBC AUTO-ENTMCNC: 34 G/DL (ref 31.5–36.5)
MCV RBC AUTO: 96 FL (ref 78–100)
MONOCYTES # BLD AUTO: 0.7 10E9/L (ref 0–1.3)
MONOCYTES NFR BLD AUTO: 9.9 %
NEUTROPHILS # BLD AUTO: 4.1 10E9/L (ref 1.6–8.3)
NEUTROPHILS NFR BLD AUTO: 62.2 %
NITRATE UR QL: NEGATIVE
PH UR STRIP: 6 PH (ref 5–7)
PLATELET # BLD AUTO: 229 10E9/L (ref 150–450)
RBC # BLD AUTO: 4.59 10E12/L (ref 3.8–5.2)
SOURCE: NORMAL
SP GR UR STRIP: <=1.005 (ref 1–1.03)
UROBILINOGEN UR STRIP-ACNC: 0.2 EU/DL (ref 0.2–1)
WBC # BLD AUTO: 6.6 10E9/L (ref 4–11)

## 2019-03-04 PROCEDURE — 99213 OFFICE O/P EST LOW 20 MIN: CPT | Performed by: NURSE PRACTITIONER

## 2019-03-04 PROCEDURE — 36415 COLL VENOUS BLD VENIPUNCTURE: CPT | Performed by: NURSE PRACTITIONER

## 2019-03-04 PROCEDURE — 81003 URINALYSIS AUTO W/O SCOPE: CPT | Performed by: NURSE PRACTITIONER

## 2019-03-04 PROCEDURE — 85025 COMPLETE CBC W/AUTO DIFF WBC: CPT | Performed by: NURSE PRACTITIONER

## 2019-03-04 ASSESSMENT — MIFFLIN-ST. JEOR: SCORE: 1128.12

## 2019-03-04 ASSESSMENT — PAIN SCALES - GENERAL: PAINLEVEL: MODERATE PAIN (4)

## 2019-03-04 NOTE — PATIENT INSTRUCTIONS
Labs pending - we will notify with results  If worsening symptoms, please let us know    At [facility], we strive to deliver an exceptional experience to you, every time we see you.  If you receive a survey in the mail, please send us back your thoughts. We really do value your feedback.    Based on your medical history, these are the current health maintenance/preventive care services that you are due for (some may have been done at this visit.)  Health Maintenance Due   Topic Date Due     ZOSTER IMMUNIZATION (2 of 3) 06/24/2013         Suggested websites for health information:  Www.[employer].org : Up to date and easily searchable information on multiple topics.  Www.Dealupa.gov : medication info, interactive tutorials, watch real surgeries online  Www.familydoctor.org : good info from the Academy of Family Physicians  Www.cdc.gov : public health info, travel advisories, epidemics (H1N1)  Www.aap.org : children's health info, normal development, vaccinations  Www.health.[facility].[place].us : [place] dept of health, public health issues in [place], N1N1    Your care team:                            Family Medicine Internal Medicine   MD Guillermo Faustin MD Shantel Branch-Fleming, MD Katya Georgiev PA-C Nam Ho, MD Pediatrics   OZ Boo, LISANDRA Guerra APRN CNP   MD Lindsay Torrez MD Deborah Mielke, MD Kim Thein, APRN CNP      Clinic hours: Monday - Thursday 7 am-7 pm; Fridays 7 am-5 pm.   Urgent care: Monday - Friday 11 am-9 pm; Saturday and Sunday 9 am-5 pm.  Pharmacy : Monday -Thursday 8 am-8 pm; Friday 8 am-6 pm; Saturday and Sunday 9 am-5 pm.     Clinic: [phone]   Pharmacy: [phone]

## 2019-03-05 NOTE — RESULT ENCOUNTER NOTE
Isabel,  Your lab results were normal. Please let us know if you have any questions.  Thank you for allowing us to participate in your care.  VIC Nayak CNP

## 2019-03-07 ENCOUNTER — TRANSFERRED RECORDS (OUTPATIENT)
Dept: HEALTH INFORMATION MANAGEMENT | Facility: CLINIC | Age: 69
End: 2019-03-07

## 2019-03-07 ENCOUNTER — MEDICAL CORRESPONDENCE (OUTPATIENT)
Dept: HEALTH INFORMATION MANAGEMENT | Facility: CLINIC | Age: 69
End: 2019-03-07

## 2019-03-08 ENCOUNTER — OFFICE VISIT (OUTPATIENT)
Dept: OPTOMETRY | Facility: CLINIC | Age: 69
End: 2019-03-08
Payer: COMMERCIAL

## 2019-03-08 DIAGNOSIS — Z96.1 PSEUDOPHAKIA OF RIGHT EYE: Primary | ICD-10-CM

## 2019-03-08 PROCEDURE — 99024 POSTOP FOLLOW-UP VISIT: CPT | Performed by: OPTOMETRIST

## 2019-03-08 ASSESSMENT — VISUAL ACUITY
METHOD: SNELLEN - LINEAR
OD_SC: 20/30
OS_SC+: -1
OS_SC: 20/20

## 2019-03-08 ASSESSMENT — TONOMETRY
OD_IOP_MMHG: 23
IOP_METHOD: TONOPEN

## 2019-03-08 ASSESSMENT — REFRACTION_WEARINGRX
OD_AXIS: 090
OS_ADD: +2.75
OD_SPHERE: -6.00
OS_SPHERE: -7.50
OS_AXIS: 079
OD_ADD: +2.75
OS_CYLINDER: +3.75
OD_CYLINDER: +2.75

## 2019-03-08 ASSESSMENT — EXTERNAL EXAM - RIGHT EYE: OD_EXAM: NORMAL

## 2019-03-08 ASSESSMENT — SLIT LAMP EXAM - LIDS: COMMENTS: NORMAL

## 2019-03-08 NOTE — PROGRESS NOTES
CHIEF COMPLAINT:   Chief Complaint   Patient presents with     Post-op Cataract     1 day right eye- FEMTO with toric IOL       Type of surgery  - cataract right eye -FEMTO with toric IOL  Date of surgery  3/7/2019           Drops reviewed.    OBJECTIVE:     See ophthalmology exam    ASSESSMENT:         ICD-10-CM    1. Pseudophakia of right eye Z96.1 POST-OP FOLLOW-UP VISIT     PLAN:      Patient Instructions   Continue with drops and scheduled follow up appointments.  Follow directions on your bottles as far as how many drops to use daily.    Wear shield while sleeping.       Please continue any glaucoma, dry eye, or other medications you were using prior to the surgery.        Mj Frederick, TIP  [facility]  [address] [place] [address]  Tel- [phone]  Yiv-[phone]

## 2019-03-08 NOTE — PATIENT INSTRUCTIONS
Continue with drops and scheduled follow up appointments.  Follow directions on your bottles as far as how many drops to use daily.    Wear shield while sleeping.       Please continue any glaucoma, dry eye, or other medications you were using prior to the surgery.        Mj Freedrick, OD  [facility]  [address] [place] [address]  Tel- [phone]  Seb-[phone]

## 2019-03-13 ENCOUNTER — OFFICE VISIT (OUTPATIENT)
Dept: OPTOMETRY | Facility: CLINIC | Age: 69
End: 2019-03-13
Payer: COMMERCIAL

## 2019-03-13 DIAGNOSIS — Z96.1 PSEUDOPHAKIA OF RIGHT EYE: Primary | ICD-10-CM

## 2019-03-13 PROCEDURE — 99024 POSTOP FOLLOW-UP VISIT: CPT | Performed by: OPTOMETRIST

## 2019-03-13 ASSESSMENT — REFRACTION_WEARINGRX
OS_CYLINDER: +3.75
OS_AXIS: 079
OD_CYLINDER: +2.75
OS_SPHERE: -7.50
OD_ADD: +2.75
OS_ADD: +2.75
OD_SPHERE: -6.00
OD_AXIS: 090

## 2019-03-13 ASSESSMENT — VISUAL ACUITY
OS_SC: 20/25
OS_SC+: -2
OD_SC: 20/20
METHOD: SNELLEN - LINEAR

## 2019-03-13 ASSESSMENT — EXTERNAL EXAM - RIGHT EYE: OD_EXAM: NORMAL

## 2019-03-13 ASSESSMENT — TONOMETRY
IOP_METHOD: TONOPEN
OD_IOP_MMHG: 24

## 2019-03-13 ASSESSMENT — SLIT LAMP EXAM - LIDS: COMMENTS: NORMAL

## 2019-03-13 NOTE — PATIENT INSTRUCTIONS
Discontinue ofloxacin.  Continue prednisolone acetate and ketorolac for the full 21 days.    Ok to discontinue shield while sleeping.  All restrictions are lifted.    Keep follow up appointments as scheduled.    Ok to use Refresh or Blink right eye as needed.       Please continue any glaucoma, dry eye, or other medications you were using prior to the surgery.        Mj Frederick, OD  [facility]  [address] [place] [address]  Tel- [phone]

## 2019-03-13 NOTE — PROGRESS NOTES
CHIEF COMPLAINT:   Chief Complaint   Patient presents with     Cataract Follow-Up       Type of surgery cataract   Date of surgery 3-7-2019 od eye                            2- os eye    Right eye feels a little irritated.    Razia Baez, Optometric Assistant, A.B.O.C.     Drops reviewed.    OBJECTIVE:     See ophthalmology exam    ASSESSMENT:         ICD-10-CM    1. Pseudophakia of right eye Z96.1 POST-OP FOLLOW-UP VISIT     PLAN:      Patient Instructions   Discontinue ofloxacin.  Continue prednisolone acetate and ketorolac for the full 21 days.    Ok to discontinue shield while sleeping.  All restrictions are lifted.    Keep follow up appointments as scheduled.    Ok to use Refresh or Blink right eye as needed.       Please continue any glaucoma, dry eye, or other medications you were using prior to the surgery.        Mj Frederick, OD  [facility]  [address] [place] [address]  Tel- [phone]

## 2019-04-03 ENCOUNTER — OFFICE VISIT (OUTPATIENT)
Dept: FAMILY MEDICINE | Facility: CLINIC | Age: 69
End: 2019-04-03
Payer: COMMERCIAL

## 2019-04-03 VITALS
RESPIRATION RATE: 16 BRPM | TEMPERATURE: 98.5 F | OXYGEN SATURATION: 94 % | SYSTOLIC BLOOD PRESSURE: 129 MMHG | DIASTOLIC BLOOD PRESSURE: 78 MMHG | HEIGHT: 61 IN | HEART RATE: 86 BPM | BODY MASS INDEX: 27.59 KG/M2

## 2019-04-03 DIAGNOSIS — R30.0 DYSURIA: ICD-10-CM

## 2019-04-03 DIAGNOSIS — A49.9 UTI (URINARY TRACT INFECTION), BACTERIAL: ICD-10-CM

## 2019-04-03 DIAGNOSIS — N94.9 VAGINAL SYMPTOM: Primary | ICD-10-CM

## 2019-04-03 DIAGNOSIS — R82.90 NONSPECIFIC FINDING ON EXAMINATION OF URINE: ICD-10-CM

## 2019-04-03 DIAGNOSIS — N39.0 UTI (URINARY TRACT INFECTION), BACTERIAL: ICD-10-CM

## 2019-04-03 LAB
ALBUMIN UR-MCNC: NEGATIVE MG/DL
APPEARANCE UR: ABNORMAL
BACTERIA #/AREA URNS HPF: ABNORMAL /HPF
BILIRUB UR QL STRIP: NEGATIVE
COLOR UR AUTO: YELLOW
GLUCOSE UR STRIP-MCNC: NEGATIVE MG/DL
HGB UR QL STRIP: ABNORMAL
KETONES UR STRIP-MCNC: NEGATIVE MG/DL
LEUKOCYTE ESTERASE UR QL STRIP: ABNORMAL
NITRATE UR QL: NEGATIVE
PH UR STRIP: 7.5 PH (ref 5–7)
RBC #/AREA URNS AUTO: ABNORMAL /HPF
SOURCE: ABNORMAL
SP GR UR STRIP: <=1.005 (ref 1–1.03)
SPECIMEN SOURCE: NORMAL
UROBILINOGEN UR STRIP-ACNC: 0.2 EU/DL (ref 0.2–1)
WBC #/AREA URNS AUTO: ABNORMAL /HPF
WET PREP SPEC: NORMAL

## 2019-04-03 PROCEDURE — 87088 URINE BACTERIA CULTURE: CPT | Performed by: PHYSICIAN ASSISTANT

## 2019-04-03 PROCEDURE — 87086 URINE CULTURE/COLONY COUNT: CPT | Performed by: PHYSICIAN ASSISTANT

## 2019-04-03 PROCEDURE — 87186 SC STD MICRODIL/AGAR DIL: CPT | Performed by: PHYSICIAN ASSISTANT

## 2019-04-03 PROCEDURE — 99213 OFFICE O/P EST LOW 20 MIN: CPT | Performed by: PHYSICIAN ASSISTANT

## 2019-04-03 PROCEDURE — 87210 SMEAR WET MOUNT SALINE/INK: CPT | Performed by: PHYSICIAN ASSISTANT

## 2019-04-03 PROCEDURE — 81001 URINALYSIS AUTO W/SCOPE: CPT | Performed by: PHYSICIAN ASSISTANT

## 2019-04-03 RX ORDER — PHENAZOPYRIDINE HYDROCHLORIDE 200 MG/1
200 TABLET, FILM COATED ORAL 3 TIMES DAILY PRN
Qty: 6 TABLET | Refills: 0 | Status: SHIPPED | OUTPATIENT
Start: 2019-04-03 | End: 2019-07-12

## 2019-04-03 RX ORDER — SULFAMETHOXAZOLE/TRIMETHOPRIM 800-160 MG
1 TABLET ORAL 2 TIMES DAILY
Qty: 14 TABLET | Refills: 0 | Status: SHIPPED | OUTPATIENT
Start: 2019-04-03 | End: 2019-04-04

## 2019-04-03 RX ORDER — SULFAMETHOXAZOLE/TRIMETHOPRIM 800-160 MG
1 TABLET ORAL 2 TIMES DAILY
Qty: 14 TABLET | Refills: 0 | Status: SHIPPED | OUTPATIENT
Start: 2019-04-03 | End: 2019-04-03

## 2019-04-03 RX ORDER — PHENAZOPYRIDINE HYDROCHLORIDE 200 MG/1
200 TABLET, FILM COATED ORAL 3 TIMES DAILY PRN
Qty: 6 TABLET | Refills: 0 | Status: SHIPPED | OUTPATIENT
Start: 2019-04-03 | End: 2019-04-03

## 2019-04-03 ASSESSMENT — PAIN SCALES - GENERAL: PAINLEVEL: NO PAIN (0)

## 2019-04-03 NOTE — PROGRESS NOTES
SUBJECTIVE:   Isabel Carroll is a 68 year old female who presents to clinic today for the following health issues:    URINARY TRACT SYMPTOMS  Onset: Since last night     Description:   Painful urination (Dysuria): YES  Blood in urine (Hematuria): no   Delay in urine (Hesitency): YES    Intensity: moderate    Progression of Symptoms:  constant    Accompanying Signs & Symptoms:  Fever/chills: no   Flank pain no   Nausea and vomiting: no   Any vaginal symptoms: none  Abdominal/Pelvic Pain: no     History:   History of frequent UTI's: no   History of kidney stones: no   Sexually Active: no   Possibility of pregnancy: No    Precipitating factors:   none    Therapies Tried and outcome: Hot bath and Cranberry juice              Allergies   Allergen Reactions     Codeine Phosphate Nausea and Vomiting     Pentazocine      Propoxyphene      Darvon     Penicillins Rash     rash and itching         Patient Active Problem List   Diagnosis     Overweight     Chronic rhinitis     Primary osteoarthritis of both hands     Basal cell carcinoma of skin     Coronary artery disease involving native heart with angina pectoris, unspecified vessel or lesion type (H)     Hyperlipidemia     Impaired fasting glucose     Chorioretinal scar of left eye     Sinus tachycardia     Advance care planning     Pes planus     Right shoulder strain, initial encounter     Bronchiectasis without complication (H)     Chronic cough     Female pattern hair loss     FHx: colon cancer     Clostridium difficile infection     Pseudophakia of right eye       Past Medical History:   Diagnosis Date     Arthritis      Basal cell carcinoma of skin 4/7/2011     Benign neoplasm of colon 10/26/2011    Overview:  Colonoscopy every 5 years  Oct 2011      Bronchiectasis without complication (H) 12/15/2016    CT chest done 2016 through COPDgene study, formal report indicates cylindrical bronchiectasis. Reviewed CT with [facility] radiologists; bronchiectasis is likely  present but of unclear clinical significance, and was likely present on CT from 2010 as well (different scanner and different technique).     Chorioretinal scar of left eye 12/2/2015     Chronic rhinitis 7/28/2015     Coronary artery disease      Coronary atherosclerosis due to calcified coronary lesion 12/22/2011    Overview:  A)  CT Chest-Date: 07/13/10, There is coronary artery and mild aortic calcification present. B)  Nuclear Stress Test on 10/01/10, No scintigraphic evidence of ischemia or infarction. EF: 71%.       Elevated blood pressure (not hypertension) 9/19/2006     Hypercholesteremia      Hyperlipidemia 12/22/2011    Overview:  See results review for lipid profile.      Impaired fasting glucose 9/18/2006     Nonsenile cataract      PONV (postoperative nausea and vomiting)      Primary osteoarthritis of both hands 7/28/2015     Shingles 2009     Sinus tachycardia 7/21/2016     Skin cancer 2013, 2015    Basal cell cancer     Vitamin D deficiency 9/16/2010         Current Outpatient Medications on File Prior to Visit:  aspirin 81 MG tablet Take 1 tablet by mouth daily.   atorvastatin (LIPITOR) 40 MG tablet 40 mg   Calcium-Vitamin D (CALCIUM + D PO) Take  by mouth.   diclofenac (VOLTAREN) 1 % GEL 2 grams to hands four times daily using enclosed dosing card.   FISH OIL    ketorolac tromethamine (ACULAR-LS) 0.4 % SOLN ophthalmic solution    MAGNESIUM OXIDE PO Take 200 mg by mouth daily   metoprolol (TOPROL-XL) 25 MG 24 hr tablet Take 1 tablet (25 mg) by mouth daily   Multiple Vitamins-Minerals (MULTIVITAMIN & MINERAL PO) Take  by mouth.   ofloxacin (OCUFLOX) 0.3 % ophthalmic solution    prednisoLONE acetate (PRED FORTE) 1 % ophthalmic suspension    Pseudoephedrine HCl (SUDAFED 12 HOUR PO) Take  by mouth.     No current facility-administered medications on file prior to visit.     Social History     Tobacco Use     Smoking status: Former Smoker     Years: 16.00     Types: Cigarettes     Last attempt to quit:  "1982     Years since quittin.2     Smokeless tobacco: Never Used   Substance Use Topics     Alcohol use: Yes     Alcohol/week: 0.0 oz     Comment: 2-7 glasses of wine per week     Drug use: No       ROS:  General: negative for fever  ABD: Denies abd pain  : as above    OBJECTIVE:  /78 (BP Location: Left arm, Patient Position: Chair, Cuff Size: Adult Regular)   Pulse 86   Temp 98.5  F (36.9  C) (Oral)   Resp 16   Ht 1.549 m (5' 1\")   LMP  (LMP Unknown)   SpO2 94%   BMI 27.59 kg/m     General:   awake, alert, and cooperative.  NAD.   Head: Normocephalic, atraumatic.  Eyes: Conjunctiva clear, non icteric.   ABD: soft, no tenderness to palpation , no rigidity, guarding or rebound . No CVAT  Neuro: Alert and oriented - normal speech.     UA c/w UTI  Wet prep WNL    ASSESSMENT:  Lower, uncomplicated urinary tract infection. Benign exam.      ICD-10-CM    1. Vaginal symptom N94.9 Wet prep     Urine Microscopic   2. UTI (urinary tract infection), bacterial N39.0 phenazopyridine (PYRIDIUM) 200 MG tablet    A49.9 sulfamethoxazole-trimethoprim (BACTRIM DS) 800-160 MG tablet     DISCONTINUED: sulfamethoxazole-trimethoprim (BACTRIM DS) 800-160 MG tablet     DISCONTINUED: phenazopyridine (PYRIDIUM) 200 MG tablet   3. Dysuria R30.0 *UA reflex to Microscopic and Culture ([place] [facility] (except [place] and [place])   4. Nonspecific finding on examination of urine R82.90 Urine Culture Aerobic Bacterial           PLAN: Patient did not complete wet prep properly, but will tx the UTI first and if still having symptoms will reorder it.    As per ordered above.  Drink plenty of fluids.  Prevention and treatment of UTI's discussed. Follow up with primary care physician if not improving.  Advised about symptoms which might herald more serious problems.                 "

## 2019-04-03 NOTE — PATIENT INSTRUCTIONS
At [facility], we strive to deliver an exceptional experience to you, every time we see you.  If you receive a survey in the mail, please send us back your thoughts. We really do value your feedback.    Based on your medical history, these are the current health maintenance/preventive care services that you are due for (some may have been done at this visit.)  Health Maintenance Due   Topic Date Due     ZOSTER IMMUNIZATION (2 of 3) 06/24/2013         Suggested websites for health information:  Www.[employer].org : Up to date and easily searchable information on multiple topics.  Www.[employer].gov : medication info, interactive tutorials, watch real surgeries online  Www.familydoctor.org : good info from the Academy of Family Physicians  Www.cdc.gov : public health info, travel advisories, epidemics (H1N1)  Www.aap.org : children's health info, normal development, vaccinations  Www.health.[facility].mn.us : [place] dept of health, public health issues in [place], N1N1    Your care team:                            Family Medicine Internal Medicine   MD Guillermo Faustin MD Shantel Branch-Fleming, MD Katya Georgiev PA-C Nam Ho, MD Pediatrics   Alonso Alex, PAESTRELLA Cotton, CNP Antionette Guerra APRN CNP   MD Lindsay Torrez MD Deborah Mielke, MD Kim Thein, APRN [facility]      Clinic hours: Monday - Thursday 7 am-7 pm; Fridays 7 am-5 pm.   Urgent care: Monday - Friday 11 am-9 pm; Saturday and Sunday 9 am-5 pm.  Pharmacy : Monday -Thursday 8 am-8 pm; Friday 8 am-6 pm; Saturday and Sunday 9 am-5 pm.     Clinic: [phone]   Pharmacy: [phone]    Return to clinic or Emergency Department for fevers, increasing pain, abdominal pain, vomiting or any other changes in condition.      Understanding Urinary Tract Infections (UTIs)  Most UTIs are caused by bacteria, although they may also be caused by viruses or fungi. Bacteria from the bowel are the most common source of  "infection. The infection may begin because of any of the following:  Sexual activity. During sex, germs can travel from the penis, vagina, or rectum into the urethra.   Germs on the skin outside the rectum may travel into the urethra. This is more common in women since the rectum and urethra are closer to each other than in men. Wiping from front to back after using the toilet and keeping the area clean can help prevent germs from getting to the urethra.  Blockage of urine flow through the urinary tract. If urine sits too long, germs may begin to grow out of control      Parts of the Urinary Tract  The infection can occur in any part of the urinary tract.  The kidneys collect and store urine.  The ureters carry urine from the kidneys to the bladder.  The bladder holds urine until you are ready to let it out.  The urethra carries urine from the bladder out of the body. It is shorter in women, so bacteria can move through it more easily. The urethra is longer in men, so a UTI is less likely to reach the bladder or kidneys in men.    A bladder infection (\"cystitis\" or \"UTI\") usually causes a constant urge to urinate and a burning when passing urine. Urine may be cloudy, smelly or dark. There may be pain in the lower abdomen. A bladder infection occurs when bacteria from the vaginal area enter the bladder opening (urethra). This can occur from sexual intercourse, wearing tight clothing, dehydration and other factors.  HOME CARE:  1. Drink lots of fluids (at least 6-8 glasses a day, unless you must restrict fluids for other medical reasons). This will force the medicine into your urinary system and flush the bacteria out of your body. Cranberry juice has been shown to help clear out the bacteria.  2. Avoid sexual intercourse until your symptoms are gone.  3. A bladder infection is treated with antibiotics. You may also be given Pyridium (generic = phenazopyridine) to reduce the burning sensation. This medicine will cause " your urine to become a bright orange color. The orange urine may stain clothing. You may wear a pad or panty-liner to protect clothing.  PREVENTING FUTURE INFECTIONS:  1. Always wipe from front to back after a bowel movement.  2. Keep the genital area clean and dry.  3. Drink plenty of fluids each day to avoid dehydration.  4. Urinate right after intercourse to flush out the bladder.  5. Wear cotton underwear and cotton-lined panty hose; avoid tight-fitting pants.  6. If you are on birth control pills and are having frequent bladder infections, discuss with your doctor.  FOLLOW UP: Return to this facility or see your doctor if ALL symptoms are not gone after three days of treatment.  GET PROMPT MEDICAL ATTENTION if any of the following occur:    Fever over 101 F (38.3 C)    No improvement by the third day of treatment    Increasing back or abdominal pain    Repeated vomiting; unable to keep medicine down    Weakness, dizziness or fainting    Vaginal discharge    Pain, redness or swelling in the labia (outer vaginal area)    8031-4096 [employer], [address] [place] [address]. All rights reserved. This information is not intended as a substitute for professional medical care. Always follow your healthcare professional's       3480-1299 [employer], [address] [place] [address]. All rights reserved. This information is not intended as a substitute for professional medical care. Always follow your healthcare professional's instructions.

## 2019-04-04 ENCOUNTER — TELEPHONE (OUTPATIENT)
Dept: FAMILY MEDICINE | Facility: CLINIC | Age: 69
End: 2019-04-04

## 2019-04-04 DIAGNOSIS — R11.0 NAUSEA: Primary | ICD-10-CM

## 2019-04-04 DIAGNOSIS — A49.9 UTI (URINARY TRACT INFECTION), BACTERIAL: ICD-10-CM

## 2019-04-04 DIAGNOSIS — N39.0 UTI (URINARY TRACT INFECTION), BACTERIAL: ICD-10-CM

## 2019-04-04 RX ORDER — NITROFURANTOIN 25; 75 MG/1; MG/1
100 CAPSULE ORAL 2 TIMES DAILY
Qty: 14 CAPSULE | Refills: 0 | Status: SHIPPED | OUTPATIENT
Start: 2019-04-04 | End: 2019-07-12

## 2019-04-04 RX ORDER — ONDANSETRON 4 MG/1
4 TABLET, FILM COATED ORAL EVERY 6 HOURS PRN
Qty: 20 TABLET | Refills: 1 | Status: SHIPPED | OUTPATIENT
Start: 2019-04-04 | End: 2019-07-12

## 2019-04-04 NOTE — TELEPHONE ENCOUNTER
Isabel  returned call    Best number to reach caller: Home number on file [phone] (home)    Is it ok to leave a detailed message: YES

## 2019-04-04 NOTE — TELEPHONE ENCOUNTER
Please advise on Rx request for antinausea medication per patient message below. Patient saw you for OV yesterday. Pharmacy is pended.    Nena Ernst RN

## 2019-04-04 NOTE — TELEPHONE ENCOUNTER
Will switch to nitrofurantoin. Stop bactrim. Zofran as needed for nausea. rxs sent to pharmacy.    Joey Tijerina MD

## 2019-04-04 NOTE — TELEPHONE ENCOUNTER
Spoke with patient. Having problems with nausea and vomiting. Started this morning after taking 3rd dose of Bactrim, has sensitivity to sulfa based drugs. Pharmacist advised that her nausea and vomiting is likely due to this medication. Patient is wondering if can have a prescription for antinausea medication sent to [facility] and will  before closing. Feeling nauseous all the time. Can't eat anything.  Only provider left in clinic at this time is Dr. Tijerina. RN will huddle with provider to see if agrees to sending in antinausea medication.   Should patient continue on Bactim? She is taking with food.    Nasrin Sotelo RN

## 2019-04-04 NOTE — TELEPHONE ENCOUNTER
Reason for Call:  Other prescription    Detailed comments: says the Sulfazyme medication that was prescribed 04/03 by Alonso Isai is making her nauseas. She also picked some AZO and per the pharmacist the nauseas is a side effect of the prescribed medication and was told to ask for an anti nausea medication.  Say's it is really bad she cannot eat anything. Please send something to our [facility] and call to tell her. Thank you     Phone Number Patient can be reached at: Home number on file [phone] (home)    Best Time: Any    Can we leave a detailed message on this number? YES    Call taken on 4/4/2019 at 4:14 PM by Norma Fernandez

## 2019-04-05 NOTE — TELEPHONE ENCOUNTER
Called and spoke with patient, informed of change to nitrofurantoin and to discontinue bactrim, per Dr. Tijerina. Tequila also sent in for nausea. Patient to contact office with any further symptoms or worsening of condition. Patient agreed and understanding. No further questions at this time.    Nasrin Sotelo RN

## 2019-04-06 LAB
BACTERIA SPEC CULT: ABNORMAL
BACTERIA SPEC CULT: ABNORMAL
SPECIMEN SOURCE: ABNORMAL

## 2019-04-10 ENCOUNTER — OFFICE VISIT (OUTPATIENT)
Dept: OPTOMETRY | Facility: CLINIC | Age: 69
End: 2019-04-10
Payer: COMMERCIAL

## 2019-04-10 DIAGNOSIS — Z96.1 PSEUDOPHAKIA OF BOTH EYES: Primary | ICD-10-CM

## 2019-04-10 PROCEDURE — 99024 POSTOP FOLLOW-UP VISIT: CPT | Performed by: OPTOMETRIST

## 2019-04-10 ASSESSMENT — SLIT LAMP EXAM - LIDS
COMMENTS: NORMAL
COMMENTS: NORMAL

## 2019-04-10 ASSESSMENT — VISUAL ACUITY
OS_SC+: -1
OD_SC: 20/20
OS_SC: 20/20
METHOD: SNELLEN - LINEAR

## 2019-04-10 ASSESSMENT — CUP TO DISC RATIO
OS_RATIO: 0.3
OD_RATIO: 0.3

## 2019-04-10 ASSESSMENT — TONOMETRY
IOP_METHOD: TONOPEN
OS_IOP_MMHG: 21
OD_IOP_MMHG: 22

## 2019-04-10 ASSESSMENT — REFRACTION_WEARINGRX
OD_ADD: +2.75
OS_AXIS: 079
OS_ADD: +2.75
OS_SPHERE: -7.50
OS_CYLINDER: +3.75
OD_AXIS: 090
OD_SPHERE: -6.00
OD_CYLINDER: +2.75

## 2019-04-10 ASSESSMENT — REFRACTION_MANIFEST
OD_ADD: +2.50
OD_CYLINDER: +0.25
OS_SPHERE: -0.25
OD_AXIS: 050
OD_SPHERE: -0.25
OS_ADD: +2.50

## 2019-04-10 ASSESSMENT — EXTERNAL EXAM - LEFT EYE: OS_EXAM: NORMAL

## 2019-04-10 ASSESSMENT — EXTERNAL EXAM - RIGHT EYE: OD_EXAM: NORMAL

## 2019-04-10 NOTE — PATIENT INSTRUCTIONS
Recommend new glasses.    Return in 1 year for a complete eye exam or sooner if needed.    Mj Frederick, OD

## 2019-04-10 NOTE — PROGRESS NOTES
CHIEF COMPLAINT:   Chief Complaint   Patient presents with     Cataract Follow-Up       Type of surgery cataract   Date of surgery 3-7-2019 od eye                            2- os eye    Razia Baez, Optometric Assistant, A.B.O.C.     Drops reviewed.    OBJECTIVE:     See ophthalmology exam    ASSESSMENT:         ICD-10-CM    1. Pseudophakia of both eyes Z96.1 POST-OP FOLLOW-UP VISIT     PLAN:      Patient Instructions   Recommend new glasses.    Return in 1 year for a complete eye exam or sooner if needed.    Mj Frederick, OD

## 2019-05-22 ENCOUNTER — OFFICE VISIT (OUTPATIENT)
Dept: FAMILY MEDICINE | Facility: CLINIC | Age: 69
End: 2019-05-22
Payer: COMMERCIAL

## 2019-05-22 VITALS
HEART RATE: 71 BPM | TEMPERATURE: 98.3 F | WEIGHT: 148.8 LBS | RESPIRATION RATE: 18 BRPM | SYSTOLIC BLOOD PRESSURE: 122 MMHG | HEIGHT: 61 IN | OXYGEN SATURATION: 98 % | BODY MASS INDEX: 28.09 KG/M2 | DIASTOLIC BLOOD PRESSURE: 78 MMHG

## 2019-05-22 DIAGNOSIS — J06.9 UPPER RESPIRATORY TRACT INFECTION, UNSPECIFIED TYPE: Primary | ICD-10-CM

## 2019-05-22 DIAGNOSIS — H10.31 ACUTE CONJUNCTIVITIS OF RIGHT EYE, UNSPECIFIED ACUTE CONJUNCTIVITIS TYPE: ICD-10-CM

## 2019-05-22 DIAGNOSIS — R05.9 COUGH: ICD-10-CM

## 2019-05-22 LAB
DEPRECATED S PYO AG THROAT QL EIA: NORMAL
SPECIMEN SOURCE: NORMAL

## 2019-05-22 PROCEDURE — 99213 OFFICE O/P EST LOW 20 MIN: CPT | Performed by: PHYSICIAN ASSISTANT

## 2019-05-22 PROCEDURE — 87880 STREP A ASSAY W/OPTIC: CPT | Performed by: PHYSICIAN ASSISTANT

## 2019-05-22 PROCEDURE — 87081 CULTURE SCREEN ONLY: CPT | Performed by: PHYSICIAN ASSISTANT

## 2019-05-22 RX ORDER — CODEINE PHOSPHATE AND GUAIFENESIN 10; 100 MG/5ML; MG/5ML
0.5 SOLUTION ORAL EVERY 4 HOURS PRN
Qty: 180 ML | Refills: 0 | Status: SHIPPED | OUTPATIENT
Start: 2019-05-22 | End: 2019-07-12

## 2019-05-22 RX ORDER — POLYMYXIN B SULFATE AND TRIMETHOPRIM 1; 10000 MG/ML; [USP'U]/ML
1-2 SOLUTION OPHTHALMIC EVERY 4 HOURS
Qty: 1 BOTTLE | Refills: 0 | Status: SHIPPED | OUTPATIENT
Start: 2019-05-22 | End: 2019-07-12

## 2019-05-22 ASSESSMENT — PAIN SCALES - GENERAL: PAINLEVEL: EXTREME PAIN (8)

## 2019-05-22 ASSESSMENT — MIFFLIN-ST. JEOR: SCORE: 1137.33

## 2019-05-22 NOTE — PATIENT INSTRUCTIONS
At [facility], we strive to deliver an exceptional experience to you, every time we see you.  If you receive a survey in the mail, please send us back your thoughts. We really do value your feedback.    Your care team:                            Family Medicine Internal Medicine   MD Guillermo Faustin MD Shantel Branch-Fleming, MD Katya Georgiev PA-C Megan Hill, APRN CNP    Joey Tijerina, MD Pediatrics   Alonso Alex, OZ Cotton, CNP MD Antionette Mcmahon APRN CNP   MD Lindsay Torrez MD Deborah Mielke, MD Naida Collins, APRN [facility]      Clinic hours: Monday - Thursday 7 am-7 pm; Fridays 7 am-5 pm.   Urgent care: Monday - Friday 11 am-9 pm; Saturday and Sunday 9 am-5 pm.  Pharmacy : Monday -Thursday 8 am-8 pm; Friday 8 am-6 pm; Saturday and Sunday 9 am-5 pm.     Clinic: [phone]   Pharmacy: [phone]    Try over the counter antihistamine drops (eg.ketotifen) and artificial tears and if not improving or significant eye discharge occurs, start the antibiotic drops.      Return to clinic or Emergency Department for fevers, vision loss, or worsening symptoms.    Conjunctivitis Caused by Infection  Infections are caused by viruses or bacteria. Treatment includes keeping your eyes and hands clean. Your doctor may prescribe eyedrops, and tell you to stay home from work or school if you re contagious. Untreated infections can be serious, so it s important that a doctor diagnoses you    Viral Infections  A cold, flu, or other virus can spread to the eyes. This causes a watery discharge. The eyes may burn or itch and get red. The eyelids may also be puffy and sore.  Treating the infections. Most viral infections go away on their own. Artificial tears, over the counter antihistamine eye drops, and warm compresses can relieve symptoms. Your doctor may also prescribe eyedrops. A viral infection can be very contagious and spreads quickly. To prevent  this, wash your hands often. Use a separate tissue to wipe each eye. Don t touch your eyes or share bedding or towels.  Bacterial Infections  Bacterial infections often occur in one eye. There may be a watery or a thick discharge from the eye. These infections can cause serious damage to the eye if not treated promptly.  Treating the infections. Your doctor may prescribe eyedrops or ointment to kill the bacteria. Warm compresses can help keep the eyelids clean. To keep the bacteria from spreading, wash your hands often. Use a separate tissue to wipe each eye. Don t touch your eyes or share bedding or towels.    [id number] [facility], [address] [place] [address], [place] [address]. All rights reserved. This information is not intended as a substitute for professional medical care. Always follow your healthcare professional's instructions.    Trial over the counter symptomatic relief, rest, and drink plenty of fluids. Salt water gargles and nasal lavage may also be helpful.  Return to clinic or Emergency Department for breathing/swallowing problems, fever >105, altered mental status, or worsening general condition.      Viral Respiratory Illness:  You have an Upper Respiratory Illness (URI) caused by a virus. This illness is contagious during the first few days. It is spread through the air by coughing and sneezing or by direct contact (touching the sick person and then touching your own eyes, nose or mouth). Most viral illnesses go away within 7-10 days with rest and simple home remedies. Sometimes, the illness may last for several weeks. Antibiotics will not kill a virus and are generally not prescribed for this condition.  Home Care:  1) If symptoms are severe, rest at home for the first 2-3 days. When you resume activity, don't let yourself get too tired.  2) Avoid being exposed to cigarette smoke (yours or others ).  3) Tylenol (acetaminophen) or ibuprofen (Advil, Motrin) will help fever, muscle aching and  headache. (Persons under 18 with fever should not take aspirin since this may cause liver damage.)  4) Your appetite may be poor, so a light diet is fine. Avoid dehydration by drinking 6-8 glasses of fluids per day (water, soft, drinks, juices, tea, soup, etc.). Extra fluids will help loosen secretions in the nose and lungs.  5) Over-the-counter cold medicines will not shorten the length of time you re sick, but they may be helpful for the following symptoms: cough (Robitussin DM); sore throat (Chloraseptic lozenges or spray); nasal and sinus congestion (Actifed, Sudafed, Chlortrimeton).  Follow Up  with your doctor or as advised if you don t improve over the next week.  Get Prompt Medical Attention  if any of the following occur:  -- Cough with lots of colored sputum (mucus) or blood in your sputum  -- Chest pain, shortness of breath, wheezing or have trouble breathing  -- Severe headache; face, neck or ear pain  -- Fever over 100.4  F (38.0  C) for more than three days  -- You can t swallow due to throat pain    [id number] [facility], [address] [place] [address], [place] [address]. All rights reserved. This information is not intended as a substitute for professional medical care. Always follow your healthcare professional's instructions.

## 2019-05-22 NOTE — PROGRESS NOTES
Subjective     Isabel Carroll is a 68 year old female who presents to clinic today for the following health issues:    HPI   Acute Illness   Acute illness concerns: Sore throat, URI, Possible pink eye  Onset: x4 days    Fever: no     Chills/Sweats: YES- Chills    Headache (location?): no     Sinus Pressure:no    Conjunctivitis:  YES: right    Ear Pain: YES: bilateral    Rhinorrhea: no     Congestion: YES    Sore Throat: YES     Cough: YES-non-productive    Wheeze: no     Decreased Appetite: no     Nausea: no     Vomiting: no     Diarrhea:  no     Dysuria/Freq.: no     Fatigue/Achiness: YES    Sick/Strep Exposure: YES- parish  and other members were sick.      Therapies Tried and outcome: OTC Delsym, little relief. OTC fever reducer       Was in [place] and ivan 5/6 to 5/20.    Can tolerate low dose codiene           Allergies   Allergen Reactions     Sulfa Drugs Nausea     Codeine Phosphate Nausea and Vomiting     Pentazocine      Propoxyphene      Darvon     Penicillins Rash     rash and itching         Patient Active Problem List   Diagnosis     Overweight     Chronic rhinitis     Primary osteoarthritis of both hands     Basal cell carcinoma of skin     Coronary artery disease involving native heart with angina pectoris, unspecified vessel or lesion type (H)     Hyperlipidemia     Impaired fasting glucose     Chorioretinal scar of left eye     Sinus tachycardia     Advance care planning     Pes planus     Right shoulder strain, initial encounter     Bronchiectasis without complication (H)     Chronic cough     Female pattern hair loss     FHx: colon cancer     Clostridium difficile infection     Pseudophakia of right eye     Pseudophakia of both eyes       Past Medical History:   Diagnosis Date     Arthritis      Basal cell carcinoma of skin 4/7/2011     Benign neoplasm of colon 10/26/2011    Overview:  Colonoscopy every 5 years  Oct 2011      Bronchiectasis without complication (H) 12/15/2016    CT  chest done  through COPDgene study, formal report indicates cylindrical bronchiectasis. Reviewed CT with [facility] radiologists; bronchiectasis is likely present but of unclear clinical significance, and was likely present on CT from  as well (different scanner and different technique).     Chorioretinal scar of left eye 2015     Chronic rhinitis 2015     Coronary artery disease      Coronary atherosclerosis due to calcified coronary lesion 2011    Overview:  A)  CT Chest-Date: 07/13/10, There is coronary artery and mild aortic calcification present. B)  Nuclear Stress Test on 10/01/10, No scintigraphic evidence of ischemia or infarction. EF: 71%.       Elevated blood pressure (not hypertension) 2006     Hypercholesteremia      Hyperlipidemia 2011    Overview:  See results review for lipid profile.      Impaired fasting glucose 2006     Nonsenile cataract      PONV (postoperative nausea and vomiting)      Primary osteoarthritis of both hands 2015     Shingles 2009     Sinus tachycardia 2016     Skin cancer ,     Basal cell cancer     Vitamin D deficiency 2010         Current Outpatient Medications on File Prior to Visit:  aspirin 81 MG tablet Take 1 tablet by mouth daily.   atorvastatin (LIPITOR) 40 MG tablet 40 mg   Calcium-Vitamin D (CALCIUM + D PO) Take  by mouth.   diclofenac (VOLTAREN) 1 % GEL 2 grams to hands four times daily using enclosed dosing card.   FISH OIL    MAGNESIUM OXIDE PO Take 200 mg by mouth daily   metoprolol (TOPROL-XL) 25 MG 24 hr tablet Take 1 tablet (25 mg) by mouth daily   Multiple Vitamins-Minerals (MULTIVITAMIN & MINERAL PO) Take  by mouth.   Pseudoephedrine HCl (SUDAFED 12 HOUR PO) Take  by mouth.   ketorolac tromethamine (ACULAR-LS) 0.4 % SOLN ophthalmic solution    [] nitroFURantoin macrocrystal-monohydrate (MACROBID) 100 MG capsule Take 1 capsule (100 mg) by mouth 2 times daily for 7 days   ofloxacin (OCUFLOX) 0.3 %  "ophthalmic solution    ondansetron (ZOFRAN) 4 MG tablet Take 1 tablet (4 mg) by mouth every 6 hours as needed for nausea (Patient not taking: Reported on 2019)   phenazopyridine (PYRIDIUM) 200 MG tablet Take 1 tablet (200 mg) by mouth 3 times daily as needed for pain (with food) (Patient not taking: Reported on 2019)   prednisoLONE acetate (PRED FORTE) 1 % ophthalmic suspension      No current facility-administered medications on file prior to visit.     Social History     Tobacco Use     Smoking status: Former Smoker     Years: 16.00     Types: Cigarettes     Last attempt to quit: 1982     Years since quittin.4     Smokeless tobacco: Never Used   Substance Use Topics     Alcohol use: Yes     Alcohol/week: 0.0 oz     Comment: 2-7 glasses of wine per week       ROS:  Consitutional: As above  ENT: As above  Respiratory: As above    OBJECTIVE:  /78 (BP Location: Left arm, Patient Position: Sitting, Cuff Size: Adult Small)   Pulse 71   Temp 98.3  F (36.8  C) (Oral)   Resp 18   Ht 1.549 m (5' 1\")   Wt 67.5 kg (148 lb 12.8 oz)   LMP  (LMP Unknown)   SpO2 98%   BMI 28.12 kg/m    GENERAL APPEARANCE: healthy, alert and no distress  EYES: conjunctiva injected on rt, PERRLA EOMI, no exudates  EARS:    Ear canals w/o erythema, TM's intact w/o erythema.    NOSE/MOUTH: Nose and mouth without ulcers, erythema or lesions  THROAT: no erythema w/ no tonsillar enlargement . no exudates  NECK: supple, nontender, no lymphadenopathy  RESP: lungs clear to auscultation - no rales, rhonchi or wheezes  CV: regular rates and rhythm, normal S1 S2, no murmur noted  NEURO: awake, alert        Rapid Strep test: Negative    ASSESSMENT: Well appearing.    ICD-10-CM    1. Upper respiratory tract infection, unspecified type J06.9    2. Fatigue R53.83    3. Fever R50.9 Strep, Rapid Screen     Beta strep group A culture   4. Acute conjunctivitis of right eye, unspecified acute conjunctivitis type H10.31 " trimethoprim-polymyxin b (POLYTRIM) 12626-2.1 UNIT/ML-% ophthalmic solution   5. Cough R05 guaiFENesin-codeine (ROBITUSSIN AC) 100-10 MG/5ML solution         PLAN:delaye dprn eye drops.  Likely viral syndrome  Lots of rest and fluids.  RTC if any worsening symptoms or if not improving.    Alonso Alex PA-C

## 2019-05-23 LAB
BACTERIA SPEC CULT: NORMAL
SPECIMEN SOURCE: NORMAL

## 2019-07-12 ENCOUNTER — OFFICE VISIT (OUTPATIENT)
Dept: FAMILY MEDICINE | Facility: CLINIC | Age: 69
End: 2019-07-12
Payer: COMMERCIAL

## 2019-07-12 ENCOUNTER — TELEPHONE (OUTPATIENT)
Dept: FAMILY MEDICINE | Facility: CLINIC | Age: 69
End: 2019-07-12

## 2019-07-12 VITALS
HEART RATE: 81 BPM | HEIGHT: 61 IN | BODY MASS INDEX: 27.56 KG/M2 | SYSTOLIC BLOOD PRESSURE: 118 MMHG | TEMPERATURE: 98.5 F | OXYGEN SATURATION: 97 % | DIASTOLIC BLOOD PRESSURE: 77 MMHG | WEIGHT: 146 LBS

## 2019-07-12 DIAGNOSIS — I25.119 CORONARY ARTERY DISEASE INVOLVING NATIVE HEART WITH ANGINA PECTORIS, UNSPECIFIED VESSEL OR LESION TYPE (H): ICD-10-CM

## 2019-07-12 DIAGNOSIS — R05.3 CHRONIC COUGH: Primary | ICD-10-CM

## 2019-07-12 DIAGNOSIS — J47.9 BRONCHIECTASIS WITHOUT COMPLICATION (H): ICD-10-CM

## 2019-07-12 DIAGNOSIS — E78.2 MIXED HYPERLIPIDEMIA: ICD-10-CM

## 2019-07-12 DIAGNOSIS — M17.0 PRIMARY OSTEOARTHRITIS OF BOTH KNEES: ICD-10-CM

## 2019-07-12 LAB
FEF 25/75: NORMAL
FEV-1: 2.37
FEV1/FVC: NORMAL
FVC: 2.92

## 2019-07-12 PROCEDURE — 99214 OFFICE O/P EST MOD 30 MIN: CPT | Mod: 25 | Performed by: FAMILY MEDICINE

## 2019-07-12 PROCEDURE — 94010 BREATHING CAPACITY TEST: CPT | Performed by: FAMILY MEDICINE

## 2019-07-12 RX ORDER — ALBUTEROL SULFATE 90 UG/1
2 AEROSOL, METERED RESPIRATORY (INHALATION) EVERY 6 HOURS PRN
Qty: 1 INHALER | Refills: 3 | Status: SHIPPED | OUTPATIENT
Start: 2019-07-12 | End: 2019-07-15

## 2019-07-12 RX ORDER — ALBUTEROL SULFATE 90 UG/1
2 AEROSOL, METERED RESPIRATORY (INHALATION) EVERY 6 HOURS PRN
Qty: 1 INHALER | Refills: 3 | Status: SHIPPED | OUTPATIENT
Start: 2019-07-12 | End: 2019-07-12

## 2019-07-12 ASSESSMENT — MIFFLIN-ST. JEOR: SCORE: 1124.63

## 2019-07-12 ASSESSMENT — PAIN SCALES - GENERAL: PAINLEVEL: SEVERE PAIN (6)

## 2019-07-12 NOTE — PROGRESS NOTES
Subjective     Isabel Carroll is a 68 year old female who presents to clinic today for the following health issues:    HPI   ENT Symptoms             Symptoms: cc Present Absent Comment   Fever/Chills   x    Fatigue   x    Muscle Aches   x    Eye Irritation   x    Sneezing   x    Nasal Rashad/Drg   x    Sinus Pressure/Pain   x    Loss of smell   x    Dental pain   x    Sore Throat   x    Swollen Glands   x    Ear Pain/Fullness   x    Cough  x  Dry most of the time with episodic coughing spasms for years   Wheeze   x    Chest Pain   x    Shortness of breath   x    Rash   x    Other         Symptom duration:  about 20 years- learned how to cough with mouth closed and limit coughing spasms   Symptom severity:  moderate   Treatments tried:  sudafed,Saline Solution & Nasal Spray, omeprazole   Contacts:  none     CT Chest COPD-Gene Study12/6/2016  [facility]  Result Narrative   CT CHEST COPDGene STUDY: 12/5/2016 2:37 PM        INDICATION: COPDGene Study   TECHNIQUE: CT scan performed in an axial projection with thin reformatting   in inspiration and expiration without contrast. Dose reduction techniques   utilized.  COMPARISON: 07/13/2010     FINDINGS:   LUNGS AND PLEURA: There is mild cylindrical bronchiectasis. There is no   evidence for emphysema or air trapping. No pulmonary nodules are   identified.    MEDIASTINUM: There is moderate coronary artery calcium. There is no   evidence for adenopathy.    UPPER ABDOMEN: The gallbladder is contracted or has been removed.    MUSCULOSKELETAL: Mild hypertrophic change.    CONCLUSION: Mild cylindrical bronchiectasis. No evidence for emphysema or   air trapping. Moderate coronary artery calcium.          Hyperlipidemia Follow-Up      Are you having any of the following symptoms? (Select all that apply)  No complaints of shortness of breath, chest pain or pressure.  No increased sweating or nausea with activity.  No left-sided neck or arm pain.  No complaints of pain in  calves when walking 1-2 blocks.    Are you regularly taking any medication or supplement to lower your cholesterol?   No    Are you having muscle aches or other side effects that you think could be caused by your cholesterol lowering medication?  No      Vascular Disease Follow-up      Are you having any of the following symptoms? (Select all that apply) No complaints of shortness of breath, chest pain or pressure.  No increased sweating or nausea with activity.  No left-sided neck or arm pain.  No complaints of pain in calves when walking 1-2 blocks.    How often do you take nitroglycerin? Never    Do you take an aspirin every day? Yes      Patient Active Problem List   Diagnosis     Overweight     Chronic rhinitis     Primary osteoarthritis of both hands     Basal cell carcinoma of skin     Coronary artery disease involving native heart with angina pectoris, unspecified vessel or lesion type (H)     Hyperlipidemia     Impaired fasting glucose     Chorioretinal scar of left eye     Sinus tachycardia     Advance care planning     Pes planus     Right shoulder strain, initial encounter     Bronchiectasis without complication (H)     Chronic cough     Female pattern hair loss     FHx: colon cancer     Clostridium difficile infection     Pseudophakia of right eye     Pseudophakia of both eyes     Past Surgical History:   Procedure Laterality Date     CATARACT IOL, RT/LT        SECTION      x 2     COLONOSCOPY      3 polyps     COLONOSCOPY      neg     ENDOSCOPY       LENGTHEN TENDON ACHILLES  2011    Procedure:LENGTHEN TENDON ACHILLES; Surgeon:AARON CÁRDENAS; Location:[facility]     LIVER SURGERY      Alex large cyst     MINI ARC SLING OPERATION FOR STRESS INCONTINENCE       OSTEOTOMY ANKLE  2011    Procedure:OSTEOTOMY ANKLE; Calcaneous,  Flexor Digitorum Longus Transfer       SALPINGO-OOPHORECTOMY BILATERAL  ,     Benign prophylactic for FHx ov cancer       Social  History     Tobacco Use     Smoking status: Former Smoker     Years: 16.00     Types: Cigarettes     Last attempt to quit: 1982     Years since quittin.5     Smokeless tobacco: Never Used   Substance Use Topics     Alcohol use: Yes     Alcohol/week: 0.0 oz     Comment: 2-7 glasses of wine per week     Family History   Problem Relation Age of Onset     Other Cancer Sister 51        ovarian cancer     Cancer Sister      Thyroid Disease Sister      Colon Cancer Father 76     Brain Hemorrhage Father         accident     Macular Degeneration Mother      Diabetes Maternal Aunt      Diabetes Maternal Uncle      Cancer Paternal Aunt      Diabetes Maternal Grandmother      Hypertension No family hx of      Cerebrovascular Disease No family hx of      Glaucoma No family hx of          Current Outpatient Medications   Medication Sig Dispense Refill     albuterol (PROAIR HFA/PROVENTIL HFA/VENTOLIN HFA) 108 (90 Base) MCG/ACT inhaler Inhale 2 puffs into the lungs every 6 hours as needed for shortness of breath / dyspnea or wheezing 1 Inhaler 3     aspirin 81 MG tablet Take 1 tablet by mouth daily.       atorvastatin (LIPITOR) 40 MG tablet 40 mg       Calcium-Vitamin D (CALCIUM + D PO) Take  by mouth.       FISH OIL        MAGNESIUM OXIDE PO Take 200 mg by mouth daily       metoprolol (TOPROL-XL) 25 MG 24 hr tablet Take 1 tablet (25 mg) by mouth daily 90 tablet 3     Multiple Vitamins-Minerals (MULTIVITAMIN & MINERAL PO) Take  by mouth.       Allergies   Allergen Reactions     Sulfa Drugs Nausea     Codeine Phosphate Nausea and Vomiting     Pentazocine      Propoxyphene      Darvon     Penicillins Rash     rash and itching       Recent Labs   Lab Test 18  1109 05/22/18 10/17/17  0820 10/14/16  0815 16  1708  12   A1C  --   --  5.8 5.5 5.8   < > 6.0  --    LDL  --  48 119* 106*  --    < > 111  --    HDL  --  48 44* 48*  --    < > 54  --    TRIG  --  182* 251* 210*  --    < > 156*  --    ALT 40 45  " --   --  39  --   --   --    CR 0.72  --   --  0.74 0.63   < >  --   --    GFRESTIMATED 80  --   --  78 >90  Non  GFR Calc     < >  --   --    GFRESTBLACK >90  --   --  >90   GFR Calc   >90   GFR Calc     < >  --   --    POTASSIUM 4.2  --   --   --  4.1   < >  --   --    TSH  --   --   --   --   --   --  0.506 0.829    < > = values in this interval not displayed.      BP Readings from Last 3 Encounters:   07/12/19 118/77   05/22/19 122/78   04/03/19 129/78    Wt Readings from Last 3 Encounters:   07/12/19 66.2 kg (146 lb)   05/22/19 67.5 kg (148 lb 12.8 oz)   03/04/19 66.2 kg (146 lb)                      Reviewed and updated as needed this visit by Provider         Review of Systems   ROS COMP: Constitutional, HEENT, cardiovascular, pulmonary, gi and gu systems are negative, except as otherwise noted.      Objective    /77 (BP Location: Left arm, Patient Position: Chair, Cuff Size: Adult Regular)   Pulse 81   Temp 98.5  F (36.9  C) (Oral)   Ht 1.549 m (5' 1\")   Wt 66.2 kg (146 lb)   LMP  (LMP Unknown)   SpO2 97%   BMI 27.59 kg/m    Body mass index is 27.59 kg/m .  Physical Exam   GENERAL APPEARANCE: healthy, alert and no distress  EYES: Eyes grossly normal to inspection, PERRL and conjunctivae and sclerae normal  HENT: ear canals and TM's normal, nose and mouth without ulcers or lesions, oropharynx clear and oral mucous membranes moist  NECK: no adenopathy, no asymmetry, masses, or scars and thyroid normal to palpation  RESP: lungs clear to auscultation - no rales, rhonchi or wheezes no coughing until she was doing spirometry, which set off coughing spasms when she reached her end expirations.  CV: regular rates and rhythm, normal S1 S2, no S3 or S4, no murmur, click or rub, no peripheral edema and peripheral pulses strong  ABDOMEN: soft, nontender, no hepatosplenomegaly, no masses and bowel sounds normal  MS: no musculoskeletal defects are noted and " "gait is age appropriate without ataxia  SKIN: no suspicious lesions or rashes  NEURO: Normal strength and tone, sensory exam grossly normal, mentation intact and speech normal  PSYCH: mentation appears normal and affect normal/bright     Diagnostic Test Results:  Labs reviewed in Epic  Results for orders placed or performed in visit on 07/12/19   Spirometry, Breathing Capacity: Normal Order, Clinic Performed   Result Value Ref Range    FEV-1 2.37     FVC 2.92     FEV1/FVC 81%     FEF 25/75 137%     Impression    Normal Spirometry           Assessment & Plan       ICD-10-CM    1. Chronic cough- extensive history and reviewed past work ups, which included CT scan, pulmonary function test with challenge testing for asthma, endoscopy, ph test of esophagus, ENT exam, allergy testing and trials of medication. Omeprazole is the only medication that seemed to help symptoms but she stopped this due to news about side effects.  R05 Spirometry, Breathing Capacity: Normal Order, Clinic Performed     SPEECH THERAPY REFERRAL- for video swallow study.      albuterol (PROAIR HFA/PROVENTIL HFA/VENTOLIN HFA) 108 (90 Base) MCG/ACT inhaler         normal spirometry   2. Bronchiectasis without complication (H) J47.9 Pulmonary does not think the findings were significant or causing the cough symptoms    3. Coronary artery disease involving native heart with angina pectoris, unspecified vessel or lesion type (H) I25.119 Calcification of coronary arteries.   4. Primary osteoarthritis of both knees- had injections in the past that worked very well and would like to have this repeated for recurrent knee pain  M17.0 ORTHOPEDICS ADULT REFERRAL   5. Mixed hyperlipidemia E78.2 Stable         BMI:   Estimated body mass index is 27.59 kg/m  as calculated from the following:    Height as of this encounter: 1.549 m (5' 1\").    Weight as of this encounter: 66.2 kg (146 lb).           FURTHER TESTING:       - swallow study since coughing worse 5-10 " minutes after eating or drinking fluids  CONSULTATION/REFERRAL to speech therapy as needed.   FUTURE LABS:       - Schedule fasting labs in 6 months  FUTURE APPOINTMENTS:       - Follow-up visit in 3 months or sooner if any questions or concerns.   See Patient Instructions    Return in about 3 months (around 10/12/2019) for medication follow up.    Marry Gonzalez MD  [facility]

## 2019-07-12 NOTE — PATIENT INSTRUCTIONS
================================================================================  Normal Values   Blood pressure  <140/90 for most adults    <130/80 for some chronic diseases (ask your care team about yours)    BMI (body mass index)  18.5-25 kg/m2 (based on height and weight)     Thank you for visiting [facility]    Normal or non-critical lab and imaging results will be communicated to you by MyChart, letter or phone within 7 days.  If you do not hear from us within 10 days, please call the clinic. If you have a critical or abnormal lab result, we will notify you by phone as soon as possible.     If you have any questions regarding your visit please contact:     Team Comfort:   Clinic Hours Telephone Number   Dr. Earle Tijerina Dr. [place] 7am-5pm  Monday - Friday [phone]  Claudio RN  Estefany RN  Rebecca RN   Pharmacy 8:00am-8pm Monday-Friday    9am-5pm Saturday-Sunday [phone]   Urgent Care 11am-9pm Monday-Friday        9am-5pm Saturday-Sunday [phone]     After hours, weekend or if you need to make an appointment with your primary provider please call [phone].   After Hours nurse advise: call [facility]: [phone]    Medication Refills:  Call your pharmacy and they will forward the refill to us. Please allow 3 business days for your refills to be completed.          Patient Education     Chronic Cough with Uncertain Cause (Adult)    Everyone has had a cough as part of the common cold, flu, or bronchitis. This kind of cough occurs along with an achy feeling, low-grade fever, nasal and sinus congestion, and a scratchy or sore throat. This usually gets better in 2 to 3 weeks. A cough that lasts longer than 3 weeks may be due to other causes. Your healthcare provider may refer to this as a chronic cough.  If your cough does not improve over the next 2 weeks, further testing may be needed. Follow up with your healthcare  provider as advised. Cough suppressants may be recommended. Based on your exam today, the exact cause of your cough is not certain. Below are some common causes for persistent cough.  Smoker's cough  Smoker s cough doesn t go away. If you continue to smoke, it only gets worse. The cough is from irritation in the air passages. Talk to your healthcare provider about quitting. Medicines or nicotine-replacement products, like gum or the patch, may make quitting easier.  Postnasal drip  A cough that is worse at night may be due to postnasal drip. Excess mucus in the nose drains from the back of your nose to your throat. This triggers the cough reflex. Postnasal drip may be due to a sinus infection or allergy. Common allergens include dust, tobacco smoke (both inhaled and secondhand smoke), environmental pollutants, pollen, mold, pets, cleaning agents, room deodorizers, and chemical fumes. Over-the-counter antihistamines or decongestants may be helpful for allergies. A sinus infection may requires antibiotic treatment. See your healthcare provider if symptoms continue.  Medicines  Certain prescribed medicines can cause a chronic cough in some people:    ACE inhibitors for high blood pressure. These include benazepril, captopril, enalapril, fosinopril, lisinopril, quinapril, ramipril, and others.    Beta-blockers for high blood pressure and other conditions. These include propranolol, atenolol, metoprolol, nadolol, and others.  Let your healthcare provider know if you are taking any of these. The chronic cough may mean your medicine needs to be changed.  Asthma  Cough may be the only sign of mild asthma. You may have tests to find out if asthma is causing your cough. You may also take asthma medicine on a trial basis.  Acid reflux (heartburn, GERD)  The esophagus is the tube that carries food from the mouth to the stomach. A valve at its lower end prevents stomach acids from flowing upward. If this valve does not work  properly, acid from the stomach enters the esophagus. This may cause a burning pain in the upper abdomen or lower chest, belching, or cough. Symptoms are often worse when lying flat. Avoid eating or drinking before bedtime. Try using extra pillows to raise your upper body, or place 4-inch blocks under the head of your bed. You may try an over-the-counter (OTC) antacid or an acid-blocking medicine such as famotidine, cimetidine, ranitidine, esomeprazole, lansoprazole, or omeprazole. Stronger medicines for this condition can be prescribed by your healthcare provider. Ask your healthcare provider which OTC medicine to use. Depending on your current medicines, some OTC medicines may cause drug interactions and should be avoided.  Follow-up care  Follow up with your healthcare provider, or as advised, if your cough does not improve. Further testing may be needed.  Note: If an X-ray was taken, a specialist will review it. You will be notified of any new findings that may affect your care.  When to seek medical advice  Call your healthcare provider right away if any of these occur:    Mild wheezing or difficulty breathing    Fever of 100.4 F (38 C) or higher, or as directed by your healthcare provider    Unexpected weight loss    Coughing up large amounts of colored sputum or blood-tinged sputum    Night sweats (sheets and pajamas get soaking wet)  Call 911  Call 911 if any of these occur:    Coughing up blood    Moderate to severe trouble breathing or wheezing  Date Last Reviewed: 6/1/2018 2000-2018 The "[employer]. [address] [place] [address], [place] [address]. All rights reserved. This information is not intended as a substitute for professional medical care. Always follow your healthcare professional's instructions.           Patient Education     Treatment for Bronchiectasis  Bronchiectasis is a condition in which the airways of the lungs become wider than normal. These airways are called bronchi and  bronchioles. Over time, the walls of the airways become thick and scarred. The damaged airways can t clear mucus as well. Because of this, mucus builds up in the airways. This increases the risk for lung infections. Bronchiectasis is a long-term (chronic) condition.  Types of treatment  Treating the cause of bronchiectasis can help to prevent more damage. For example, you may take antibiotic medicine for an infection caused by bacteria.  Bronchiectasis is a long-term (chronic) condition. There may be times when you have an infection and your symptoms get worse. During these times, you may be given antibiotic medicine to take by mouth.  If oral antibiotic medicine does not work, or if you have severe symptoms, you may need to stay in the hospital. While in the hospital, you may get antibiotic medicine in one of your veins (IV). You will get other treatment to help with breathing and other symptoms. For example, you may be given oxygen.  Surgery is another treatment. Your healthcare provider can tell you more about what kinds of treatment may work best for you.  Possible complications of bronchiectasis  Possible complications of bronchiectasis include:    Collapsed lung    Respiratory failure, when you don t have enough oxygen in your blood    Heart failure, when your heart can t pump well  Managing bronchiectasis  Your healthcare provider will talk with you about managing your condition. You can take steps to help prevent bronchiectasis from getting worse, such as:    Avoiding people with respiratory infections, if possible    Keeping your hands clean by washing with soap and water or using antibacterial gel    Getting all the vaccines your health care provider advises    Taking antibiotic medicine exactly as prescribed, to treat or to prevent infections    Following all instructions to clear mucus from your airways, using special equipment or methods    Quitting smoking and staying away from secondhand  smoke  Staying healthy with bronchiectasis  You ll need to take good care of your overall health. Make sure to:    Quit smoking, if you smoke. Talk with your healthcare provider. He or she can recommend medicines and programs that can help. Or call the national quit-line at 800-QUIT-NOW (354-539-1012).    Make sure you drink a lot of water or other healthy drinks every day.    Stay healthy by eating fresh fruits and vegetables, whole grains, low-fat milk foods, and lean meats.    Keep active and get exercise.  When to call the healthcare provider  Call your healthcare provider right away if you have any of these:    Fever of 100.4 F (38 C) or higher    Trouble breathing    Coughing that gets worse    Increased amount of mucus    Mucus that s darker in color    Coughing up blood    Changes in symptoms or new symptoms  Date Last Reviewed: 6/1/2018 2000-2018 [employer]. [address] [place] [address], [place] [address]. All rights reserved. This information is not intended as a substitute for professional medical care. Always follow your healthcare professional's instructions.

## 2019-07-12 NOTE — TELEPHONE ENCOUNTER
Routing to provider, any alternatives to albuterol proair for patient, due to current Rx being too expensive for patient?    Nena Moreno, DILIAN, RN, PHN

## 2019-07-12 NOTE — TELEPHONE ENCOUNTER
..Reason for Call:  Other prescription    Detailed comments: patient stated that the PROAIR is too expensive and would like something else prescribe and patient would like  to call her and discuss other options.    Phone Number Patient can be reached at: Cell number on file:    Telephone Information:   Mobile [phone]       Best Time: any    Can we leave a detailed message on this number? YES    Call taken on 7/12/2019 at 4:21 PM by William Rodriguez

## 2019-07-15 ENCOUNTER — MYC MEDICAL ADVICE (OUTPATIENT)
Dept: FAMILY MEDICINE | Facility: CLINIC | Age: 69
End: 2019-07-15

## 2019-07-15 DIAGNOSIS — R05.3 CHRONIC COUGH: ICD-10-CM

## 2019-07-15 RX ORDER — ALBUTEROL SULFATE 90 UG/1
2 AEROSOL, METERED RESPIRATORY (INHALATION) EVERY 6 HOURS PRN
Qty: 1 INHALER | Refills: 3 | Status: SHIPPED | OUTPATIENT
Start: 2019-07-15 | End: 2019-07-19

## 2019-07-15 NOTE — TELEPHONE ENCOUNTER
Mychart to patient regarding phone to schedule for speech therapy/swallow study. Please see 7/12/19 telephone encounter for more information on inhaler.    Flakito Smart CMA

## 2019-07-19 RX ORDER — ALBUTEROL SULFATE 90 UG/1
2 AEROSOL, METERED RESPIRATORY (INHALATION) EVERY 6 HOURS PRN
Qty: 1 INHALER | Refills: 3 | Status: SHIPPED | OUTPATIENT
Start: 2019-07-19 | End: 2020-09-09

## 2019-07-19 NOTE — TELEPHONE ENCOUNTER
Closing encounter. Patient informed and script is at  for patient to .    Haylie Contreras MA on 7/19/2019 at 8:30 AM

## 2019-07-24 ENCOUNTER — OFFICE VISIT (OUTPATIENT)
Dept: ORTHOPEDICS | Facility: CLINIC | Age: 69
End: 2019-07-24
Payer: COMMERCIAL

## 2019-07-24 ENCOUNTER — ANCILLARY PROCEDURE (OUTPATIENT)
Dept: GENERAL RADIOLOGY | Facility: CLINIC | Age: 69
End: 2019-07-24
Attending: ORTHOPAEDIC SURGERY
Payer: COMMERCIAL

## 2019-07-24 VITALS
HEART RATE: 87 BPM | SYSTOLIC BLOOD PRESSURE: 115 MMHG | DIASTOLIC BLOOD PRESSURE: 76 MMHG | HEIGHT: 61 IN | WEIGHT: 146.4 LBS | BODY MASS INDEX: 27.64 KG/M2

## 2019-07-24 DIAGNOSIS — G89.29 CHRONIC PAIN OF BOTH KNEES: ICD-10-CM

## 2019-07-24 DIAGNOSIS — M25.562 CHRONIC PAIN OF BOTH KNEES: Primary | ICD-10-CM

## 2019-07-24 DIAGNOSIS — M25.561 CHRONIC PAIN OF BOTH KNEES: Primary | ICD-10-CM

## 2019-07-24 DIAGNOSIS — M25.561 CHRONIC PAIN OF BOTH KNEES: ICD-10-CM

## 2019-07-24 DIAGNOSIS — M25.562 CHRONIC PAIN OF BOTH KNEES: ICD-10-CM

## 2019-07-24 DIAGNOSIS — G89.29 CHRONIC PAIN OF BOTH KNEES: Primary | ICD-10-CM

## 2019-07-24 DIAGNOSIS — M17.0 PRIMARY OSTEOARTHRITIS OF BOTH KNEES: ICD-10-CM

## 2019-07-24 PROCEDURE — 20610 DRAIN/INJ JOINT/BURSA W/O US: CPT | Mod: 50 | Performed by: ORTHOPAEDIC SURGERY

## 2019-07-24 PROCEDURE — 73562 X-RAY EXAM OF KNEE 3: CPT | Mod: LT

## 2019-07-24 PROCEDURE — 99204 OFFICE O/P NEW MOD 45 MIN: CPT | Mod: 25 | Performed by: ORTHOPAEDIC SURGERY

## 2019-07-24 RX ORDER — BUPIVACAINE HYDROCHLORIDE 2.5 MG/ML
3 INJECTION, SOLUTION INFILTRATION; PERINEURAL
Status: DISCONTINUED | OUTPATIENT
Start: 2019-07-24 | End: 2021-01-11

## 2019-07-24 RX ORDER — LIDOCAINE HYDROCHLORIDE 10 MG/ML
4 INJECTION, SOLUTION INFILTRATION; PERINEURAL
Status: DISCONTINUED | OUTPATIENT
Start: 2019-07-24 | End: 2021-01-11

## 2019-07-24 RX ORDER — METHYLPREDNISOLONE ACETATE 80 MG/ML
80 INJECTION, SUSPENSION INTRA-ARTICULAR; INTRALESIONAL; INTRAMUSCULAR; SOFT TISSUE
Status: DISCONTINUED | OUTPATIENT
Start: 2019-07-24 | End: 2021-01-11

## 2019-07-24 RX ADMIN — BUPIVACAINE HYDROCHLORIDE 3 ML: 2.5 INJECTION, SOLUTION INFILTRATION; PERINEURAL at 15:35

## 2019-07-24 RX ADMIN — LIDOCAINE HYDROCHLORIDE 4 ML: 10 INJECTION, SOLUTION INFILTRATION; PERINEURAL at 15:35

## 2019-07-24 RX ADMIN — METHYLPREDNISOLONE ACETATE 80 MG: 80 INJECTION, SUSPENSION INTRA-ARTICULAR; INTRALESIONAL; INTRAMUSCULAR; SOFT TISSUE at 15:35

## 2019-07-24 ASSESSMENT — MIFFLIN-ST. JEOR: SCORE: 1126.45

## 2019-07-24 ASSESSMENT — PAIN SCALES - GENERAL: PAINLEVEL: MODERATE PAIN (5)

## 2019-07-24 NOTE — LETTER
7/24/2019         RE: Isabel Carroll  [address] [id number]        Dear Colleague,    Thank you for referring your patient, Isabel Carroll, to the [facility]. Please see a copy of my visit note below.    CHIEF COMPLAINT:   Chief Complaint   Patient presents with     Knee Pain     Bilateral knee pain. Right knee is worse 7/10, left 5/10. Onset: 2009 - patient notes she took a fall. She iced it at that time and it got better. She went to [place] and did a lot of walking in May. After that she started to have increased pain. Pain is anterior. No recent tx.    .    Isabel Carroll is seen today in the [facility] for evaluation of bilateral knee pain at the request of Marry Treviño      HISTORY OF PRESENT ILLNESS    Isabel Carroll is a 68 year old female seen for evaluation of ongoing bilateral knee pain with no known recent injury. Right more than left.   Pain has been present for 10 years. Onset 2009, took a fall onto front of both knees. Improved. Recently went to [place] and did a lot of walking 5/2019 and pain started to increase. Locates pain mostly in front of the knees. No recent treatment. No swelling. Ok at rest. Worse with prolonged walking 5-6 hours, stairs > flat surfaces, squatting. Takes ibuprofen as needed. Did Physical Therapy in the past, not recent. Ice helps. Had injections with [facility] 4.5 years ago she states helped for a long time.    Has seen [facility] in the past for the same.     Present symptoms: moderate pain, no swelling, no catching/popping, no locking,no giving way.    Pain severity: 6/10  Frequency of symptoms: frequently  Exacerbating Factors: weight bearing, stairs, [id number], squatting  Relieving Factors: rest, sitting, positional changes  Night Pain: No  Pain while at rest: No   Numbness or tingling: No   Patient has tried:      NSAIDS: Yes      Physical Therapy: in the past, not recently.      Activity modification: No      Bracing: No      Injections: Yes, cortisone, 2014 [facility] with relief.     Ice: Yes      Assistive device:  No      Other PMH:  has a past medical history of Arthritis, Basal cell carcinoma of skin (2011), Benign neoplasm of colon (10/26/2011), Bronchiectasis without complication (H) (12/15/2016), Chorioretinal scar of left eye (2015), Chronic rhinitis (2015), Coronary artery disease, Coronary atherosclerosis due to calcified coronary lesion (2011), Elevated blood pressure (not hypertension) (2006), Hypercholesteremia, Hyperlipidemia (2011), Impaired fasting glucose (2006), Nonsenile cataract, PONV (postoperative nausea and vomiting), Primary osteoarthritis of both hands (2015), Shingles (), Sinus tachycardia (2016), Skin cancer (, ), and Vitamin D deficiency (2010).  Patient Active Problem List   Diagnosis     Overweight     Chronic rhinitis     Primary osteoarthritis of both hands     Basal cell carcinoma of skin     Coronary artery disease involving native heart with angina pectoris, unspecified vessel or lesion type (H)     Hyperlipidemia     Impaired fasting glucose     Chorioretinal scar of left eye     Sinus tachycardia     Advance care planning     Pes planus     Right shoulder strain, initial encounter     Bronchiectasis without complication (H)     Chronic cough     Female pattern hair loss     FHx: colon cancer     Clostridium difficile infection     Pseudophakia of right eye     Pseudophakia of both eyes       Surgical Hx:  has a past surgical history that includes  section; Liver surgery (); Mini arc sling operation for stress incontinence (); Salpingo-oophorectomy bilateral (, ); Lengthen tendon achilles (2011); Osteotomy ankle (2011); colonoscopy (); colonoscopy (); endoscopy (); and  cataract iol, rt/lt.    Medications:   Current Outpatient Medications:      albuterol (PROAIR HFA/PROVENTIL HFA/VENTOLIN HFA) 108 (90 Base) MCG/ACT inhaler, Inhale 2 puffs into the lungs every 6 hours as needed for shortness of breath / dyspnea or wheezing, Disp: 1 Inhaler, Rfl: 3     aspirin 81 MG tablet, Take 1 tablet by mouth daily., Disp: , Rfl:      atorvastatin (LIPITOR) 40 MG tablet, 40 mg, Disp: , Rfl:      Calcium-Vitamin D (CALCIUM + D PO), Take  by mouth., Disp: , Rfl:      FISH OIL, , Disp: , Rfl:      MAGNESIUM OXIDE PO, Take 200 mg by mouth daily, Disp: , Rfl:      metoprolol (TOPROL-XL) 25 MG 24 hr tablet, Take 1 tablet (25 mg) by mouth daily, Disp: 90 tablet, Rfl: 3     Multiple Vitamins-Minerals (MULTIVITAMIN & MINERAL PO), Take  by mouth., Disp: , Rfl:     Allergies:   Allergies   Allergen Reactions     Sulfa Drugs Nausea     Codeine Phosphate Nausea and Vomiting     Pentazocine      Propoxyphene      Darvon     Penicillins Rash     rash and itching         Social Hx: retired school counselor.   reports that she quit smoking about 37 years ago. Her smoking use included cigarettes. She quit after 16.00 years of use. She has never used smokeless tobacco. She reports that she drinks alcohol. She reports that she does not use drugs.    Family Hx: family history includes Brain Hemorrhage in her father; Cancer in her paternal aunt and sister; Colon Cancer (age of onset: 76) in her father; Diabetes in her maternal aunt, maternal grandmother, and maternal uncle; Macular Degeneration in her mother; Other Cancer (age of onset: 51) in her sister; Thyroid Disease in her sister.    REVIEW OF SYSTEMS: 10 point ROS neg other than the symptoms noted above in the HPI and PMH. Notables include  CONSTITUTIONAL:NEGATIVE for fever, chills, change in weight  INTEGUMENTARY/SKIN: NEGATIVE for worrisome rashes, moles or lesions  MUSCULOSKELETAL:See HPI above  NEURO: NEGATIVE for weakness, dizziness or  "paresthesias    PHYSICAL EXAM:  /76   Pulse 87   Ht 1.549 m (5' 1\")   Wt 66.4 kg (146 lb 6.4 oz)   LMP  (LMP Unknown)   BMI 27.66 kg/m      GENERAL APPEARANCE: healthy, alert, no distress  SKIN: no suspicious lesions or rashes  NEURO: Normal strength and tone, mentation intact and speech normal  PSYCH:  mentation appears normal and affect normal, not anxious  RESPIRATORY: No increased work of breathing.  HANDS: no clubbing, nail pitting  LYMPH: no palpable popliteal lymphadenopathy.    BILATERAL LOWER EXTREMITIES:  Gait: normal  Alignment: neutral.  No gross deformities or masses.  No Quad atrophy, strength normal.  Intact sensation deep peroneal nerve, superficial peroneal nerve, med/lat tibial nerve, sural nerve, saphenous nerve  Intact EHL, EDL, TA, FHL, GS, quadriceps hamstrings and hip flexors  Toes warm and well perfused, brisk capillary refill. Palpable 2+ dp pulses.  Bilateral calf soft and nttp or squeeze.  DTRs: achilles 2+, patella 2+.  Edema: none    LEFT KNEE EXAM:    Skin: intact, no ecchymosis or erythema  Squat: 100 %, not limited by pain.     ROM: slight hyperextension to 140 flexion  Tight hamstrings on straight leg raise.  Effusion: none  Tender: medial joint line  nontender to palpation lat joint line, anterior or posterior knee  McMurrays: negative    MCL: stable, and non-painful at both 0 and 30 degrees knee flexion  Varus stress: stable, and non-painful at both 0 and 30 degrees knee flexion  Lachmans: neg, firm endpoint  Posterior Drawer stable  Patellofemoral joint:                Apprehension: negative              Crepitations: mild   Grind: positive.    RIGHT KNEE EXAM:    Skin: intact, no ecchymosis or erythema  Squat: 100 %, not limited by pain.     ROM: slight hyperextension to 140 flexion, anterior discomfort.  Tight hamstrings on straight leg raise.  Effusion: none  Tender: medial joint line, pes  NTTP lat joint line, anterior or posterior knee  McMurrays: negative    MCL: " "stable, and non-painful at both 0 and 30 degrees knee flexion  Varus stress: stable, and non-painful at both 0 and 30 degrees knee flexion  Lachmans: neg, firm endpoint  Posterior Drawer stable  Patellofemoral joint:                Apprehension: negative              Crepitations: mild   Grind: positive.    X-RAY:  3 views bilateral knee from 7/24/2019 were reviewed in clinic today. On my review, no obvious fractures or dislocations. Fairly preserved joint spaces. Tiny patello-femoral osteophytes.         ASSESSMENT/PLAN: Isabel Carroll is a 68 year old female with chronic bilateral knee pain, primary osteoarthritis.     * reviewed imaging studies with patient, showing mild arthritic changes mostly under the knee cap, or wearing of the cartilage in the knee. This can be caused by normal \"wear and tear\" over the years or following prior injury to the knee.    Non-surgical treatment for knee arthritis includes:    * rest, sitting  * Activity modification - avoid impact activities or activities that aggravate symptoms.  * NSAIDS (non-steroidal anti-inflammatory medications; e.g. Aleve, advil, motrin, ibuprofen) - regular use for inflammation ( twice daily or three times daily), with food, as long as no contra-indications Please discuss with primary care doctor if needed  * ice, 15-20 minutes at a time several times a day or as needed.  * Strengthening of quadriceps muscles  * Physical Therapy for strengthening, stretching and range of motion exercises of legs  * Tylenol as needed for pain, consider Tylenol arthritis or similar  * Weight loss: Weight loss:  Body mass index is 27.66 kg/m .. weight loss benefits, not only for the current pain symptoms, but also overall health. Recommend a good diet plan that works for the patient, with the assistance of a dietician or primary care doctor as needed. Also, a good, low-impact exercise program for at least 20 minutes per day, 3 times per week, such as exercise bike, " "elliptical , or pool.  * Exercise: low impact such as stationary bike, elliptical, pool.  * Injections: cortisone versus viscosupplementation (hyaluronic acid, \"rooster comb\", \"gel shots\"); risks and perceived benefits discussed today. Patient elects to proceed.  * Bracing: bracing the knee may offer some relief of symptoms when worn and provide some stability.  * over the counter supplements such as glucosamine and chondroitin sulfate may help with joint pain.  * topical ointments may help as well    * return to clinic as needed.      Jerrod Arias M.D., M.S.  Dept. of Orthopaedic Surgery  [facility]    Large Joint Injection/Arthocentesis: bilateral knee  Date/Time: 7/24/2019 3:35 PM  Performed by: Bong Barriga PA  Authorized by: Jerrod Arias MD     Indications:  Pain and osteoarthritis  Needle Size:  22 G  Guidance: landmark guided    Approach:  Anteromedial  Location:  Knee  Laterality:  Bilateral      Medications (Right):  3 mL bupivacaine 0.25 %; 4 mL lidocaine 1 %; 80 mg methylPREDNISolone 80 MG/ML  Medications (Left):  3 mL bupivacaine 0.25 %; 4 mL lidocaine 1 %; 80 mg methylPREDNISolone 80 MG/ML  Outcome:  Tolerated well, no immediate complications  Procedure discussed: discussed risks, benefits, and alternatives    Consent Given by:  Patient  Prep: patient was prepped and draped in usual sterile fashion                Again, thank you for allowing me to participate in the care of your patient.        Sincerely,        Jerrod Arias MD    "

## 2019-07-24 NOTE — PROGRESS NOTES
CHIEF COMPLAINT:   Chief Complaint   Patient presents with     Knee Pain     Bilateral knee pain. Right knee is worse 7/10, left 5/10. Onset: 2009 - patient notes she took a fall. She iced it at that time and it got better. She went to [place] and [place] and did a lot of walking in May. After that she started to have increased pain. Pain is anterior. No recent tx.    .    Isabel Carroll is seen today in the [facility] for evaluation of bilateral knee pain at the request of Marry Treviño      HISTORY OF PRESENT ILLNESS    Isabel Carroll is a 68 year old female seen for evaluation of ongoing bilateral knee pain with no known recent injury. Right more than left.   Pain has been present for 10 years. Onset 2009, took a fall onto front of both knees. Improved. Recently went to [place] and [place] and did a lot of walking 5/2019 and pain started to increase. Locates pain mostly in front of the knees. No recent treatment. No swelling. Ok at rest. Worse with prolonged walking 5-6 hours, stairs > flat surfaces, squatting. Takes ibuprofen as needed. Did Physical Therapy in the past, not recent. Ice helps. Had injections with [facility] 4.5 years ago she states helped for a long time.    Has seen [facility] in the past for the same.     Present symptoms: moderate pain, no swelling, no catching/popping, no locking,no giving way.    Pain severity: 6/10  Frequency of symptoms: frequently  Exacerbating Factors: weight bearing, stairs, [id number], squatting  Relieving Factors: rest, sitting, positional changes  Night Pain: No  Pain while at rest: No   Numbness or tingling: No   Patient has tried:     NSAIDS: Yes      Physical Therapy: in the past, not recently.      Activity modification: No      Bracing: No      Injections: Yes, cortisone, 2014 [facility] with relief.     Ice: Yes      Assistive device:  No      Other PMH:  has a past medical history of  Arthritis, Basal cell carcinoma of skin (2011), Benign neoplasm of colon (10/26/2011), Bronchiectasis without complication (H) (12/15/2016), Chorioretinal scar of left eye (2015), Chronic rhinitis (2015), Coronary artery disease, Coronary atherosclerosis due to calcified coronary lesion (2011), Elevated blood pressure (not hypertension) (2006), Hypercholesteremia, Hyperlipidemia (2011), Impaired fasting glucose (2006), Nonsenile cataract, PONV (postoperative nausea and vomiting), Primary osteoarthritis of both hands (2015), Shingles (), Sinus tachycardia (2016), Skin cancer (, ), and Vitamin D deficiency (2010).  Patient Active Problem List   Diagnosis     Overweight     Chronic rhinitis     Primary osteoarthritis of both hands     Basal cell carcinoma of skin     Coronary artery disease involving native heart with angina pectoris, unspecified vessel or lesion type (H)     Hyperlipidemia     Impaired fasting glucose     Chorioretinal scar of left eye     Sinus tachycardia     Advance care planning     Pes planus     Right shoulder strain, initial encounter     Bronchiectasis without complication (H)     Chronic cough     Female pattern hair loss     FHx: colon cancer     Clostridium difficile infection     Pseudophakia of right eye     Pseudophakia of both eyes       Surgical Hx:  has a past surgical history that includes  section; Liver surgery (); Mini arc sling operation for stress incontinence (); Salpingo-oophorectomy bilateral (, ); Lengthen tendon achilles (2011); Osteotomy ankle (2011); colonoscopy (); colonoscopy (); endoscopy (); and cataract iol, rt/lt.    Medications:   Current Outpatient Medications:      albuterol (PROAIR HFA/PROVENTIL HFA/VENTOLIN HFA) 108 (90 Base) MCG/ACT inhaler, Inhale 2 puffs into the lungs every 6 hours as needed for shortness of breath / dyspnea or wheezing, Disp: 1  "Inhaler, Rfl: 3     aspirin 81 MG tablet, Take 1 tablet by mouth daily., Disp: , Rfl:      atorvastatin (LIPITOR) 40 MG tablet, 40 mg, Disp: , Rfl:      Calcium-Vitamin D (CALCIUM + D PO), Take  by mouth., Disp: , Rfl:      FISH OIL, , Disp: , Rfl:      MAGNESIUM OXIDE PO, Take 200 mg by mouth daily, Disp: , Rfl:      metoprolol (TOPROL-XL) 25 MG 24 hr tablet, Take 1 tablet (25 mg) by mouth daily, Disp: 90 tablet, Rfl: 3     Multiple Vitamins-Minerals (MULTIVITAMIN & MINERAL PO), Take  by mouth., Disp: , Rfl:     Allergies:   Allergies   Allergen Reactions     Sulfa Drugs Nausea     Codeine Phosphate Nausea and Vomiting     Pentazocine      Propoxyphene      Darvon     Penicillins Rash     rash and itching         Social Hx: retired school counselor.   reports that she quit smoking about 37 years ago. Her smoking use included cigarettes. She quit after 16.00 years of use. She has never used smokeless tobacco. She reports that she drinks alcohol. She reports that she does not use drugs.    Family Hx: family history includes Brain Hemorrhage in her father; Cancer in her paternal aunt and sister; Colon Cancer (age of onset: 76) in her father; Diabetes in her maternal aunt, maternal grandmother, and maternal uncle; Macular Degeneration in her mother; Other Cancer (age of onset: 51) in her sister; Thyroid Disease in her sister.    REVIEW OF SYSTEMS: 10 point ROS neg other than the symptoms noted above in the HPI and PMH. Notables include  CONSTITUTIONAL:NEGATIVE for fever, chills, change in weight  INTEGUMENTARY/SKIN: NEGATIVE for worrisome rashes, moles or lesions  MUSCULOSKELETAL:See HPI above  NEURO: NEGATIVE for weakness, dizziness or paresthesias    PHYSICAL EXAM:  /76   Pulse 87   Ht 1.549 m (5' 1\")   Wt 66.4 kg (146 lb 6.4 oz)   LMP  (LMP Unknown)   BMI 27.66 kg/m     GENERAL APPEARANCE: healthy, alert, no distress  SKIN: no suspicious lesions or rashes  NEURO: Normal strength and tone, mentation intact " and speech normal  PSYCH:  mentation appears normal and affect normal, not anxious  RESPIRATORY: No increased work of breathing.  HANDS: no clubbing, nail pitting  LYMPH: no palpable popliteal lymphadenopathy.    BILATERAL LOWER EXTREMITIES:  Gait: normal  Alignment: neutral.  No gross deformities or masses.  No Quad atrophy, strength normal.  Intact sensation deep peroneal nerve, superficial peroneal nerve, med/lat tibial nerve, sural nerve, saphenous nerve  Intact EHL, EDL, TA, FHL, GS, quadriceps hamstrings and hip flexors  Toes warm and well perfused, brisk capillary refill. Palpable 2+ dp pulses.  Bilateral calf soft and nttp or squeeze.  DTRs: achilles 2+, patella 2+.  Edema: none    LEFT KNEE EXAM:    Skin: intact, no ecchymosis or erythema  Squat: 100 %, not limited by pain.     ROM: slight hyperextension to 140 flexion  Tight hamstrings on straight leg raise.  Effusion: none  Tender: medial joint line  nontender to palpation lat joint line, anterior or posterior knee  McMurrays: negative    MCL: stable, and non-painful at both 0 and 30 degrees knee flexion  Varus stress: stable, and non-painful at both 0 and 30 degrees knee flexion  Lachmans: neg, firm endpoint  Posterior Drawer stable  Patellofemoral joint:                Apprehension: negative              Crepitations: mild   Grind: positive.    RIGHT KNEE EXAM:    Skin: intact, no ecchymosis or erythema  Squat: 100 %, not limited by pain.     ROM: slight hyperextension to 140 flexion, anterior discomfort.  Tight hamstrings on straight leg raise.  Effusion: none  Tender: medial joint line, pes  NTTP lat joint line, anterior or posterior knee  McMurrays: negative    MCL: stable, and non-painful at both 0 and 30 degrees knee flexion  Varus stress: stable, and non-painful at both 0 and 30 degrees knee flexion  Lachmans: neg, firm endpoint  Posterior Drawer stable  Patellofemoral joint:                Apprehension: negative              Crepitations:  "mild   Grind: positive.    X-RAY:  3 views bilateral knee from 7/24/2019 were reviewed in clinic today. On my review, no obvious fractures or dislocations. Fairly preserved joint spaces. Tiny patello-femoral osteophytes.         ASSESSMENT/PLAN: Isabel Carroll is a 68 year old female with chronic bilateral knee pain, primary osteoarthritis.     * reviewed imaging studies with patient, showing mild arthritic changes mostly under the knee cap, or wearing of the cartilage in the knee. This can be caused by normal \"wear and tear\" over the years or following prior injury to the knee.    Non-surgical treatment for knee arthritis includes:    * rest, sitting  * Activity modification - avoid impact activities or activities that aggravate symptoms.  * NSAIDS (non-steroidal anti-inflammatory medications; e.g. Aleve, advil, motrin, ibuprofen) - regular use for inflammation ( twice daily or three times daily), with food, as long as no contra-indications Please discuss with primary care doctor if needed  * ice, 15-20 minutes at a time several times a day or as needed.  * Strengthening of quadriceps muscles  * Physical Therapy for strengthening, stretching and range of motion exercises of legs  * Tylenol as needed for pain, consider Tylenol arthritis or similar  * Weight loss: Weight loss:  Body mass index is 27.66 kg/m .. weight loss benefits, not only for the current pain symptoms, but also overall health. Recommend a good diet plan that works for the patient, with the assistance of a dietician or primary care doctor as needed. Also, a good, low-impact exercise program for at least 20 minutes per day, 3 times per week, such as exercise bike, elliptical , or pool.  * Exercise: low impact such as stationary bike, elliptical, pool.  * Injections: cortisone versus viscosupplementation (hyaluronic acid, \"rooster comb\", \"gel shots\"); risks and perceived benefits discussed today. Patient elects to proceed.  * Bracing: " bracing the knee may offer some relief of symptoms when worn and provide some stability.  * over the counter supplements such as glucosamine and chondroitin sulfate may help with joint pain.  * topical ointments may help as well    * return to clinic as needed.      Jerrod Arias M.D., M.S.  Dept. of Orthopaedic Surgery  [facility]    Large Joint Injection/Arthocentesis: bilateral knee  Date/Time: 7/24/2019 3:35 PM  Performed by: Bong Barriga PA  Authorized by: Jerrod Arias MD     Indications:  Pain and osteoarthritis  Needle Size:  22 G  Guidance: landmark guided    Approach:  Anteromedial  Location:  Knee  Laterality:  Bilateral      Medications (Right):  3 mL bupivacaine 0.25 %; 4 mL lidocaine 1 %; 80 mg methylPREDNISolone 80 MG/ML  Medications (Left):  3 mL bupivacaine 0.25 %; 4 mL lidocaine 1 %; 80 mg methylPREDNISolone 80 MG/ML  Outcome:  Tolerated well, no immediate complications  Procedure discussed: discussed risks, benefits, and alternatives    Consent Given by:  Patient  Prep: patient was prepped and draped in usual sterile fashion

## 2019-08-06 ENCOUNTER — THERAPY VISIT (OUTPATIENT)
Dept: PHYSICAL THERAPY | Facility: CLINIC | Age: 69
End: 2019-08-06
Payer: COMMERCIAL

## 2019-08-06 DIAGNOSIS — M25.561 CHRONIC PAIN OF BOTH KNEES: Primary | ICD-10-CM

## 2019-08-06 DIAGNOSIS — M17.0 PRIMARY OSTEOARTHRITIS OF BOTH KNEES: ICD-10-CM

## 2019-08-06 DIAGNOSIS — M25.562 CHRONIC PAIN OF BOTH KNEES: Primary | ICD-10-CM

## 2019-08-06 DIAGNOSIS — G89.29 CHRONIC PAIN OF BOTH KNEES: Primary | ICD-10-CM

## 2019-08-06 PROCEDURE — 97110 THERAPEUTIC EXERCISES: CPT | Mod: GP | Performed by: PHYSICAL THERAPIST

## 2019-08-06 PROCEDURE — 97161 PT EVAL LOW COMPLEX 20 MIN: CPT | Mod: GP | Performed by: PHYSICAL THERAPIST

## 2019-08-06 ASSESSMENT — ACTIVITIES OF DAILY LIVING (ADL)
RISE FROM A CHAIR: ACTIVITY IS NOT DIFFICULT
GIVING WAY, BUCKLING OR SHIFTING OF KNEE: THE SYMPTOM AFFECTS MY ACTIVITY SLIGHTLY
AS_A_RESULT_OF_YOUR_KNEE_INJURY,_HOW_WOULD_YOU_RATE_YOUR_CURRENT_LEVEL_OF_DAILY_ACTIVITY?: NEARLY NORMAL
PAIN: THE SYMPTOM AFFECTS MY ACTIVITY SLIGHTLY
RAW_SCORE: 48
GO UP STAIRS: ACTIVITY IS SOMEWHAT DIFFICULT
KNEEL ON THE FRONT OF YOUR KNEE: ACTIVITY IS FAIRLY DIFFICULT
STAND: ACTIVITY IS MINIMALLY DIFFICULT
HOW_WOULD_YOU_RATE_THE_OVERALL_FUNCTION_OF_YOUR_KNEE_DURING_YOUR_USUAL_DAILY_ACTIVITIES?: NEARLY NORMAL
GO DOWN STAIRS: ACTIVITY IS MINIMALLY DIFFICULT
SWELLING: I DO NOT HAVE THE SYMPTOM
SIT WITH YOUR KNEE BENT: ACTIVITY IS MINIMALLY DIFFICULT
KNEE_ACTIVITY_OF_DAILY_LIVING_SCORE: 68.57
HOW_WOULD_YOU_RATE_THE_CURRENT_FUNCTION_OF_YOUR_KNEE_DURING_YOUR_USUAL_DAILY_ACTIVITIES_ON_A_SCALE_FROM_0_TO_100_WITH_100_BEING_YOUR_LEVEL_OF_KNEE_FUNCTION_PRIOR_TO_YOUR_INJURY_AND_0_BEING_THE_INABILITY_TO_PERFORM_ANY_OF_YOUR_USUAL_DAILY_ACTIVITIES?: 75
STIFFNESS: THE SYMPTOM AFFECTS MY ACTIVITY SLIGHTLY
SQUAT: ACTIVITY IS SOMEWHAT DIFFICULT
LIMPING: I HAVE THE SYMPTOM BUT IT DOES NOT AFFECT MY ACTIVITY
WEAKNESS: THE SYMPTOM AFFECTS MY ACTIVITY MODERATELY
KNEE_ACTIVITY_OF_DAILY_LIVING_SUM: 48
WALK: ACTIVITY IS SOMEWHAT DIFFICULT

## 2019-08-06 NOTE — PROGRESS NOTES
[facility] Initial Evaluation  Subjective:  The history is provided by the patient. No  was used.   Isabel Carroll being seen for BILATERAL KNEE PAIN.   Where injured: OVERUSE.Problem occurred: TRIP TO [facility].  and reported as 8/10 on pain scale. General health as reported by patient is excellent. Pertinent medical history includes:  Heart problems, migraines/headaches and osteoarthritis.  Medical allergies: SULFA.  Surgeries include:  Orthopedic surgery and other.  Current medications:  Cardiac medication.    Other job/home tasks details: RETIRED.  Pain is described as aching and is intermittent. Pain is worse in the P.M.. Since onset symptoms are gradually improving (SINCE INJECTIONS RECENTLY. ). Special tests:  X-ray. Previous treatment includes physical therapy. There was significant improvement following previous treatment.      Barriers include:  None as reported by patient.  Other pertinent conditions: BALANCE ISSUES.    Type of problem:  Bilateral knees   Condition occurred with:  Degenerative joint disease. This is a chronic condition   Problem details: MAY 2019.   Patient reports pain:  Anterior.  Associated symptoms:  Buckling/giving out, catching and loss of strength. Symptoms are exacerbated by ascending stairs, kneeling and walking and relieved by ice and NSAID's (ELEVATION).                      Objective:  System                                                Knee Evaluation:  ROM:  PROM: normal      PROM      Extension: Left: ERP OP    Right:  ERP OP        Strength:     Extension:  Left: 5-/5   Pain:      Right: 5/5   Pain:  Flexion:  Left: 5/5   Pain:      Right: 5-/5   Pain:        Ligament Testing:  Normal                Special Tests:     Left knee negative for the following special tests:  Meninscal    Right knee negative for the following special tests:  Meniscal    Edema:    Circumference:      Joint Line:  Left:  NORMAL   Right:  <1/5 GENERAL.        Functional Testing:          Quad:    Single Leg Squat:  Left:      Mild loss of control  Right:       Moderate loss of control  Bilateral Leg Squat:                General     ROS    Assessment/Plan:    Patient is a 68 year old female with both sides knee complaints.    Patient has the following significant findings with corresponding treatment plan.                Diagnosis 1:  CHRONIC PAIN BILATERAL KNEES, PRIMARY OA BILATERAL KNEES.   Pain -  hot/cold therapy, US, electric stimulation, manual therapy, splint/taping/bracing/orthotics, self management, education, directional preference exercise and home program  Decreased function - therapeutic activities and home program    Therapy Evaluation Codes:   1) History comprised of:   Personal factors that impact the plan of care:      Age and Past/current experiences.    Comorbidity factors that impact the plan of care are:      Heart problems and Osteoarthritis.     Medications impacting care: Cardiac.  2) Examination of Body Systems comprised of:   Body structures and functions that impact the plan of care:      Knee.   Activity limitations that impact the plan of care are:      Squatting/kneeling, Stairs and Walking.  3) Clinical presentation characteristics are:   Stable/Uncomplicated.  4) Decision-Making    Low complexity using standardized patient assessment instrument and/or measureable assessment of functional outcome.  Cumulative Therapy Evaluation is: Low complexity.    Previous and current functional limitations:  (See Goal Flow Sheet for this information)    Short term and Long term goals: (See Goal Flow Sheet for this information)     Communication ability:  Patient appears to be able to clearly communicate and understand verbal and written communication and follow directions correctly.  Treatment Explanation - The following has been discussed with the patient:   RX ordered/plan of care  Anticipated outcomes  Possible risks and side effects  This  patient would benefit from PT intervention to resume normal activities.   Rehab potential is fair.    Frequency:  1 X week, once daily  Duration:  for 6 weeks  Discharge Plan:  Achieve all LTG.  Independent in home treatment program.  Reach maximal therapeutic benefit.    Please refer to the daily flowsheet for treatment today, total treatment time and time spent performing 1:1 timed codes.

## 2019-08-07 ENCOUNTER — TELEPHONE (OUTPATIENT)
Dept: FAMILY MEDICINE | Facility: CLINIC | Age: 69
End: 2019-08-07

## 2019-08-07 ENCOUNTER — HOSPITAL ENCOUNTER (OUTPATIENT)
Dept: GENERAL RADIOLOGY | Facility: CLINIC | Age: 69
Discharge: HOME | End: 2019-08-07
Attending: FAMILY MEDICINE | Admitting: FAMILY MEDICINE
Payer: COMMERCIAL

## 2019-08-07 ENCOUNTER — HOSPITAL ENCOUNTER (OUTPATIENT)
Dept: SPEECH THERAPY | Facility: CLINIC | Age: 69
Setting detail: THERAPIES SERIES
End: 2019-08-07
Attending: FAMILY MEDICINE
Payer: COMMERCIAL

## 2019-08-07 DIAGNOSIS — R05.3 CHRONIC COUGH: ICD-10-CM

## 2019-08-07 DIAGNOSIS — R05.3 CHRONIC COUGH: Primary | ICD-10-CM

## 2019-08-07 DIAGNOSIS — K21.9 GASTROESOPHAGEAL REFLUX DISEASE, ESOPHAGITIS PRESENCE NOT SPECIFIED: ICD-10-CM

## 2019-08-07 PROBLEM — M17.0 PRIMARY OSTEOARTHRITIS OF BOTH KNEES: Status: ACTIVE | Noted: 2019-08-07

## 2019-08-07 PROBLEM — M25.561 CHRONIC PAIN OF BOTH KNEES: Status: ACTIVE | Noted: 2019-08-07

## 2019-08-07 PROBLEM — M25.562 CHRONIC PAIN OF BOTH KNEES: Status: ACTIVE | Noted: 2019-08-07

## 2019-08-07 PROBLEM — G89.29 CHRONIC PAIN OF BOTH KNEES: Status: ACTIVE | Noted: 2019-08-07

## 2019-08-07 PROCEDURE — 74230 X-RAY XM SWLNG FUNCJ C+: CPT

## 2019-08-07 PROCEDURE — 92611 MOTION FLUOROSCOPY/SWALLOW: CPT | Mod: GN | Performed by: SPEECH-LANGUAGE PATHOLOGIST

## 2019-08-07 NOTE — PROGRESS NOTES
"   08/07/19 1202   General Information   Type Of Visit Initial   Start Of Care Date 08/07/19   Referring Physician Dr. Gonzalez   Medical Diagnosis Chronic cough   Patient/family Goals To decrease/eliminate chronic cough.   General Information Comments Pt reports a longstanding hx of chronic cough.  No recent pneumonias or bronchitis.  No confirmed GERD per previous GI consult or laryngeal issues per previous ENT consult.  Family reports to pt that pt's cough increases after eating.  Pt feels some globus sensation and need to use liquids to pass food.  Pt briefly used GERD meds, but not consistently and does not use currently.  Hoarse voice and throat clearing/cough noted in conversation.     Fall Risk Screen   Fall screen comments See radiology staff documentation.   VFSS Eval: Radiology   Radiologist Dr. Davenport   Views Taken left lateral   Physical Location of Procedure [facility]   VFSS Eval: Thin Liquid Texture Trial   Mode of Presentation, Thin Liquid cup;straw   Order of Presentation 1, 2, 3, 4   Preparatory Phase Poor bolus control   Oral Phase, Thin Liquid Premature pharyngeal entry   Pharyngeal Phase, Thin Liquid Delayed swallow reflex   Rosenbek's Penetration Aspiration Scale: Thin Liquid Trial Results 2 - contrast enters airway, remains above the vocal cords, no residue remains (penetration)   Diagnostic Statement Curved epiglottis, min decreased epiglottic closure, slight delay/WFL, bolus under the epiglottis, flash min penetration during consecutive swallows, no residue, \"bumpy\" UES region with possible minimal bar with good passage of bolus through UES   VFSS Eval: Puree Solid Texture Trial   Mode of Presentation, Puree spoon   Order of Presentation 5   Preparatory Phase Poor bolus control   Oral Phase, Puree Premature pharyngeal entry   Pharyngeal Phase, Puree Delayed swallow reflex   Rosenbek's Penetration Aspiration Scale: Puree Food Trial Results 1 - no aspiration, contrast does not enter airway   Diagnostic " "Statement Curved epiglottis, min decreased epiglottic closure, slight delay/WFL, bolus around the epiglottis, no residue, \"bumpy\" UES region with good passage of bolus through UES   VFSS Eval: Semisolid Texture Trial   Mode of Presentation, Semisolid spoon   Order of Presentation 6   Preparatory Phase Poor bolus control   Oral Phase, Semisolid Premature pharyngeal entry   Pharyngeal Phase, Semisolid Delayed swallow reflex   Rosenbek's Penetration Aspiration Scale: Semisolid Food Trial Results 1 - no aspiration, contrast does not enter airway   Diagnostic Statement Curved epiglottis, min decreased epiglottic closure, slight delay/WFL, bolus around the epiglottis, no residue, \"bumpy\" UES region with good passage of bolus through UES   VFSS Eval: Solid Food Texture Trial   Mode of Presentation, Solid spoon   Order of Presentation 7   Preparatory Phase Poor bolus control   Oral Phase, Solid Premature pharyngeal entry   Pharyngeal Phase, Solid Delayed swallow reflex   Rosenbek's Penetration Aspiration Scale: Solid Food Trial Results 1 - no aspiration, contrast does not enter airway   Diagnostic Statement Curved epiglottis, min decreased epiglottic closure, slight delay/WFL, bolus around the epiglottis, no residue, \"bumpy\" UES region with good passage of bolus through UES   Swallow Compensations   Swallow Compensations Reduce amounts   Educational Assessment   Barriers to Learning No barriers   Preferred Learning Style Listening;Pictures/video  (Verbalized understanding)   Esophageal Phase of Swallow   Patient reports or presents with symptoms of esophageal dysphagia Yes   Esophageal sweep performed during today s vidofluoroscopic exam  Yes   Esophageal comments Delayed emptying noted on wallace down   Swallow Eval: Clinical Impressions   Skilled Criteria for Therapy Intervention No problems identified which require skilled intervention   Functional Assessment Scale (FAS) 6   Dysphagia Outcome Severity Scale (ARLINE) Level 6 - " "ARLINE   Treatment Diagnosis minimal oral-pharyngeal dysphagia   Diet texture recommendations Thin liquids;Regular diet   Recommended Feeding/Eating Techniques   (see below)   Rehab Potential good, to achieve stated therapy goals   Risks and Benefits of Treatment have been explained. Yes   Patient, family and/or staff in agreement with Plan of Care Yes   Clinical Impression Comments Patient presents with minimal oral-pharyngeal dysphagia per study results.  Findings include curved epiglottis, min decreased epiglottic closure, slight delay/WFL, and \"bumpy\" UES region.  Findings resulted in bolus under/around the epiglottis and flash min penetration during consecutive swallows of thin liquids.  No aspiration and no pharyngeal or UES residue noted.  Swallow function is WFL overall.  Occasional cough may occur if pt is taking large sips of thin liquids.  Recommend a continued regular diet and thin liquids with swallow strategies: sit up at 90 degrees, stay up for 30-60 minutes after eating, small bites/sips, smaller meals at a sitting, alternate textures.  Recommend completion of an OP esophagram given possible reflux resulting in hoarse voice, throat clearing, and cough and note of delayed emptying on brief esophageal viewing during today's study.  Also consider ENT re-consult to assess vocal cords given hoarse voice noted today and if esophagram does not demonstrate significant findings.  Pt verbalized understanding of recommendations.  Pt to follow up with MD for esophagram orders.     Total Session Time   SLP Misty: oral/pharyngeal swallow function, clinical minutes [phone] 30     "

## 2019-08-07 NOTE — RESULT ENCOUNTER NOTE
Dear Isabel    Your test results are attached.    I reviewed the swallow study results. They recommended follow up with Ear, nose and throat doctor. I also put in an order for an esophagram. Let me know if you have any questions.    Please contact me by MyChart if you have any questions about your labs or management.    Marry Gonzalez MD

## 2019-08-07 NOTE — TELEPHONE ENCOUNTER
Video swallow study results reviewed. They recommend follow up with ENT and an esophagram study. Please call patient with scheduling information.   Marry Gonzalez MD

## 2019-08-08 NOTE — TELEPHONE ENCOUNTER
ENT - Your provider has referred you to: [facility]: [facility] - Andreea Dinero [phone]   [email or web address]      This writer attempted to contact Isabel on 08/08/19    Reason for call see below and left message to return call.    When patient calls back, please contact 1st floor Andreea Dinero. routine priority.        Flakito Smart

## 2019-08-09 NOTE — TELEPHONE ENCOUNTER
This writer attempted to contact patient on 08/09/19      Reason for call message below and left message.      If patient calls back:   Patient contacted by 1st floor [facility] Team (MA/TC). Inform patient that someone from the team will contact them, document that pt called and route to care team.         Shayy Ribeiro MA

## 2019-08-12 NOTE — TELEPHONE ENCOUNTER
Patient states that she has an appt with esophogram on 08/21/2019. Called and informed patient of ENT referral.     Haylie Contreras MA on 8/12/2019 at 10:02 AM

## 2019-08-13 ENCOUNTER — THERAPY VISIT (OUTPATIENT)
Dept: PHYSICAL THERAPY | Facility: CLINIC | Age: 69
End: 2019-08-13
Payer: COMMERCIAL

## 2019-08-13 DIAGNOSIS — G89.29 CHRONIC PAIN OF BOTH KNEES: ICD-10-CM

## 2019-08-13 DIAGNOSIS — M25.562 CHRONIC PAIN OF BOTH KNEES: ICD-10-CM

## 2019-08-13 DIAGNOSIS — M25.561 CHRONIC PAIN OF BOTH KNEES: ICD-10-CM

## 2019-08-13 PROCEDURE — 97112 NEUROMUSCULAR REEDUCATION: CPT | Mod: GP | Performed by: PHYSICAL THERAPIST

## 2019-08-13 PROCEDURE — 97530 THERAPEUTIC ACTIVITIES: CPT | Mod: GP | Performed by: PHYSICAL THERAPIST

## 2019-08-21 ENCOUNTER — ANCILLARY PROCEDURE (OUTPATIENT)
Dept: GENERAL RADIOLOGY | Facility: CLINIC | Age: 69
End: 2019-08-21
Attending: FAMILY MEDICINE
Payer: COMMERCIAL

## 2019-08-21 DIAGNOSIS — R05.3 CHRONIC COUGH: ICD-10-CM

## 2019-08-21 DIAGNOSIS — K21.9 GASTROESOPHAGEAL REFLUX DISEASE, ESOPHAGITIS PRESENCE NOT SPECIFIED: ICD-10-CM

## 2019-08-21 PROCEDURE — 74240 X-RAY XM UPR GI TRC 1CNTRST: CPT

## 2019-09-10 ENCOUNTER — THERAPY VISIT (OUTPATIENT)
Dept: PHYSICAL THERAPY | Facility: CLINIC | Age: 69
End: 2019-09-10
Payer: COMMERCIAL

## 2019-09-10 ENCOUNTER — OFFICE VISIT (OUTPATIENT)
Dept: OTOLARYNGOLOGY | Facility: CLINIC | Age: 69
End: 2019-09-10
Attending: FAMILY MEDICINE
Payer: COMMERCIAL

## 2019-09-10 VITALS
WEIGHT: 145 LBS | BODY MASS INDEX: 27.38 KG/M2 | HEART RATE: 79 BPM | OXYGEN SATURATION: 97 % | HEIGHT: 61 IN | DIASTOLIC BLOOD PRESSURE: 83 MMHG | SYSTOLIC BLOOD PRESSURE: 124 MMHG | RESPIRATION RATE: 16 BRPM

## 2019-09-10 DIAGNOSIS — M25.561 CHRONIC PAIN OF BOTH KNEES: ICD-10-CM

## 2019-09-10 DIAGNOSIS — G89.29 CHRONIC PAIN OF BOTH KNEES: ICD-10-CM

## 2019-09-10 DIAGNOSIS — M25.562 CHRONIC PAIN OF BOTH KNEES: ICD-10-CM

## 2019-09-10 DIAGNOSIS — J38.3 VOCAL CORD DYSFUNCTION: Primary | ICD-10-CM

## 2019-09-10 DIAGNOSIS — R05.3 CHRONIC COUGH: ICD-10-CM

## 2019-09-10 PROCEDURE — 99204 OFFICE O/P NEW MOD 45 MIN: CPT | Performed by: OTOLARYNGOLOGY

## 2019-09-10 PROCEDURE — 97112 NEUROMUSCULAR REEDUCATION: CPT | Mod: GP | Performed by: PHYSICAL THERAPIST

## 2019-09-10 PROCEDURE — 97530 THERAPEUTIC ACTIVITIES: CPT | Mod: GP | Performed by: PHYSICAL THERAPIST

## 2019-09-10 PROCEDURE — 97110 THERAPEUTIC EXERCISES: CPT | Mod: GP | Performed by: PHYSICAL THERAPIST

## 2019-09-10 RX ORDER — AMITRIPTYLINE HYDROCHLORIDE 10 MG/1
TABLET ORAL
Qty: 90 TABLET | Refills: 3 | Status: SHIPPED | OUTPATIENT
Start: 2019-09-10 | End: 2019-10-29

## 2019-09-10 ASSESSMENT — ACTIVITIES OF DAILY LIVING (ADL)
WALK: ACTIVITY IS MINIMALLY DIFFICULT
KNEE_ACTIVITY_OF_DAILY_LIVING_SCORE: 88.57
RAW_SCORE: 62
GO DOWN STAIRS: ACTIVITY IS NOT DIFFICULT
GIVING WAY, BUCKLING OR SHIFTING OF KNEE: THE SYMPTOM AFFECTS MY ACTIVITY SLIGHTLY
GO UP STAIRS: ACTIVITY IS MINIMALLY DIFFICULT
KNEEL ON THE FRONT OF YOUR KNEE: ACTIVITY IS MINIMALLY DIFFICULT
RISE FROM A CHAIR: ACTIVITY IS NOT DIFFICULT
PAIN: THE SYMPTOM AFFECTS MY ACTIVITY SLIGHTLY
SQUAT: ACTIVITY IS NOT DIFFICULT
WEAKNESS: I HAVE THE SYMPTOM BUT IT DOES NOT AFFECT MY ACTIVITY
LIMPING: I DO NOT HAVE THE SYMPTOM
KNEE_ACTIVITY_OF_DAILY_LIVING_SUM: 62
STIFFNESS: I DO NOT HAVE THE SYMPTOM
SIT WITH YOUR KNEE BENT: ACTIVITY IS NOT DIFFICULT
STAND: ACTIVITY IS NOT DIFFICULT
SWELLING: I DO NOT HAVE THE SYMPTOM
AS_A_RESULT_OF_YOUR_KNEE_INJURY,_HOW_WOULD_YOU_RATE_YOUR_CURRENT_LEVEL_OF_DAILY_ACTIVITY?: NORMAL
HOW_WOULD_YOU_RATE_THE_CURRENT_FUNCTION_OF_YOUR_KNEE_DURING_YOUR_USUAL_DAILY_ACTIVITIES_ON_A_SCALE_FROM_0_TO_100_WITH_100_BEING_YOUR_LEVEL_OF_KNEE_FUNCTION_PRIOR_TO_YOUR_INJURY_AND_0_BEING_THE_INABILITY_TO_PERFORM_ANY_OF_YOUR_USUAL_DAILY_ACTIVITIES?: 90
HOW_WOULD_YOU_RATE_THE_OVERALL_FUNCTION_OF_YOUR_KNEE_DURING_YOUR_USUAL_DAILY_ACTIVITIES?: NORMAL

## 2019-09-10 ASSESSMENT — MIFFLIN-ST. JEOR: SCORE: 1120.1

## 2019-09-10 NOTE — LETTER
9/10/2019         RE: Isabel Carroll  [address] [facility] [address]        Dear Colleague,    Thank you for referring your patient, Isabel Carroll, to the [facility]. Please see a copy of my visit note below.    History of Present Illness - Isabel Carroll is a 68 year old female here to see me for the first time for chronic cough.    She tells me that this has been a very longstanding problem, over 20 years.  She has slowly developed a persistent dry tickling cough.  It can happen any time and has no pattern.  She has no chest pain, no heartburn, no pain.  No issues with swallowing or eating.  No coughing up blood.  No previous ENT surgery or head and neck disease no post nasal drainage.    She has had work up from [facility], including barium swallow, pH Probe, Allergy testing, work up from Pulmonary Medicine, and everything has been negative.    Past Medical History -   Patient Active Problem List   Diagnosis     Overweight     Chronic rhinitis     Primary osteoarthritis of both hands     Basal cell carcinoma of skin     Coronary artery disease involving native heart with angina pectoris, unspecified vessel or lesion type (H)     Hyperlipidemia     Impaired fasting glucose     Chorioretinal scar of left eye     Sinus tachycardia     Advance care planning     Pes planus     Right shoulder strain, initial encounter     Bronchiectasis without complication (H)     Chronic cough     Female pattern hair loss     FHx: colon cancer     Clostridium difficile infection     Pseudophakia of right eye     Pseudophakia of both eyes     Chronic pain of both knees     Primary osteoarthritis of both knees       Current Medications -   Current Outpatient Medications:      albuterol (PROAIR HFA/PROVENTIL HFA/VENTOLIN HFA) 108 (90 Base) MCG/ACT inhaler, Inhale 2 puffs into the lungs every 6 hours as needed for shortness of breath / dyspnea or wheezing, Disp: 1 Inhaler, Rfl:  3     aspirin 81 MG tablet, Take 1 tablet by mouth daily., Disp: , Rfl:      atorvastatin (LIPITOR) 40 MG tablet, 40 mg, Disp: , Rfl:      Calcium-Vitamin D (CALCIUM + D PO), Take  by mouth., Disp: , Rfl:      FISH OIL, , Disp: , Rfl:      MAGNESIUM OXIDE PO, Take 200 mg by mouth daily, Disp: , Rfl:      metoprolol (TOPROL-XL) 25 MG 24 hr tablet, Take 1 tablet (25 mg) by mouth daily, Disp: 90 tablet, Rfl: 3     Multiple Vitamins-Minerals (MULTIVITAMIN & MINERAL PO), Take  by mouth., Disp: , Rfl:     Current Facility-Administered Medications:      bupivacaine (MARCAINE) 0.25 % injection 3 mL, 3 mL, , , Jerrod Arias MD, 3 mL at 07/24/19 1535     bupivacaine (MARCAINE) 0.25 % injection 3 mL, 3 mL, , , Jerrod Arias MD, 3 mL at 07/24/19 1535     lidocaine 1 % injection 4 mL, 4 mL, , , Jerrod Arias MD, 4 mL at 07/24/19 1535     lidocaine 1 % injection 4 mL, 4 mL, , , Jerrod Arias MD, 4 mL at 07/24/19 1535     methylPREDNISolone (DEPO-MEDROL) injection 80 mg, 80 mg, , , Jerrod Arias MD, 80 mg at 07/24/19 1535     methylPREDNISolone (DEPO-MEDROL) injection 80 mg, 80 mg, , , Jerrod Arias MD, 80 mg at 07/24/19 1535    Allergies -   Allergies   Allergen Reactions     Sulfa Drugs Nausea     Codeine Phosphate Nausea and Vomiting     Pentazocine      Propoxyphene      Darvon     Penicillins Rash     rash and itching         Social History -   Social History     Socioeconomic History     Marital status:      Spouse name: Darrian     Number of children: 2     Years of education: 17.5     Highest education level: Not on file   Occupational History     Occupation: Retired     Comment: School counselor, June 2015   Social Needs     Financial resource strain: Not on file     Food insecurity:     Worry: Not on file     Inability: Not on file     Transportation needs:     Medical: Not on file     Non-medical: Not on file   Tobacco Use     Smoking status: Former Smoker     Years: 16.00     Types:  Cigarettes     Last attempt to quit: 1982     Years since quittin.7     Smokeless tobacco: Never Used   Substance and Sexual Activity     Alcohol use: Yes     Alcohol/week: 0.0 oz     Comment: 2-7 glasses of wine per week     Drug use: No     Sexual activity: Never     Partners: Male     Birth control/protection: Post-menopausal   Lifestyle     Physical activity:     Days per week: Not on file     Minutes per session: Not on file     Stress: Not on file   Relationships     Social connections:     Talks on phone: Not on file     Gets together: Not on file     Attends Oriental orthodox service: Not on file     Active member of club or organization: Not on file     Attends meetings of clubs or organizations: Not on file     Relationship status: Not on file     Intimate partner violence:     Fear of current or ex partner: Not on file     Emotionally abused: Not on file     Physically abused: Not on file     Forced sexual activity: Not on file   Other Topics Concern     Parent/sibling w/ CABG, MI or angioplasty before 65F 55M? Not Asked      Service Not Asked     Blood Transfusions Not Asked     Caffeine Concern Not Asked     Occupational Exposure Not Asked     Hobby Hazards Not Asked     Sleep Concern Not Asked     Stress Concern Not Asked     Weight Concern Not Asked     Special Diet Not Asked     Back Care Not Asked     Exercise Yes     Comment: jazzercise     Bike Helmet Not Asked     Seat Belt Not Asked     Self-Exams Not Asked   Social History Narrative     Not on file       Family History -   Family History   Problem Relation Age of Onset     Other Cancer Sister 51        ovarian cancer     Cancer Sister      Thyroid Disease Sister      Colon Cancer Father 76     Brain Hemorrhage Father         accident     Macular Degeneration Mother      Diabetes Maternal Aunt      Diabetes Maternal Uncle      Cancer Paternal Aunt      Diabetes Maternal Grandmother      Hypertension No family hx of      Cerebrovascular  "Disease No family hx of      Glaucoma No family hx of        Review of Systems - As per HPI and PMHx, otherwise 10+ system review of the head and neck, and general constitution is negative.    Physical Exam  /83   Pulse 79   Resp 16   Ht 1.549 m (5' 1\")   Wt 65.8 kg (145 lb)   LMP  (LMP Unknown)   SpO2 97%   BMI 27.40 kg/m       General - The patient is well nourished and well developed, and appears to have good nutritional status.  Alert and oriented to person and place, answers questions and cooperates with examination appropriately.   Head and Face - Normocephalic and atraumatic, with no gross asymmetry noted of the contour of the facial features.  The facial nerve is intact, with strong symmetric movements.  Voice and Breathing - The patient was breathing comfortably without the use of accessory muscles. There was no wheezing, stridor, or stertor.  The patients voice was clear and strong, and had appropriate pitch and quality.  Ears - The tympanic membranes are normal in appearance, bony landmarks are intact.  No retraction, perforation, or masses.  No fluid or purulence was seen in the external canal or the middle ear. No evidence of infection of the middle ear or external canal, cerumen was normal in appearance.  Eyes - Extraocular movements intact, and the pupils were reactive to light.  Sclera were not icteric or injected, conjunctiva were pink and moist.  Mouth - Examination of the oral cavity showed pink, healthy oral mucosa. No lesions or ulcerations noted.  The tongue was mobile and midline, and the dentition were in good condition.    Throat - The walls of the oropharynx were smooth, pink, moist, symmetric, and had no lesions or ulcerations.  The tonsillar pillars and soft palate were symmetric.  The uvula was midline on elevation.    Neck - Normal midline excursion of the laryngotracheal complex during swallowing.  Full range of motion on passive movement.  Palpation of the occipital, " submental, submandibular, internal jugular chain, and supraclavicular nodes did not demonstrate any abnormal lymph nodes or masses.  The carotid pulse was palpable bilaterally.  Palpation of the thyroid was soft and smooth, with no nodules or goiter appreciated.  The trachea was mobile and midline.  Nose - External contour is symmetric, no gross deflection or scars.  Nasal mucosa is pink and moist with no abnormal mucus.  The septum was midline and non-obstructive, turbinates of normal size and position.  No polyps, masses, or purulence noted on examination.      A/P - Isabel Carroll is a 68 year old female  (J38.3) Vocal cord dysfunction  (primary encounter diagnosis)  (R05) Chronic cough     She sounds like she has had an excellent work up over the years.  I think it would be reasonable to give an empiric attempt at Neuroleptic management for VCD/neurgenic cough.      As a first line I will try Elavil.  If that does not help, we can also try inhaled steroids to coat the larynx, or also Baclofen as well.    I will start with Elavil titrating up to 10mg three times daily for a month and then follow up.    Again, thank you for allowing me to participate in the care of your patient.        Sincerely,        Swapnil Shah MD

## 2019-09-10 NOTE — PATIENT INSTRUCTIONS
Scheduling Information  To schedule your CT/MRI scan, please contact [facility] at [phone] OR [facility] at [phone]    To schedule your Surgery, please contact our Specialty Schedulers at [phone]      ENT Clinic Locations Clinic Hours Telephone Number     [facility]  [address] [facility] [place] [address]   Monday:           1:00pm -- 5:00pm    Friday:              8:00am - 12:00pm   To schedule/reschedule an appointment with   Dr. Shah,   please contact our   Specialty Scheduling Department at:     [phone]       [facility]  [address] [place]  [place] [address] Tuesday:          8:00am -- 2:00pm         Urgent Care Locations Clinic Hours Telephone Numbers     [facility]  [address] [place] [address]     Monday-Friday:     11:00am - 9:00pm    Saturday-Sunday:  9:00am - 5:00pm   [phone]     [facility]  [address] [place] [address]     Monday-Friday:      5:00pm - 9:00pm     Saturday-Sunday:  9:00am - 5:00pm   [phone]

## 2019-09-10 NOTE — PROGRESS NOTES
[facility] Initial Evaluation  Subjective:  HPI                    Objective:  System    Physical Exam    General     ROS    Assessment/Plan:    {REHAB NOTES:[id number]}

## 2019-09-10 NOTE — PROGRESS NOTES
History of Present Illness - Isabel Carroll is a 68 year old female here to see me for the first time for chronic cough.    She tells me that this has been a very longstanding problem, over 20 years.  She has slowly developed a persistent dry tickling cough.  It can happen any time and has no pattern.  She has no chest pain, no heartburn, no pain.  No issues with swallowing or eating.  No coughing up blood.  No previous ENT surgery or head and neck disease no post nasal drainage.    She has had work up from [facility], including barium swallow, pH Probe, Allergy testing, work up from Pulmonary Medicine, and everything has been negative.    Past Medical History -   Patient Active Problem List   Diagnosis     Overweight     Chronic rhinitis     Primary osteoarthritis of both hands     Basal cell carcinoma of skin     Coronary artery disease involving native heart with angina pectoris, unspecified vessel or lesion type (H)     Hyperlipidemia     Impaired fasting glucose     Chorioretinal scar of left eye     Sinus tachycardia     Advance care planning     Pes planus     Right shoulder strain, initial encounter     Bronchiectasis without complication (H)     Chronic cough     Female pattern hair loss     FHx: colon cancer     Clostridium difficile infection     Pseudophakia of right eye     Pseudophakia of both eyes     Chronic pain of both knees     Primary osteoarthritis of both knees       Current Medications -   Current Outpatient Medications:      albuterol (PROAIR HFA/PROVENTIL HFA/VENTOLIN HFA) 108 (90 Base) MCG/ACT inhaler, Inhale 2 puffs into the lungs every 6 hours as needed for shortness of breath / dyspnea or wheezing, Disp: 1 Inhaler, Rfl: 3     aspirin 81 MG tablet, Take 1 tablet by mouth daily., Disp: , Rfl:      atorvastatin (LIPITOR) 40 MG tablet, 40 mg, Disp: , Rfl:      Calcium-Vitamin D (CALCIUM + D PO), Take  by mouth., Disp: , Rfl:      FISH OIL, , Disp: , Rfl:      MAGNESIUM OXIDE PO, Take 200 mg  by mouth daily, Disp: , Rfl:      metoprolol (TOPROL-XL) 25 MG 24 hr tablet, Take 1 tablet (25 mg) by mouth daily, Disp: 90 tablet, Rfl: 3     Multiple Vitamins-Minerals (MULTIVITAMIN & MINERAL PO), Take  by mouth., Disp: , Rfl:     Current Facility-Administered Medications:      bupivacaine (MARCAINE) 0.25 % injection 3 mL, 3 mL, , , Jerrod Arias MD, 3 mL at 19 1535     bupivacaine (MARCAINE) 0.25 % injection 3 mL, 3 mL, , , Jerrod Arias MD, 3 mL at 19 1535     lidocaine 1 % injection 4 mL, 4 mL, , , Jerrod Arias MD, 4 mL at 19 1535     lidocaine 1 % injection 4 mL, 4 mL, , , Jerrod Arias MD, 4 mL at 19 1535     methylPREDNISolone (DEPO-MEDROL) injection 80 mg, 80 mg, , , Jerrod Arias MD, 80 mg at 19 1535     methylPREDNISolone (DEPO-MEDROL) injection 80 mg, 80 mg, , , Jerrod Arias MD, 80 mg at 19 1535    Allergies -   Allergies   Allergen Reactions     Sulfa Drugs Nausea     Codeine Phosphate Nausea and Vomiting     Pentazocine      Propoxyphene      Darvon     Penicillins Rash     rash and itching         Social History -   Social History     Socioeconomic History     Marital status:      Spouse name: Darrian     Number of children: 2     Years of education: 17.5     Highest education level: Not on file   Occupational History     Occupation: Retired     Comment: School counselor, 2015   Social Needs     Financial resource strain: Not on file     Food insecurity:     Worry: Not on file     Inability: Not on file     Transportation needs:     Medical: Not on file     Non-medical: Not on file   Tobacco Use     Smoking status: Former Smoker     Years: 16.00     Types: Cigarettes     Last attempt to quit: 1982     Years since quittin.7     Smokeless tobacco: Never Used   Substance and Sexual Activity     Alcohol use: Yes     Alcohol/week: 0.0 oz     Comment: 2-7 glasses of wine per week     Drug use: No     Sexual activity:  "Never     Partners: Male     Birth control/protection: Post-menopausal   Lifestyle     Physical activity:     Days per week: Not on file     Minutes per session: Not on file     Stress: Not on file   Relationships     Social connections:     Talks on phone: Not on file     Gets together: Not on file     Attends Confucianist service: Not on file     Active member of club or organization: Not on file     Attends meetings of clubs or organizations: Not on file     Relationship status: Not on file     Intimate partner violence:     Fear of current or ex partner: Not on file     Emotionally abused: Not on file     Physically abused: Not on file     Forced sexual activity: Not on file   Other Topics Concern     Parent/sibling w/ CABG, MI or angioplasty before 65F 55M? Not Asked      Service Not Asked     Blood Transfusions Not Asked     Caffeine Concern Not Asked     Occupational Exposure Not Asked     Hobby Hazards Not Asked     Sleep Concern Not Asked     Stress Concern Not Asked     Weight Concern Not Asked     Special Diet Not Asked     Back Care Not Asked     Exercise Yes     Comment: jazzercise     Bike Helmet Not Asked     Seat Belt Not Asked     Self-Exams Not Asked   Social History Narrative     Not on file       Family History -   Family History   Problem Relation Age of Onset     Other Cancer Sister 51        ovarian cancer     Cancer Sister      Thyroid Disease Sister      Colon Cancer Father 76     Brain Hemorrhage Father         accident     Macular Degeneration Mother      Diabetes Maternal Aunt      Diabetes Maternal Uncle      Cancer Paternal Aunt      Diabetes Maternal Grandmother      Hypertension No family hx of      Cerebrovascular Disease No family hx of      Glaucoma No family hx of        Review of Systems - As per HPI and PMHx, otherwise 10+ system review of the head and neck, and general constitution is negative.    Physical Exam  /83   Pulse 79   Resp 16   Ht 1.549 m (5' 1\")   Wt " 65.8 kg (145 lb)   LMP  (LMP Unknown)   SpO2 97%   BMI 27.40 kg/m      General - The patient is well nourished and well developed, and appears to have good nutritional status.  Alert and oriented to person and place, answers questions and cooperates with examination appropriately.   Head and Face - Normocephalic and atraumatic, with no gross asymmetry noted of the contour of the facial features.  The facial nerve is intact, with strong symmetric movements.  Voice and Breathing - The patient was breathing comfortably without the use of accessory muscles. There was no wheezing, stridor, or stertor.  The patients voice was clear and strong, and had appropriate pitch and quality.  Ears - The tympanic membranes are normal in appearance, bony landmarks are intact.  No retraction, perforation, or masses.  No fluid or purulence was seen in the external canal or the middle ear. No evidence of infection of the middle ear or external canal, cerumen was normal in appearance.  Eyes - Extraocular movements intact, and the pupils were reactive to light.  Sclera were not icteric or injected, conjunctiva were pink and moist.  Mouth - Examination of the oral cavity showed pink, healthy oral mucosa. No lesions or ulcerations noted.  The tongue was mobile and midline, and the dentition were in good condition.    Throat - The walls of the oropharynx were smooth, pink, moist, symmetric, and had no lesions or ulcerations.  The tonsillar pillars and soft palate were symmetric.  The uvula was midline on elevation.    Neck - Normal midline excursion of the laryngotracheal complex during swallowing.  Full range of motion on passive movement.  Palpation of the occipital, submental, submandibular, internal jugular chain, and supraclavicular nodes did not demonstrate any abnormal lymph nodes or masses.  The carotid pulse was palpable bilaterally.  Palpation of the thyroid was soft and smooth, with no nodules or goiter appreciated.  The  trachea was mobile and midline.  Nose - External contour is symmetric, no gross deflection or scars.  Nasal mucosa is pink and moist with no abnormal mucus.  The septum was midline and non-obstructive, turbinates of normal size and position.  No polyps, masses, or purulence noted on examination.      A/P - Isabel Carroll is a 68 year old female  (J38.3) Vocal cord dysfunction  (primary encounter diagnosis)  (R05) Chronic cough     She sounds like she has had an excellent work up over the years.  I think it would be reasonable to give an empiric attempt at Neuroleptic management for VCD/neurgenic cough.      As a first line I will try Elavil.  If that does not help, we can also try inhaled steroids to coat the larynx, or also Baclofen as well.    I will start with Elavil titrating up to 10mg three times daily for a month and then follow up.

## 2019-09-10 NOTE — PROGRESS NOTES
Subjective:  HPI                    Objective:  System    Physical Exam    General     ROS    Assessment/Plan:    DISCHARGE REPORT    Progress reporting period is from 8/6/2019 to 9/10/2019.     SUBJECTIVE  Subjective: DOING VERY WELL.  NO PAIN.    EASIER TO DO STAIRS.  DOES NOT HAVE THE CATCH ANYMORE.    Current Pain level: 0/10   Initial Pain level: 8/10   Changes in function: Yes, see goal flow sheet for change in function   Adverse reactions: None;   ,     The subjective and objective information are from the last SOAP note on this patient.    OBJECTIVE  Objective: RESISTED KNEE EXT: 5/5 BILAT., FLEX: 5/5 BILAT.  LIGAMENTS: NEGATIVE,  MENISCUS: NEGATIVE.   SINGLE LEG SQUATS: NORMAL CONTROL        ASSESSMENT/PLAN  Updated problem list and treatment plan: Diagnosis 1:  CHRONIC PAIN BOTH KNEES, PRIMARY OSTEOARTHRITIS BILATERAL KNEES  PATIENT IS DOING WELL.   STG/LTGs have been met or progress has been made towards goals:  Yes (See Goal flow sheet completed today.)  Assessment of Progress: The patient's condition is improving.  Self Management Plans:  Patient has been instructed in a home treatment program.  I have re-evaluated this patient and find that the nature, scope, duration and intensity of the therapy is appropriate for the medical condition of the patient.  Isabel continues to require the following intervention to meet STG and LTG's: PT intervention is no longer required to meet STG/LTG.  We will discharge this patient from PT.    Recommendations:  This patient is ready to be discharged from therapy and continue their home treatment program.                                                        Please refer to the daily flowsheet for treatment today, total treatment time and time spent performing 1:1 timed codes.

## 2019-09-27 ENCOUNTER — ANCILLARY PROCEDURE (OUTPATIENT)
Dept: MAMMOGRAPHY | Facility: CLINIC | Age: 69
End: 2019-09-27
Attending: FAMILY MEDICINE
Payer: COMMERCIAL

## 2019-09-27 DIAGNOSIS — Z12.31 VISIT FOR SCREENING MAMMOGRAM: ICD-10-CM

## 2019-09-27 PROCEDURE — 77067 SCR MAMMO BI INCL CAD: CPT | Mod: TC

## 2019-09-27 NOTE — LETTER
September 30, 2019        Isabel Carroll  [address]  [place] [address]        Dear Isabel,    I am happy to let you know that your mammogram was normal. You may schedule a follow up mammogram in 1-2 years depending on your risk factors and family history. Please let me know if you have any questions about your results. You should be receiving a letter from the radiologist.    Sincerely,      Marry Gonzalez MD/v        Resulted Orders   MA Screening Digital Bilateral    Narrative    Examination: Bilateral digital screening mammography with computer  aided detection, 9/27/2019 1:27 PM.    Comparison: 09/22/2018, 09/19/2017, 09/06/2016, 08/25/2015    History: No current breast concerns.    BREAST DENSITY: Scattered fibroglandular densities.    COMMENTS:  No suspicious finding.      Impression    IMPRESSION: BI-RADS CATEGORY: 1 -  NEGATIVE.    RECOMMENDED FOLLOW-UP: Annual Mammography.      The patient will be notified of the results.     MICHAEL AVILA MD

## 2019-09-29 NOTE — RESULT ENCOUNTER NOTE
Dear Isabel Carroll,    I am happy to let you know that your mammogram was normal. You may schedule a follow up mammogram in 1-2 years depending on your risk factors and family history. Please let me know if you have any questions about your results. You should be receiving a letter from the radiologist.    Marry Gonzalez MD

## 2019-10-04 ENCOUNTER — HEALTH MAINTENANCE LETTER (OUTPATIENT)
Age: 69
End: 2019-10-04

## 2019-10-09 ENCOUNTER — TELEPHONE (OUTPATIENT)
Dept: FAMILY MEDICINE | Facility: CLINIC | Age: 69
End: 2019-10-09

## 2019-10-29 ENCOUNTER — OFFICE VISIT (OUTPATIENT)
Dept: OTOLARYNGOLOGY | Facility: CLINIC | Age: 69
End: 2019-10-29
Payer: COMMERCIAL

## 2019-10-29 VITALS
HEART RATE: 78 BPM | DIASTOLIC BLOOD PRESSURE: 80 MMHG | RESPIRATION RATE: 12 BRPM | SYSTOLIC BLOOD PRESSURE: 125 MMHG | WEIGHT: 145 LBS | HEIGHT: 61 IN | OXYGEN SATURATION: 97 % | BODY MASS INDEX: 27.38 KG/M2

## 2019-10-29 DIAGNOSIS — R05.3 CHRONIC COUGH: ICD-10-CM

## 2019-10-29 DIAGNOSIS — J38.3 VOCAL CORD DYSFUNCTION: Primary | ICD-10-CM

## 2019-10-29 PROCEDURE — 99214 OFFICE O/P EST MOD 30 MIN: CPT | Performed by: OTOLARYNGOLOGY

## 2019-10-29 RX ORDER — AMITRIPTYLINE HYDROCHLORIDE 10 MG/1
TABLET ORAL
Qty: 360 TABLET | Refills: 3 | Status: SHIPPED | OUTPATIENT
Start: 2019-10-29 | End: 2019-12-17

## 2019-10-29 ASSESSMENT — MIFFLIN-ST. JEOR: SCORE: 1120.1

## 2019-10-29 NOTE — LETTER
10/29/2019         RE: Isabel Carroll  [address] [facility] [address]        Dear Colleague,    Thank you for referring your patient, Isabel Carroll, [facility]. Please see a copy of my visit note below.    History of Present Illness - Isabel Carroll is a 68 year old female here in follow up from 9/10/2019, seen for chronic cough.    To review, this has been a very longstanding problem, over 20 years.  She has slowly developed a persistent dry tickling cough.  It can happen any time and has no pattern.  She has no chest pain, no heartburn, no pain.  No issues with swallowing or eating.  No coughing up blood.  No previous ENT surgery or head and neck disease no post nasal drainage.    She has had work up from [facility], including barium swallow, pH Probe, Allergy testing, work up from Pulmonary Medicine, and everything has been negative.    After the visit, and evaluation, I felt that this was most likely a neurogenic cough.  I started her on a Elavil titration with a goal of 10mg three times daily, and she is here for follow up.   She notices a major improvement, at least 50% better.    Past Medical History -   Patient Active Problem List   Diagnosis     Overweight     Chronic rhinitis     Primary osteoarthritis of both hands     Basal cell carcinoma of skin     Coronary artery disease involving native heart with angina pectoris, unspecified vessel or lesion type (H)     Hyperlipidemia     Impaired fasting glucose     Chorioretinal scar of left eye     Sinus tachycardia     Advance care planning     Pes planus     Right shoulder strain, initial encounter     Bronchiectasis without complication (H)     Chronic cough     Female pattern hair loss     FHx: colon cancer     Clostridium difficile infection     Pseudophakia of right eye     Pseudophakia of both eyes     Primary osteoarthritis of both knees     Vocal cord dysfunction       Current Medications  -   Current Outpatient Medications:      albuterol (PROAIR HFA/PROVENTIL HFA/VENTOLIN HFA) 108 (90 Base) MCG/ACT inhaler, Inhale 2 puffs into the lungs every 6 hours as needed for shortness of breath / dyspnea or wheezing, Disp: 1 Inhaler, Rfl: 3     amitriptyline (ELAVIL) 10 MG tablet, 10mg at bedtime for three days, then increase to 10mg twice daily for three days, then increase to 10mg three times daily until follow up., Disp: 90 tablet, Rfl: 3     aspirin 81 MG tablet, Take 1 tablet by mouth daily., Disp: , Rfl:      atorvastatin (LIPITOR) 40 MG tablet, 40 mg, Disp: , Rfl:      Calcium-Vitamin D (CALCIUM + D PO), Take  by mouth., Disp: , Rfl:      FISH OIL, , Disp: , Rfl:      MAGNESIUM OXIDE PO, Take 200 mg by mouth daily, Disp: , Rfl:      metoprolol (TOPROL-XL) 25 MG 24 hr tablet, Take 1 tablet (25 mg) by mouth daily, Disp: 90 tablet, Rfl: 3     Multiple Vitamins-Minerals (MULTIVITAMIN & MINERAL PO), Take  by mouth., Disp: , Rfl:     Current Facility-Administered Medications:      bupivacaine (MARCAINE) 0.25 % injection 3 mL, 3 mL, , , Jerrod Arias MD, 3 mL at 07/24/19 1535     bupivacaine (MARCAINE) 0.25 % injection 3 mL, 3 mL, , , Jerrod Arias MD, 3 mL at 07/24/19 1535     lidocaine 1 % injection 4 mL, 4 mL, , , Jerrod Arias MD, 4 mL at 07/24/19 1535     lidocaine 1 % injection 4 mL, 4 mL, , , Jerrod Arias MD, 4 mL at 07/24/19 1535     methylPREDNISolone (DEPO-MEDROL) injection 80 mg, 80 mg, , , Jerrod Arias MD, 80 mg at 07/24/19 1535     methylPREDNISolone (DEPO-MEDROL) injection 80 mg, 80 mg, , , Jerrod Arias MD, 80 mg at 07/24/19 1535    Allergies -   Allergies   Allergen Reactions     Sulfa Drugs Nausea     Codeine Phosphate Nausea and Vomiting     Pentazocine      Propoxyphene      Darvon     Penicillins Rash     rash and itching         Social History -   Social History     Socioeconomic History     Marital status:      Spouse name: Darrian Rizvi of  children: 2     Years of education: 17.5     Highest education level: Not on file   Occupational History     Occupation: Retired     Comment: School counselor, 2015   Social Needs     Financial resource strain: Not on file     Food insecurity:     Worry: Not on file     Inability: Not on file     Transportation needs:     Medical: Not on file     Non-medical: Not on file   Tobacco Use     Smoking status: Former Smoker     Years: 16.00     Types: Cigarettes     Last attempt to quit: 1982     Years since quittin.7     Smokeless tobacco: Never Used   Substance and Sexual Activity     Alcohol use: Yes     Alcohol/week: 0.0 oz     Comment: 2-7 glasses of wine per week     Drug use: No     Sexual activity: Never     Partners: Male     Birth control/protection: Post-menopausal   Lifestyle     Physical activity:     Days per week: Not on file     Minutes per session: Not on file     Stress: Not on file   Relationships     Social connections:     Talks on phone: Not on file     Gets together: Not on file     Attends Jew service: Not on file     Active member of club or organization: Not on file     Attends meetings of clubs or organizations: Not on file     Relationship status: Not on file     Intimate partner violence:     Fear of current or ex partner: Not on file     Emotionally abused: Not on file     Physically abused: Not on file     Forced sexual activity: Not on file   Other Topics Concern     Parent/sibling w/ CABG, MI or angioplasty before 65F 55M? Not Asked      Service Not Asked     Blood Transfusions Not Asked     Caffeine Concern Not Asked     Occupational Exposure Not Asked     Hobby Hazards Not Asked     Sleep Concern Not Asked     Stress Concern Not Asked     Weight Concern Not Asked     Special Diet Not Asked     Back Care Not Asked     Exercise Yes     Comment: jazzercise     Bike Helmet Not Asked     Seat Belt Not Asked     Self-Exams Not Asked   Social History Narrative      "Not on file       Family History -   Family History   Problem Relation Age of Onset     Other Cancer Sister 51        ovarian cancer     Cancer Sister      Thyroid Disease Sister      Colon Cancer Father 76     Brain Hemorrhage Father         accident     Macular Degeneration Mother      Diabetes Maternal Aunt      Diabetes Maternal Uncle      Cancer Paternal Aunt      Diabetes Maternal Grandmother      Hypertension No family hx of      Cerebrovascular Disease No family hx of      Glaucoma No family hx of        Review of Systems - As per HPI and PMHx, otherwise 10+ system review of the head and neck, and general constitution is negative.    Physical Exam  /80   Pulse 78   Resp 12   Ht 1.549 m (5' 1\")   Wt 65.8 kg (145 lb)   LMP  (LMP Unknown)   SpO2 97%   BMI 27.40 kg/m       General - The patient is well nourished and well developed, and appears to have good nutritional status.  Alert and oriented to person and place, answers questions and cooperates with examination appropriately.   Head and Face - Normocephalic and atraumatic, with no gross asymmetry noted of the contour of the facial features.  The facial nerve is intact, with strong symmetric movements.  Voice and Breathing - The patient was breathing comfortably without the use of accessory muscles. There was no wheezing, stridor, or stertor.  The patients voice was clear and strong, and had appropriate pitch and quality.  Ears - The tympanic membranes are normal in appearance, bony landmarks are intact.  No retraction, perforation, or masses.  No fluid or purulence was seen in the external canal or the middle ear. No evidence of infection of the middle ear or external canal, cerumen was normal in appearance.  Eyes - Extraocular movements intact, and the pupils were reactive to light.  Sclera were not icteric or injected, conjunctiva were pink and moist.  Mouth - Examination of the oral cavity showed pink, healthy oral mucosa. No lesions or " ulcerations noted.  The tongue was mobile and midline, and the dentition were in good condition.    Throat - The walls of the oropharynx were smooth, pink, moist, symmetric, and had no lesions or ulcerations.  The tonsillar pillars and soft palate were symmetric.  The uvula was midline on elevation.    Neck - Normal midline excursion of the laryngotracheal complex during swallowing.  Full range of motion on passive movement.  Palpation of the occipital, submental, submandibular, internal jugular chain, and supraclavicular nodes did not demonstrate any abnormal lymph nodes or masses.  The carotid pulse was palpable bilaterally.  Palpation of the thyroid was soft and smooth, with no nodules or goiter appreciated.  The trachea was mobile and midline.  Nose - External contour is symmetric, no gross deflection or scars.  Nasal mucosa is pink and moist with no abnormal mucus.  The septum was midline and non-obstructive, turbinates of normal size and position.  No polyps, masses, or purulence noted on examination.      A/P - Isabel Carroll is a 68 year old female  (J38.3) Vocal cord dysfunction  (primary encounter diagnosis)  (R05) Chronic cough     She sounds like she has cleaerly improved so we discussed changing the dose.    I have discussed increasing the dose at bedtime to 20mg, and keeping the dose 10mg in the morning and at noon.    Alternatively, we can try to pulmicort flexhaler as well.    She has opted for the 10-10-20, and we will do that and see her in 2 months.    Again, thank you for allowing me to participate in the care of your patient.        Sincerely,        Swapnil Shah MD

## 2019-10-29 NOTE — PROGRESS NOTES
History of Present Illness - Isabel Carroll is a 68 year old female here in follow up from 9/10/2019, seen for chronic cough.    To review, this has been a very longstanding problem, over 20 years.  She has slowly developed a persistent dry tickling cough.  It can happen any time and has no pattern.  She has no chest pain, no heartburn, no pain.  No issues with swallowing or eating.  No coughing up blood.  No previous ENT surgery or head and neck disease no post nasal drainage.    She has had work up from [facility], including barium swallow, pH Probe, Allergy testing, work up from Pulmonary Medicine, and everything has been negative.    After the visit, and evaluation, I felt that this was most likely a neurogenic cough.  I started her on a Elavil titration with a goal of 10mg three times daily, and she is here for follow up.   She notices a major improvement, at least 50% better.    Past Medical History -   Patient Active Problem List   Diagnosis     Overweight     Chronic rhinitis     Primary osteoarthritis of both hands     Basal cell carcinoma of skin     Coronary artery disease involving native heart with angina pectoris, unspecified vessel or lesion type (H)     Hyperlipidemia     Impaired fasting glucose     Chorioretinal scar of left eye     Sinus tachycardia     Advance care planning     Pes planus     Right shoulder strain, initial encounter     Bronchiectasis without complication (H)     Chronic cough     Female pattern hair loss     FHx: colon cancer     Clostridium difficile infection     Pseudophakia of right eye     Pseudophakia of both eyes     Primary osteoarthritis of both knees     Vocal cord dysfunction       Current Medications -   Current Outpatient Medications:      albuterol (PROAIR HFA/PROVENTIL HFA/VENTOLIN HFA) 108 (90 Base) MCG/ACT inhaler, Inhale 2 puffs into the lungs every 6 hours as needed for shortness of breath / dyspnea or wheezing, Disp: 1 Inhaler, Rfl: 3     amitriptyline  (ELAVIL) 10 MG tablet, 10mg at bedtime for three days, then increase to 10mg twice daily for three days, then increase to 10mg three times daily until follow up., Disp: 90 tablet, Rfl: 3     aspirin 81 MG tablet, Take 1 tablet by mouth daily., Disp: , Rfl:      atorvastatin (LIPITOR) 40 MG tablet, 40 mg, Disp: , Rfl:      Calcium-Vitamin D (CALCIUM + D PO), Take  by mouth., Disp: , Rfl:      FISH OIL, , Disp: , Rfl:      MAGNESIUM OXIDE PO, Take 200 mg by mouth daily, Disp: , Rfl:      metoprolol (TOPROL-XL) 25 MG 24 hr tablet, Take 1 tablet (25 mg) by mouth daily, Disp: 90 tablet, Rfl: 3     Multiple Vitamins-Minerals (MULTIVITAMIN & MINERAL PO), Take  by mouth., Disp: , Rfl:     Current Facility-Administered Medications:      bupivacaine (MARCAINE) 0.25 % injection 3 mL, 3 mL, , , Jerrod Arias MD, 3 mL at 07/24/19 1535     bupivacaine (MARCAINE) 0.25 % injection 3 mL, 3 mL, , , Jerrod Arias MD, 3 mL at 07/24/19 1535     lidocaine 1 % injection 4 mL, 4 mL, , , Jerrod Arias MD, 4 mL at 07/24/19 1535     lidocaine 1 % injection 4 mL, 4 mL, , , Jerrod Arias MD, 4 mL at 07/24/19 1535     methylPREDNISolone (DEPO-MEDROL) injection 80 mg, 80 mg, , , Jerrod Arias MD, 80 mg at 07/24/19 1535     methylPREDNISolone (DEPO-MEDROL) injection 80 mg, 80 mg, , , Jerrod Arias MD, 80 mg at 07/24/19 1535    Allergies -   Allergies   Allergen Reactions     Sulfa Drugs Nausea     Codeine Phosphate Nausea and Vomiting     Pentazocine      Propoxyphene      Darvon     Penicillins Rash     rash and itching         Social History -   Social History     Socioeconomic History     Marital status:      Spouse name: Darrian     Number of children: 2     Years of education: 17.5     Highest education level: Not on file   Occupational History     Occupation: Retired     Comment: School counselor, June 2015   Social Needs     Financial resource strain: Not on file     Food insecurity:     Worry: Not on  file     Inability: Not on file     Transportation needs:     Medical: Not on file     Non-medical: Not on file   Tobacco Use     Smoking status: Former Smoker     Years: 16.00     Types: Cigarettes     Last attempt to quit: 1982     Years since quittin.7     Smokeless tobacco: Never Used   Substance and Sexual Activity     Alcohol use: Yes     Alcohol/week: 0.0 oz     Comment: 2-7 glasses of wine per week     Drug use: No     Sexual activity: Never     Partners: Male     Birth control/protection: Post-menopausal   Lifestyle     Physical activity:     Days per week: Not on file     Minutes per session: Not on file     Stress: Not on file   Relationships     Social connections:     Talks on phone: Not on file     Gets together: Not on file     Attends [employer] service: Not on file     Active member of club or organization: Not on file     Attends meetings of clubs or organizations: Not on file     Relationship status: Not on file     Intimate partner violence:     Fear of current or ex partner: Not on file     Emotionally abused: Not on file     Physically abused: Not on file     Forced sexual activity: Not on file   Other Topics Concern     Parent/sibling w/ CABG, MI or angioplasty before 65F 55M? Not Asked      Service Not Asked     Blood Transfusions Not Asked     Caffeine Concern Not Asked     Occupational Exposure Not Asked     Hobby Hazards Not Asked     Sleep Concern Not Asked     Stress Concern Not Asked     Weight Concern Not Asked     Special Diet Not Asked     Back Care Not Asked     Exercise Yes     Comment: jazzercise     Bike Helmet Not Asked     Seat Belt Not Asked     Self-Exams Not Asked   Social History Narrative     Not on file       Family History -   Family History   Problem Relation Age of Onset     Other Cancer Sister 51        ovarian cancer     Cancer Sister      Thyroid Disease Sister      Colon Cancer Father 76     Brain Hemorrhage Father         accident     Macular  "Degeneration Mother      Diabetes Maternal Aunt      Diabetes Maternal Uncle      Cancer Paternal Aunt      Diabetes Maternal Grandmother      Hypertension No family hx of      Cerebrovascular Disease No family hx of      Glaucoma No family hx of        Review of Systems - As per HPI and PMHx, otherwise 10+ system review of the head and neck, and general constitution is negative.    Physical Exam  /80   Pulse 78   Resp 12   Ht 1.549 m (5' 1\")   Wt 65.8 kg (145 lb)   LMP  (LMP Unknown)   SpO2 97%   BMI 27.40 kg/m      General - The patient is well nourished and well developed, and appears to have good nutritional status.  Alert and oriented to person and place, answers questions and cooperates with examination appropriately.   Head and Face - Normocephalic and atraumatic, with no gross asymmetry noted of the contour of the facial features.  The facial nerve is intact, with strong symmetric movements.  Voice and Breathing - The patient was breathing comfortably without the use of accessory muscles. There was no wheezing, stridor, or stertor.  The patients voice was clear and strong, and had appropriate pitch and quality.  Ears - The tympanic membranes are normal in appearance, bony landmarks are intact.  No retraction, perforation, or masses.  No fluid or purulence was seen in the external canal or the middle ear. No evidence of infection of the middle ear or external canal, cerumen was normal in appearance.  Eyes - Extraocular movements intact, and the pupils were reactive to light.  Sclera were not icteric or injected, conjunctiva were pink and moist.  Mouth - Examination of the oral cavity showed pink, healthy oral mucosa. No lesions or ulcerations noted.  The tongue was mobile and midline, and the dentition were in good condition.    Throat - The walls of the oropharynx were smooth, pink, moist, symmetric, and had no lesions or ulcerations.  The tonsillar pillars and soft palate were symmetric.  The " uvula was midline on elevation.    Neck - Normal midline excursion of the laryngotracheal complex during swallowing.  Full range of motion on passive movement.  Palpation of the occipital, submental, submandibular, internal jugular chain, and supraclavicular nodes did not demonstrate any abnormal lymph nodes or masses.  The carotid pulse was palpable bilaterally.  Palpation of the thyroid was soft and smooth, with no nodules or goiter appreciated.  The trachea was mobile and midline.  Nose - External contour is symmetric, no gross deflection or scars.  Nasal mucosa is pink and moist with no abnormal mucus.  The septum was midline and non-obstructive, turbinates of normal size and position.  No polyps, masses, or purulence noted on examination.      A/P - Isabel Carroll is a 68 year old female  (J38.3) Vocal cord dysfunction  (primary encounter diagnosis)  (R05) Chronic cough     She sounds like she has cleaerly improved so we discussed changing the dose.    I have discussed increasing the dose at bedtime to 20mg, and keeping the dose 10mg in the morning and at noon.    Alternatively, we can try to pulmicort flexhaler as well.    She has opted for the 10-10-20, and we will do that and see her in 2 months.

## 2019-10-29 NOTE — PATIENT INSTRUCTIONS
Scheduling Information  To schedule your CT/MRI scan, please contact [facility] at [phone] OR [facility] at [phone]    To schedule your Surgery, please contact our Specialty Schedulers at [phone]      ENT Clinic Locations Clinic Hours Telephone Number     [facility]  [address] [facility]. [place] [address]   Monday:           1:00pm -- 5:00pm    Friday:              8:00am - 12:00pm   To schedule/reschedule an appointment with   Dr. Shah,   please contact our   Specialty Scheduling Department at:     [phone]       [facility] [place]  [address] [place]  [place] [address] Tuesday:          8:00am -- 2:00pm         Urgent Care Locations Clinic Hours Telephone Numbers     [facility]  [address] [place] [address]     Monday-Friday:     11:00am - 9:00pm    Saturday-Sunday:  9:00am - 5:00pm   [phone]     [facility]  [address] [place] [address]     Monday-Friday:      5:00pm - 9:00pm     Saturday-Sunday:  9:00am - 5:00pm   [phone]

## 2019-10-30 ENCOUNTER — TELEPHONE (OUTPATIENT)
Dept: FAMILY MEDICINE | Facility: CLINIC | Age: 69
End: 2019-10-30

## 2019-10-30 NOTE — TELEPHONE ENCOUNTER
Please submit JORDAN [email or web address]  Key: [id number]              Torrey Faarax  [facility]

## 2019-10-31 NOTE — TELEPHONE ENCOUNTER
Central Prior Authorization Team   Phone: [phone]    PA Initiation    Medication: amitriptyline (ELAVIL) 10 MG tablet  Insurance Company: [employer] - Phone [phone] Fax [phone]  Pharmacy Filling the Rx: [facility] [address] [place] [address] - [address] [place] [address]  Filling Pharmacy Phone: [phone]  Filling Pharmacy Fax: [phone]  Start Date: 10/31/2019

## 2019-11-01 DIAGNOSIS — R73.03 PRE-DIABETES: Primary | ICD-10-CM

## 2019-11-04 NOTE — TELEPHONE ENCOUNTER
Prior Authorization Approval    Authorization Effective Date: 10/1/2019  Authorization Expiration Date: 10/30/2020  Medication: amitriptyline (ELAVIL) 10 MG-APPROVED  Approved Dose/Quantity:    Reference #: [id number]   Insurance Company: [employer] - Phone [phone] Fax [phone]  Expected CoPay:       CoPay Card Available:      Foundation Assistance Needed:    Which Pharmacy is filling the prescription (Not needed for infusion/clinic administered): [facility] [address] [place] [address] - [address] [facility]  Pharmacy Notified: Yes  Patient Notified: Yes  **Instructed pharmacy to notify patient when script is ready to /ship.**

## 2019-11-06 ENCOUNTER — ALLIED HEALTH/NURSE VISIT (OUTPATIENT)
Dept: NURSING | Facility: CLINIC | Age: 69
End: 2019-11-06
Payer: COMMERCIAL

## 2019-11-06 DIAGNOSIS — Z23 NEED FOR PROPHYLACTIC VACCINATION AND INOCULATION AGAINST INFLUENZA: Primary | ICD-10-CM

## 2019-11-06 PROCEDURE — G0008 ADMIN INFLUENZA VIRUS VAC: HCPCS

## 2019-11-06 PROCEDURE — 90662 IIV NO PRSV INCREASED AG IM: CPT

## 2019-11-06 PROCEDURE — 99207 ZZC NO CHARGE NURSE ONLY: CPT

## 2019-11-08 ENCOUNTER — TRANSFERRED RECORDS (OUTPATIENT)
Dept: HEALTH INFORMATION MANAGEMENT | Facility: CLINIC | Age: 69
End: 2019-11-08

## 2019-11-08 DIAGNOSIS — R73.03 PRE-DIABETES: ICD-10-CM

## 2019-11-08 LAB
GLUCOSE SERPL-MCNC: 117 MG/DL (ref 70–99)
HBA1C MFR BLD: 5.6 % (ref 0–5.6)

## 2019-11-08 PROCEDURE — 36415 COLL VENOUS BLD VENIPUNCTURE: CPT | Performed by: FAMILY MEDICINE

## 2019-11-08 PROCEDURE — 82947 ASSAY GLUCOSE BLOOD QUANT: CPT | Performed by: FAMILY MEDICINE

## 2019-11-08 PROCEDURE — 83036 HEMOGLOBIN GLYCOSYLATED A1C: CPT | Performed by: FAMILY MEDICINE

## 2019-11-09 NOTE — RESULT ENCOUNTER NOTE
Dear Isabel    Your test results are attached. I am happy to let you know that they are stable.    The blood sugar is normal and you do not have diabetes. We can recheck labs in 6 months.     Please contact me by [email or web address] if you have any questions about your labs or management. You may also call my office number [phone] for any questions.     Marry Gonzalez MD

## 2019-11-12 ENCOUNTER — OFFICE VISIT (OUTPATIENT)
Dept: FAMILY MEDICINE | Facility: CLINIC | Age: 69
End: 2019-11-12
Payer: COMMERCIAL

## 2019-11-12 VITALS
HEART RATE: 75 BPM | HEIGHT: 61 IN | WEIGHT: 151 LBS | DIASTOLIC BLOOD PRESSURE: 72 MMHG | RESPIRATION RATE: 12 BRPM | TEMPERATURE: 98.1 F | SYSTOLIC BLOOD PRESSURE: 107 MMHG | BODY MASS INDEX: 28.51 KG/M2 | OXYGEN SATURATION: 97 %

## 2019-11-12 DIAGNOSIS — R30.0 DYSURIA: ICD-10-CM

## 2019-11-12 DIAGNOSIS — N30.00 ACUTE CYSTITIS WITHOUT HEMATURIA: Primary | ICD-10-CM

## 2019-11-12 LAB
ALBUMIN UR-MCNC: NEGATIVE MG/DL
APPEARANCE UR: ABNORMAL
BACTERIA #/AREA URNS HPF: ABNORMAL /HPF
BILIRUB UR QL STRIP: NEGATIVE
COLOR UR AUTO: ABNORMAL
GLUCOSE UR STRIP-MCNC: NEGATIVE MG/DL
HGB UR QL STRIP: ABNORMAL
KETONES UR STRIP-MCNC: NEGATIVE MG/DL
LEUKOCYTE ESTERASE UR QL STRIP: ABNORMAL
NITRATE UR QL: NEGATIVE
PH UR STRIP: 7 PH (ref 5–7)
RBC #/AREA URNS AUTO: ABNORMAL /HPF
SOURCE: ABNORMAL
SP GR UR STRIP: <=1.005 (ref 1–1.03)
UROBILINOGEN UR STRIP-ACNC: 0.2 EU/DL (ref 0.2–1)
WBC #/AREA URNS AUTO: ABNORMAL /HPF

## 2019-11-12 PROCEDURE — 81001 URINALYSIS AUTO W/SCOPE: CPT | Performed by: NURSE PRACTITIONER

## 2019-11-12 PROCEDURE — 87186 SC STD MICRODIL/AGAR DIL: CPT | Performed by: NURSE PRACTITIONER

## 2019-11-12 PROCEDURE — 87086 URINE CULTURE/COLONY COUNT: CPT | Performed by: NURSE PRACTITIONER

## 2019-11-12 PROCEDURE — 87088 URINE BACTERIA CULTURE: CPT | Performed by: NURSE PRACTITIONER

## 2019-11-12 PROCEDURE — 99213 OFFICE O/P EST LOW 20 MIN: CPT | Performed by: NURSE PRACTITIONER

## 2019-11-12 RX ORDER — NITROFURANTOIN 25; 75 MG/1; MG/1
100 CAPSULE ORAL 2 TIMES DAILY
Qty: 14 CAPSULE | Refills: 0 | Status: SHIPPED | OUTPATIENT
Start: 2019-11-12 | End: 2020-02-18

## 2019-11-12 ASSESSMENT — PAIN SCALES - GENERAL: PAINLEVEL: EXTREME PAIN (8)

## 2019-11-12 ASSESSMENT — MIFFLIN-ST. JEOR: SCORE: 1142.31

## 2019-11-12 NOTE — PATIENT INSTRUCTIONS
"Urinalysis indicative of UTI, urine culture pending. We will call you if we need to switch antibiotics.  Start antibiotic today.  Drink plenty of water (64 oz daily).  Follow up if worsening or not improving in 3 days  Patient Education     Bladder Infection, Female (Adult)    Urine is normally doesn't have any bacteria in it. But bacteria can get into the urinary tract from the skin around the rectum. Or they can travel in the blood from elsewhere in the body. Once they are in your urinary tract, they can cause infection in the urethra (urethritis), the bladder (cystitis), or the kidneys (pyelonephritis).  The most common place for an infection is in the bladder. This is called a bladder infection. This is one of the most common infections in women. Most bladder infections are easily treated. They are not serious unless the infection spreads to the kidney.  The phrases \"bladder infection,\" \"UTI,\" and \"cystitis\" are often used to describe the same thing. But they are not always the same. Cystitis is an inflammation of the bladder. The most common cause of cystitis is an infection.  Symptoms  The infection causes inflammation in the urethra and bladder. This causes many of the symptoms. The most common symptoms of a bladder infection are:    Pain or burning when urinating    Having to urinate more often than usual    Urgent need to urinate    Only a small amount of urine comes out    Blood in urine    Abdominal discomfort. This is usually in the lower abdomen above the pubic bone.    Cloudy urine    Strong- or bad-smelling urine    Unable to urinate (urinary retention)    Unable to hold urine in (urinary incontinence)    Fever    Loss of appetite    Confusion (in older adults)  Causes  Bladder infections are not contagious. You can't get one from someone else, from a toilet seat, or from sharing a bath.  The most common cause of bladder infections is bacteria from the bowels. The bacteria get onto the skin around the " opening of the urethra. From there, they can get into the urine and travel up to the bladder, causing inflammation and infection. This usually happens because of:    Wiping improperly after urinating. Always wipe from front to back.    Bowel incontinence    Pregnancy    Procedures such as having a catheter inserted    Older age    Not emptying your bladder. This can allow bacteria a chance to grow in your urine.    Dehydration    Constipation    Sex    Use of a diaphragm for birth control   Treatment  Bladder infections are diagnosed by a urine test. They are treated with antibiotics and usually clear up quickly without complications. Treatment helps prevent a more serious kidney infection.  Medicines  Medicines can help in the treatment of a bladder infection:    Take antibiotics until they are used up, even if you feel better. It is important to finish them to make sure the infection has cleared.    You can use acetaminophen or ibuprofen for pain, fever, or discomfort, unless another medicine was prescribed. If you have chronic liver or kidney disease, talk with your healthcare provider before using these medicines. Also talk with your provider if you've ever had a stomach ulcer or gastrointestinal bleeding, or are taking blood-thinner medicines.    If you are given phenazopydridine to reduce burning with urination, it will cause your urine to become a bright orange color. This can stain clothing.  Care and prevention  These self-care steps can help prevent future infections:    Drink plenty of fluids to prevent dehydration and flush out your bladder. Do this unless you must restrict fluids for other health reasons, or your doctor told you not to.    Proper cleaning after going to the bathroom is important. Wipe from front to back after using the toilet to prevent the spread of bacteria.    Urinate more often. Don't try to hold urine in for a long time.    Wear loose-fitting clothes and cotton underwear. Avoid  tight-fitting pants.    Improve your diet and prevent constipation. Eat more fresh fruit and vegetables, and fiber, and less junk and fatty foods.    Avoid sex until your symptoms are gone.    Avoid caffeine, alcohol, and spicy foods. These can irritate your bladder.    Urinate right after intercourse to flush out your bladder.    If you use birth control pills and have frequent bladder infections, discuss it with your doctor.  Follow-up care  Call your healthcare provider if all symptoms are not gone after 3 days of treatment. This is especially important if you have repeat infections.  If a culture was done, you will be told if your treatment needs to be changed. If directed, you can call to find out the results.  If X-rays were done, you will be told if the results will affect your treatment.  Call 911  Call 911 if any of the following occur:    Trouble breathing    Hard to wake up or confusion    Fainting or loss of consciousness    Rapid heart rate  When to seek medical advice  Call your healthcare provider right away if any of these occur:    Fever of 100.4 F (38.0 C) or higher, or as directed by your healthcare provider    Symptoms are not better by the third day of treatment    Back or belly (abdominal) pain that gets worse    Repeated vomiting, or unable to keep medicine down    Weakness or dizziness    Vaginal discharge    Pain, redness, or swelling in the outer vaginal area (labia)  Date Last Reviewed: 10/1/2016    8370-1963 [employer]. [address] [place] [address], [place] [address]. All rights reserved. This information is not intended as a substitute for professional medical care. Always follow your healthcare professional's instructions.

## 2019-11-12 NOTE — PROGRESS NOTES
Subjective     Isabel Carroll is a 69 year old female who presents to clinic today for the following health issues:    HPI   URINARY TRACT SYMPTOMS  Onset: yesterday    Description:   Painful urination (Dysuria): YES           Frequency: YES  Blood in urine (Hematuria): YES  Delay in urine (Hesitency): YES    Intensity: moderate, severe    Progression of Symptoms:  worsening    Accompanying Signs & Symptoms:  Fever/chills: no   Flank pain no   Nausea and vomiting: no   Any vaginal symptoms: none  Abdominal/Pelvic Pain: no     History:   History of frequent UTI's: no   History of kidney stones: no   Sexually Active: no   Possibility of pregnancy: No    Precipitating factors:   None.    Therapies Tried and outcome: None.    No fever, abdominal pain, nausea or vomiting  Dysuria and urgency worsening since this morning    Patient Active Problem List   Diagnosis     Overweight     Chronic rhinitis     Primary osteoarthritis of both hands     Basal cell carcinoma of skin     Coronary artery disease involving native heart with angina pectoris, unspecified vessel or lesion type (H)     Hyperlipidemia     Impaired fasting glucose     Chorioretinal scar of left eye     Sinus tachycardia     Advance care planning     Pes planus     Right shoulder strain, initial encounter     Bronchiectasis without complication (H)     Chronic cough     Female pattern hair loss     FHx: colon cancer     Clostridium difficile infection     Pseudophakia of right eye     Pseudophakia of both eyes     Primary osteoarthritis of both knees     Vocal cord dysfunction     Past Surgical History:   Procedure Laterality Date     CATARACT IOL, RT/LT        SECTION      x 2     COLONOSCOPY      3 polyps     COLONOSCOPY  2016    neg     ENDOSCOPY  2016     LENGTHEN TENDON ACHILLES  2011    Procedure:LENGTHEN TENDON ACHILLES; Surgeon:AARON CÁRDENAS; Location:[facility]     LIVER SURGERY      Alex large cyst     MINI ARC SLING  OPERATION FOR STRESS INCONTINENCE  2007     OSTEOTOMY ANKLE  2011    Procedure:OSTEOTOMY ANKLE; Calcaneous,  Flexor Digitorum Longus Transfer       SALPINGO-OOPHORECTOMY BILATERAL  ,     Benign prophylactic for FHx ov cancer       Social History     Tobacco Use     Smoking status: Former Smoker     Years: 16.00     Types: Cigarettes     Last attempt to quit: 1982     Years since quittin.8     Smokeless tobacco: Never Used   Substance Use Topics     Alcohol use: Yes     Alcohol/week: 0.0 standard drinks     Comment: 2-7 glasses of wine per week     Family History   Problem Relation Age of Onset     Other Cancer Sister 51        ovarian cancer     Cancer Sister      Thyroid Disease Sister      Colon Cancer Father 76     Brain Hemorrhage Father         accident     Macular Degeneration Mother      Diabetes Maternal Aunt      Diabetes Maternal Uncle      Cancer Paternal Aunt      Diabetes Maternal Grandmother      Hypertension No family hx of      Cerebrovascular Disease No family hx of      Glaucoma No family hx of          Current Outpatient Medications   Medication Sig Dispense Refill     amitriptyline (ELAVIL) 10 MG tablet 10mg in the morning, 10mg in the aftternoon, and 20mg at bedtime 360 tablet 3     aspirin 81 MG tablet Take 1 tablet by mouth daily.       atorvastatin (LIPITOR) 40 MG tablet 40 mg       Calcium-Vitamin D (CALCIUM + D PO) Take  by mouth.       FISH OIL        MAGNESIUM OXIDE PO Take 200 mg by mouth daily       metoprolol (TOPROL-XL) 25 MG 24 hr tablet Take 1 tablet (25 mg) by mouth daily 90 tablet 3     Multiple Vitamins-Minerals (MULTIVITAMIN & MINERAL PO) Take  by mouth.       nitroFURantoin macrocrystal-monohydrate (MACROBID) 100 MG capsule Take 1 capsule (100 mg) by mouth 2 times daily for 7 days 14 capsule 0     albuterol (PROAIR HFA/PROVENTIL HFA/VENTOLIN HFA) 108 (90 Base) MCG/ACT inhaler Inhale 2 puffs into the lungs every 6 hours as needed for shortness of breath /  "dyspnea or wheezing (Patient not taking: Reported on 11/12/2019) 1 Inhaler 3     Allergies   Allergen Reactions     Sulfa Drugs Nausea     Codeine Phosphate Nausea and Vomiting     Pentazocine      Propoxyphene      Darvon     Penicillins Rash     rash and itching       BP Readings from Last 3 Encounters:   11/12/19 107/72   10/29/19 125/80   09/10/19 124/83    Wt Readings from Last 3 Encounters:   11/12/19 68.5 kg (151 lb)   10/29/19 65.8 kg (145 lb)   09/10/19 65.8 kg (145 lb)               Reviewed and updated as needed this visit by Provider         Review of Systems   ROS COMP: Constitutional, HEENT, cardiovascular, pulmonary, gi and gu systems are negative, except as otherwise noted.      Objective    /72 (BP Location: Right arm, Patient Position: Sitting, Cuff Size: Adult Regular)   Pulse 75   Temp 98.1  F (36.7  C) (Oral)   Resp 12   Ht 1.549 m (5' 1\")   Wt 68.5 kg (151 lb)   LMP  (LMP Unknown)   SpO2 97%   BMI 28.53 kg/m    Body mass index is 28.53 kg/m .  Physical Exam   GENERAL: healthy, alert and no distress  RESP: lungs clear to auscultation - no rales, rhonchi or wheezes  CV: regular rate and rhythm, normal S1 S2, no S3 or S4, no murmur, click or rub, no peripheral edema and peripheral pulses strong  ABDOMEN: soft, nontender, no hepatosplenomegaly, no masses and bowel sounds normal  MS: no gross musculoskeletal defects noted, no edema  BACK: no CVA tenderness, no paralumbar tenderness  PSYCH: mentation appears normal, affect normal/bright    Diagnostic Test Results:  Labs reviewed in Epic  Results for orders placed or performed in visit on 11/12/19 (from the past 24 hour(s))   *UA reflex to Microscopic and Culture ([place] and [facility] (except [place] and [place])   Result Value Ref Range    Color Urine Pink     Appearance Urine Slightly Cloudy     Glucose Urine Negative NEG^Negative mg/dL    Bilirubin Urine Negative NEG^Negative    Ketones Urine Negative NEG^Negative mg/dL    " Specific Gravity Urine <=1.005 1.003 - 1.035    Blood Urine Large (A) NEG^Negative    pH Urine 7.0 5.0 - 7.0 pH    Protein Albumin Urine Negative NEG^Negative mg/dL    Urobilinogen Urine 0.2 0.2 - 1.0 EU/dL    Nitrite Urine Negative NEG^Negative    Leukocyte Esterase Urine Moderate (A) NEG^Negative    Source Midstream Urine    Urine Microscopic   Result Value Ref Range    WBC Urine 10-25 (A) OTO5^0 - 5 /HPF    RBC Urine 10-25 (A) OTO2^O - 2 /HPF    Bacteria Urine Many (A) NEG^Negative /HPF           Assessment & Plan     1. Acute cystitis without hematuria  Urinalysis indicative of UTI, urine culture pending. We will call you if we need to switch antibiotics. Normal kidney function and allergy to sulfa and penicillin. Will use macrobid.  Start antibiotic today.  Drink plenty of water (64 oz daily).  Follow up if worsening or not improving in 3 days  - *UA reflex to Microscopic and Culture ([place] and [facility] (except [place] and [place])  - nitroFURantoin macrocrystal-monohydrate (MACROBID) 100 MG capsule; Take 1 capsule (100 mg) by mouth 2 times daily for 7 days  Dispense: 14 capsule; Refill: 0  - Urine Culture Aerobic Bacterial         See Patient Instructions    Return in about 3 days (around 11/15/2019), or if symptoms worsen or fail to improve.     The benefits, risks and potential side effects were discussed in detail. Black box warnings discussed as relevant. All patient questions were answered. The patient was instructed to follow up immediately if any adverse reactions develop.    Return precautions discussed, including when to seek urgent/emergent care.    Patient verbalizes understanding and agrees with plan of care. Patient stable for discharge.      VIC Nayak [facility]

## 2019-11-12 NOTE — TELEPHONE ENCOUNTER
Central Prior Authorization Team   Phone: [phone]    Prior Authorization Not Needed per Insurance    Medication: amitriptyline (ELAVIL) 10 MG tablet  Insurance Company: [employer] - Phone [phone] Fax [phone]  Expected CoPay:      Pharmacy Filling the Rx: [facility] [place], [place] - [address]  Pharmacy Notified: Yes  Patient Notified: Yes    Drug is covered by current benefit plan. No further PA activity needed.  Pharmacy received paid claim, no PA is needed.

## 2019-11-14 ENCOUNTER — OFFICE VISIT (OUTPATIENT)
Dept: FAMILY MEDICINE | Facility: CLINIC | Age: 69
End: 2019-11-14
Payer: COMMERCIAL

## 2019-11-14 VITALS
HEIGHT: 61 IN | HEART RATE: 83 BPM | SYSTOLIC BLOOD PRESSURE: 107 MMHG | WEIGHT: 149 LBS | OXYGEN SATURATION: 97 % | RESPIRATION RATE: 16 BRPM | DIASTOLIC BLOOD PRESSURE: 72 MMHG | TEMPERATURE: 97.7 F | BODY MASS INDEX: 28.13 KG/M2

## 2019-11-14 DIAGNOSIS — I25.119 CORONARY ARTERY DISEASE INVOLVING NATIVE HEART WITH ANGINA PECTORIS, UNSPECIFIED VESSEL OR LESION TYPE (H): ICD-10-CM

## 2019-11-14 DIAGNOSIS — R00.0 SINUS TACHYCARDIA: ICD-10-CM

## 2019-11-14 DIAGNOSIS — R05.3 CHRONIC COUGH: ICD-10-CM

## 2019-11-14 DIAGNOSIS — J47.9 BRONCHIECTASIS WITHOUT COMPLICATION (H): ICD-10-CM

## 2019-11-14 DIAGNOSIS — J38.3 VOCAL CORD DYSFUNCTION: ICD-10-CM

## 2019-11-14 DIAGNOSIS — Z00.00 ENCOUNTER FOR MEDICARE ANNUAL WELLNESS EXAM: Primary | ICD-10-CM

## 2019-11-14 DIAGNOSIS — E78.2 MIXED HYPERLIPIDEMIA: ICD-10-CM

## 2019-11-14 LAB
BACTERIA SPEC CULT: ABNORMAL
SPECIMEN SOURCE: ABNORMAL

## 2019-11-14 PROCEDURE — G0438 PPPS, INITIAL VISIT: HCPCS | Performed by: FAMILY MEDICINE

## 2019-11-14 PROCEDURE — 99213 OFFICE O/P EST LOW 20 MIN: CPT | Mod: 25 | Performed by: FAMILY MEDICINE

## 2019-11-14 RX ORDER — ATORVASTATIN CALCIUM 40 MG/1
40 TABLET, FILM COATED ORAL DAILY
Qty: 90 TABLET | Refills: 3
Start: 2019-11-14 | End: 2021-02-18

## 2019-11-14 RX ORDER — METOPROLOL SUCCINATE 25 MG/1
25 TABLET, EXTENDED RELEASE ORAL DAILY
Qty: 90 TABLET | Refills: 3
Start: 2019-11-14 | End: 2021-02-18

## 2019-11-14 ASSESSMENT — PAIN SCALES - GENERAL: PAINLEVEL: NO PAIN (0)

## 2019-11-14 ASSESSMENT — MIFFLIN-ST. JEOR: SCORE: 1133.24

## 2019-11-14 NOTE — PROGRESS NOTES
"  SUBJECTIVE:   Isabel Carroll is a 69 year old female who presents for Preventive Visit.  Are you in the first 12 months of your Medicare Part B coverage?  No    Physical Health:    In general, how would you rate your overall physical health? excellent    Outside of work, how many days during the week do you exercise? 6-7 days/week    Outside of work, approximately how many minutes a day do you exercise?30-45 minutes    If you drink alcohol do you typically have >3 drinks per day or >7 drinks per week? No    Do you usually eat at least 4 servings of fruit and vegetables a day, include whole grains & fiber and avoid regularly eating high fat or \"junk\" foods? Yes    Do you have any problems taking medications regularly?  No    Do you have any side effects from medications? none    Needs assistance for the following daily activities: no assistance needed    Which of the following safety concerns are present in your home?  lack of grab bars in the bathroom     Hearing impairment: No    In the past 6 months, have you been bothered by leaking of urine? Yes, minimal. Use poise pad    Mental Health:    In general, how would you rate your overall mental or emotional health? excellent  PHQ-2 Score:      Do you feel safe in your environment? Yes    Have you ever done Advance Care Planning? (For example, a Health Directive, POLST, or a discussion with a medical provider or your loved ones about your wishes): Yes, patient states has an Advance Care Planning document and will bring a copy to the clinic.    Additional concerns to address?  No    Fall risk:  Fallen 2 or more times in the past year?: No  Any fall with injury in the past year?: No    Cognitive Screenin) Repeat 3 items (Leader, Season, Table)    2) Clock draw: NORMAL  3) 3 item recall: Recalls 3 objects  Results: NORMAL clock, 3 items recalled: COGNITIVE IMPAIRMENT LESS LIKELY    Mini-CogTM Copyright S Akash. Licensed by the author for use in [facility]; reprinted with permission (dianne@.[email or web address]). All rights reserved.      Do you have sleep apnea, excessive snoring or daytime drowsiness?: no        Hyperlipidemia Follow-Up      Are you having any of the following symptoms? (Select all that apply)  No complaints of shortness of breath, chest pain or pressure.  No increased sweating or nausea with activity.  No left-sided neck or arm pain.  No complaints of pain in calves when walking 1-2 blocks.    Are you regularly taking any medication or supplement to lower your cholesterol?   Yes- atorvastatin    Are you having muscle aches or other side effects that you think could be caused by your cholesterol lowering medication?  No    Vascular Disease Follow-up      Are you having any of the following symptoms? (Select all that apply) No complaints of shortness of breath, chest pain or pressure.  No increased sweating or nausea with activity.  No left-sided neck or arm pain.  No complaints of pain in calves when walking 1-2 blocks.    How often do you take nitroglycerin? Never    Do you take an aspirin every day? Yes    Bronchiectasis Follow-Up    Overall, how are your symptoms since your last clinic visit?  No change    How much fatigue or shortness of breath do you have when you are walking?  Same as usual    How much shortness of breath do you have when you are resting?  Same as usual    How often do you cough? Rarely Much improved after seeing ENT and diagnosed with vocal cord dysfunction. Started on Elavil 10 mg four times a day     Have you noticed any change in your sputum/phlegm?  No    Have you experienced a recent fever? No    Please describe how far you can walk without stopping to rest:  Less than a mile    How many flights of stairs are you able to walk up without stopping?  1    Have you had any Emergency Room Visits, Urgent Care Visits, or Hospital Admissions because of your COPD since your last office visit?  No    History   Smoking Status      Former Smoker     Years: 16.00     Types: Cigarettes     Quit date: 1982   Smokeless Tobacco     Never Used     Lab Results   Component Value Date    FEV1 2.37 2019    UFL6IXS 81% 2019         Reviewed and updated as needed this visit by clinical staff  Tobacco  Allergies  Meds  Med Hx  Surg Hx  Fam Hx  Soc Hx        Reviewed and updated as needed this visit by Provider        Social History     Tobacco Use     Smoking status: Former Smoker     Years: 16.00     Types: Cigarettes     Last attempt to quit: 1982     Years since quittin.8     Smokeless tobacco: Never Used   Substance Use Topics     Alcohol use: Yes     Alcohol/week: 0.0 standard drinks     Comment: 2-7 glasses of wine per week                           Current providers sharing in care for this patient include:     Patient Care Team:  Marry Gonzalez MD as PCP - General (Family Practice)  Alisia Boyd APRN CNP as Assigned PCP    The following health maintenance items are reviewed in Epic and correct as of today:  Health Maintenance   Topic Date Due     ZOSTER IMMUNIZATION (2 of 3) 2013     MEDICARE ANNUAL WELLNESS VISIT  10/16/2019     FALL RISK ASSESSMENT  2020     DTAP/TDAP/TD IMMUNIZATION (5 - Td) 2021     MAMMO SCREENING  2021     ADVANCE CARE PLANNING  2021     COLONOSCOPY  2021     LIPID  2023     DEXA  Completed     HEPATITIS C SCREENING  Completed     PHQ-2  Completed     INFLUENZA VACCINE  Completed     PNEUMOCOCCAL IMMUNIZATION 65+ LOW/MEDIUM RISK  Completed     IPV IMMUNIZATION  Aged Out     MENINGITIS IMMUNIZATION  Aged Out     Lab work is in process  Labs reviewed in EPIC  BP Readings from Last 3 Encounters:   19 107/72   19 107/72   10/29/19 125/80    Wt Readings from Last 3 Encounters:   19 67.6 kg (149 lb)   19 68.5 kg (151 lb)   10/29/19 65.8 kg (145 lb)                  Patient Active Problem List   Diagnosis     Overweight      Chronic rhinitis     Primary osteoarthritis of both hands     Basal cell carcinoma of skin     Coronary artery disease involving native heart with angina pectoris, unspecified vessel or lesion type (H)     Hyperlipidemia     Impaired fasting glucose     Chorioretinal scar of left eye     Sinus tachycardia     Advance care planning     Pes planus     Right shoulder strain, initial encounter     Bronchiectasis without complication (H)     Chronic cough     Female pattern hair loss     FHx: colon cancer     Clostridium difficile infection     Pseudophakia of right eye     Pseudophakia of both eyes     Primary osteoarthritis of both knees     Vocal cord dysfunction     Past Surgical History:   Procedure Laterality Date     CATARACT IOL, RT/LT        SECTION      x 2     COLONOSCOPY      3 polyps     COLONOSCOPY  2016    neg     ENDOSCOPY  2016     LENGTHEN TENDON ACHILLES  2011    Procedure:LENGTHEN TENDON ACHILLES; Surgeon:AARON CÁRDENAS; Location:[facility]     LIVER SURGERY      Bengin large cyst     MINI ARC SLING OPERATION FOR STRESS INCONTINENCE       OSTEOTOMY ANKLE  2011    Procedure:OSTEOTOMY ANKLE; Calcaneous,  Flexor Digitorum Longus Transfer       SALPINGO-OOPHORECTOMY BILATERAL  ,     Benign prophylactic for FHx ov cancer       Social History     Tobacco Use     Smoking status: Former Smoker     Years: 16.00     Types: Cigarettes     Last attempt to quit: 1982     Years since quittin.8     Smokeless tobacco: Never Used   Substance Use Topics     Alcohol use: Yes     Alcohol/week: 0.0 standard drinks     Comment: 2-7 glasses of wine per week     Family History   Problem Relation Age of Onset     Other Cancer Sister 51        ovarian cancer     Cancer Sister      Thyroid Disease Sister      Colon Cancer Father 76     Brain Hemorrhage Father         accident     Macular Degeneration Mother      Diabetes Maternal Aunt      Diabetes Maternal Uncle      Cancer  Paternal Aunt      Diabetes Maternal Grandmother      Hypertension No family hx of      Cerebrovascular Disease No family hx of      Glaucoma No family hx of          Current Outpatient Medications   Medication Sig Dispense Refill     amitriptyline (ELAVIL) 10 MG tablet 10mg in the morning, 10mg in the aftternoon, and 20mg at bedtime 360 tablet 3     aspirin 81 MG tablet Take 1 tablet by mouth daily.       atorvastatin (LIPITOR) 40 MG tablet 40 mg       Calcium-Vitamin D (CALCIUM + D PO) Take  by mouth.       diphenhydrAMINE HCl, Sleep, (SLEEP AID) 50 MG CAPS        FISH OIL        MAGNESIUM OXIDE PO Take 200 mg by mouth daily       metoprolol (TOPROL-XL) 25 MG 24 hr tablet Take 1 tablet (25 mg) by mouth daily 90 tablet 3     Multiple Vitamins-Minerals (MULTIVITAMIN & MINERAL PO) Take  by mouth.       nitroFURantoin macrocrystal-monohydrate (MACROBID) 100 MG capsule Take 1 capsule (100 mg) by mouth 2 times daily for 7 days 14 capsule 0     albuterol (PROAIR HFA/PROVENTIL HFA/VENTOLIN HFA) 108 (90 Base) MCG/ACT inhaler Inhale 2 puffs into the lungs every 6 hours as needed for shortness of breath / dyspnea or wheezing (Patient not taking: Reported on 11/14/2019) 1 Inhaler 3     Allergies   Allergen Reactions     Sulfa Drugs Nausea     Codeine Phosphate Nausea and Vomiting     Pentazocine      Propoxyphene      Darvon     Penicillins Rash     rash and itching       Recent Labs   Lab Test 11/08/19  0812 11/06/18  1109 05/22/18 10/17/17  0820 10/14/16  0815 05/22/16  1708  09/22/12 08/17/12   A1C 5.6  --   --  5.8 5.5 5.8   < > 6.0  --    LDL  --   --  48 119* 106*  --    < > 111  --    HDL  --   --  48 44* 48*  --    < > 54  --    TRIG  --   --  182* 251* 210*  --    < > 156*  --    ALT  --  40 45  --   --  39  --   --   --    CR  --  0.72  --   --  0.74 0.63   < >  --   --    GFRESTIMATED  --  80  --   --  78 >90  Non  GFR Calc     < >  --   --    GFRESTBLACK  --  >90  --   --  >90   GFR  "Calc   >90   GFR Calc     < >  --   --    POTASSIUM  --  4.2  --   --   --  4.1   < >  --   --    TSH  --   --   --   --   --   --   --  0.506 0.829    < > = values in this interval not displayed.      Pneumonia Vaccine:Adults age 65+ who received Pneumovax (PPSV23) at 65 years or older: Should be given PCV13 > 1 year after their most recent PPSV23  Mammogram Screening: Mammogram Screening: Patient over age 50, mutual decision to screen reflected in health maintenance.    ROS:  Constitutional, HEENT, cardiovascular, pulmonary, gi and gu systems are negative, except as otherwise noted.    OBJECTIVE:   /72 (BP Location: Right arm, Patient Position: Sitting, Cuff Size: Adult Large)   Pulse 83   Temp 97.7  F (36.5  C) (Oral)   Resp 16   Ht 1.549 m (5' 1\")   Wt 67.6 kg (149 lb)   LMP  (LMP Unknown)   SpO2 97%   BMI 28.15 kg/m   Estimated body mass index is 28.15 kg/m  as calculated from the following:    Height as of this encounter: 1.549 m (5' 1\").    Weight as of this encounter: 67.6 kg (149 lb).  EXAM:   GENERAL APPEARANCE: healthy, alert and no distress  EYES: Eyes grossly normal to inspection, PERRL and conjunctivae and sclerae normal  HENT: ear canals and TM's normal, nose and mouth without ulcers or lesions, oropharynx clear and oral mucous membranes moist  NECK: no adenopathy, no asymmetry, masses, or scars and thyroid normal to palpation  RESP: lungs clear to auscultation - no rales, rhonchi or wheezes  CV: regular rates and rhythm, normal S1 S2, no S3 or S4, no murmur, click or rub, no peripheral edema and peripheral pulses strong  ABDOMEN: soft, nontender, no hepatosplenomegaly, no masses and bowel sounds normal  MS: no musculoskeletal defects are noted and gait is age appropriate without ataxia  SKIN: no suspicious lesions or rashes  NEURO: Normal strength and tone, sensory exam grossly normal, mentation intact and speech normal  PSYCH: mentation appears normal and affect " "normal/bright     Diagnostic Test Results:  Labs reviewed in [facility]  11-1-2019  CHOLESTEROL <200 mg/dL 128    TRIGLYCERIDES PROFILE <150 mg/dL 92    LDL CHOL, CALC <100 mg/dL 55    HDL CHOLESTEROL >40 mg/dL 55    CHOL/HDL RATIO 0.0 - 4.9  2.3          ASSESSMENT / PLAN:       ICD-10-CM    1. Encounter for Medicare annual wellness exam Z00.00 Doing well   2. Mixed hyperlipidemia E78.2 atorvastatin (LIPITOR) 40 MG tablet   3. Coronary artery disease involving native heart with angina pectoris, unspecified vessel or lesion type (H) I25.119 Seen by cardiology and is doing well.    4. Bronchiectasis without complication (H) J47.9 Stable and cough may have been due more to vocal cord dysfunction per ENT   5. Chronic cough R05 Improved.    6. Vocal cord dysfunction J38.3 Much better with Elavil.    7. Sinus tachycardia stable  R00.0 metoprolol succinate ER (TOPROL-XL) 25 MG 24 hr tablet       COUNSELING:  Reviewed preventive health counseling, as reflected in patient instructions       Regular exercise       Healthy diet/nutrition       Dental care       Bladder control       Osteoporosis Prevention/Bone Health    Estimated body mass index is 28.15 kg/m  as calculated from the following:    Height as of this encounter: 1.549 m (5' 1\").    Weight as of this encounter: 67.6 kg (149 lb).         reports that she quit smoking about 37 years ago. Her smoking use included cigarettes. She quit after 16.00 years of use. She has never used smokeless tobacco.      Appropriate preventive services were discussed with this patient, including applicable screening as appropriate for cardiovascular disease, diabetes, osteopenia/osteoporosis, and glaucoma.  As appropriate for age/gender, discussed screening for colorectal cancer, prostate cancer, breast cancer, and cervical cancer. Checklist reviewing preventive services available has been given to the patient.    Reviewed patients plan of care and provided an AVS. The Basic Care Plan " (routine screening as documented in Health Maintenance) for Isabel meets the Care Plan requirement. This Care Plan has been established and reviewed with the Patient.    Counseling Resources:  ATP IV Guidelines  Pooled Cohorts Equation Calculator  Breast Cancer Risk Calculator  FRAX Risk Assessment  ICSI Preventive Guidelines  Dietary Guidelines for Americans, 2010  USDA's MyPlate  ASA Prophylaxis  Lung CA Screening    Marry Gonzalez MD  [facility]

## 2019-11-14 NOTE — PATIENT INSTRUCTIONS
Patient Education   Personalized Prevention Plan  You are due for the preventive services outlined below.  Your care team is available to assist you in scheduling these services.  If you have already completed any of these items, please share that information with your care team to update in your medical record.  Health Maintenance Due   Topic Date Due     Zoster (Shingles) Vaccine (2 of 3) 06/24/2013     Annual Wellness Visit  10/16/2019           At [facility], we strive to deliver an exceptional experience to you, every time we see you.  If you receive a survey in the mail, please send us back your thoughts. We really do value your feedback.    Based on your medical history, these are the current health maintenance/preventive care services that you are due for (some may have been done at this visit.)  Health Maintenance Due   Topic Date Due     ZOSTER IMMUNIZATION (2 of 3) 06/24/2013     MEDICARE ANNUAL WELLNESS VISIT  10/16/2019         Suggested websites for health information:  Www.Visiogen : Up to date and easily searchable information on multiple topics.  Www.medlineplus.gov : medication info, interactive tutorials, watch real surgeries online  Www.familydoctor.org : good info from the Academy of Family Physicians  Www.cdc.gov : public health info, travel advisories, epidemics (H1N1)  Www.aap.org : children's health info, normal development, vaccinations  Www.health.state.[place].us : [place] dept of health, public health issues in [place], N1N1    Your care team:                            Family Medicine Internal Medicine   MD Guillermo Faustin MD Shantel Branch-Fleming, MD Katya Georgiev PA-C Nam Ho, MD Pediatrics   OZ Boo CNP Amelia Massimini APRN MD Lindsay Cagle MD Deborah Mielke, MD Kim Thein, APRN CNP      Clinic hours: Monday - Thursday 7 am-7 pm; Fridays 7 am-5 pm.   Urgent care: Monday - Friday 11 am-9 pm;  Saturday and Sunday 9 am-5 pm.  Pharmacy : Monday -Thursday 8 am-8 pm; Friday 8 am-6 pm; Saturday and Sunday 9 am-5 pm.     Clinic: [phone]   Pharmacy: [phone]

## 2019-12-17 ENCOUNTER — OFFICE VISIT (OUTPATIENT)
Dept: OTOLARYNGOLOGY | Facility: CLINIC | Age: 69
End: 2019-12-17
Payer: COMMERCIAL

## 2019-12-17 DIAGNOSIS — R05.3 CHRONIC COUGH: ICD-10-CM

## 2019-12-17 DIAGNOSIS — J38.3 VOCAL CORD DYSFUNCTION: Primary | ICD-10-CM

## 2019-12-17 PROCEDURE — 99213 OFFICE O/P EST LOW 20 MIN: CPT | Performed by: OTOLARYNGOLOGY

## 2019-12-17 RX ORDER — AMITRIPTYLINE HYDROCHLORIDE 10 MG/1
TABLET ORAL
Qty: 360 TABLET | Refills: 3 | Status: SHIPPED | OUTPATIENT
Start: 2019-12-17 | End: 2020-10-12

## 2019-12-17 NOTE — LETTER
12/17/2019         RE: Isabel Carroll  [address] [facility] [address]        Dear Colleague,    Thank you for referring your patient, Isabel Carroll, to the [facility]. Please see a copy of my visit note below.    History of Present Illness - Isabel Carroll is a 68 year old female here in follow up from 10/29/2019, seen for chronic cough.    To review, this has been a very longstanding problem, over 20 years.  She has slowly developed a persistent dry tickling cough.  It can happen any time and has no pattern.  She has no chest pain, no heartburn, no pain.  No issues with swallowing or eating.  No coughing up blood.  No previous ENT surgery or head and neck disease no post nasal drainage.    She has had work up from [facility], including barium swallow, pH Probe, Allergy testing, work up from Pulmonary Medicine, and everything has been negative.    After the visit, and evaluation, I felt that this was most likely a neurogenic cough.  I started her on a Elavil titration with a goal of 10mg three times daily, and she is here for follow up.   She notices a major improvement, at least 50% better. Therefore we changed to a dose of 10-10-20 and she is back for follow up after trying this higher dose of Elavil.    Past Medical History -   Patient Active Problem List   Diagnosis     Overweight     Chronic rhinitis     Primary osteoarthritis of both hands     Basal cell carcinoma of skin     Coronary artery disease involving native heart with angina pectoris, unspecified vessel or lesion type (H)     Hyperlipidemia     Impaired fasting glucose     Chorioretinal scar of left eye     Sinus tachycardia     Advance care planning     Pes planus     Right shoulder strain, initial encounter     Bronchiectasis without complication (H)     Chronic cough     Female pattern hair loss     FHx: colon cancer     Clostridium difficile infection     Pseudophakia of right eye      Pseudophakia of both eyes     Primary osteoarthritis of both knees     Vocal cord dysfunction       Current Medications -   Current Outpatient Medications:      albuterol (PROAIR HFA/PROVENTIL HFA/VENTOLIN HFA) 108 (90 Base) MCG/ACT inhaler, Inhale 2 puffs into the lungs every 6 hours as needed for shortness of breath / dyspnea or wheezing (Patient not taking: Reported on 11/14/2019), Disp: 1 Inhaler, Rfl: 3     amitriptyline (ELAVIL) 10 MG tablet, 10mg in the morning, 10mg in the aftternoon, and 20mg at bedtime, Disp: 360 tablet, Rfl: 3     aspirin 81 MG tablet, Take 1 tablet by mouth daily., Disp: , Rfl:      atorvastatin (LIPITOR) 40 MG tablet, Take 1 tablet (40 mg) by mouth daily, Disp: 90 tablet, Rfl: 3     Calcium-Vitamin D (CALCIUM + D PO), Take  by mouth., Disp: , Rfl:      FISH OIL, , Disp: , Rfl:      MAGNESIUM OXIDE PO, Take 200 mg by mouth daily, Disp: , Rfl:      metoprolol succinate ER (TOPROL-XL) 25 MG 24 hr tablet, Take 1 tablet (25 mg) by mouth daily, Disp: 90 tablet, Rfl: 3     Multiple Vitamins-Minerals (MULTIVITAMIN & MINERAL PO), Take  by mouth., Disp: , Rfl:     Current Facility-Administered Medications:      bupivacaine (MARCAINE) 0.25 % injection 3 mL, 3 mL, , , Jerrod Arias MD, 3 mL at 07/24/19 1535     bupivacaine (MARCAINE) 0.25 % injection 3 mL, 3 mL, , , Jerrod Arias MD, 3 mL at 07/24/19 1535     lidocaine 1 % injection 4 mL, 4 mL, , , Jerrod Arias MD, 4 mL at 07/24/19 1535     lidocaine 1 % injection 4 mL, 4 mL, , , Jerrod Arias MD, 4 mL at 07/24/19 1535     methylPREDNISolone (DEPO-MEDROL) injection 80 mg, 80 mg, , , Jerrod Arias MD, 80 mg at 07/24/19 1535     methylPREDNISolone (DEPO-MEDROL) injection 80 mg, 80 mg, , , Jerrod Arias MD, 80 mg at 07/24/19 1538    Allergies -   Allergies   Allergen Reactions     Sulfa Drugs Nausea     Codeine Phosphate Nausea and Vomiting     Pentazocine      Propoxyphene      Darvon     Penicillins Rash     rash and  itching         Social History -   Social History     Socioeconomic History     Marital status:      Spouse name: Darrian     Number of children: 2     Years of education: 17.5     Highest education level: Not on file   Occupational History     Occupation: Retired     Comment: School counselor, 2015   Social Needs     Financial resource strain: Not on file     Food insecurity:     Worry: Not on file     Inability: Not on file     Transportation needs:     Medical: Not on file     Non-medical: Not on file   Tobacco Use     Smoking status: Former Smoker     Years: 16.00     Types: Cigarettes     Last attempt to quit: 1982     Years since quittin.7     Smokeless tobacco: Never Used   Substance and Sexual Activity     Alcohol use: Yes     Alcohol/week: 0.0 oz     Comment: 2-7 glasses of wine per week     Drug use: No     Sexual activity: Never     Partners: Male     Birth control/protection: Post-menopausal   Lifestyle     Physical activity:     Days per week: Not on file     Minutes per session: Not on file     Stress: Not on file   Relationships     Social connections:     Talks on phone: Not on file     Gets together: Not on file     Attends Confucianism service: Not on file     Active member of club or organization: Not on file     Attends meetings of clubs or organizations: Not on file     Relationship status: Not on file     Intimate partner violence:     Fear of current or ex partner: Not on file     Emotionally abused: Not on file     Physically abused: Not on file     Forced sexual activity: Not on file   Other Topics Concern     Parent/sibling w/ CABG, MI or angioplasty before 65F 55M? Not Asked      Service Not Asked     Blood Transfusions Not Asked     Caffeine Concern Not Asked     Occupational Exposure Not Asked     Hobby Hazards Not Asked     Sleep Concern Not Asked     Stress Concern Not Asked     Weight Concern Not Asked     Special Diet Not Asked     Back Care Not Asked      Exercise Yes     Comment: jazzercise     Bike Helmet Not Asked     Seat Belt Not Asked     Self-Exams Not Asked   Social History Narrative     Not on file       Family History -   Family History   Problem Relation Age of Onset     Other Cancer Sister 51        ovarian cancer     Cancer Sister      Thyroid Disease Sister      Colon Cancer Father 76     Brain Hemorrhage Father         accident     Macular Degeneration Mother      Diabetes Maternal Aunt      Diabetes Maternal Uncle      Cancer Paternal Aunt      Diabetes Maternal Grandmother      Hypertension No family hx of      Cerebrovascular Disease No family hx of      Glaucoma No family hx of        Review of Systems - As per HPI and PMHx, otherwise 10+ system review of the head and neck, and general constitution is negative.    Physical Exam  LMP  (LMP Unknown)     General - The patient is well nourished and well developed, and appears to have good nutritional status.  Alert and oriented to person and place, answers questions and cooperates with examination appropriately.   Head and Face - Normocephalic and atraumatic, with no gross asymmetry noted of the contour of the facial features.  The facial nerve is intact, with strong symmetric movements.  Voice and Breathing - The patient was breathing comfortably without the use of accessory muscles. There was no wheezing, stridor, or stertor.  The patients voice was clear and strong, and had appropriate pitch and quality.  Ears - The tympanic membranes are normal in appearance, bony landmarks are intact.  No retraction, perforation, or masses.  No fluid or purulence was seen in the external canal or the middle ear. No evidence of infection of the middle ear or external canal, cerumen was normal in appearance.  Eyes - Extraocular movements intact, and the pupils were reactive to light.  Sclera were not icteric or injected, conjunctiva were pink and moist.  Mouth - Examination of the oral cavity showed pink, healthy  oral mucosa. No lesions or ulcerations noted.  The tongue was mobile and midline, and the dentition were in good condition.    Throat - The walls of the oropharynx were smooth, pink, moist, symmetric, and had no lesions or ulcerations.  The tonsillar pillars and soft palate were symmetric.  The uvula was midline on elevation.    Neck - Normal midline excursion of the laryngotracheal complex during swallowing.  Full range of motion on passive movement.  Palpation of the occipital, submental, submandibular, internal jugular chain, and supraclavicular nodes did not demonstrate any abnormal lymph nodes or masses.  The carotid pulse was palpable bilaterally.  Palpation of the thyroid was soft and smooth, with no nodules or goiter appreciated.  The trachea was mobile and midline.  Nose - External contour is symmetric, no gross deflection or scars.  Nasal mucosa is pink and moist with no abnormal mucus.  The septum was midline and non-obstructive, turbinates of normal size and position.  No polyps, masses, or purulence noted on examination.      A/P - Isabel Carroll is a 68 year old female  (J38.3) Vocal cord dysfunction  (primary encounter diagnosis)  (R05) Chronic cough     She sounds like she has cleaerly improved but she also needs help with sleep at night.    I think the ideal solution would be to stop her benadryl, and increase her Elavil to 10-10-30           Again, thank you for allowing me to participate in the care of your patient.        Sincerely,        Swapnil Shah MD

## 2019-12-17 NOTE — PATIENT INSTRUCTIONS
Scheduling Information  To schedule your CT/MRI scan, please contact [facility] at [phone] OR [facility] at [phone]    To schedule your Surgery, please contact our Specialty Schedulers at [phone]      ENT Clinic Locations Clinic Hours Telephone Number     [facility]  [address] [facility] [address] [facility] [address] [facility] [place] [address]   Monday:           1:00pm -- 5:00pm    Friday:              8:00am - 12:00pm   To schedule/reschedule an appointment with   Dr. Shah,   please contact our   Specialty Scheduling Department at:     [phone]       [facility] [place]  [address] [place]  [place] [address] Tuesday:          8:00am -- 2:00pm         Urgent Care Locations Clinic Hours Telephone Numbers     [facility]  [address] [place] [address]     Monday-Friday:     11:00am - 9:00pm    Saturday-Sunday:  9:00am - 5:00pm   [phone]     [facility]  [address] [place] [address]     Monday-Friday:      5:00pm - 9:00pm     Saturday-Sunday:  9:00am - 5:00pm   [phone]

## 2019-12-17 NOTE — PROGRESS NOTES
History of Present Illness - Isabel Carroll is a 68 year old female here in follow up from 10/29/2019, seen for chronic cough.    To review, this has been a very longstanding problem, over 20 years.  She has slowly developed a persistent dry tickling cough.  It can happen any time and has no pattern.  She has no chest pain, no heartburn, no pain.  No issues with swallowing or eating.  No coughing up blood.  No previous ENT surgery or head and neck disease no post nasal drainage.    She has had work up from [facility], including barium swallow, pH Probe, Allergy testing, work up from Pulmonary Medicine, and everything has been negative.    After the visit, and evaluation, I felt that this was most likely a neurogenic cough.  I started her on a Elavil titration with a goal of 10mg three times daily, and she is here for follow up.   She notices a major improvement, at least 50% better. Therefore we changed to a dose of 10-10-20 and she is back for follow up after trying this higher dose of Elavil.    Past Medical History -   Patient Active Problem List   Diagnosis     Overweight     Chronic rhinitis     Primary osteoarthritis of both hands     Basal cell carcinoma of skin     Coronary artery disease involving native heart with angina pectoris, unspecified vessel or lesion type (H)     Hyperlipidemia     Impaired fasting glucose     Chorioretinal scar of left eye     Sinus tachycardia     Advance care planning     Pes planus     Right shoulder strain, initial encounter     Bronchiectasis without complication (H)     Chronic cough     Female pattern hair loss     FHx: colon cancer     Clostridium difficile infection     Pseudophakia of right eye     Pseudophakia of both eyes     Primary osteoarthritis of both knees     Vocal cord dysfunction       Current Medications -   Current Outpatient Medications:      albuterol (PROAIR HFA/PROVENTIL HFA/VENTOLIN HFA) 108 (90 Base) MCG/ACT inhaler, Inhale 2 puffs into the lungs  every 6 hours as needed for shortness of breath / dyspnea or wheezing (Patient not taking: Reported on 11/14/2019), Disp: 1 Inhaler, Rfl: 3     amitriptyline (ELAVIL) 10 MG tablet, 10mg in the morning, 10mg in the aftternoon, and 20mg at bedtime, Disp: 360 tablet, Rfl: 3     aspirin 81 MG tablet, Take 1 tablet by mouth daily., Disp: , Rfl:      atorvastatin (LIPITOR) 40 MG tablet, Take 1 tablet (40 mg) by mouth daily, Disp: 90 tablet, Rfl: 3     Calcium-Vitamin D (CALCIUM + D PO), Take  by mouth., Disp: , Rfl:      FISH OIL, , Disp: , Rfl:      MAGNESIUM OXIDE PO, Take 200 mg by mouth daily, Disp: , Rfl:      metoprolol succinate ER (TOPROL-XL) 25 MG 24 hr tablet, Take 1 tablet (25 mg) by mouth daily, Disp: 90 tablet, Rfl: 3     Multiple Vitamins-Minerals (MULTIVITAMIN & MINERAL PO), Take  by mouth., Disp: , Rfl:     Current Facility-Administered Medications:      bupivacaine (MARCAINE) 0.25 % injection 3 mL, 3 mL, , , Jerrod Arias MD, 3 mL at 07/24/19 1535     bupivacaine (MARCAINE) 0.25 % injection 3 mL, 3 mL, , , Jerrod Arias MD, 3 mL at 07/24/19 1535     lidocaine 1 % injection 4 mL, 4 mL, , , Jerrod Arias MD, 4 mL at 07/24/19 1535     lidocaine 1 % injection 4 mL, 4 mL, , , Jerrod Arias MD, 4 mL at 07/24/19 1535     methylPREDNISolone (DEPO-MEDROL) injection 80 mg, 80 mg, , , Jerrod Arias MD, 80 mg at 07/24/19 1535     methylPREDNISolone (DEPO-MEDROL) injection 80 mg, 80 mg, , , Jerrod Arias MD, 80 mg at 07/24/19 1535    Allergies -   Allergies   Allergen Reactions     Sulfa Drugs Nausea     Codeine Phosphate Nausea and Vomiting     Pentazocine      Propoxyphene      Darvon     Penicillins Rash     rash and itching         Social History -   Social History     Socioeconomic History     Marital status:      Spouse name: Darrian     Number of children: 2     Years of education: 17.5     Highest education level: Not on file   Occupational History     Occupation: Retired      Comment: School counselor, 2015   Social Needs     Financial resource strain: Not on file     Food insecurity:     Worry: Not on file     Inability: Not on file     Transportation needs:     Medical: Not on file     Non-medical: Not on file   Tobacco Use     Smoking status: Former Smoker     Years: 16.00     Types: Cigarettes     Last attempt to quit: 1982     Years since quittin.7     Smokeless tobacco: Never Used   Substance and Sexual Activity     Alcohol use: Yes     Alcohol/week: 0.0 oz     Comment: 2-7 glasses of wine per week     Drug use: No     Sexual activity: Never     Partners: Male     Birth control/protection: Post-menopausal   Lifestyle     Physical activity:     Days per week: Not on file     Minutes per session: Not on file     Stress: Not on file   Relationships     Social connections:     Talks on phone: Not on file     Gets together: Not on file     Attends Catholic service: Not on file     Active member of club or organization: Not on file     Attends meetings of clubs or organizations: Not on file     Relationship status: Not on file     Intimate partner violence:     Fear of current or ex partner: Not on file     Emotionally abused: Not on file     Physically abused: Not on file     Forced sexual activity: Not on file   Other Topics Concern     Parent/sibling w/ CABG, MI or angioplasty before 65F 55M? Not Asked      Service Not Asked     Blood Transfusions Not Asked     Caffeine Concern Not Asked     Occupational Exposure Not Asked     Hobby Hazards Not Asked     Sleep Concern Not Asked     Stress Concern Not Asked     Weight Concern Not Asked     Special Diet Not Asked     Back Care Not Asked     Exercise Yes     Comment: jazzercise     Bike Helmet Not Asked     Seat Belt Not Asked     Self-Exams Not Asked   Social History Narrative     Not on file       Family History -   Family History   Problem Relation Age of Onset     Other Cancer Sister 51        ovarian cancer      Cancer Sister      Thyroid Disease Sister      Colon Cancer Father 76     Brain Hemorrhage Father         accident     Macular Degeneration Mother      Diabetes Maternal Aunt      Diabetes Maternal Uncle      Cancer Paternal Aunt      Diabetes Maternal Grandmother      Hypertension No family hx of      Cerebrovascular Disease No family hx of      Glaucoma No family hx of        Review of Systems - As per HPI and PMHx, otherwise 10+ system review of the head and neck, and general constitution is negative.    Physical Exam  LMP  (LMP Unknown)     General - The patient is well nourished and well developed, and appears to have good nutritional status.  Alert and oriented to person and place, answers questions and cooperates with examination appropriately.   Head and Face - Normocephalic and atraumatic, with no gross asymmetry noted of the contour of the facial features.  The facial nerve is intact, with strong symmetric movements.  Voice and Breathing - The patient was breathing comfortably without the use of accessory muscles. There was no wheezing, stridor, or stertor.  The patients voice was clear and strong, and had appropriate pitch and quality.  Ears - The tympanic membranes are normal in appearance, bony landmarks are intact.  No retraction, perforation, or masses.  No fluid or purulence was seen in the external canal or the middle ear. No evidence of infection of the middle ear or external canal, cerumen was normal in appearance.  Eyes - Extraocular movements intact, and the pupils were reactive to light.  Sclera were not icteric or injected, conjunctiva were pink and moist.  Mouth - Examination of the oral cavity showed pink, healthy oral mucosa. No lesions or ulcerations noted.  The tongue was mobile and midline, and the dentition were in good condition.    Throat - The walls of the oropharynx were smooth, pink, moist, symmetric, and had no lesions or ulcerations.  The tonsillar pillars and soft palate  were symmetric.  The uvula was midline on elevation.    Neck - Normal midline excursion of the laryngotracheal complex during swallowing.  Full range of motion on passive movement.  Palpation of the occipital, submental, submandibular, internal jugular chain, and supraclavicular nodes did not demonstrate any abnormal lymph nodes or masses.  The carotid pulse was palpable bilaterally.  Palpation of the thyroid was soft and smooth, with no nodules or goiter appreciated.  The trachea was mobile and midline.  Nose - External contour is symmetric, no gross deflection or scars.  Nasal mucosa is pink and moist with no abnormal mucus.  The septum was midline and non-obstructive, turbinates of normal size and position.  No polyps, masses, or purulence noted on examination.      A/P - Isabel Carroll is a 68 year old female  (J38.3) Vocal cord dysfunction  (primary encounter diagnosis)  (R05) Chronic cough     She sounds like she has cleaerly improved but she also needs help with sleep at night.    I think the ideal solution would be to stop her benadryl, and increase her Elavil to 10-10-30

## 2020-01-23 NOTE — PROGRESS NOTES
SUBJECTIVE:   Isabel Carroll is a 68 year old female who presents to clinic today for the following health issues:      ABDOMINAL PAIN     Onset: 3 days ago    Description:   Character: Dull ache  Location: left lower quadrant  Radiation: None    Intensity: moderate    Progression of Symptoms:  same    Accompanying Signs & Symptoms:  Fever/Chills?: no   Gas/Bloating: YES- gas  Nausea: no   Vomitting: no   Diarrhea?: no   Constipation:YES  Dysuria or Hematuria: no    History:   Trauma: no   Previous similar pain: no    Previous tests done: none    Precipitating factors:   Does the pain change with:     Food: no      BM: no     Urination: no     Alleviating factors:  Rest    Therapies Tried and outcome: Ibuprofen: mild relieve    LMP:  not applicable     Pleasant 68 year old female presents with the above concerns. Worsening LLQ pain for the last 3-4 days. Achy, sweaty, chills, decreased appetite 2-3 days ago. Nausea no vomiting. LLQ abdominal pain started Sat and continued into Sun (2-3 days ago). Pain worsened 2 days ago. Went to UC and now on bactrim for UTI but culture grew out normal  davonte. Constipation and loose BMs last 2 weeks. Decreased appetite. Colonoscopy 2016 and was ok. Flu vaccine 10/31    Problem list and histories reviewed & adjusted, as indicated.  Additional history: as documented    Patient Active Problem List   Diagnosis     Overweight     Chronic rhinitis     Primary osteoarthritis of both hands     Basal cell carcinoma of skin     Coronary artery disease involving native heart with angina pectoris, unspecified vessel or lesion type (H)     Hyperlipidemia     Impaired fasting glucose     Chorioretinal scar of left eye     Sinus tachycardia     Advance care planning     Pes planus     Right shoulder strain, initial encounter     Bronchiectasis without complication (H)     Chronic cough     Female pattern hair loss     FHx: colon cancer     Past Surgical History:   Procedure Laterality  Date      SECTION      x 2     COLONOSCOPY      3 polyps     COLONOSCOPY  2016    neg     ENDOSCOPY  2016     LENGTHEN TENDON ACHILLES  2011    Procedure:LENGTHEN TENDON ACHILLES; Surgeon:AARON CÁRDENAS; [facility]     LIVER SURGERY      Bengin large cyst     MINI ARC SLING OPERATION FOR STRESS INCONTINENCE  2007     OSTEOTOMY ANKLE  2011    Procedure:OSTEOTOMY ANKLE; Calcaneous,  Flexor Digitorum Longus Transfer       SALPINGO-OOPHORECTOMY BILATERAL  ,     Benign prophylactic for FHx ov cancer       Social History   Substance Use Topics     Smoking status: Former Smoker     Years: 16.00     Types: Cigarettes     Quit date: 1982     Smokeless tobacco: Never Used     Alcohol use 0.0 oz/week     0 Standard drinks or equivalent per week      Comment: 2-7 glasses of wine per week     Family History   Problem Relation Age of Onset     Other Cancer Sister 51     ovarian cancer     Cancer Sister      Thyroid Disease Sister      Colon Cancer Father 76     Brain Hemorrhage Father      accident     Macular Degeneration Mother      Diabetes Maternal Aunt      Diabetes Maternal Uncle      Cancer Paternal Aunt      Diabetes Maternal Grandmother      Hypertension No family hx of      Cerebrovascular Disease No family hx of      Glaucoma No family hx of          Current Outpatient Prescriptions   Medication Sig Dispense Refill     aspirin 81 MG tablet Take 1 tablet by mouth daily.       atorvastatin (LIPITOR) 40 MG tablet 40 mg       Calcium-Vitamin D (CALCIUM + D PO) Take  by mouth.       diclofenac (VOLTAREN) 1 % GEL 2 grams to hands four times daily using enclosed dosing card. 100 g 1     FISH OIL        MAGNESIUM OXIDE PO Take 200 mg by mouth daily       metoprolol (TOPROL-XL) 25 MG 24 hr tablet Take 1 tablet (25 mg) by mouth daily 90 tablet 3     Multiple Vitamins-Minerals (MULTIVITAMIN & MINERAL PO) Take  by mouth.       Pseudoephedrine HCl (SUDAFED 12 HOUR PO) Take  by  "mouth.       sulfamethoxazole-trimethoprim (BACTRIM DS/SEPTRA DS) 800-160 MG per tablet Take 1 tablet by mouth 2 times daily 14 tablet 0     fluticasone (FLONASE) 50 MCG/ACT nasal spray Spray 1-2 sprays into both nostrils daily (Patient not taking: Reported on 10/16/2018) 1 Package 11     guaiFENesin-codeine (ROBITUSSIN AC) 100-10 MG/5ML SOLN solution Take 5-10 mLs by mouth nightly as needed for cough (Patient not taking: Reported on 10/16/2018) 120 mL 1     omeprazole (PRILOSEC) 20 MG CR capsule Take 1 capsule (20 mg) by mouth daily (Patient not taking: Reported on 10/16/2018) 30 capsule 1     Allergies   Allergen Reactions     Codeine Phosphate Nausea and Vomiting     Pentazocine      Propoxyphene      Darvon     Penicillins Rash       Reviewed and updated as needed this visit by clinical staff  Tobacco  Allergies  Meds  Problems  Med Hx  Surg Hx  Fam Hx  Soc Hx        Reviewed and updated as needed this visit by Provider  Allergies  Meds  Problems         ROS:  Constitutional, HEENT, cardiovascular, pulmonary, GI, , musculoskeletal, neuro, skin, endocrine and psych systems are negative, except as otherwise noted.    OBJECTIVE:     /74 (BP Location: Right arm, Patient Position: Chair, Cuff Size: Adult Large)  Pulse 75  Temp 98.4  F (36.9  C) (Oral)  Resp 12  Ht 5' 1.42\" (1.56 m)  Wt 145 lb (65.8 kg)  LMP  (LMP Unknown)  SpO2 99%  Breastfeeding? No  BMI 27.02 kg/m2  Body mass index is 27.02 kg/(m^2).  GENERAL: healthy, alert and no distress  NECK: no adenopathy, no asymmetry, masses, or scars and thyroid normal to palpation  RESP: lungs clear to auscultation - no rales, rhonchi or wheezes  CV: regular rate and rhythm, normal S1 S2, no S3 or S4, no murmur, click or rub, no peripheral edema and peripheral pulses strong  ABDOMEN: soft, LLQ tenderness with even light palpation, no hepatosplenomegaly, no masses and bowel sounds normal  MS: no gross musculoskeletal defects noted, no " edema  SKIN: no suspicious lesions or rashes  PSYCH: mentation appears normal, affect normal/bright    Diagnostic Test Results:  Results for orders placed or performed in visit on 11/06/18 (from the past 24 hour(s))   CBC with platelets differential   Result Value Ref Range    WBC 5.7 4.0 - 11.0 10e9/L    RBC Count 4.40 3.8 - 5.2 10e12/L    Hemoglobin 14.2 11.7 - 15.7 g/dL    Hematocrit 41.5 35.0 - 47.0 %    MCV 94 78 - 100 fl    MCH 32.3 26.5 - 33.0 pg    MCHC 34.2 31.5 - 36.5 g/dL    RDW 12.2 10.0 - 15.0 %    Platelet Count 273 150 - 450 10e9/L    % Neutrophils 56.8 %    % Lymphocytes 29.8 %    % Monocytes 9.6 %    % Eosinophils 3.1 %    % Basophils 0.7 %    Absolute Neutrophil 3.3 1.6 - 8.3 10e9/L    Absolute Lymphocytes 1.7 0.8 - 5.3 10e9/L    Absolute Monocytes 0.6 0.0 - 1.3 10e9/L    Absolute Eosinophils 0.2 0.0 - 0.7 10e9/L    Absolute Basophils 0.0 0.0 - 0.2 10e9/L    Diff Method Automated Method        ASSESSMENT/PLAN:     1. LLQ abdominal pain  Worsening LLQ abdominal pain. On Bactrim for UTI but UC with normal davonte. WBC as high as 11.9 and now down to 5.7; however, she has been taking Bactrim the last 2 days. Will get CT to check for diverticulitis. Further plan pending result. Patient will not have CT until tomorrow as she's going to her granddaughter's first birthday party today.   - CBC with platelets differential  - Comprehensive metabolic panel (BMP + Alb, Alk Phos, ALT, AST, Total. Bili, TP)  - Lipase  - CT Abdomen Pelvis w Contrast; Future    2. Coronary artery disease involving native heart with angina pectoris, unspecified vessel or lesion type (H)  Patient had many questions about elevated triglycerides and cholesterol. She reports that she eats lots of low fat/fat free foods. I explained that these often have added sugars which could be elevating her triglycerides. Try eating regular foods in moderation instead.      See Patient Instructions    The benefits, risks and potential side effects  were discussed in detail. Black box warnings discussed as relevant. All patient questions were answered. The patient was instructed to follow up immediately if any adverse reactions develop.    Patient verbalizes understanding and agrees with plan of care. Patient stable for discharge.      VIC Nayak [facility]   Cyclophosphamide Pregnancy And Lactation Text: This medication is Pregnancy Category D and it isn't considered safe during pregnancy. This medication is excreted in breast milk.

## 2020-02-18 ENCOUNTER — OFFICE VISIT (OUTPATIENT)
Dept: FAMILY MEDICINE | Facility: CLINIC | Age: 70
End: 2020-02-18
Payer: COMMERCIAL

## 2020-02-18 ENCOUNTER — TELEPHONE (OUTPATIENT)
Dept: FAMILY MEDICINE | Facility: CLINIC | Age: 70
End: 2020-02-18

## 2020-02-18 VITALS
HEIGHT: 61 IN | WEIGHT: 156.9 LBS | OXYGEN SATURATION: 95 % | TEMPERATURE: 98.2 F | HEART RATE: 82 BPM | RESPIRATION RATE: 18 BRPM | BODY MASS INDEX: 29.62 KG/M2 | DIASTOLIC BLOOD PRESSURE: 77 MMHG | SYSTOLIC BLOOD PRESSURE: 114 MMHG

## 2020-02-18 DIAGNOSIS — R10.32 LLQ ABDOMINAL PAIN: Primary | ICD-10-CM

## 2020-02-18 LAB
ALBUMIN UR-MCNC: NEGATIVE MG/DL
APPEARANCE UR: CLEAR
BACTERIA #/AREA URNS HPF: ABNORMAL /HPF
BASOPHILS # BLD AUTO: 0 10E9/L (ref 0–0.2)
BASOPHILS NFR BLD AUTO: 0.3 %
BILIRUB UR QL STRIP: NEGATIVE
COLOR UR AUTO: YELLOW
DIFFERENTIAL METHOD BLD: NORMAL
EOSINOPHIL # BLD AUTO: 0.1 10E9/L (ref 0–0.7)
EOSINOPHIL NFR BLD AUTO: 1.4 %
ERYTHROCYTE [DISTWIDTH] IN BLOOD BY AUTOMATED COUNT: 11.8 % (ref 10–15)
GLUCOSE UR STRIP-MCNC: NEGATIVE MG/DL
HCT VFR BLD AUTO: 43.4 % (ref 35–47)
HGB BLD-MCNC: 14.6 G/DL (ref 11.7–15.7)
HGB UR QL STRIP: NEGATIVE
KETONES UR STRIP-MCNC: NEGATIVE MG/DL
LEUKOCYTE ESTERASE UR QL STRIP: ABNORMAL
LYMPHOCYTES # BLD AUTO: 3 10E9/L (ref 0.8–5.3)
LYMPHOCYTES NFR BLD AUTO: 42.6 %
MCH RBC QN AUTO: 32.4 PG (ref 26.5–33)
MCHC RBC AUTO-ENTMCNC: 33.6 G/DL (ref 31.5–36.5)
MCV RBC AUTO: 96 FL (ref 78–100)
MONOCYTES # BLD AUTO: 0.5 10E9/L (ref 0–1.3)
MONOCYTES NFR BLD AUTO: 7.7 %
NEUTROPHILS # BLD AUTO: 3.4 10E9/L (ref 1.6–8.3)
NEUTROPHILS NFR BLD AUTO: 48 %
NITRATE UR QL: NEGATIVE
NON-SQ EPI CELLS #/AREA URNS LPF: ABNORMAL /LPF
PH UR STRIP: 7.5 PH (ref 5–7)
PLATELET # BLD AUTO: 264 10E9/L (ref 150–450)
RBC # BLD AUTO: 4.51 10E12/L (ref 3.8–5.2)
RBC #/AREA URNS AUTO: ABNORMAL /HPF
SOURCE: ABNORMAL
SP GR UR STRIP: 1.01 (ref 1–1.03)
UROBILINOGEN UR STRIP-ACNC: 0.2 EU/DL (ref 0.2–1)
WBC # BLD AUTO: 7 10E9/L (ref 4–11)
WBC #/AREA URNS AUTO: ABNORMAL /HPF

## 2020-02-18 PROCEDURE — 99213 OFFICE O/P EST LOW 20 MIN: CPT | Performed by: NURSE PRACTITIONER

## 2020-02-18 PROCEDURE — 87186 SC STD MICRODIL/AGAR DIL: CPT | Performed by: NURSE PRACTITIONER

## 2020-02-18 PROCEDURE — 81001 URINALYSIS AUTO W/SCOPE: CPT | Performed by: NURSE PRACTITIONER

## 2020-02-18 PROCEDURE — 36415 COLL VENOUS BLD VENIPUNCTURE: CPT | Performed by: NURSE PRACTITIONER

## 2020-02-18 PROCEDURE — 87086 URINE CULTURE/COLONY COUNT: CPT | Performed by: NURSE PRACTITIONER

## 2020-02-18 PROCEDURE — 85025 COMPLETE CBC W/AUTO DIFF WBC: CPT | Performed by: NURSE PRACTITIONER

## 2020-02-18 PROCEDURE — 87088 URINE BACTERIA CULTURE: CPT | Performed by: NURSE PRACTITIONER

## 2020-02-18 RX ORDER — NITROFURANTOIN 25; 75 MG/1; MG/1
100 CAPSULE ORAL 2 TIMES DAILY
Qty: 5 CAPSULE | Refills: 0 | Status: SHIPPED | OUTPATIENT
Start: 2020-02-18 | End: 2020-02-19

## 2020-02-18 ASSESSMENT — PAIN SCALES - GENERAL: PAINLEVEL: SEVERE PAIN (7)

## 2020-02-18 ASSESSMENT — MIFFLIN-ST. JEOR: SCORE: 1169.07

## 2020-02-18 NOTE — PROGRESS NOTES
Subjective     Isabel Carroll is a 69 year old female who presents to clinic today for the following health issues:    HPI   ABDOMINAL   PAIN     Onset: 4-5 days     Description:   Character: Dull ache  Location: left lower quadrant  Radiation: None    Intensity: severe 9/10    Progression of Symptoms:  intermittent    Accompanying Signs & Symptoms:  Fever/Chills?: no   Gas/Bloating: YES- gas   Nausea: no   Vomitting: no   Diarrhea?: no   Constipation:no   Dysuria or Hematuria: no    History:   Trauma: no   Previous similar pain: YES   Previous tests done: CT    Precipitating factors:   Does the pain change with:     Food: no      BM: no     Urination: no     Alleviating factors:  Hx of divertculitis 1 year and 3 months ago     Therapies Tried and outcome: ibuprofen gives relief     LMP:  not applicable     Nagging pain  Hx diverticulitis  10 days ago had diarrhea and nausea and a little pain but it resolved and then this started 4-5 days ago  No fever  No dysuria, urgency or frequency  No ovaries or tubes - cyst    Patient Active Problem List   Diagnosis     Overweight     Chronic rhinitis     Primary osteoarthritis of both hands     Basal cell carcinoma of skin     Coronary artery disease involving native heart with angina pectoris, unspecified vessel or lesion type (H)     Hyperlipidemia     Impaired fasting glucose     Chorioretinal scar of left eye     Sinus tachycardia     Advance care planning     Pes planus     Right shoulder strain, initial encounter     Bronchiectasis without complication (H)     Chronic cough     Female pattern hair loss     FHx: colon cancer     Clostridium difficile infection     Pseudophakia of right eye     Pseudophakia of both eyes     Primary osteoarthritis of both knees     Vocal cord dysfunction     Past Surgical History:   Procedure Laterality Date     CATARACT IOL, RT/LT        SECTION      x 2     COLONOSCOPY      3 polyps     COLONOSCOPY      neg      ENDOSCOPY  2016     LENGTHEN TENDON ACHILLES  2011    Procedure:LENGTHEN TENDON ACHILLES; Surgeon:AARON CÁRDENAS; [facility]     LIVER SURGERY      Bengin large cyst     MINI ARC SLING OPERATION FOR STRESS INCONTINENCE       OSTEOTOMY ANKLE  2011    Procedure:OSTEOTOMY ANKLE; Calcaneous,  Flexor Digitorum Longus Transfer       SALPINGO-OOPHORECTOMY BILATERAL  ,     Benign prophylactic for FHx ov cancer       Social History     Tobacco Use     Smoking status: Former Smoker     Years: 16.00     Types: Cigarettes     Last attempt to quit: 1982     Years since quittin.1     Smokeless tobacco: Never Used   Substance Use Topics     Alcohol use: Yes     Alcohol/week: 0.0 standard drinks     Comment: 2-7 glasses of wine per week     Family History   Problem Relation Age of Onset     Other Cancer Sister 51        ovarian cancer     Cancer Sister      Thyroid Disease Sister      Colon Cancer Father 76     Brain Hemorrhage Father         accident     Macular Degeneration Mother      Diabetes Maternal Aunt      Diabetes Maternal Uncle      Cancer Paternal Aunt      Diabetes Maternal Grandmother      Hypertension No family hx of      Cerebrovascular Disease No family hx of      Glaucoma No family hx of          Current Outpatient Medications   Medication Sig Dispense Refill     amitriptyline (ELAVIL) 10 MG tablet 10mg in the morning, 10mg in the aftternoon, and 20mg at bedtime 360 tablet 3     aspirin 81 MG tablet Take 1 tablet by mouth daily.       atorvastatin (LIPITOR) 40 MG tablet Take 1 tablet (40 mg) by mouth daily 90 tablet 3     Calcium-Vitamin D (CALCIUM + D PO) Take  by mouth.       FISH OIL        MAGNESIUM OXIDE PO Take 200 mg by mouth daily       metoprolol succinate ER (TOPROL-XL) 25 MG 24 hr tablet Take 1 tablet (25 mg) by mouth daily 90 tablet 3     Multiple Vitamins-Minerals (MULTIVITAMIN & MINERAL PO) Take  by mouth.       albuterol (PROAIR HFA/PROVENTIL  "HFA/VENTOLIN HFA) 108 (90 Base) MCG/ACT inhaler Inhale 2 puffs into the lungs every 6 hours as needed for shortness of breath / dyspnea or wheezing (Patient not taking: Reported on 11/14/2019) 1 Inhaler 3     nitroFURantoin macrocrystal-monohydrate 100 MG PO capsule Take 1 capsule (100 mg) by mouth 2 times daily for 5 days 10 capsule 0     Allergies   Allergen Reactions     Sulfa Drugs Nausea     Codeine Phosphate Nausea and Vomiting     Pentazocine      Propoxyphene      Darvon     Penicillins Rash     rash and itching           Reviewed and updated as needed this visit by Provider  Tobacco  Allergies  Meds  Problems  Med Hx  Surg Hx  Fam Hx         Review of Systems   ROS COMP: Constitutional, HEENT, cardiovascular, pulmonary, gi and gu systems are negative, except as otherwise noted.      Objective    /77 (BP Location: Right arm, Patient Position: Sitting, Cuff Size: Adult Large)   Pulse 82   Temp 98.2  F (36.8  C) (Oral)   Resp 18   Ht 1.549 m (5' 1\")   Wt 71.2 kg (156 lb 14.4 oz)   LMP  (LMP Unknown)   SpO2 95%   Breastfeeding No   BMI 29.65 kg/m    Body mass index is 29.65 kg/m .  Physical Exam   GENERAL: healthy, alert and no distress  RESP: lungs clear to auscultation - no rales, rhonchi or wheezes  CV: regular rate and rhythm, normal S1 S2, no S3 or S4, no murmur, click or rub, no peripheral edema and peripheral pulses strong  ABDOMEN: soft, mild lower abdominal tenderness  MS: no gross musculoskeletal defects noted, no edema  PSYCH: mentation appears normal, affect normal/bright    Diagnostic Test Results:  Labs reviewed in Epic  Results for orders placed or performed in visit on 02/18/20   UA reflex to Microscopic and Culture     Status: Abnormal   Result Value Ref Range    Color Urine Yellow     Appearance Urine Clear     Glucose Urine Negative NEG^Negative mg/dL    Bilirubin Urine Negative NEG^Negative    Ketones Urine Negative NEG^Negative mg/dL    Specific Gravity Urine 1.010 " 1.003 - 1.035    Blood Urine Negative NEG^Negative    pH Urine 7.5 (H) 5.0 - 7.0 pH    Protein Albumin Urine Negative NEG^Negative mg/dL    Urobilinogen Urine 0.2 0.2 - 1.0 EU/dL    Nitrite Urine Negative NEG^Negative    Leukocyte Esterase Urine Small (A) NEG^Negative    Source Midstream Urine    CBC with platelets differential     Status: None   Result Value Ref Range    WBC 7.0 4.0 - 11.0 10e9/L    RBC Count 4.51 3.8 - 5.2 10e12/L    Hemoglobin 14.6 11.7 - 15.7 g/dL    Hematocrit 43.4 35.0 - 47.0 %    MCV 96 78 - 100 fl    MCH 32.4 26.5 - 33.0 pg    MCHC 33.6 31.5 - 36.5 g/dL    RDW 11.8 10.0 - 15.0 %    Platelet Count 264 150 - 450 10e9/L    % Neutrophils 48.0 %    % Lymphocytes 42.6 %    % Monocytes 7.7 %    % Eosinophils 1.4 %    % Basophils 0.3 %    Absolute Neutrophil 3.4 1.6 - 8.3 10e9/L    Absolute Lymphocytes 3.0 0.8 - 5.3 10e9/L    Absolute Monocytes 0.5 0.0 - 1.3 10e9/L    Absolute Eosinophils 0.1 0.0 - 0.7 10e9/L    Absolute Basophils 0.0 0.0 - 0.2 10e9/L    Diff Method Automated Method    Urine Microscopic     Status: Abnormal   Result Value Ref Range    WBC Urine 5-10 (A) OTO5^0 - 5 /HPF    RBC Urine O - 2 OTO2^O - 2 /HPF    Squamous Epithelial /LPF Urine Few FEW^Few /LPF    Bacteria Urine Moderate (A) NEG^Negative /HPF   Urine Culture Aerobic Bacterial     Status: Abnormal (Preliminary result)   Result Value Ref Range    Specimen Description Midstream Urine     Culture Micro (A)      10,000 to 50,000 colonies/mL  Non lactose fermenting gram negative rods  Susceptibility testing in progress             Assessment & Plan       ICD-10-CM    1. LLQ abdominal pain R10.32 UA reflex to Microscopic and Culture     CBC with platelets differential     Urine Microscopic     Urine Culture Aerobic Bacterial     macrobid 100 mg PO BID x5 days   Will treat for UTI. WBC normal and less likely to be diverticulitis. I tried to pick antibiotics that would cover but both her allergic and hx c diff make it tricky.        See Patient Instructions    Return in about 3 days (around 2/21/2020), or if symptoms worsen or fail to improve.     The benefits, risks and potential side effects were discussed in detail. Black box warnings discussed as relevant. All patient questions were answered. The patient was instructed to follow up immediately if any adverse reactions develop.    Return precautions discussed, including when to seek urgent/emergent care.    Patient verbalizes understanding and agrees with plan of care. Patient stable for discharge.      VIC Nayak [facility]

## 2020-02-18 NOTE — TELEPHONE ENCOUNTER
.Reason for call:  Other   Patient called regarding (reason for call): call back  Additional comments: pt is experiencing right side pain and wants to talk to her provider about this. Thinks it relates to a past diagnosis.    Phone number to reach patient:  Home number on file [phone] (home)    Best Time:  Anytime    Can we leave a detailed message on this number?  YES    Travel screening: Negative

## 2020-02-18 NOTE — TELEPHONE ENCOUNTER
S-(situation): abdominal pain LLQ    B-(background): Patient reports  hx of diverticulitis    A-(assessment): LLQ abdomen continuous discomfort over the past  4-5 days.     Denies following symptoms: vomiting, abdominal swelling, problems with urination, walking bent over/guarding.     When pain is at worst at 8/10. She has some level of discomfort continuously, but 8/10 is intermittent.    R-(recommendations): clinic apt today in next 2-4 hours. She is agreeable and RN assisted her in scheduling apt.    Next 5 appointments (look out 90 days)    Feb 18, 2020  4:00 PM CST  Office Visit with VIC Noland CNP  [facility] ([facility] [address] [place] [id number] [phone]

## 2020-02-19 ENCOUNTER — TELEPHONE (OUTPATIENT)
Dept: FAMILY MEDICINE | Facility: CLINIC | Age: 70
End: 2020-02-19

## 2020-02-19 DIAGNOSIS — R10.32 LLQ ABDOMINAL PAIN: ICD-10-CM

## 2020-02-19 RX ORDER — NITROFURANTOIN 25; 75 MG/1; MG/1
100 CAPSULE ORAL 2 TIMES DAILY
Qty: 10 CAPSULE | Refills: 0 | Status: SHIPPED | OUTPATIENT
Start: 2020-02-19 | End: 2020-02-24

## 2020-02-19 NOTE — TELEPHONE ENCOUNTER
Reason for Call:  Other prescription    Detailed comments: [facility] calling to verify quantity for nitrofurantoin     Phone Number Pharmacy can be reached at: Other phone number:  [phone]    Best Time: anytime    Can we leave a detailed message on this number? YES    Call taken on 2/19/2020 at 9:07 AM by Augie Whittington

## 2020-02-20 LAB
BACTERIA SPEC CULT: ABNORMAL
SPECIMEN SOURCE: ABNORMAL

## 2020-05-12 ENCOUNTER — VIRTUAL VISIT (OUTPATIENT)
Dept: FAMILY MEDICINE | Facility: CLINIC | Age: 70
End: 2020-05-12
Payer: COMMERCIAL

## 2020-05-12 ENCOUNTER — ANCILLARY PROCEDURE (OUTPATIENT)
Dept: GENERAL RADIOLOGY | Facility: CLINIC | Age: 70
End: 2020-05-12
Attending: NURSE PRACTITIONER
Payer: COMMERCIAL

## 2020-05-12 ENCOUNTER — OFFICE VISIT (OUTPATIENT)
Dept: FAMILY MEDICINE | Facility: CLINIC | Age: 70
End: 2020-05-12
Payer: COMMERCIAL

## 2020-05-12 VITALS
WEIGHT: 158 LBS | BODY MASS INDEX: 29.83 KG/M2 | TEMPERATURE: 97.9 F | SYSTOLIC BLOOD PRESSURE: 116 MMHG | HEIGHT: 61 IN | OXYGEN SATURATION: 97 % | RESPIRATION RATE: 18 BRPM | HEART RATE: 83 BPM | DIASTOLIC BLOOD PRESSURE: 76 MMHG

## 2020-05-12 VITALS — BODY MASS INDEX: 29.29 KG/M2 | WEIGHT: 155 LBS

## 2020-05-12 DIAGNOSIS — R10.31 RLQ ABDOMINAL PAIN: Primary | ICD-10-CM

## 2020-05-12 DIAGNOSIS — I25.119 CORONARY ARTERY DISEASE INVOLVING NATIVE HEART WITH ANGINA PECTORIS, UNSPECIFIED VESSEL OR LESION TYPE (H): ICD-10-CM

## 2020-05-12 DIAGNOSIS — R39.11 URINARY HESITANCY: ICD-10-CM

## 2020-05-12 DIAGNOSIS — R10.31 ABDOMINAL PAIN, RIGHT LOWER QUADRANT: Primary | ICD-10-CM

## 2020-05-12 DIAGNOSIS — R10.31 ABDOMINAL PAIN, RIGHT LOWER QUADRANT: ICD-10-CM

## 2020-05-12 DIAGNOSIS — J47.9 BRONCHIECTASIS WITHOUT COMPLICATION (H): ICD-10-CM

## 2020-05-12 LAB
ALBUMIN UR-MCNC: NEGATIVE MG/DL
APPEARANCE UR: CLEAR
BASOPHILS # BLD AUTO: 0 10E9/L (ref 0–0.2)
BASOPHILS NFR BLD AUTO: 0.5 %
BILIRUB UR QL STRIP: NEGATIVE
COLOR UR AUTO: YELLOW
DIFFERENTIAL METHOD BLD: NORMAL
EOSINOPHIL # BLD AUTO: 0.1 10E9/L (ref 0–0.7)
EOSINOPHIL NFR BLD AUTO: 2.3 %
ERYTHROCYTE [DISTWIDTH] IN BLOOD BY AUTOMATED COUNT: 12.2 % (ref 10–15)
GLUCOSE UR STRIP-MCNC: NEGATIVE MG/DL
HCT VFR BLD AUTO: 42.4 % (ref 35–47)
HGB BLD-MCNC: 14.6 G/DL (ref 11.7–15.7)
HGB UR QL STRIP: ABNORMAL
KETONES UR STRIP-MCNC: NEGATIVE MG/DL
LEUKOCYTE ESTERASE UR QL STRIP: ABNORMAL
LYMPHOCYTES # BLD AUTO: 2.3 10E9/L (ref 0.8–5.3)
LYMPHOCYTES NFR BLD AUTO: 40.8 %
MCH RBC QN AUTO: 32.7 PG (ref 26.5–33)
MCHC RBC AUTO-ENTMCNC: 34.4 G/DL (ref 31.5–36.5)
MCV RBC AUTO: 95 FL (ref 78–100)
MONOCYTES # BLD AUTO: 0.5 10E9/L (ref 0–1.3)
MONOCYTES NFR BLD AUTO: 8.8 %
NEUTROPHILS # BLD AUTO: 2.6 10E9/L (ref 1.6–8.3)
NEUTROPHILS NFR BLD AUTO: 47.6 %
NITRATE UR QL: NEGATIVE
NON-SQ EPI CELLS #/AREA URNS LPF: NORMAL /LPF
PH UR STRIP: 7.5 PH (ref 5–7)
PLATELET # BLD AUTO: 255 10E9/L (ref 150–450)
RBC # BLD AUTO: 4.46 10E12/L (ref 3.8–5.2)
RBC #/AREA URNS AUTO: NORMAL /HPF
SOURCE: ABNORMAL
SP GR UR STRIP: 1.01 (ref 1–1.03)
UROBILINOGEN UR STRIP-ACNC: 0.2 EU/DL (ref 0.2–1)
WBC # BLD AUTO: 5.6 10E9/L (ref 4–11)
WBC #/AREA URNS AUTO: NORMAL /HPF

## 2020-05-12 PROCEDURE — 87186 SC STD MICRODIL/AGAR DIL: CPT | Performed by: NURSE PRACTITIONER

## 2020-05-12 PROCEDURE — 99207 ZZC NO BILLABLE SERVICE THIS VISIT: CPT | Performed by: NURSE PRACTITIONER

## 2020-05-12 PROCEDURE — 87088 URINE BACTERIA CULTURE: CPT | Performed by: NURSE PRACTITIONER

## 2020-05-12 PROCEDURE — 87086 URINE CULTURE/COLONY COUNT: CPT | Performed by: NURSE PRACTITIONER

## 2020-05-12 PROCEDURE — 85025 COMPLETE CBC W/AUTO DIFF WBC: CPT | Performed by: NURSE PRACTITIONER

## 2020-05-12 PROCEDURE — 74019 RADEX ABDOMEN 2 VIEWS: CPT | Mod: FY

## 2020-05-12 PROCEDURE — 36415 COLL VENOUS BLD VENIPUNCTURE: CPT | Performed by: NURSE PRACTITIONER

## 2020-05-12 PROCEDURE — 81001 URINALYSIS AUTO W/SCOPE: CPT | Performed by: NURSE PRACTITIONER

## 2020-05-12 PROCEDURE — 99214 OFFICE O/P EST MOD 30 MIN: CPT | Performed by: NURSE PRACTITIONER

## 2020-05-12 RX ORDER — POLYETHYLENE GLYCOL 3350 17 G/17G
1 POWDER, FOR SOLUTION ORAL DAILY
Qty: 225 G | Refills: 1 | Status: SHIPPED | OUTPATIENT
Start: 2020-05-12 | End: 2020-10-14

## 2020-05-12 ASSESSMENT — MIFFLIN-ST. JEOR: SCORE: 1174.06

## 2020-05-12 ASSESSMENT — PAIN SCALES - GENERAL
PAINLEVEL: MODERATE PAIN (5)
PAINLEVEL: MODERATE PAIN (5)

## 2020-05-12 NOTE — PATIENT INSTRUCTIONS
At [facility], we strive to deliver an exceptional experience to you, every time we see you. If you receive a survey, please complete it as we do value your feedback.  If you have MyChart, you can expect to receive results automatically within 24 hours of their completion.  Your provider will send a note interpreting your results as well.   If you do not have MyChart, you should receive your results in about a week by mail.    Your care team:                            Family Medicine Internal Medicine   MD Guillermo Faustin MD Shantel Branch-Fleming, MD Katya Georgiev PA-C Megan Hill, APRTRACEE Tijerina, MD Pediatrics   Alonso Alex, PAESTRELLA Cotton, MD Antionette Suárez APRN CNP   MD Lindsay Torrez MD Deborah Mielke, MD Kim Thein, APRN [facility]      Clinic hours: Monday - Thursday 7 am-7 pm; Fridays 7 am-5 pm.   Urgent care: Monday - Friday 11 am-9 pm; Saturday and Sunday 9 am-5 pm.    Clinic: [phone]       [facility]: Monday - Thursday 8 am - 7 pm; Friday 8 am - 6 pm  [facility]: [phone]     Use [email or web address] for 24/7 diagnosis and treatment of dozens of conditions.

## 2020-05-12 NOTE — PATIENT INSTRUCTIONS
CBC is normal. Xray by my read shows some constipation which could be the cause of your pain.  I do recommend Miralax daily for the next two weeks.  I also sent a culture of your urine which should come back in to days as a confirmatory test to rule out infection.  IF your pain worsens, you develop nausea, vomiting, chills, fever, etc, please give us a call back.  If your symptoms are severe, you should go to the emergency room to rule out diverticulitis or appendicitis.

## 2020-05-12 NOTE — PROGRESS NOTES
Subjective     Isabel Carroll is a 69 year old female who presents to clinic today for the following health issues:    HPI   ABDOMINAL   PAIN     Onset: Last thursday    Description:   Character: Dull ache and throbbing  Location: right lower quadrant  Radiation: Back lower RT    Intensity: moderate    Progression of Symptoms:  improving    Accompanying Signs & Symptoms:  Fever/Chills?: no   Gas/Bloating: gas yes  Nausea: no   Vomitting: no   Diarrhea?: no   Constipation:no   Dysuria or Hematuria: no    History:   Trauma: no   Previous similar pain: YES- years ago had cyst on RT ovary   Previous tests done: none    Precipitating factors:   Does the pain change with:     Food: no      BM: no     Urination: no     Alleviating factors:  ibuprofen    Therapies Tried and outcome: ibuprofen helps    LMP:  not applicable     +history of frequent UTIs (last 2/2020, 11/2019, 4/2019), diverticulitis (11/2018).  No known history of renal stone.    Pain not worse with any activity  Denies increased fatigue  No fever, chills  Not limiting activities or movements  States pain until today had been an 8/10.  Today is a 5/10.  She states when she had diverticulitis her pain was much worse and she did have chills with nausea.     Bowel movements normal.  Slightly constipated sometimes- stools sometimes harder to pass but has not had an issue with this recently.    Has pressure when she pees and sometimes does not urinate very much for the last week.  She denies pain when urinating.  Does sometimes have to push to urinate ( a few times) which is not normal for her.      Describes as dull ache, constant.    Sister with history of ovarian cancer. Patient has had oopherectomy. Patient is brca negative    Patient does still have appendix      Patient Active Problem List   Diagnosis     Overweight     Chronic rhinitis     Primary osteoarthritis of both hands     Basal cell carcinoma of skin     Coronary artery disease involving  native heart with angina pectoris, unspecified vessel or lesion type (H)     Hyperlipidemia     Impaired fasting glucose     Chorioretinal scar of left eye     Sinus tachycardia     Advance care planning     Pes planus     Right shoulder strain, initial encounter     Bronchiectasis without complication (H)     Chronic cough     Female pattern hair loss     FHx: colon cancer     Clostridium difficile infection     Pseudophakia of right eye     Pseudophakia of both eyes     Primary osteoarthritis of both knees     Vocal cord dysfunction     Past Surgical History:   Procedure Laterality Date     CATARACT IOL, RT/LT        SECTION      x 2     COLONOSCOPY      3 polyps     COLONOSCOPY  2016    neg     ENDOSCOPY       LENGTHEN TENDON ACHILLES  2011    Procedure:LENGTHEN TENDON ACHILLES; Surgeon:AARON CÁRDENAS; [facility]     LIVER SURGERY      Bengin large cyst     MINI ARC SLING OPERATION FOR STRESS INCONTINENCE       OSTEOTOMY ANKLE  2011    Procedure:OSTEOTOMY ANKLE; Calcaneous,  Flexor Digitorum Longus Transfer       SALPINGO-OOPHORECTOMY BILATERAL  ,     Benign prophylactic for FHx ov cancer       Social History     Tobacco Use     Smoking status: Former Smoker     Years: 16.00     Types: Cigarettes     Last attempt to quit: 1982     Years since quittin.3     Smokeless tobacco: Never Used   Substance Use Topics     Alcohol use: Yes     Alcohol/week: 0.0 standard drinks     Comment: 2-7 glasses of wine per week     Family History   Problem Relation Age of Onset     Other Cancer Sister 51        ovarian cancer     Cancer Sister      Thyroid Disease Sister      Colon Cancer Father 76     Brain Hemorrhage Father         accident     Macular Degeneration Mother      Diabetes Maternal Aunt      Diabetes Maternal Uncle      Cancer Paternal Aunt      Diabetes Maternal Grandmother      Hypertension No family hx of      Cerebrovascular Disease No family hx of       "Glaucoma No family hx of          Current Outpatient Medications   Medication Sig Dispense Refill     albuterol (PROAIR HFA/PROVENTIL HFA/VENTOLIN HFA) 108 (90 Base) MCG/ACT inhaler Inhale 2 puffs into the lungs every 6 hours as needed for shortness of breath / dyspnea or wheezing 1 Inhaler 3     amitriptyline (ELAVIL) 10 MG tablet 10mg in the morning, 10mg in the aftternoon, and 20mg at bedtime 360 tablet 3     aspirin 81 MG tablet Take 1 tablet by mouth daily.       atorvastatin (LIPITOR) 40 MG tablet Take 1 tablet (40 mg) by mouth daily 90 tablet 3     Calcium-Vitamin D (CALCIUM + D PO) Take  by mouth.       FISH OIL        MAGNESIUM OXIDE PO Take 200 mg by mouth daily       metoprolol succinate ER (TOPROL-XL) 25 MG 24 hr tablet Take 1 tablet (25 mg) by mouth daily 90 tablet 3     Multiple Vitamins-Minerals (MULTIVITAMIN & MINERAL PO) Take  by mouth.       Allergies   Allergen Reactions     Sulfa Drugs Nausea     Codeine Phosphate Nausea and Vomiting     Pentazocine      Propoxyphene      Darvon     Penicillins Rash     rash and itching       BP Readings from Last 3 Encounters:   05/12/20 116/76   02/18/20 114/77   11/14/19 107/72    Wt Readings from Last 3 Encounters:   05/12/20 71.7 kg (158 lb)   05/12/20 70.3 kg (155 lb)   02/18/20 71.2 kg (156 lb 14.4 oz)                      Reviewed and updated as needed this visit by Provider         Review of Systems   Constitutional, HEENT, cardiovascular, pulmonary, GI, , musculoskeletal, neuro, skin, endocrine and psych systems are negative, except as otherwise noted.      Objective    /76 (BP Location: Right arm, Patient Position: Sitting, Cuff Size: Adult Regular)   Pulse 83   Temp 97.9  F (36.6  C) (Oral)   Resp 18   Ht 1.549 m (5' 1\")   Wt 71.7 kg (158 lb)   LMP  (LMP Unknown)   SpO2 97%   BMI 29.85 kg/m    Body mass index is 29.85 kg/m .  Physical Exam   GENERAL: healthy, alert and no distress  NECK: no adenopathy, no asymmetry, masses, or scars " and thyroid normal to palpation  RESP: lungs clear to auscultation - no rales, rhonchi or wheezes  CV: regular rate and rhythm, normal S1 S2, no S3 or S4, no murmur, click or rub, no peripheral edema and peripheral pulses strong  ABDOMEN: tenderness RLQ, moderate (patient states 4/10 when pushing), no rebound tenderness, no organomegaly or masses and bowel sounds normal  MS: no gross musculoskeletal defects noted, no edema  SKIN: no suspicious lesions or rashes    Diagnostic Test Results:  Labs reviewed in Epic  Results for orders placed or performed in visit on 05/12/20 (from the past 24 hour(s))   UA reflex to Microscopic and Culture    Specimen: Midstream Urine   Result Value Ref Range    Color Urine Yellow     Appearance Urine Clear     Glucose Urine Negative NEG^Negative mg/dL    Bilirubin Urine Negative NEG^Negative    Ketones Urine Negative NEG^Negative mg/dL    Specific Gravity Urine 1.015 1.003 - 1.035    Blood Urine Trace (A) NEG^Negative    pH Urine 7.5 (H) 5.0 - 7.0 pH    Protein Albumin Urine Negative NEG^Negative mg/dL    Urobilinogen Urine 0.2 0.2 - 1.0 EU/dL    Nitrite Urine Negative NEG^Negative    Leukocyte Esterase Urine Small (A) NEG^Negative    Source Midstream Urine    Urine Microscopic   Result Value Ref Range    WBC Urine 0 - 5 OTO5^0 - 5 /HPF    RBC Urine O - 2 OTO2^O - 2 /HPF    Squamous Epithelial /LPF Urine Few FEW^Few /LPF           Assessment & Plan     1. Abdominal pain, right lower quadrant  Normal CBC.  Xray with constipation, treat for that. Asked patient to monitor symptoms closely given her history of diverticulitis.  She will follow up in the next 2 days if she does not improve or if she worsens.  Low threshold for CT given history.  emergency room precautions discussed.    - CBC with platelets and differential  - XR Abdomen 2 Views; Future  - Urine Culture Aerobic Bacterial  - polyethylene glycol (MIRALAX) 17 GM/SCOOP powder; Take 17 g (1 capful) by mouth daily With 8 oz of  "water  Dispense: 225 g; Refill: 1    2. Urinary hesitancy  Normal micro.  Sent for culture given history.  Treatment based on results.   - UA reflex to Microscopic and Culture  - Urine Microscopic  - Urine Culture Aerobic Bacterial    3. Coronary artery disease involving native heart with angina pectoris, unspecified vessel or lesion type (H)  stable    4. Bronchiectasis without complication (H)  Stable.        BMI:   Estimated body mass index is 29.85 kg/m  as calculated from the following:    Height as of this encounter: 1.549 m (5' 1\").    Weight as of this encounter: 71.7 kg (158 lb).   Weight management plan: Patient was referred to their PCP to discuss a diet and exercise plan.        Patient Instructions   CBC is normal. Xray by my read shows some constipation which could be the cause of your pain.  I do recommend Miralax daily for the next two weeks.  I also sent a culture of your urine which should come back in to days as a confirmatory test to rule out infection.  IF your pain worsens, you develop nausea, vomiting, chills, fever, etc, please give us a call back.  If your symptoms are severe, you should go to the emergency room to rule out diverticulitis or appendicitis.        Return in about 3 days (around 5/15/2020), or if symptoms worsen or fail to improve.    VIC Hardin [facility]      "

## 2020-05-14 ENCOUNTER — TELEPHONE (OUTPATIENT)
Dept: FAMILY MEDICINE | Facility: CLINIC | Age: 70
End: 2020-05-14

## 2020-05-14 DIAGNOSIS — N30.00 ACUTE CYSTITIS WITHOUT HEMATURIA: Primary | ICD-10-CM

## 2020-05-14 LAB
BACTERIA SPEC CULT: ABNORMAL
BACTERIA SPEC CULT: ABNORMAL
SPECIMEN SOURCE: ABNORMAL

## 2020-05-14 RX ORDER — CIPROFLOXACIN 500 MG/1
500 TABLET, FILM COATED ORAL 2 TIMES DAILY
Qty: 10 TABLET | Refills: 0 | Status: SHIPPED | OUTPATIENT
Start: 2020-05-14 | End: 2020-05-19

## 2020-05-14 NOTE — TELEPHONE ENCOUNTER
Reviewed message with patient she did get the message and she did pick Rx up. She would like to discuss hx of UTIs with Alisia Boyd NP she is wondering why she has had so many UTIs. Assisted her with scheduling.    Arminda Beyer RN

## 2020-05-14 NOTE — TELEPHONE ENCOUNTER
This writer attempted to contact patient on 05/14/20      Reason for call results/medication and left message.      If patient calls back:   Registered Nurse called. Follow Triage Call workflow      Notes recorded by Abby Cotton APRN CNP on 5/14/2020 at 8:52 AM CDT   Please call patient to ensure she received the result note here about her UTI.  Cipro twice a day x 5 days sent to her pharmacy. Thanks!   Abby Quinones RN

## 2020-05-15 ENCOUNTER — VIRTUAL VISIT (OUTPATIENT)
Dept: FAMILY MEDICINE | Facility: CLINIC | Age: 70
End: 2020-05-15
Payer: COMMERCIAL

## 2020-05-15 VITALS — BODY MASS INDEX: 29.29 KG/M2 | WEIGHT: 155 LBS

## 2020-05-15 DIAGNOSIS — N39.0 FREQUENT UTI: Primary | ICD-10-CM

## 2020-05-15 PROCEDURE — 99213 OFFICE O/P EST LOW 20 MIN: CPT | Mod: 95 | Performed by: NURSE PRACTITIONER

## 2020-05-15 ASSESSMENT — PAIN SCALES - GENERAL: PAINLEVEL: NO PAIN (0)

## 2020-05-15 NOTE — PATIENT INSTRUCTIONS
At [facility], we strive to deliver an exceptional experience to you, every time we see you. If you receive a survey, please complete it as we do value your feedback.  If you have MyChart, you can expect to receive results automatically within 24 hours of their completion.  Your provider will send a note interpreting your results as well.   If you do not have MyChart, you should receive your results in about a week by mail.    Your care team:                            Family Medicine Internal Medicine   MD Guillermo Faustin MD Shantel Branch-Fleming, MD Katya Georgiev PA-C Megan Hill, APRTRACEE Tijerina, MD Pediatrics   Alonso Alex, PAESTRELLA Cotton, MD Antionette Suárez APRN CNP   MD Lindsay Torrez MD Deborah Mielke, MD Kim Thein, APRN [facility]      Clinic hours: Monday - Thursday 7 am-7 pm; Fridays 7 am-5 pm.   Urgent care: Monday - Friday 11 am-9 pm; Saturday and Sunday 9 am-5 pm.    Clinic: [phone]       [facility]: Monday - Thursday 8 am - 7 pm; Friday 8 am - 6 pm  [facility]: [phone]     Use www.oncare.org for 24/7 diagnosis and treatment of dozens of conditions.

## 2020-05-15 NOTE — PROGRESS NOTES
"Isabel Carroll is a 69 year old female who is being evaluated via a billable telephone visit.      The patient has been notified of following:     \"This telephone visit will be conducted via a call between you and your physician/provider. We have found that certain health care needs can be provided without the need for a physical exam.  This service lets us provide the care you need with a short phone conversation.  If a prescription is necessary we can send it directly to your pharmacy.  If lab work is needed we can place an order for that and you can then stop by our lab to have the test done at a later time.    Telephone visits are billed at different rates depending on your insurance coverage. During this emergency period, for some insurers they may be billed the same as an in-person visit.  Please reach out to your insurance provider with any questions.    If during the course of the call the physician/provider feels a telephone visit is not appropriate, you will not be charged for this service.\"    Patient has given verbal consent for Telephone visit?  Yes    What phone number would you like to be contacted at? [phone]    How would you like to obtain your AVS? Benjy Ramirez     Isabel Carroll is a 69 year old female who presents to clinic today for the following health issues:    HPI  Concerned about the number of times she has had UTI's    Pleasant 69 year old female initiated a virtual visit with concerns for frequent UTIs. It appears she has had 4 in the last 2 years. Upon discussing further, it sounds like they usually happen after intercourse but not close to every time. She is wondering what she can do to prevent them as well as requesting referral to urologist if she continues to get them. Had surgery for urology issues in the past. She is currently on cipro and feeling much better - she will finish all of the antibiotics.    Patient Active Problem List   Diagnosis     " Overweight     Chronic rhinitis     Primary osteoarthritis of both hands     Basal cell carcinoma of skin     Coronary artery disease involving native heart with angina pectoris, unspecified vessel or lesion type (H)     Hyperlipidemia     Impaired fasting glucose     Chorioretinal scar of left eye     Sinus tachycardia     Advance care planning     Pes planus     Right shoulder strain, initial encounter     Bronchiectasis without complication (H)     Chronic cough     Female pattern hair loss     FHx: colon cancer     Clostridium difficile infection     Pseudophakia of right eye     Pseudophakia of both eyes     Primary osteoarthritis of both knees     Vocal cord dysfunction     Past Surgical History:   Procedure Laterality Date     CATARACT IOL, RT/LT        SECTION      x 2     COLONOSCOPY      3 polyps     COLONOSCOPY  2016    neg     ENDOSCOPY  2016     LENGTHEN TENDON ACHILLES  2011    Procedure:LENGTHEN TENDON ACHILLES; Surgeon:AARON CÁRDENAS; [facility]     LIVER SURGERY      Bengin large cyst     MINI ARC SLING OPERATION FOR STRESS INCONTINENCE       OSTEOTOMY ANKLE  2011    Procedure:OSTEOTOMY ANKLE; Calcaneous,  Flexor Digitorum Longus Transfer       SALPINGO-OOPHORECTOMY BILATERAL  ,     Benign prophylactic for FHx ov cancer       Social History     Tobacco Use     Smoking status: Former Smoker     Years: 16.00     Types: Cigarettes     Last attempt to quit: 1982     Years since quittin.3     Smokeless tobacco: Never Used   Substance Use Topics     Alcohol use: Yes     Alcohol/week: 0.0 standard drinks     Comment: 2-7 glasses of wine per week     Family History   Problem Relation Age of Onset     Other Cancer Sister 51        ovarian cancer     Cancer Sister      Thyroid Disease Sister      Colon Cancer Father 76     Brain Hemorrhage Father         accident     Macular Degeneration Mother      Diabetes Maternal Aunt      Diabetes Maternal Uncle       Cancer Paternal Aunt      Diabetes Maternal Grandmother      Hypertension No family hx of      Cerebrovascular Disease No family hx of      Glaucoma No family hx of          Current Outpatient Medications   Medication Sig Dispense Refill     amitriptyline (ELAVIL) 10 MG tablet 10mg in the morning, 10mg in the aftternoon, and 20mg at bedtime 360 tablet 3     aspirin 81 MG tablet Take 1 tablet by mouth daily.       atorvastatin (LIPITOR) 40 MG tablet Take 1 tablet (40 mg) by mouth daily 90 tablet 3     Calcium-Vitamin D (CALCIUM + D PO) Take  by mouth.       ciprofloxacin (CIPRO) 500 MG tablet Take 1 tablet (500 mg) by mouth 2 times daily for 5 days 10 tablet 0     FISH OIL        MAGNESIUM OXIDE PO Take 200 mg by mouth daily       metoprolol succinate ER (TOPROL-XL) 25 MG 24 hr tablet Take 1 tablet (25 mg) by mouth daily 90 tablet 3     Multiple Vitamins-Minerals (MULTIVITAMIN & MINERAL PO) Take  by mouth.       polyethylene glycol (MIRALAX) 17 GM/SCOOP powder Take 17 g (1 capful) by mouth daily With 8 oz of water 225 g 1     albuterol (PROAIR HFA/PROVENTIL HFA/VENTOLIN HFA) 108 (90 Base) MCG/ACT inhaler Inhale 2 puffs into the lungs every 6 hours as needed for shortness of breath / dyspnea or wheezing (Patient not taking: Reported on 5/15/2020) 1 Inhaler 3     Allergies   Allergen Reactions     Sulfa Drugs Nausea     Codeine Phosphate Nausea and Vomiting     Pentazocine      Propoxyphene      Darvon     Penicillins Rash     rash and itching         Reviewed and updated as needed this visit by Provider         Review of Systems   Constitutional, HEENT, cardiovascular, pulmonary, gi and gu systems are negative, except as otherwise noted.       Objective   Reported vitals:  Wt 70.3 kg (155 lb)   LMP  (LMP Unknown)   BMI 29.29 kg/m     healthy, alert and no distress  PSYCH: Alert and oriented times 3; coherent speech, normal   rate and volume, able to articulate logical thoughts, able   to abstract reason, no  tangential thoughts, no hallucinations   or delusions  Her affect is normal  RESP: No cough, no audible wheezing, able to talk in full sentences  Remainder of exam unable to be completed due to telephone visits    Diagnostic Test Results:  Labs reviewed in Epic        Assessment/Plan:  1. Frequent UTI  Symptoms of current UTI improving. Discussed ways to prevent future UTIs. She will follow up with urology if she continues to have frequent UTIs.  - UROLOGY ADULT REFERRAL    Return in about 4 weeks (around 6/12/2020), or if symptoms worsen or fail to improve.     Return precautions discussed, including when to seek urgent/emergent care.    Patient verbalizes understanding and agrees with plan of care.         Phone call duration:  7 minutes    VIC Nayak CNP

## 2020-06-29 NOTE — PATIENT INSTRUCTIONS
At [facility], we strive to deliver an exceptional experience to you, every time we see you.  If you receive a survey in the mail, please send us back your thoughts. We really do value your feedback.    Based on your medical history, these are the current health maintenance/preventive care services that you are due for (some may have been done at this visit.)  Health Maintenance Due   Topic Date Due     DTAP/TDAP/TD IMMUNIZATION (3 - Td) 06/21/2006     ZOSTER IMMUNIZATION (2 of 3) 06/24/2013     FALL RISK ASSESSMENT  05/25/2018         Suggested websites for health information:  Www.[employer].Hubkick : Up to date and easily searchable information on multiple topics.  Www.medlineplus.gov : medication info, interactive tutorials, watch real surgeries online  Www.familydoctor.org : good info from the Academy of Family Physicians  Www.cdc.gov : public health info, travel advisories, epidemics (H1N1)  Www.aap.org : children's health info, normal development, vaccinations  Www.health.[facility].[place].us : [place] dept of health, public health issues in [place], N1N1    Your care team:                            Family Medicine Internal Medicine   MD Guillermo Faustin MD Shantel Branch-Fleming, MD Katya Georgiev PA-C Nam Ho, MD Pediatrics   OZ Boo, LISANDRA Guerra APRN CNP   MD Lindsay Torrez MD Deborah Mielke, MD Kim Thein, APRN CNP      Clinic hours: Monday - Thursday 7 am-7 pm; Fridays 7 am-5 pm.   Urgent care: Monday - Friday 11 am-9 pm; Saturday and Sunday 9 am-5 pm.  Pharmacy : Monday -Thursday 8 am-8 pm; Friday 8 am-6 pm; Saturday and Sunday 9 am-5 pm.     Clinic: [phone]   Pharmacy: [phone]    
Yes

## 2020-09-09 ENCOUNTER — OFFICE VISIT (OUTPATIENT)
Dept: FAMILY MEDICINE | Facility: CLINIC | Age: 70
End: 2020-09-09
Payer: COMMERCIAL

## 2020-09-09 VITALS
TEMPERATURE: 98.5 F | DIASTOLIC BLOOD PRESSURE: 77 MMHG | HEART RATE: 90 BPM | HEIGHT: 61 IN | OXYGEN SATURATION: 95 % | BODY MASS INDEX: 28.7 KG/M2 | RESPIRATION RATE: 18 BRPM | SYSTOLIC BLOOD PRESSURE: 121 MMHG | WEIGHT: 152 LBS

## 2020-09-09 DIAGNOSIS — R10.31 RLQ ABDOMINAL PAIN: Primary | ICD-10-CM

## 2020-09-09 DIAGNOSIS — Z12.31 ENCOUNTER FOR SCREENING MAMMOGRAM FOR BREAST CANCER: ICD-10-CM

## 2020-09-09 LAB
ALBUMIN UR-MCNC: NEGATIVE MG/DL
APPEARANCE UR: CLEAR
BACTERIA #/AREA URNS HPF: ABNORMAL /HPF
BASOPHILS # BLD AUTO: 0 10E9/L (ref 0–0.2)
BASOPHILS NFR BLD AUTO: 0.5 %
BILIRUB UR QL STRIP: NEGATIVE
COLOR UR AUTO: YELLOW
DIFFERENTIAL METHOD BLD: NORMAL
EOSINOPHIL # BLD AUTO: 0.2 10E9/L (ref 0–0.7)
EOSINOPHIL NFR BLD AUTO: 3 %
ERYTHROCYTE [DISTWIDTH] IN BLOOD BY AUTOMATED COUNT: 11.6 % (ref 10–15)
GLUCOSE UR STRIP-MCNC: NEGATIVE MG/DL
HCT VFR BLD AUTO: 44 % (ref 35–47)
HGB BLD-MCNC: 15.2 G/DL (ref 11.7–15.7)
HGB UR QL STRIP: ABNORMAL
KETONES UR STRIP-MCNC: NEGATIVE MG/DL
LEUKOCYTE ESTERASE UR QL STRIP: ABNORMAL
LYMPHOCYTES # BLD AUTO: 2.6 10E9/L (ref 0.8–5.3)
LYMPHOCYTES NFR BLD AUTO: 42.5 %
MCH RBC QN AUTO: 32.6 PG (ref 26.5–33)
MCHC RBC AUTO-ENTMCNC: 34.5 G/DL (ref 31.5–36.5)
MCV RBC AUTO: 94 FL (ref 78–100)
MONOCYTES # BLD AUTO: 0.4 10E9/L (ref 0–1.3)
MONOCYTES NFR BLD AUTO: 7.1 %
NEUTROPHILS # BLD AUTO: 2.8 10E9/L (ref 1.6–8.3)
NEUTROPHILS NFR BLD AUTO: 46.9 %
NITRATE UR QL: NEGATIVE
NON-SQ EPI CELLS #/AREA URNS LPF: ABNORMAL /LPF
PH UR STRIP: 6.5 PH (ref 5–7)
PLATELET # BLD AUTO: 253 10E9/L (ref 150–450)
RBC # BLD AUTO: 4.66 10E12/L (ref 3.8–5.2)
RBC #/AREA URNS AUTO: ABNORMAL /HPF
SOURCE: ABNORMAL
SP GR UR STRIP: 1.01 (ref 1–1.03)
UROBILINOGEN UR STRIP-ACNC: 0.2 EU/DL (ref 0.2–1)
WBC # BLD AUTO: 6 10E9/L (ref 4–11)
WBC #/AREA URNS AUTO: ABNORMAL /HPF

## 2020-09-09 PROCEDURE — 85025 COMPLETE CBC W/AUTO DIFF WBC: CPT | Performed by: NURSE PRACTITIONER

## 2020-09-09 PROCEDURE — 99214 OFFICE O/P EST MOD 30 MIN: CPT | Performed by: NURSE PRACTITIONER

## 2020-09-09 PROCEDURE — 36415 COLL VENOUS BLD VENIPUNCTURE: CPT | Performed by: NURSE PRACTITIONER

## 2020-09-09 PROCEDURE — 81001 URINALYSIS AUTO W/SCOPE: CPT | Performed by: NURSE PRACTITIONER

## 2020-09-09 PROCEDURE — 87086 URINE CULTURE/COLONY COUNT: CPT | Performed by: NURSE PRACTITIONER

## 2020-09-09 ASSESSMENT — PAIN SCALES - GENERAL: PAINLEVEL: SEVERE PAIN (6)

## 2020-09-09 ASSESSMENT — MIFFLIN-ST. JEOR: SCORE: 1146.85

## 2020-09-09 NOTE — PROGRESS NOTES
"Subjective     Isabel Carroll is a 69 year old female who presents to clinic today for the following health issues:    HPI       Genitourinary - Female  Onset/Duration: 4-5 days  Description:   Painful urination (Dysuria): no           Frequency: YES  Blood in urine (Hematuria): no  Delay in urine (Hesitency): no  Intensity: severe  Progression of Symptoms:  worsening  Accompanying Signs & Symptoms:  Fever/chills: no  Flank pain: no  Nausea and vomiting: no  Vaginal symptoms: none  Abdominal/Pelvic Pain: YES- right side pressure  History:   History of frequent UTI s: no  History of kidney stones: no  Sexually Active: no  Possibility of pregnancy: No  Precipitating or alleviating factors: None  Therapies tried and outcome: Ibuprofen OTC, Arthritis acetaminophen OTC    Tubes and ovaries removed because sister had ovarian cancer and she ended up with a cyst so had it removed  No vaginal bleeding  RLQ pain - \"dull ache\"  Has appendix    Review of Systems   Constitutional, HEENT, cardiovascular, pulmonary, gi and gu systems are negative, except as otherwise noted.      Objective    /77 (BP Location: Left arm, Patient Position: Chair, Cuff Size: Adult Regular)   Pulse 90   Temp 98.5  F (36.9  C) (Oral)   Resp 18   Ht 1.549 m (5' 1\")   Wt 68.9 kg (152 lb)   LMP  (LMP Unknown)   SpO2 95%   BMI 28.72 kg/m    Body mass index is 28.72 kg/m .  Physical Exam   GENERAL: healthy, alert and no distress  NECK: no adenopathy, no asymmetry, masses, or scars and thyroid normal to palpation  RESP: lungs clear to auscultation - no rales, rhonchi or wheezes  CV: regular rate and rhythm, normal S1 S2, no S3 or S4, no murmur, click or rub, no peripheral edema and peripheral pulses strong  ABDOMEN: soft, mildly tender to RLQ  MS: no gross musculoskeletal defects noted, no edema  PSYCH: mentation appears normal, affect normal/bright    Results for orders placed or performed in visit on 09/09/20 (from the past 24 hour(s)) "   *UA reflex to Microscopic and Culture ([facility] (except [place] and [place])    Specimen: Midstream Urine   Result Value Ref Range    Color Urine Yellow     Appearance Urine Clear     Glucose Urine Negative NEG^Negative mg/dL    Bilirubin Urine Negative NEG^Negative    Ketones Urine Negative NEG^Negative mg/dL    Specific Gravity Urine 1.015 1.003 - 1.035    Blood Urine Moderate (A) NEG^Negative    pH Urine 6.5 5.0 - 7.0 pH    Protein Albumin Urine Negative NEG^Negative mg/dL    Urobilinogen Urine 0.2 0.2 - 1.0 EU/dL    Nitrite Urine Negative NEG^Negative    Leukocyte Esterase Urine Trace (A) NEG^Negative    Source Midstream Urine    Urine Microscopic   Result Value Ref Range    WBC Urine 0 - 5 OTO5^0 - 5 /HPF    RBC Urine 10-25 (A) OTO2^O - 2 /HPF    Squamous Epithelial /LPF Urine Few FEW^Few /LPF    Bacteria Urine Few (A) NEG^Negative /HPF   CBC with platelets differential   Result Value Ref Range    WBC 6.0 4.0 - 11.0 10e9/L    RBC Count 4.66 3.8 - 5.2 10e12/L    Hemoglobin 15.2 11.7 - 15.7 g/dL    Hematocrit 44.0 35.0 - 47.0 %    MCV 94 78 - 100 fl    MCH 32.6 26.5 - 33.0 pg    MCHC 34.5 31.5 - 36.5 g/dL    RDW 11.6 10.0 - 15.0 %    Platelet Count 253 150 - 450 10e9/L    % Neutrophils 46.9 %    % Lymphocytes 42.5 %    % Monocytes 7.1 %    % Eosinophils 3.0 %    % Basophils 0.5 %    Absolute Neutrophil 2.8 1.6 - 8.3 10e9/L    Absolute Lymphocytes 2.6 0.8 - 5.3 10e9/L    Absolute Monocytes 0.4 0.0 - 1.3 10e9/L    Absolute Eosinophils 0.2 0.0 - 0.7 10e9/L    Absolute Basophils 0.0 0.0 - 0.2 10e9/L    Diff Method Automated Method            Assessment & Plan     RLQ abdominal pain  Labs unremarkable. UC pending. Discussed CT - will hold off until UC is back unless worsening. emergency department precautions discussed.  - *UA reflex to Microscopic and Culture ([facility] (except [place] and [place])  - Urine Microscopic  - CBC with platelets differential  - Urine Culture  Aerobic Bacterial  - CT Abdomen Pelvis w/o & w Contrast; Future    Encounter for screening mammogram for breast cancer  - *MA Screening Digital Bilateral; Future       See Patient Instructions    Return in about 1 week (around 9/16/2020), or if symptoms worsen or fail to improve.     Return precautions discussed, including when to seek urgent/emergent care.    Patient verbalizes understanding and agrees with plan of care. Patient stable for discharge.      VIC Nayak CNP  [facility]    This visit took place during the COVID 19 global pandemic.   PPE worn during the visit included: surgical mask and face shield by me and mask by patient

## 2020-09-09 NOTE — PATIENT INSTRUCTIONS
At [facility], we strive to deliver an exceptional experience to you, every time we see you. If you receive a survey, please complete it as we do value your feedback.  If you have MyChart, you can expect to receive results automatically within 24 hours of their completion.  Your provider will send a note interpreting your results as well.   If you do not have MyChart, you should receive your results in about a week by mail.    Your care team:                            Family Medicine Internal Medicine   MD Guillermo Faustin MD Shantel Branch-Fleming, MD Srinivasa Vaka, MD Katya Georgiev PA-C Megan Hill, APRN CNP    Joey Tijerina, MD Pediatrics   Alonso Alex, PANikkiC  Abby Cotton, CNP MD Antionette Mcmahon APRN CNP   MD Lindsay Torrez MD Deborah Mielke, MD Naida Collins, APRN CNP  Divine Graham, PANikkiC  Pamela Cid, CNP  MD Yesi Soliz MD Angela Wermerskirchen, MD      Clinic hours: Monday - Thursday 7 am-7 pm; Fridays 7 am-5 pm.   Urgent care: Monday - Friday 11 am-9 pm; Saturday and Sunday 9 am-5 pm.    Clinic: [phone]       [facility]: Monday - Thursday 8 am - 7 pm; Friday 8 am - 6 pm  [facility]: [phone]     Use [email or web address] for 24/7 diagnosis and treatment of dozens of conditions.

## 2020-09-10 LAB
BACTERIA SPEC CULT: NORMAL
SPECIMEN SOURCE: NORMAL

## 2020-09-11 ENCOUNTER — ANCILLARY PROCEDURE (OUTPATIENT)
Dept: CT IMAGING | Facility: CLINIC | Age: 70
End: 2020-09-11
Attending: NURSE PRACTITIONER
Payer: COMMERCIAL

## 2020-09-11 ENCOUNTER — TELEPHONE (OUTPATIENT)
Dept: FAMILY MEDICINE | Facility: CLINIC | Age: 70
End: 2020-09-11

## 2020-09-11 DIAGNOSIS — R31.9 HEMATURIA, UNSPECIFIED TYPE: ICD-10-CM

## 2020-09-11 DIAGNOSIS — R10.31 RLQ ABDOMINAL PAIN: ICD-10-CM

## 2020-09-11 DIAGNOSIS — R93.89 ABNORMAL CT SCAN: Primary | ICD-10-CM

## 2020-09-11 LAB
CREAT BLD-MCNC: 0.7 MG/DL (ref 0.52–1.04)
GFR SERPL CREATININE-BSD FRML MDRD: 83 ML/MIN/{1.73_M2}

## 2020-09-11 PROCEDURE — 74177 CT ABD & PELVIS W/CONTRAST: CPT | Performed by: RADIOLOGY

## 2020-09-11 RX ORDER — IOPAMIDOL 755 MG/ML
93 INJECTION, SOLUTION INTRAVASCULAR ONCE
Status: COMPLETED | OUTPATIENT
Start: 2020-09-11 | End: 2020-09-11

## 2020-09-11 RX ADMIN — IOPAMIDOL 93 ML: 755 INJECTION, SOLUTION INTRAVASCULAR at 14:44

## 2020-09-11 NOTE — TELEPHONE ENCOUNTER
Reason for Call:  Other call back    Detailed comments: Pt states that she would like to request a call back from provider regarding lissette results today if it's still possible. Thank you.    Phone Number Patient can be reached at: Home number on file [phone] (home)    Best Time: Any    Can we leave a detailed message on this number? YES    Call taken on 9/11/2020 at 4:50 PM by Nanette Keenan

## 2020-09-24 ENCOUNTER — TRANSFERRED RECORDS (OUTPATIENT)
Dept: HEALTH INFORMATION MANAGEMENT | Facility: CLINIC | Age: 70
End: 2020-09-24

## 2020-09-29 ENCOUNTER — ANCILLARY PROCEDURE (OUTPATIENT)
Dept: MAMMOGRAPHY | Facility: CLINIC | Age: 70
End: 2020-09-29
Payer: COMMERCIAL

## 2020-09-29 ENCOUNTER — MYC MEDICAL ADVICE (OUTPATIENT)
Dept: FAMILY MEDICINE | Facility: CLINIC | Age: 70
End: 2020-09-29

## 2020-09-29 DIAGNOSIS — Z12.31 ENCOUNTER FOR SCREENING MAMMOGRAM FOR BREAST CANCER: ICD-10-CM

## 2020-09-29 PROCEDURE — 77067 SCR MAMMO BI INCL CAD: CPT | Mod: TC

## 2020-09-29 PROCEDURE — 77063 BREAST TOMOSYNTHESIS BI: CPT | Mod: TC

## 2020-09-29 NOTE — RESULT ENCOUNTER NOTE
Dear Isabel Carroll,    I am happy to let you know that your mammogram was normal. You may schedule a follow up mammogram in 1-2 years depending on your risk factors and family history. Please let me know if you have any questions about your results. You should be receiving a letter from the radiologist.    You may call me at [phone] with any questions or send me a [employer] message.     Marry Gonzalez MD

## 2020-09-29 NOTE — TELEPHONE ENCOUNTER
This writer attempted to contact pt on 09/29/20      Reason for call schedule pre op  and left message.  [employer] message responded  to pt    If patient calls back:   Schedule PRE OP Office Visit appointment within 2 weeks with PCP, document that pt called and close encounter         Constanza Castillo

## 2020-10-04 ENCOUNTER — MYC MEDICAL ADVICE (OUTPATIENT)
Dept: FAMILY MEDICINE | Facility: CLINIC | Age: 70
End: 2020-10-04

## 2020-10-05 ENCOUNTER — VIRTUAL VISIT (OUTPATIENT)
Dept: FAMILY MEDICINE | Facility: CLINIC | Age: 70
End: 2020-10-05
Payer: COMMERCIAL

## 2020-10-05 DIAGNOSIS — Z20.822 SUSPECTED COVID-19 VIRUS INFECTION: ICD-10-CM

## 2020-10-05 DIAGNOSIS — R53.83 OTHER FATIGUE: Primary | ICD-10-CM

## 2020-10-05 DIAGNOSIS — R52 GENERALIZED BODY ACHES: ICD-10-CM

## 2020-10-05 DIAGNOSIS — Z20.822 ENCOUNTER FOR LABORATORY TESTING FOR COVID-19 VIRUS: ICD-10-CM

## 2020-10-05 DIAGNOSIS — J02.9 SORE THROAT: ICD-10-CM

## 2020-10-05 DIAGNOSIS — R19.7 DIARRHEA, UNSPECIFIED TYPE: ICD-10-CM

## 2020-10-05 PROBLEM — Z86.19 HISTORY OF CLOSTRIDIOIDES DIFFICILE INFECTION: Status: ACTIVE | Noted: 2018-12-13

## 2020-10-05 PROCEDURE — 99442 PR PHYSICIAN TELEPHONE EVALUATION 11-20 MIN: CPT | Mod: 95 | Performed by: FAMILY MEDICINE

## 2020-10-05 NOTE — PROGRESS NOTES
"Isabel Carroll is a 69 year old female who is being evaluated via a billable telephone visit.      The patient has been notified of following:     \"This telephone visit will be conducted via a call between you and your physician/provider. We have found that certain health care needs can be provided without the need for a physical exam.  This service lets us provide the care you need with a short phone conversation.  If a prescription is necessary we can send it directly to your pharmacy.  If lab work is needed we can place an order for that and you can then stop by our lab to have the test done at a later time.    Telephone visits are billed at different rates depending on your insurance coverage. During this emergency period, for some insurers they may be billed the same as an in-person visit.  Please reach out to your insurance provider with any questions.    If during the course of the call the physician/provider feels a telephone visit is not appropriate, you will not be charged for this service.\"    Patient has given verbal consent for Telephone visit?  Yes    What phone number would you like to be contacted at? [phone]    How would you like to obtain your AVS? Benjy Ramirez     Isabel Carroll is a 69 year old female who presents via phone visit today for the following health issues:    HPI     Concern - acute illness symptoms   Onset: 10/2/20   Description: achy (worst symptom),headache,fatigue,sore throat  Intensity: mild  Progression of Symptoms:  Improving   Does have surgery coming up.  And a bunch of appts coming up.   No fever noted. Not sure if her thermometer is correct or not.   Would like to get tested for COVID asap      Concern for COVID-19  About how many days ago did these symptoms start? Late Friday night  Is this your first visit for this illness? Yes  In the 14 days before your symptoms started, have you had close contact with someone with COVID-19 (Coronavirus)? No " and I do not know.  Do you have a fever or chills? I do not know:   Are you having new or worsening difficulty breathing? No  Do you have new or worsening cough? No (has chronic cough L  Have you had any new or unexplained body aches? YES and initial symptom    Have you experienced any of the following NEW symptoms?    Headache: YES and initial symptom, present for whole illness    Sore throat: Saturday & Sunday, improved now    Loss of taste or smell: No    Chest pain: No    Diarrhea: YES a lot yesterday, improved    Rash: No  What treatments have you tried? ibuprofen  Who do you live with?   Are you, or a household member, a healthcare worker or a ? No and they do babysit grandchildren however  Do you live in a nursing home, group home, or shelter? No  Do you have a way to get food/medications if quarantined? Yes, I have a friend or family member who can help me.      Past medical, family, and social histories, medications, and allergies are reviewed and updated in Epic.  Allergies: Sulfa drugs, Codeine phosphate, Pentazocine, Propoxyphene, and Penicillins      Objective:         LMP  (LMP Unknown) , since this is a telephone visit.  healthy, alert and no distress  RESP: No cough, no audible wheezing, able to talk in full sentences.  PSYCH: Alert and oriented times 3; coherent speech, normal   rate and volume, able to articulate logical thoughts, able   to abstract reason, no tangential thoughts, no hallucinations   or delusions, and affect is normal and pleasant  The remainder of the exam cannot be completed due to this being a telephone visit.      =====    ASSESSMENT/PLAN:  (R53.83) Other fatigue  (primary encounter diagnosis)  (R52) Generalized body aches  (J02.9) Sore throat  (R19.7) Diarrhea, unspecified type  (Z20.828) Suspected COVID-19 virus infection  (Z20.828) Encounter for laboratory testing for COVID-19 virus  Comment: no fever documented, but she questions her thermometer and  had some sweating with this  Plan: Symptomatic COVID-19 Virus (Coronavirus) by PCR        Return in about 11 days (around 10/16/2020) for recheck if symptoms fail to resolve by then, or call sooner if symptoms worsen.  Handout provided       Phone call duration:  16 minutes    Earle Barnes MD

## 2020-10-05 NOTE — TELEPHONE ENCOUNTER
Reason for Call:  Other appointment    Detailed comments: Pt calling for she was advised by [facility] that her Provider would need to put in an order for a Serology test as soon as possible.  She is concerned where her health is at because she has some upcoming procedures coming up this week and is hoping the Provider can send in an order as soon as possible today.  She would like a call back to confirm order has been placed.    Phone Number Patient can be reached at: Home number on file [phone] (home)    Best Time: anytime    Can we leave a detailed message on this number? YES    Call taken on 10/5/2020 at 7:54 AM by Augie Whittington

## 2020-10-05 NOTE — PATIENT INSTRUCTIONS
"Discharge Instructions for COVID-19 Patients  You have--or may have--COVID-19. Please follow the instructions listed below.   If you have a weakened immune system, discuss with your doctor any other actions you need to take.  How can I protect others?  If you have symptoms (fever, cough, body aches or trouble breathing):    Stay home and away from others (self-isolate) until:  ? At least 10 days have passed since your symptoms started, And   ? You've had no fever--and no medicine that reduces fever--for 1 full day (24 hours), And    ? Your other symptoms have resolved (gotten better).  If you don't show symptoms, but testing showed that you have COVID-19:    Stay home and away from others (self-isolate). Follow the tips under \"How do I self-isolate?\" below for 10 days (20 days if you have a weak immune system).    You don't need to be retested for COVID-19 before going back to school or work. As long as you're fever-free and feeling better, you can go back to school, work and other activities after waiting the 10 or 20 days.   How do I self-isolate?    Stay in your own room, even for meals. Use your own bathroom if you can.    Stay away from others in your home. No hugging, kissing or shaking hands. No visitors.    Don't go to work, school or anywhere else.    Clean \"high touch\" surfaces often (doorknobs, counters, handles). Use household cleaning spray or wipes. You'll find a full list of  on the EPA website: www.epa.gov/pesticide-registration/list-n-disinfectants-use-against-sars-cov-2.    Cover your mouth and nose with a mask or other face covering to avoid spreading germs.    Wash your hands and face often. Use soap and water.    Caregivers in these groups are at risk for severe illness due to COVID-19:  ? People 65 years and older  ? People who live in a nursing home or long-term care facility  ? People with chronic disease (lung, heart, cancer, diabetes, kidney, liver, immunologic)  ? People who have a " weakened immune system, including those who:    Are in cancer treatment    Take medicine that weakens the immune system, such as corticosteroids    Had a bone marrow or organ transplant    Have an immune deficiency    Have poorly controlled HIV or AIDS    Are obese (body mass index of 40 or higher)    Smoke regularly    Caregivers should wear gloves while washing dishes, handling laundry and cleaning bedrooms and bathrooms.    Use caution when washing and drying laundry: Don't shake dirty laundry and use the warmest water setting that you can.    For more tips on managing your health at home, go to www.cdc.gov/coronavirus/2019-ncov/downloads/10Things.pdf.  How can I take care of myself at home?  1. Get lots of rest. Drink extra fluids (unless a doctor has told you not to).    2. Take Tylenol (acetaminophen) for fever or pain. If you have liver or kidney problems, ask your family doctor if it's okay to take Tylenol.     Adults can take either:  ? 650 mg (two 325 mg pills) every 4 to 6 hours, or   ? 1,000 mg (two 500 mg pills) every 8 hours as needed.  ? Note: Don't take more than 3,000 mg in one day. Acetaminophen is found in many medicines (both prescribed and over-the-counter medicines). Read all labels to be sure you don't take too much.   For children, check the Tylenol bottle for the right dose. The dose is based on the child's age or weight.  3. If you have other health problems (like cancer, heart failure, an organ transplant or severe kidney disease): Call your specialty clinic if you don't feel better in the next 2 days.    4. Know when to call 911. Emergency warning signs include:  ? Trouble breathing or shortness of breath  ? Pain or pressure in the chest that doesn't go away  ? Feeling confused like you haven't felt before, or not being able to wake up  ? Bluish-colored lips or face    5. Your doctor may have prescribed a blood thinner medicine. Follow their instructions.  Where can I get more  information?    [facility] - About COVID-19: [email or web address]    CDC - What to Do If You're Sick: www.cdc.gov/coronavirus/2019-ncov/about/steps-when-sick.html    CDC - Ending Home Isolation: www.cdc.gov/coronavirus/2019-ncov/hcp/disposition-in-home-patients.html    CDC - Caring for Someone: www.cdc.gov/coronavirus/2019-ncov/if-you-are-sick/care-for-someone.html    [facility] - Interim Guidance for Hospital Discharge to Home: www.health.[facility].[place]./diseases/coronavirus/hcp/hospdischarge.pdf    [facility] clinical trials (COVID-19 research studies): clinicalaffairs.[facility]-clinical-trials    Below are the COVID-19 hotlines at the [place] Department of [facility] ([facility]). Interpreters are available.  ? For health questions: Call 409-757-2386 or 1-703.955.1517 (7 a.m. to 7 p.m.)  ? For questions about schools and childcare: Call 696-421-7755 or 1-379.751.9304 (7 a.m. to 7 p.m.)    For informational purposes only. Not to replace the advice of your health care provider. Clinically reviewed by the Infection Prevention Team. Copyright   2020 [employer]. All rights reserved. [employer] 388661 - REV 08/04/20.

## 2020-10-06 ENCOUNTER — MYC MEDICAL ADVICE (OUTPATIENT)
Dept: FAMILY MEDICINE | Facility: CLINIC | Age: 70
End: 2020-10-06

## 2020-10-12 ENCOUNTER — OFFICE VISIT (OUTPATIENT)
Dept: OTOLARYNGOLOGY | Facility: CLINIC | Age: 70
End: 2020-10-12
Payer: COMMERCIAL

## 2020-10-12 VITALS
OXYGEN SATURATION: 98 % | HEIGHT: 61 IN | BODY MASS INDEX: 28.7 KG/M2 | RESPIRATION RATE: 16 BRPM | WEIGHT: 152 LBS | DIASTOLIC BLOOD PRESSURE: 76 MMHG | HEART RATE: 72 BPM | SYSTOLIC BLOOD PRESSURE: 130 MMHG

## 2020-10-12 DIAGNOSIS — J38.3 VOCAL CORD DYSFUNCTION: Primary | ICD-10-CM

## 2020-10-12 DIAGNOSIS — R05.3 CHRONIC COUGH: ICD-10-CM

## 2020-10-12 PROCEDURE — 99214 OFFICE O/P EST MOD 30 MIN: CPT | Performed by: OTOLARYNGOLOGY

## 2020-10-12 RX ORDER — AMITRIPTYLINE HYDROCHLORIDE 10 MG/1
TABLET ORAL
Qty: 360 TABLET | Refills: 3 | Status: SHIPPED | OUTPATIENT
Start: 2020-10-12 | End: 2021-11-29

## 2020-10-12 ASSESSMENT — MIFFLIN-ST. JEOR: SCORE: 1146.85

## 2020-10-12 ASSESSMENT — PAIN SCALES - GENERAL: PAINLEVEL: NO PAIN (0)

## 2020-10-12 NOTE — LETTER
10/12/2020         RE: Isabel Carroll  [address] [address]        Dear Colleague,    Thank you for referring your patient, Isabel Carroll, to the [facility]. Please see a copy of my visit note below.    History of Present Illness - Isabel Carroll is a 69 year old female here in follow up from 12/17/2019, seen for chronic cough.    To review, this has been a very longstanding problem, over 20 years.  She has slowly developed a persistent dry tickling cough.  It can happen any time and has no pattern.  She has no chest pain, no heartburn, no pain.  No issues with swallowing or eating.  No coughing up blood.  No previous ENT surgery or head and neck disease no post nasal drainage.    She has had work up from [facility], including barium swallow, pH Probe, Allergy testing, work up from Pulmonary Medicine, and everything has been negative.    After the visit, and evaluation, I felt that this was most likely a neurogenic cough.  I started her on a Elavil titration with a goal of 10mg three times daily, and at follow up she noticed a major improvement, at least 50% better. Therefore we changed to a dose of 10-10-30 and she is back for follow up after trying this higher dose of Elavil.    She ended up weaning herself off the Elavil.  She also tried an inhaler and that did not make any difference.  She tells me that the Elavil definitely decreases the coughing, and when she does have episodes they are less frequent and shorter.    Past Medical History -   Patient Active Problem List   Diagnosis     Chronic rhinitis     Primary osteoarthritis of both hands     Basal cell carcinoma of skin     Coronary artery disease involving native heart with angina pectoris, unspecified vessel or lesion type (H)     Hyperlipidemia LDL goal <100     Impaired fasting glucose     Chorioretinal scar of left eye     Sinus tachycardia     Advance care planning     Pes planus      Bronchiectasis without complication (H)     Chronic cough     Female pattern hair loss     FHx: colon cancer     History of Clostridioides difficile infection     Pseudophakia of right eye     Pseudophakia of both eyes     Primary osteoarthritis of both knees     Vocal cord dysfunction       Current Medications -   Current Outpatient Medications:      amitriptyline (ELAVIL) 10 MG tablet, 10mg in the morning, 10mg in the aftternoon, and 20mg at bedtime, Disp: 360 tablet, Rfl: 3     aspirin 81 MG tablet, Take 1 tablet by mouth daily., Disp: , Rfl:      atorvastatin (LIPITOR) 40 MG tablet, Take 1 tablet (40 mg) by mouth daily, Disp: 90 tablet, Rfl: 3     Calcium-Vitamin D (CALCIUM + D PO), Take  by mouth., Disp: , Rfl:      FISH OIL, , Disp: , Rfl:      MAGNESIUM OXIDE PO, Take 200 mg by mouth daily, Disp: , Rfl:      metoprolol succinate ER (TOPROL-XL) 25 MG 24 hr tablet, Take 1 tablet (25 mg) by mouth daily, Disp: 90 tablet, Rfl: 3     Multiple Vitamins-Minerals (MULTIVITAMIN & MINERAL PO), Take  by mouth., Disp: , Rfl:      polyethylene glycol (MIRALAX) 17 GM/SCOOP powder, Take 17 g (1 capful) by mouth daily With 8 oz of water, Disp: 225 g, Rfl: 1    Current Facility-Administered Medications:      bupivacaine (MARCAINE) 0.25 % injection 3 mL, 3 mL, , , Jerrod Arias MD, 3 mL at 07/24/19 1535     bupivacaine (MARCAINE) 0.25 % injection 3 mL, 3 mL, , , Jerrod Arias MD, 3 mL at 07/24/19 1535     lidocaine 1 % injection 4 mL, 4 mL, , , Jerrod Arias MD, 4 mL at 07/24/19 1535     lidocaine 1 % injection 4 mL, 4 mL, , , Jerrod Arias MD, 4 mL at 07/24/19 1535     methylPREDNISolone (DEPO-MEDROL) injection 80 mg, 80 mg, , , Jerrod Arias MD, 80 mg at 07/24/19 1535     methylPREDNISolone (DEPO-MEDROL) injection 80 mg, 80 mg, , , Jerrod Arias MD, 80 mg at 07/24/19 1535    Allergies -   Allergies   Allergen Reactions     Sulfa Drugs Nausea     Codeine Phosphate Nausea and Vomiting      Pentazocine      Propoxyphene      Darvon     Penicillins Rash     rash and itching         Social History -   Social History     Socioeconomic History     Marital status:      Spouse name: Darrian     Number of children: 2     Years of education: 17.5     Highest education level: Not on file   Occupational History     Occupation: Retired     Comment: School counselor, 2015   Social Needs     Financial resource strain: Not on file     Food insecurity:     Worry: Not on file     Inability: Not on file     Transportation needs:     Medical: Not on file     Non-medical: Not on file   Tobacco Use     Smoking status: Former Smoker     Years: 16.00     Types: Cigarettes     Last attempt to quit: 1982     Years since quittin.7     Smokeless tobacco: Never Used   Substance and Sexual Activity     Alcohol use: Yes     Alcohol/week: 0.0 oz     Comment: 2-7 glasses of wine per week     Drug use: No     Sexual activity: Never     Partners: Male     Birth control/protection: Post-menopausal   Lifestyle     Physical activity:     Days per week: Not on file     Minutes per session: Not on file     Stress: Not on file   Relationships     Social connections:     Talks on phone: Not on file     Gets together: Not on file     Attends Confucianist service: Not on file     Active member of club or organization: Not on file     Attends meetings of clubs or organizations: Not on file     Relationship status: Not on file     Intimate partner violence:     Fear of current or ex partner: Not on file     Emotionally abused: Not on file     Physically abused: Not on file     Forced sexual activity: Not on file   Other Topics Concern     Parent/sibling w/ CABG, MI or angioplasty before 65F 55M? Not Asked      Service Not Asked     Blood Transfusions Not Asked     Caffeine Concern Not Asked     Occupational Exposure Not Asked     Hobby Hazards Not Asked     Sleep Concern Not Asked     Stress Concern Not Asked     Weight  "Concern Not Asked     Special Diet Not Asked     Back Care Not Asked     Exercise Yes     Comment: jazzercise     Bike Helmet Not Asked     Seat Belt Not Asked     Self-Exams Not Asked   Social History Narrative     Not on file       Family History -   Family History   Problem Relation Age of Onset     Other Cancer Sister 51        ovarian cancer     Cancer Sister      Thyroid Disease Sister      Colon Cancer Father 76     Brain Hemorrhage Father         accident     Macular Degeneration Mother      Diabetes Maternal Aunt      Diabetes Maternal Uncle      Cancer Paternal Aunt      Diabetes Maternal Grandmother      Hypertension No family hx of      Cerebrovascular Disease No family hx of      Glaucoma No family hx of        Review of Systems - As per HPI and PMHx, otherwise 10+ system review of the head and neck, and general constitution is negative.    Physical Exam  /76   Pulse 72   Resp 16   Ht 1.549 m (5' 1\")   Wt 68.9 kg (152 lb)   LMP  (LMP Unknown)   SpO2 98%   BMI 28.72 kg/m      General - The patient is well nourished and well developed, and appears to have good nutritional status.  Alert and oriented to person and place, answers questions and cooperates with examination appropriately.   Head and Face - Normocephalic and atraumatic, with no gross asymmetry noted of the contour of the facial features.  The facial nerve is intact, with strong symmetric movements.  Voice and Breathing - The patient was breathing comfortably without the use of accessory muscles. There was no wheezing, stridor, or stertor.  The patients voice was clear and strong, and had appropriate pitch and quality.  Ears - The tympanic membranes are normal in appearance, bony landmarks are intact.  No retraction, perforation, or masses.  No fluid or purulence was seen in the external canal or the middle ear. No evidence of infection of the middle ear or external canal, cerumen was normal in appearance.  Eyes - Extraocular " movements intact, and the pupils were reactive to light.  Sclera were not icteric or injected, conjunctiva were pink and moist.  Mouth - Examination of the oral cavity showed pink, healthy oral mucosa. No lesions or ulcerations noted.  The tongue was mobile and midline, and the dentition were in good condition.    Throat - The walls of the oropharynx were smooth, pink, moist, symmetric, and had no lesions or ulcerations.  The tonsillar pillars and soft palate were symmetric.  The uvula was midline on elevation.    Neck - Normal midline excursion of the laryngotracheal complex during swallowing.  Full range of motion on passive movement.  Palpation of the occipital, submental, submandibular, internal jugular chain, and supraclavicular nodes did not demonstrate any abnormal lymph nodes or masses.  The carotid pulse was palpable bilaterally.  Palpation of the thyroid was soft and smooth, with no nodules or goiter appreciated.  The trachea was mobile and midline.  Nose - External contour is symmetric, no gross deflection or scars.  Nasal mucosa is pink and moist with no abnormal mucus.  The septum was midline and non-obstructive, turbinates of normal size and position.  No polyps, masses, or purulence noted on examination.      A/P - Isabel Carroll is a 69 year old female  (J38.3) Vocal cord dysfunction  (primary encounter diagnosis)  (R05) Chronic cough     She tells me that even with weaning it her cough is much better.  I recommend staying off of it for now, especially with her upcoming bladder surgery.    I have also counseled her at length that given her tendency to Neurogenic cough, this will likely happen again the next time she has a bad URI or bronchitis, then we will just do it again.           Again, thank you for allowing me to participate in the care of your patient.        Sincerely,        Swapnil Shah MD

## 2020-10-12 NOTE — PROGRESS NOTES
History of Present Illness - Isabel Carroll is a 69 year old female here in follow up from 12/17/2019, seen for chronic cough.    To review, this has been a very longstanding problem, over 20 years.  She has slowly developed a persistent dry tickling cough.  It can happen any time and has no pattern.  She has no chest pain, no heartburn, no pain.  No issues with swallowing or eating.  No coughing up blood.  No previous ENT surgery or head and neck disease no post nasal drainage.    She has had work up from [facility], including barium swallow, pH Probe, Allergy testing, work up from Pulmonary Medicine, and everything has been negative.    After the visit, and evaluation, I felt that this was most likely a neurogenic cough.  I started her on a Elavil titration with a goal of 10mg three times daily, and at follow up she noticed a major improvement, at least 50% better. Therefore we changed to a dose of 10-10-30 and she is back for follow up after trying this higher dose of Elavil.    She ended up weaning herself off the Elavil.  She also tried an inhaler and that did not make any difference.  She tells me that the Elavil definitely decreases the coughing, and when she does have episodes they are less frequent and shorter.    Past Medical History -   Patient Active Problem List   Diagnosis     Chronic rhinitis     Primary osteoarthritis of both hands     Basal cell carcinoma of skin     Coronary artery disease involving native heart with angina pectoris, unspecified vessel or lesion type (H)     Hyperlipidemia LDL goal <100     Impaired fasting glucose     Chorioretinal scar of left eye     Sinus tachycardia     Advance care planning     Pes planus     Bronchiectasis without complication (H)     Chronic cough     Female pattern hair loss     FHx: colon cancer     History of Clostridioides difficile infection     Pseudophakia of right eye     Pseudophakia of both eyes     Primary osteoarthritis of both knees     Vocal  cord dysfunction       Current Medications -   Current Outpatient Medications:      amitriptyline (ELAVIL) 10 MG tablet, 10mg in the morning, 10mg in the aftternoon, and 20mg at bedtime, Disp: 360 tablet, Rfl: 3     aspirin 81 MG tablet, Take 1 tablet by mouth daily., Disp: , Rfl:      atorvastatin (LIPITOR) 40 MG tablet, Take 1 tablet (40 mg) by mouth daily, Disp: 90 tablet, Rfl: 3     Calcium-Vitamin D (CALCIUM + D PO), Take  by mouth., Disp: , Rfl:      FISH OIL, , Disp: , Rfl:      MAGNESIUM OXIDE PO, Take 200 mg by mouth daily, Disp: , Rfl:      metoprolol succinate ER (TOPROL-XL) 25 MG 24 hr tablet, Take 1 tablet (25 mg) by mouth daily, Disp: 90 tablet, Rfl: 3     Multiple Vitamins-Minerals (MULTIVITAMIN & MINERAL PO), Take  by mouth., Disp: , Rfl:      polyethylene glycol (MIRALAX) 17 GM/SCOOP powder, Take 17 g (1 capful) by mouth daily With 8 oz of water, Disp: 225 g, Rfl: 1    Current Facility-Administered Medications:      bupivacaine (MARCAINE) 0.25 % injection 3 mL, 3 mL, , , Jerrod Arias MD, 3 mL at 07/24/19 1535     bupivacaine (MARCAINE) 0.25 % injection 3 mL, 3 mL, , , Jerrod Arias MD, 3 mL at 07/24/19 1535     lidocaine 1 % injection 4 mL, 4 mL, , , Jerrod Arias MD, 4 mL at 07/24/19 1535     lidocaine 1 % injection 4 mL, 4 mL, , , Jerrod Arias MD, 4 mL at 07/24/19 1535     methylPREDNISolone (DEPO-MEDROL) injection 80 mg, 80 mg, , , Jerrod Arias MD, 80 mg at 07/24/19 1535     methylPREDNISolone (DEPO-MEDROL) injection 80 mg, 80 mg, , , Jerrod Arias MD, 80 mg at 07/24/19 1535    Allergies -   Allergies   Allergen Reactions     Sulfa Drugs Nausea     Codeine Phosphate Nausea and Vomiting     Pentazocine      Propoxyphene      Darvon     Penicillins Rash     rash and itching         Social History -   Social History     Socioeconomic History     Marital status:      Spouse name: Darrian     Number of children: 2     Years of education: 17.5     Highest  education level: Not on file   Occupational History     Occupation: Retired     Comment: School counselor, 2015   Social Needs     Financial resource strain: Not on file     Food insecurity:     Worry: Not on file     Inability: Not on file     Transportation needs:     Medical: Not on file     Non-medical: Not on file   Tobacco Use     Smoking status: Former Smoker     Years: 16.00     Types: Cigarettes     Last attempt to quit: 1982     Years since quittin.7     Smokeless tobacco: Never Used   Substance and Sexual Activity     Alcohol use: Yes     Alcohol/week: 0.0 oz     Comment: 2-7 glasses of wine per week     Drug use: No     Sexual activity: Never     Partners: Male     Birth control/protection: Post-menopausal   Lifestyle     Physical activity:     Days per week: Not on file     Minutes per session: Not on file     Stress: Not on file   Relationships     Social connections:     Talks on phone: Not on file     Gets together: Not on file     Attends Hoahaoism service: Not on file     Active member of club or organization: Not on file     Attends meetings of clubs or organizations: Not on file     Relationship status: Not on file     Intimate partner violence:     Fear of current or ex partner: Not on file     Emotionally abused: Not on file     Physically abused: Not on file     Forced sexual activity: Not on file   Other Topics Concern     Parent/sibling w/ CABG, MI or angioplasty before 65F 55M? Not Asked      Service Not Asked     Blood Transfusions Not Asked     Caffeine Concern Not Asked     Occupational Exposure Not Asked     Hobby Hazards Not Asked     Sleep Concern Not Asked     Stress Concern Not Asked     Weight Concern Not Asked     Special Diet Not Asked     Back Care Not Asked     Exercise Yes     Comment: jazzercise     Bike Helmet Not Asked     Seat Belt Not Asked     Self-Exams Not Asked   Social History Narrative     Not on file       Family History -   Family History  "  Problem Relation Age of Onset     Other Cancer Sister 51        ovarian cancer     Cancer Sister      Thyroid Disease Sister      Colon Cancer Father 76     Brain Hemorrhage Father         accident     Macular Degeneration Mother      Diabetes Maternal Aunt      Diabetes Maternal Uncle      Cancer Paternal Aunt      Diabetes Maternal Grandmother      Hypertension No family hx of      Cerebrovascular Disease No family hx of      Glaucoma No family hx of        Review of Systems - As per HPI and PMHx, otherwise 10+ system review of the head and neck, and general constitution is negative.    Physical Exam  /76   Pulse 72   Resp 16   Ht 1.549 m (5' 1\")   Wt 68.9 kg (152 lb)   LMP  (LMP Unknown)   SpO2 98%   BMI 28.72 kg/m      General - The patient is well nourished and well developed, and appears to have good nutritional status.  Alert and oriented to person and place, answers questions and cooperates with examination appropriately.   Head and Face - Normocephalic and atraumatic, with no gross asymmetry noted of the contour of the facial features.  The facial nerve is intact, with strong symmetric movements.  Voice and Breathing - The patient was breathing comfortably without the use of accessory muscles. There was no wheezing, stridor, or stertor.  The patients voice was clear and strong, and had appropriate pitch and quality.  Ears - The tympanic membranes are normal in appearance, bony landmarks are intact.  No retraction, perforation, or masses.  No fluid or purulence was seen in the external canal or the middle ear. No evidence of infection of the middle ear or external canal, cerumen was normal in appearance.  Eyes - Extraocular movements intact, and the pupils were reactive to light.  Sclera were not icteric or injected, conjunctiva were pink and moist.  Mouth - Examination of the oral cavity showed pink, healthy oral mucosa. No lesions or ulcerations noted.  The tongue was mobile and midline, and " the dentition were in good condition.    Throat - The walls of the oropharynx were smooth, pink, moist, symmetric, and had no lesions or ulcerations.  The tonsillar pillars and soft palate were symmetric.  The uvula was midline on elevation.    Neck - Normal midline excursion of the laryngotracheal complex during swallowing.  Full range of motion on passive movement.  Palpation of the occipital, submental, submandibular, internal jugular chain, and supraclavicular nodes did not demonstrate any abnormal lymph nodes or masses.  The carotid pulse was palpable bilaterally.  Palpation of the thyroid was soft and smooth, with no nodules or goiter appreciated.  The trachea was mobile and midline.  Nose - External contour is symmetric, no gross deflection or scars.  Nasal mucosa is pink and moist with no abnormal mucus.  The septum was midline and non-obstructive, turbinates of normal size and position.  No polyps, masses, or purulence noted on examination.      A/P - Isabel Carroll is a 69 year old female  (J38.3) Vocal cord dysfunction  (primary encounter diagnosis)  (R05) Chronic cough     She tells me that even with weaning it her cough is much better.  I recommend staying off of it for now, especially with her upcoming bladder surgery.    I have also counseled her at length that given her tendency to Neurogenic cough, this will likely happen again the next time she has a bad URI or bronchitis, then we will just do it again.

## 2020-10-14 ENCOUNTER — OFFICE VISIT (OUTPATIENT)
Dept: FAMILY MEDICINE | Facility: CLINIC | Age: 70
End: 2020-10-14
Payer: COMMERCIAL

## 2020-10-14 VITALS
HEART RATE: 69 BPM | OXYGEN SATURATION: 100 % | TEMPERATURE: 98.2 F | BODY MASS INDEX: 28.53 KG/M2 | SYSTOLIC BLOOD PRESSURE: 125 MMHG | WEIGHT: 151 LBS | DIASTOLIC BLOOD PRESSURE: 79 MMHG

## 2020-10-14 DIAGNOSIS — Z23 NEED FOR PROPHYLACTIC VACCINATION AND INOCULATION AGAINST INFLUENZA: ICD-10-CM

## 2020-10-14 DIAGNOSIS — N32.89 BLADDER MASS: ICD-10-CM

## 2020-10-14 DIAGNOSIS — E78.5 HYPERLIPIDEMIA LDL GOAL <100: ICD-10-CM

## 2020-10-14 DIAGNOSIS — R00.0 SINUS TACHYCARDIA: ICD-10-CM

## 2020-10-14 DIAGNOSIS — I25.119 CORONARY ARTERY DISEASE INVOLVING NATIVE HEART WITH ANGINA PECTORIS, UNSPECIFIED VESSEL OR LESION TYPE (H): ICD-10-CM

## 2020-10-14 DIAGNOSIS — R73.01 IMPAIRED FASTING GLUCOSE: ICD-10-CM

## 2020-10-14 DIAGNOSIS — Z01.818 PREOP GENERAL PHYSICAL EXAM: Primary | ICD-10-CM

## 2020-10-14 LAB
ANION GAP SERPL CALCULATED.3IONS-SCNC: 3 MMOL/L (ref 3–14)
BUN SERPL-MCNC: 10 MG/DL (ref 7–30)
CALCIUM SERPL-MCNC: 9.9 MG/DL (ref 8.5–10.1)
CHLORIDE SERPL-SCNC: 103 MMOL/L (ref 94–109)
CHOLEST SERPL-MCNC: 142 MG/DL
CO2 SERPL-SCNC: 33 MMOL/L (ref 20–32)
CREAT SERPL-MCNC: 0.63 MG/DL (ref 0.52–1.04)
GFR SERPL CREATININE-BSD FRML MDRD: >90 ML/MIN/{1.73_M2}
GLUCOSE SERPL-MCNC: 107 MG/DL (ref 70–99)
HBA1C MFR BLD: 5.6 % (ref 0–5.6)
HDLC SERPL-MCNC: 47 MG/DL
HGB BLD-MCNC: 14.8 G/DL (ref 11.7–15.7)
LDLC SERPL CALC-MCNC: 39 MG/DL
NONHDLC SERPL-MCNC: 95 MG/DL
POTASSIUM SERPL-SCNC: 3.9 MMOL/L (ref 3.4–5.3)
SODIUM SERPL-SCNC: 139 MMOL/L (ref 133–144)
TRIGL SERPL-MCNC: 280 MG/DL

## 2020-10-14 PROCEDURE — 90662 IIV NO PRSV INCREASED AG IM: CPT | Performed by: NURSE PRACTITIONER

## 2020-10-14 PROCEDURE — 80048 BASIC METABOLIC PNL TOTAL CA: CPT | Performed by: NURSE PRACTITIONER

## 2020-10-14 PROCEDURE — 83036 HEMOGLOBIN GLYCOSYLATED A1C: CPT | Performed by: NURSE PRACTITIONER

## 2020-10-14 PROCEDURE — G0008 ADMIN INFLUENZA VIRUS VAC: HCPCS | Performed by: NURSE PRACTITIONER

## 2020-10-14 PROCEDURE — 99214 OFFICE O/P EST MOD 30 MIN: CPT | Mod: 25 | Performed by: NURSE PRACTITIONER

## 2020-10-14 PROCEDURE — 85018 HEMOGLOBIN: CPT | Performed by: NURSE PRACTITIONER

## 2020-10-14 PROCEDURE — 80061 LIPID PANEL: CPT | Performed by: NURSE PRACTITIONER

## 2020-10-14 PROCEDURE — 93000 ELECTROCARDIOGRAM COMPLETE: CPT | Performed by: NURSE PRACTITIONER

## 2020-10-14 PROCEDURE — 36415 COLL VENOUS BLD VENIPUNCTURE: CPT | Performed by: NURSE PRACTITIONER

## 2020-10-14 NOTE — PROGRESS NOTES
[address] [address]  [phone]  Dept: [phone]    PRE-OP EVALUATION:  Today's date: 10/14/2020    Isabel Carroll (: 1950) presents for pre-operative evaluation assessment as requested by Dr. Nicole.  She requires evaluation and anesthesia risk assessment prior to undergoing surgery/procedure for treatment of bladder mass biopsy.    Proposed Surgery/ Procedure: Bladder Growth   Date of Surgery/ Procedure: 10/19/2020  Time of Surgery/ Procedure: 8:00Am   Hospital/Surgical Facility: [facility]   Surgery Fax Number:   Primary Physician: Marry Gonzalez  Type of Anesthesia Anticipated: General    Preoperative Questionnaire:   No - Have you ever had a heart attack or stroke?  No - Have you ever had surgery on your heart or blood vessels, such as a stent, coronary (heart) bypass, or surgery on an artery in the head, neck, heart, or legs?  No - Do you have chest pain when you are physically active?  No - Do you have a history of heart failure?  No - Do you currently have a cold, bronchitis, or symptoms of other respiratory (head and chest) infections?  No - Do you have a cough, shortness of breath, or wheezing?  No - Do you or anyone in your family have a history of blood clots?  No - Do you or anyone in your family have a serious bleeding problem, such as long-lasting bleeding after surgeries or cuts?  No - Have you ever had anemia or been told to take iron pills?  No - Have you had any abnormal blood loss such as black, tarry or bloody stools, or abnormal vaginal bleeding?  No - Have you ever had a blood transfusion?  Yes - Are you willing to have a blood transfusion if it is medically needed before, during, or after your surgery?  YES - HAVE YOU OR ANYONE IN YOUR FAMILY EVER HAD PROBLEMS WITH ANESTHESIA (SEDATION FOR SURGERY)? Aunt didn't wake up for 3 days this was in   No - Do you have sleep apnea, excessive  snoring, or daytime drowsiness?   No - Do you have any artifical heart valves or other implanted medical devices, such as a pacemaker, defibrillator, or continuous glucose monitor?  No - Do you have any artifical joints?  No - Are you allergic to latex?  No - Is there any chance that you may be pregnant?    Patient has a Health Care Directive or Living Will:  YES     HPI:     HPI related to upcoming procedure: Patient had episodes of UTI symptoms and low abdominal discomfort. CT showed bladder mass.     Had upper respiratory infection symptoms last week and had COVID test on 10/6 which was negative at [facility]. upper respiratory infection symptoms have since resolved and she has been asymptomatic for at least 5 days. Has COVID test today at [facility].       CAD - Patient has a longstanding history of moderate-severe CAD. Patient denies recent chest pain or NTG use, denies exercise induced dyspnea or PND. Last Stress test 10/2010, EKG 10/14/2020.     HYPERLIPIDEMIA - Patient has a long history of significant Hyperlipidemia requiring medication for treatment with recent good control. Patient reports no problems or side effects with the medication.     HYPERTENSION - Patient has longstanding history of HTN , currently denies any symptoms referable to elevated blood pressure. Specifically denies chest pain, palpitations, dyspnea, orthopnea, PND or peripheral edema. Blood pressure readings have been in normal range. Current medication regimen is as listed below. Patient denies any side effects of medication.       Last saw cardiology Nov 2019. Per discharge instructions:    Etta Elam, APRN, CNP - 11/08/2019 12:40 PM CST      Your EKG today shows a normal sinus rhythm       Your BP today was normotensive 120/70    continue metoprolol      Tests from 2016: ECHO - Normal pumping function. No significant valvular disease. Overall results look good. CAROTID US - no stenosis/blockage       Your  last cholesterol numbers from November 1, 2019 were: Very good, , TG 92, LDL 55, HDL 55, AST 20    continue same dose of atorvastatin with fish oil      Medication changes today: NONE      Stay active, goal of 30 minutes of aerobic activity most days of the week; keep doing the exercise classes!      Heart healthy diet, no more than 400 mg of cholesterol daily; stay hydrated      Follow up in Cardiology in 2 years . 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 Reviewed CT with [facility] radiologists; bronchiectasis is likely present but of unclear clinical significance, and was likely present on CT from 2010 as well (different scanner and different technique).       Advance care planning 11/04/2016     Priority: Medium     Advance Care Planning 11/4/2016: ACP Review of Chart / Resources Provided:  Reviewed chart for advance care plan.  Isabel Carroll has been provided information and resources to begin or update their advance care plan.  Added by Tamara Mendiola             FHx: colon cancer 10/17/2016     Priority: Medium     Sinus tachycardia 07/21/2016     Priority: Medium     Chorioretinal scar of left eye 12/02/2015     Priority: Medium     Chronic rhinitis 07/28/2015     Priority: Medium     Primary osteoarthritis of both hands  07/28/2015     Priority: Medium     Coronary artery disease involving native heart with angina pectoris, unspecified vessel or lesion type (H) 12/22/2011     Priority: Medium     Overview:   A)  CT Chest-Date: 07/13/10, There is coronary artery and mild aortic calcification present.  B)  Nuclear Stress Test on 10/01/10, No scintigraphic evidence of ischemia or infarction. EF: 71%.   Overview:   A)  CT Chest-Date: 07/13/10, There is coronary artery and mild aortic calcification present.  B)  Nuclear Stress Test on 10/01/10, No scintigraphic evidence of ischemia or infarction. EF: 71%.        Hyperlipidemia LDL goal <100 12/22/2011     Priority: Medium     Basal cell carcinoma of skin 04/07/2011     Priority: Medium     Pes planus 01/24/2011     Priority: Medium     Impaired fasting glucose 09/18/2006     Priority: Medium      Past Medical History:   Diagnosis Date     Arthritis      Basal cell carcinoma of skin 4/7/2011     Benign neoplasm of colon 10/26/2011    Overview:  Colonoscopy every 5 years  Oct 2011      Bronchiectasis without complication (H) 12/15/2016    CT chest done 2016 through COPDgene study, formal report indicates cylindrical bronchiectasis. Reviewed CT with [facility] radiologists; bronchiectasis is likely present but of unclear clinical significance, and was likely present on CT from 2010 as well (different scanner and different technique).     Chorioretinal scar of left eye 12/2/2015     Chronic rhinitis 7/28/2015     Clostridium difficile infection 12/13/2018     Coronary artery disease      Coronary atherosclerosis due to calcified coronary lesion 12/22/2011    Overview:  A)  CT Chest-Date: 07/13/10, There is coronary artery and mild aortic calcification present. B)  Nuclear Stress Test on 10/01/10, No scintigraphic evidence of ischemia or infarction. EF: 71%.       Elevated blood pressure (not hypertension) 9/19/2006     Hypercholesteremia      Hyperlipidemia 12/22/2011    Overview:  See results  review for lipid profile.      Impaired fasting glucose 2006     Nonsenile cataract      Overweight 2015     PONV (postoperative nausea and vomiting)      Primary osteoarthritis of both hands 2015     Right shoulder strain, initial encounter 12/15/2016     Shingles 2009     Sinus tachycardia 2016     Skin cancer 2013, 2015    Basal cell cancer     Vitamin D deficiency 2010     Past Surgical History:   Procedure Laterality Date     CATARACT IOL, RT/LT        SECTION      x 2     COLONOSCOPY      3 polyps     COLONOSCOPY      neg     ENDOSCOPY       LENGTHEN TENDON ACHILLES  2011    Procedure:LENGTHEN TENDON ACHILLES; Surgeon:AARON CÁRDENAS; Location:[facility]     LIVER SURGERY      Bengin large cyst     MINI ARC SLING OPERATION FOR STRESS INCONTINENCE       OSTEOTOMY ANKLE  2011    Procedure:OSTEOTOMY ANKLE; Calcaneous,  Flexor Digitorum Longus Transfer       SALPINGO-OOPHORECTOMY BILATERAL  ,     Benign prophylactic for FHx ov cancer     Current Outpatient Medications   Medication Sig Dispense Refill     amitriptyline (ELAVIL) 10 MG tablet 10mg in the morning, 10mg in the aftternoon, and 20mg at bedtime 360 tablet 3     aspirin 81 MG tablet Take 1 tablet by mouth daily.       atorvastatin (LIPITOR) 40 MG tablet Take 1 tablet (40 mg) by mouth daily 90 tablet 3     Calcium-Vitamin D (CALCIUM + D PO) Take  by mouth.       FISH OIL        MAGNESIUM OXIDE PO Take 200 mg by mouth daily       metoprolol succinate ER (TOPROL-XL) 25 MG 24 hr tablet Take 1 tablet (25 mg) by mouth daily 90 tablet 3     Multiple Vitamins-Minerals (MULTIVITAMIN & MINERAL PO) Take  by mouth.       OTC products: None, except as noted above    Allergies   Allergen Reactions     Sulfa Drugs Nausea     Codeine Phosphate Nausea and Vomiting     Pentazocine      Propoxyphene      Darvon     Penicillins Rash     rash and itching        Latex Allergy: NO    Social History      Tobacco Use     Smoking status: Former Smoker     Years: 16.00     Types: Cigarettes     Quit date: 1982     Years since quittin.8     Smokeless tobacco: Never Used   Substance Use Topics     Alcohol use: Yes     Alcohol/week: 0.0 standard drinks     Comment: 2-7 glasses of wine per week     History   Drug Use No       REVIEW OF SYSTEMS:   CONSTITUTIONAL: NEGATIVE for fever, chills, change in weight  INTEGUMENTARY/SKIN: NEGATIVE for worrisome rashes, moles or lesions  EYES: NEGATIVE for vision changes or irritation  ENT/MOUTH: NEGATIVE for ear, mouth and throat problems  RESP: NEGATIVE for significant cough or SOB  BREAST: NEGATIVE for masses, tenderness or discharge  CV: NEGATIVE for chest pain, palpitations or peripheral edema  GI: NEGATIVE for nausea, abdominal pain, heartburn, or change in bowel habits  : NEGATIVE for frequency, dysuria, or hematuria  MUSCULOSKELETAL: NEGATIVE for significant arthralgias or myalgia  NEURO: NEGATIVE for weakness, dizziness or paresthesias  ENDOCRINE: NEGATIVE for temperature intolerance, skin/hair changes  HEME: NEGATIVE for bleeding problems  PSYCHIATRIC: NEGATIVE for changes in mood or affect    EXAM:   /79 (BP Location: Left arm, Patient Position: Chair, Cuff Size: Adult Regular)   Pulse 69   Temp 98.2  F (36.8  C) (Oral)   Wt 68.5 kg (151 lb)   LMP  (LMP Unknown)   SpO2 100%   BMI 28.53 kg/m      GENERAL APPEARANCE: healthy, alert and no distress     EYES: EOMI, PERRL     HENT: ear canals and TM's normal and nose and mouth without ulcers or lesions     NECK: no adenopathy, no asymmetry, masses, or scars and thyroid normal to palpation     RESP: lungs clear to auscultation - no rales, rhonchi or wheezes     CV: regular rates and rhythm, normal S1 S2, no S3 or S4 and no murmur, click or rub     ABDOMEN:  soft, nontender, no HSM or masses and bowel sounds normal     MS: extremities normal- no gross deformities noted, no evidence of inflammation in  joints, FROM in all extremities.     SKIN: no suspicious lesions or rashes     NEURO: Normal strength and tone, sensory exam grossly normal, mentation intact and speech normal     PSYCH: mentation appears normal. and affect normal/bright     LYMPHATICS: No cervical adenopathy    DIAGNOSTICS:     Labs Resulted Today:   Results for orders placed or performed in visit on 10/14/20   Basic metabolic panel  (Ca, Cl, CO2, Creat, Gluc, K, Na, BUN)     Status: Abnormal   Result Value Ref Range    Sodium 139 133 - 144 mmol/L    Potassium 3.9 3.4 - 5.3 mmol/L    Chloride 103 94 - 109 mmol/L    Carbon Dioxide 33 (H) 20 - 32 mmol/L    Anion Gap 3 3 - 14 mmol/L    Glucose 107 (H) 70 - 99 mg/dL    Urea Nitrogen 10 7 - 30 mg/dL    Creatinine 0.63 0.52 - 1.04 mg/dL    GFR Estimate >90 >60 mL/min/[1.73_m2]    GFR Estimate If Black >90 >60 mL/min/[1.73_m2]    Calcium 9.9 8.5 - 10.1 mg/dL   Hemoglobin     Status: None   Result Value Ref Range    Hemoglobin 14.8 11.7 - 15.7 g/dL   Hemoglobin A1c     Status: None   Result Value Ref Range    Hemoglobin A1C 5.6 0 - 5.6 %   Lipid panel reflex to direct LDL Non-fasting     Status: Abnormal   Result Value Ref Range    Cholesterol 142 <200 mg/dL    Triglycerides 280 (H) <150 mg/dL    HDL Cholesterol 47 (L) >49 mg/dL    LDL Cholesterol Calculated 39 <100 mg/dL    Non HDL Cholesterol 95 <130 mg/dL   EKG 12-lead complete w/read - Clinics     Status: None    Narrative    Sinus rhythm  Low voltage in precordial leads  Rate 63      QTcH 423         Recent Labs   Lab Test 09/09/20  1610 05/12/20  1418 11/08/19  0812 11/08/19  0812 11/06/18  1109 11/06/18  1109 10/17/17  0820 10/17/17  0820 10/14/16  0815 05/22/16  1708   HGB 15.2 14.6   < >  --    < > 14.2   < >  --   --  15.8*    255   < >  --    < > 273   < >  --   --  271   NA  --   --   --   --   --  140  --   --   --  141   POTASSIUM  --   --   --   --   --  4.2  --   --   --  4.1   CR  --   --   --   --   --  0.72  --   --   0.74 0.63   A1C  --   --   --  5.6  --   --   --  5.8 5.5 5.8    < > = values in this interval not displayed.        IMPRESSION:   Reason for surgery/procedure: bladder mass  Diagnosis/reason for consult: pre-operative evaluation    The proposed surgical procedure is considered INTERMEDIATE risk.    REVISED CARDIAC RISK INDEX  The patient has the following serious cardiovascular risks for perioperative complications such as (MI, PE, VFib and 3  AV Block):  Coronary Artery Disease (MI, positive stress test, angina, Qs on EKG)  INTERPRETATION: 1 risks: Class II (low risk - 0.9% complication rate)    The patient has the following additional risks for perioperative complications:  The 10-year ASCVD risk score (Shruthi MCINTOSH Jr., et al., 2013) is: 8.5%    Values used to calculate the score:      Age: 69 years      Sex: Female      Is Non- : No      Diabetic: No      Tobacco smoker: No      Systolic Blood Pressure: 125 mmHg      Is BP treated: No      HDL Cholesterol: 47 mg/dL      Total Cholesterol: 142 mg/dL      ICD-10-CM    1. Preop general physical exam  Z01.818 EKG 12-lead complete w/read - Clinics     Basic metabolic panel  (Ca, Cl, CO2, Creat, Gluc, K, Na, BUN)     Hemoglobin     Hemoglobin A1c   2. Bladder mass  N32.89    3. Impaired fasting glucose  R73.01 Hemoglobin A1c   4. Coronary artery disease involving native heart with angina pectoris, unspecified vessel or lesion type (H)  I25.119 Lipid panel reflex to direct LDL Non-fasting   5. Hyperlipidemia LDL goal <100  E78.5 Lipid panel reflex to direct LDL Non-fasting   6. Sinus tachycardia  R00.0    7. Need for prophylactic vaccination and inoculation against influenza  Z23 FLUZONE HIGH DOSE 65+  [33747]     Vaccine Administration, Initial [54140]       RECOMMENDATIONS:     --Consult hospital rounder / IM to assist post-op medical management    Cardiovascular Risk  Performs 4 METs exercise without symptoms (Light housework (dusting, washing  dishes) and Climb a flight of stairs) .   Patient is already on a Beta Blocker. Continue Betablocker therapy after surgery, using Beta blocker order set as necessary for NPO status.      --Patient is to take all scheduled medications on the day of surgery EXCEPT for modifications listed below.    Anticoagulant or Antiplatelet Medication Use  ASPIRIN: Discontinue ASA 7-10 days prior to procedure to reduce bleeding risk.  It should be resumed post-operatively.  Fish oil - instructed to stop at time of pre-op        APPROVAL GIVEN to proceed with proposed procedure, without further diagnostic evaluation       Signed Electronically by: VIC Nayak CNP    Copy of this evaluation report is provided to requesting physician.    [facility] Preop Guidelines    Revised Cardiac Risk Index

## 2020-10-14 NOTE — PATIENT INSTRUCTIONS
"Stop aspirin and fish oil    Patient Education    Preparing for Your Surgery  Getting started  A surgery nurse will call you to review your health history and instructions. They will give you an arrival time based on your scheduled surgery time.  Please be ready to share the following:    Your doctor's clinic name and phone number    Your medical, surgical and anesthesia history    A list of allergies and sensitivities    A list of medicines, including herbal treatments and over-the-counter drugs    Whether the patient has a legal guardian (ask how to send us the papers in advance)  If your child is having surgery, please ask for a copy of Preparing for Your Child's Surgery.    Preparing for surgery    Within 30 days of surgery: Have an exam at your family clinic (primary care clinic), or go to a pre-operative clinic. This exam is called a \"History and Physical,\" or H&P.    At your H&P exam, talk to your care team about all medicines you take. If you need to stop any medicines before surgery, ask when to start taking them again.  ? We do this for your safety. Many medicines can make you bleed too much during surgery. Some change how well surgery (anesthesia) drugs work.    Call your insurance company to see what it will and won't pay for. Ask if they need to pre-approve the surgery. (If no insurance, call [phone].)    Call your surgeon's clinic if there's any change in your health. This includes signs of a cold or flu (sore throat, runny nose, cough, rash, fever). It also includes a scrape or scratch near the surgery site.    If you have questions on the day of surgery, call your surgery center.  Eating and drinking guidelines  For your safety: Unless your surgeon tells you otherwise, follow the guidelines below.    Eat and drink as usual until 8 hours before surgery. After that, no food or milk.    Drink clear liquids until 2 hours before surgery. These are liquids you can see through, like water, Gatorade and " Propel Water. You may also have black coffee and tea (no cream or milk).    Nothing by mouth within 2 hours of surgery. This includes gum, candy and breath mints.    Stop alcohol the midnight before surgery.    If your family clinic tells you to take medicine on the morning of surgery, it's okay to take it with a sip of water.  Preventing infection    Shower or bathe the night before and morning of your surgery. Follow the instructions your clinic gave you. (If no instructions, use regular soap.)    Don't shave or clip hair near your surgery site. This can lead to skin infection.    Don't smoke the morning of surgery. Smoking increases the risk of infection. You may chew nicotine gum up to 2 hours before surgery. A nicotine patch is okay.  ? Note: Some surgeries require you to completely quit smoking and nicotine. Check with your surgeon.    Your care team will make every effort to keep you safe from infection. We will:  ? Clean our hands often with soap and water (or an alcohol-based hand rub).  ? Clean the skin at your surgery site with a special soap that kills germs. We'll also remove hair from the site as needed.  ? Wear special hair covers, masks, gowns and gloves during surgery.  ? Give antibiotic medicine, if prescribed. Not all surgeries need antibiotics.  What to bring on the day of surgery    Photo ID and insurance card    Copy of your health care directive, if you have one    Glasses and hearing aides (bring cases)  ? You can't wear contacts during surgery    Inhaler and eye drops, if you use them (tell us about these when you arrive)    CPAP machine or breathing device, if you use them    A few personal items, if spending the night    If you have . . .  ? A pacemaker or ICD (cardiac defibrillator): Bring the ID card.  ? An implanted stimulator: Bring the remote control.  ? A legal guardian: Bring a copy of the certified (court-stamped) guardianship papers.  Please remove any jewelry, including body  piercings. Leave jewelry and other valuables at home.  If you're going home the day of surgery  Important: If you don't follow the rules below, we must cancel your surgery.     Arrange for someone to drive you home after surgery. You may not drive, take a taxi or take public transportation by yourself (unless you'll have local anesthesia only).    Arrange for a responsible adult to stay with you overnight. If you don't, we may keep you in the hospital overnight, and you may need to pay the costs yourself.  Questions?   If you have any questions for your care team, list them here: _________________________________________________________________________________________________________________________________________________________________________________________________________________________________________________________________________________________________________________________  For informational purposes only. Not to replace the advice of your health care provider. Copyright   5642-4543 [facility]. All rights reserved. Clinically reviewed by Citlaly Martinez MD. [employer] [id number] - REV 07/19.

## 2020-10-15 ENCOUNTER — TELEPHONE (OUTPATIENT)
Dept: FAMILY MEDICINE | Facility: CLINIC | Age: 70
End: 2020-10-15

## 2020-10-15 NOTE — TELEPHONE ENCOUNTER
Reason for Call:  Other    Detailed comments: Rebecca from [facility] called pt is having surgery on Monday and they need the pre op faxed to them with any labs done within the last 30 days and any EKG done within the last year. Please fax to [phone] with ATTN: Rebecca Thank you     Phone Number Patient can be reached at: Cell number on file:    Telephone Information:   Mobile [phone]       Best Time: Any    Can we leave a detailed message on this number? YES    Call taken on 10/15/2020 at 11:00 AM by Azucena Hagan

## 2020-10-19 ENCOUNTER — TRANSFERRED RECORDS (OUTPATIENT)
Dept: HEALTH INFORMATION MANAGEMENT | Facility: CLINIC | Age: 70
End: 2020-10-19

## 2020-10-29 ENCOUNTER — VIRTUAL VISIT (OUTPATIENT)
Dept: FAMILY MEDICINE | Facility: CLINIC | Age: 70
End: 2020-10-29
Payer: COMMERCIAL

## 2020-10-29 ENCOUNTER — OFFICE VISIT (OUTPATIENT)
Dept: OPTOMETRY | Facility: CLINIC | Age: 70
End: 2020-10-29
Payer: COMMERCIAL

## 2020-10-29 DIAGNOSIS — H52.4 PRESBYOPIA: ICD-10-CM

## 2020-10-29 DIAGNOSIS — R10.32 LEFT LOWER QUADRANT PAIN: ICD-10-CM

## 2020-10-29 DIAGNOSIS — H10.13 ALLERGIC CONJUNCTIVITIS OF BOTH EYES: ICD-10-CM

## 2020-10-29 DIAGNOSIS — Z01.00 EXAMINATION OF EYES AND VISION: Primary | ICD-10-CM

## 2020-10-29 DIAGNOSIS — H02.889 MEIBOMIAN GLAND DYSFUNCTION: ICD-10-CM

## 2020-10-29 DIAGNOSIS — R10.32 LEFT LOWER QUADRANT PAIN: Primary | ICD-10-CM

## 2020-10-29 DIAGNOSIS — R73.03 PRE-DIABETES: ICD-10-CM

## 2020-10-29 DIAGNOSIS — H04.123 DRY EYE SYNDROME OF BOTH EYES: ICD-10-CM

## 2020-10-29 DIAGNOSIS — Z96.1 PSEUDOPHAKIA OF BOTH EYES: ICD-10-CM

## 2020-10-29 DIAGNOSIS — Z85.51 PERSONAL HISTORY OF MALIGNANT NEOPLASM OF BLADDER: ICD-10-CM

## 2020-10-29 LAB
ALBUMIN SERPL-MCNC: 3.6 G/DL (ref 3.4–5)
ALBUMIN UR-MCNC: NEGATIVE MG/DL
ALP SERPL-CCNC: 161 U/L (ref 40–150)
ALT SERPL W P-5'-P-CCNC: 35 U/L (ref 0–50)
ANION GAP SERPL CALCULATED.3IONS-SCNC: 3 MMOL/L (ref 3–14)
APPEARANCE UR: CLEAR
AST SERPL W P-5'-P-CCNC: 22 U/L (ref 0–45)
BACTERIA #/AREA URNS HPF: ABNORMAL /HPF
BILIRUB SERPL-MCNC: 0.3 MG/DL (ref 0.2–1.3)
BILIRUB UR QL STRIP: NEGATIVE
BUN SERPL-MCNC: 12 MG/DL (ref 7–30)
CALCIUM SERPL-MCNC: 9 MG/DL (ref 8.5–10.1)
CHLORIDE SERPL-SCNC: 104 MMOL/L (ref 94–109)
CO2 SERPL-SCNC: 33 MMOL/L (ref 20–32)
COLOR UR AUTO: YELLOW
CREAT SERPL-MCNC: 0.6 MG/DL (ref 0.52–1.04)
ERYTHROCYTE [DISTWIDTH] IN BLOOD BY AUTOMATED COUNT: 11.6 % (ref 10–15)
GFR SERPL CREATININE-BSD FRML MDRD: >90 ML/MIN/{1.73_M2}
GLUCOSE SERPL-MCNC: 150 MG/DL (ref 70–99)
GLUCOSE UR STRIP-MCNC: NEGATIVE MG/DL
HCT VFR BLD AUTO: 39.7 % (ref 35–47)
HGB BLD-MCNC: 13.4 G/DL (ref 11.7–15.7)
HGB UR QL STRIP: ABNORMAL
KETONES UR STRIP-MCNC: NEGATIVE MG/DL
LEUKOCYTE ESTERASE UR QL STRIP: ABNORMAL
MCH RBC QN AUTO: 32.5 PG (ref 26.5–33)
MCHC RBC AUTO-ENTMCNC: 33.8 G/DL (ref 31.5–36.5)
MCV RBC AUTO: 96 FL (ref 78–100)
NITRATE UR QL: NEGATIVE
NON-SQ EPI CELLS #/AREA URNS LPF: ABNORMAL /LPF
PH UR STRIP: 6 PH (ref 5–7)
PLATELET # BLD AUTO: 311 10E9/L (ref 150–450)
POTASSIUM SERPL-SCNC: 3.9 MMOL/L (ref 3.4–5.3)
PROT SERPL-MCNC: 6.8 G/DL (ref 6.8–8.8)
RBC # BLD AUTO: 4.12 10E12/L (ref 3.8–5.2)
RBC #/AREA URNS AUTO: ABNORMAL /HPF
SODIUM SERPL-SCNC: 140 MMOL/L (ref 133–144)
SOURCE: ABNORMAL
SP GR UR STRIP: 1.01 (ref 1–1.03)
UROBILINOGEN UR STRIP-ACNC: 0.2 EU/DL (ref 0.2–1)
WBC # BLD AUTO: 6.5 10E9/L (ref 4–11)
WBC #/AREA URNS AUTO: ABNORMAL /HPF

## 2020-10-29 PROCEDURE — 99214 OFFICE O/P EST MOD 30 MIN: CPT | Mod: 95 | Performed by: NURSE PRACTITIONER

## 2020-10-29 PROCEDURE — 80053 COMPREHEN METABOLIC PANEL: CPT | Performed by: NURSE PRACTITIONER

## 2020-10-29 PROCEDURE — 92015 DETERMINE REFRACTIVE STATE: CPT | Performed by: OPTOMETRIST

## 2020-10-29 PROCEDURE — 81001 URINALYSIS AUTO W/SCOPE: CPT | Performed by: NURSE PRACTITIONER

## 2020-10-29 PROCEDURE — 85027 COMPLETE CBC AUTOMATED: CPT | Performed by: NURSE PRACTITIONER

## 2020-10-29 PROCEDURE — 92014 COMPRE OPH EXAM EST PT 1/>: CPT | Performed by: OPTOMETRIST

## 2020-10-29 PROCEDURE — 87086 URINE CULTURE/COLONY COUNT: CPT | Performed by: NURSE PRACTITIONER

## 2020-10-29 ASSESSMENT — VISUAL ACUITY
OS_SC: 20/20
OS_CC: 20/30
METHOD: SNELLEN - LINEAR
OS_CC: 20/20
OD_CC: 20/20
OD_CC: 20/30
CORRECTION_TYPE: GLASSES
OS_SC+: -2
OD_SC: 20/20

## 2020-10-29 ASSESSMENT — REFRACTION_MANIFEST
OS_CYLINDER: SPHERE
OD_SPHERE: -0.25
OS_SPHERE: -0.50
OS_ADD: +3.00
OD_ADD: +3.00
OD_CYLINDER: SPHERE

## 2020-10-29 ASSESSMENT — REFRACTION_WEARINGRX
SPECS_TYPE: PAL
OS_CYLINDER: SPHERE
OS_ADD: +2.75
OD_SPHERE: PLANO
OD_ADD: +2.75
OD_CYLINDER: +0.25
OS_SPHERE: PLANO
OD_AXIS: 050

## 2020-10-29 ASSESSMENT — TONOMETRY
OD_IOP_MMHG: 21
OS_IOP_MMHG: 20
IOP_METHOD: TONOPEN

## 2020-10-29 ASSESSMENT — CONF VISUAL FIELD
OD_NORMAL: 1
OS_NORMAL: 1

## 2020-10-29 ASSESSMENT — EXTERNAL EXAM - RIGHT EYE: OD_EXAM: NORMAL

## 2020-10-29 ASSESSMENT — EXTERNAL EXAM - LEFT EYE: OS_EXAM: NORMAL

## 2020-10-29 ASSESSMENT — CUP TO DISC RATIO
OD_RATIO: 0.3
OS_RATIO: 0.3

## 2020-10-29 NOTE — LETTER
10/29/2020         RE: Isabel Carroll  [address]  [place] [address]        Dear Colleague,    Thank you for referring your patient, Isabel Carroll, to the [facility]. Please see a copy of my visit note below.    Chief Complaint   Patient presents with     Annual Eye Exam      Accompanied by self  Last Eye Exam: 4-  Dilated Previously: Yes    What are you currently using to see?  glasses       Distance Vision Acuity: Satisfied with vision    Near Vision Acuity: Satisfied with vision while reading  with glasses    Eye Comfort: itchy  Do you use eye drops? : Yes: otc drops?  Occupation or Hobbies: retired    Razia Baez Optometric Assistant, A.B.O.C.          Medical, surgical and family histories reviewed and updated 10/29/2020.       OBJECTIVE: See Ophthalmology exam    ASSESSMENT:    ICD-10-CM    1. Examination of eyes and vision  Z01.00 EYE EXAM (SIMPLE-NONBILLABLE)   2. Presbyopia  H52.4 REFRACTION   3. Pseudophakia of both eyes  Z96.1 EYE EXAM (SIMPLE-NONBILLABLE)   4. Meibomian gland dysfunction  H02.889 EYE EXAM (SIMPLE-NONBILLABLE)   5. Dry eye syndrome of both eyes  H04.123 EYE EXAM (SIMPLE-NONBILLABLE)   6. Allergic conjunctivitis of both eyes  H10.13 EYE EXAM (SIMPLE-NONBILLABLE)      PLAN:     Patient Instructions   Optional change in eyeglass prescription.    Heat to the eyes daily for 10-15 minutes nightly with warm washcloth or reusable gel masks from the pharmacy or  [email or web address] heat masks can be purchased at [employer].    Sterilid eyelid cleanser to clean eyelids/lashes at night.    Blink tears- 1 drop both eyes 2-4 x daily.    OTC Pataday to be used once or twice daily for itchy eyes.  Use as needed.    Return in 1 year for a complete eye exam or sooner if needed.    Mj Frederick OD           Again, thank you for allowing me to participate in the care of your patient.        Sincerely,        Mj Frederick, OD

## 2020-10-29 NOTE — PROGRESS NOTES
"Isabel Carroll is a 69 year old female who is being evaluated via a billable telephone visit.      The patient has been notified of following:     \"This telephone visit will be conducted via a call between you and your physician/provider. We have found that certain health care needs can be provided without the need for a physical exam.  This service lets us provide the care you need with a short phone conversation.  If a prescription is necessary we can send it directly to your pharmacy.  If lab work is needed we can place an order for that and you can then stop by our lab to have the test done at a later time.    Telephone visits are billed at different rates depending on your insurance coverage. During this emergency period, for some insurers they may be billed the same as an in-person visit.  Please reach out to your insurance provider with any questions.    If during the course of the call the physician/provider feels a telephone visit is not appropriate, you will not be charged for this service.\"    Patient has given verbal consent for Telephone visit?  Yes    What phone number would you like to be contacted at? [phone]    How would you like to obtain your AVS? Benjy Ramirez     Isabel Carroll is a 69 year old female who presents via phone visit today for the following health issues:    HPI          Had bladder tumor resect on 10/19/2020. Tumor was right at juncture of ureter, had loera for 24 hours then it was removed. For 5 days after she had lower bladder pain and up to 7 days some hematuria which both have mostly improved other than pain on her lower left side, it has worsened in the past day or so. When she presses on her left lower quadrant it is sharp pain. She is worried about diverticulitis as she last had that in Fall 2018. She also had C-diff at that time too.  Isn't if bowel or urinary related for her current pain. No fever/chills. Some constipation. Some discomfort with " urination but isn't sure if related to procedure or not. No current noticeable blood in urine. Breathing okay, no chest pain.     Right lower side is doing well, stent will be removed in December and another cysto in Dec 16th.      Review of Systems   Constitutional, HEENT, cardiovascular, pulmonary, gi-as above and gu-as above systems are negative, except as otherwise noted.       Objective          Vitals:  No vitals were obtained today due to virtual visit.    healthy, alert and no distress  PSYCH: Alert and oriented times 3; coherent speech, normal   rate and volume, able to articulate logical thoughts, able   to abstract reason, no tangential thoughts, no hallucinations   or delusions  Her affect is normal  RESP: No cough, no audible wheezing, able to talk in full sentences  Remainder of exam unable to be completed due to telephone visits    No results found for this or any previous visit (from the past 24 hour(s)).        Assessment/Plan:    Assessment & Plan     Left lower quadrant pain  Unsure if issue is urinary vs bowel. If she cannot get into having CT done today/tomorrow advised to go to ER. Should not delay care/assessment. She verbalized understanding. She will try to get labs/urine done today/tomorrow.  - CT Abdomen Pelvis w Contrast; Future  - CBC with platelets; Future  - **Comprehensive metabolic panel FUTURE anytime; Future  - UA with Microscopic reflex to Culture; Future          Personal history of malignant neoplasm of bladder  10/19/2020 had resection of tumor, has follow up with urology in December for removal of right stent      Return in about 5 days (around 11/3/2020), or if symptoms worsen or fail to improve.    VIC Ibrahim, NP-C  [facility]      Phone call duration:  15 minutes

## 2020-10-29 NOTE — PROGRESS NOTES
Chief Complaint   Patient presents with     Annual Eye Exam      Accompanied by self  Last Eye Exam: 4-  Dilated Previously: Yes    What are you currently using to see?  glasses       Distance Vision Acuity: Satisfied with vision    Near Vision Acuity: Satisfied with vision while reading  with glasses    Eye Comfort: itchy  Do you use eye drops? : Yes: otc drops?  Occupation or Hobbies: retired    Razia Baez Optometric Assistant, A.B.O.C.          Medical, surgical and family histories reviewed and updated 10/29/2020.       OBJECTIVE: See Ophthalmology exam    ASSESSMENT:    ICD-10-CM    1. Examination of eyes and vision  Z01.00 EYE EXAM (SIMPLE-NONBILLABLE)   2. Presbyopia  H52.4 REFRACTION   3. Pseudophakia of both eyes  Z96.1 EYE EXAM (SIMPLE-NONBILLABLE)   4. Meibomian gland dysfunction  H02.889 EYE EXAM (SIMPLE-NONBILLABLE)   5. Dry eye syndrome of both eyes  H04.123 EYE EXAM (SIMPLE-NONBILLABLE)   6. Allergic conjunctivitis of both eyes  H10.13 EYE EXAM (SIMPLE-NONBILLABLE)      PLAN:     Patient Instructions   Optional change in eyeglass prescription.    Heat to the eyes daily for 10-15 minutes nightly with warm washcloth or reusable gel masks from the pharmacy or  [employer] heat masks can be purchased at [employer].    Sterilid eyelid cleanser to clean eyelids/lashes at night.    Blink tears- 1 drop both eyes 2-4 x daily.    OTC Pataday to be used once or twice daily for itchy eyes.  Use as needed.    Return in 1 year for a complete eye exam or sooner if needed.    Mj Frederick, TIP

## 2020-10-29 NOTE — PATIENT INSTRUCTIONS
Number for CT abd/pelvis with contrast: [phone] - okay to get lab/urine done via this number also if [facility] is booked out today when you go for your eye appointment.  Let the CT people know you have a urinary stent also which is new.    If you cannot get into CT today/tomorrow and pain is worsening go to the ER within 24 hours

## 2020-10-29 NOTE — PATIENT INSTRUCTIONS
Optional change in eyeglass prescription.    Heat to the eyes daily for 10-15 minutes nightly with warm washcloth or reusable gel masks from the pharmacy or  [employer] heat masks can be purchased at [employer].    Sterilid eyelid cleanser to clean eyelids/lashes at night.    Blink tears- 1 drop both eyes 2-4 x daily.    OTC Pataday to be used once or twice daily for itchy eyes.  Use as needed.    Return in 1 year for a complete eye exam or sooner if needed.    Mj Frederick, OD    The affects of the dilating drops last for 4- 6 hours.  You will be more sensitive to light and vision will be blurry up close.  Do not drive if you do not feel comfortable.  Mydriatic sunglasses were given if needed.      Optometry Providers       Clinic Locations                                 Telephone Number   Dr. Sruthi Walker [phone]     [facility] Hours:                [facility] Hours:       [facility] Hours:   [address]   [address]     [address] [place] [address]   [place] [address]    [place] [address]  Phone: [phone]                    Phone: [phone]     Phone: [phone]                      Monday 8:00-7:00                          Monday 8:00-7:00                          Monday 8:00-7:00              Tuesday 8:00-6:00                          Tuesday 8:00-7:00                          Tuesday 8:00-7:00              Wednesday 8:00-6:00                  Wednesday 8:00-7:00                   Wednesday 8:00-7:00      Thursday 8:00-6:00                        Thursday 8:00-7:00                         Thursday 8:00-7:00            Friday 8:00-5:00                              Friday 8:00-5:00                              Friday 8:00-5:00    [facility] Hours:   [address] [facility] [place] [address]  [phone]    Monday 8:00-7:00  Tuesday 8:00-7:00  Wednesday  8:00-7:00  Thursday 8:00-7:00  Friday 8:00-5:00  Please log on to [email or web address] to order your contact lenses.  The link is found on the [facility] page.  As always, Thank you for trusting us with your health care needs!

## 2020-10-29 NOTE — NURSING NOTE
patient states that she had surgery on 10/19/2020for a cancerous tumor on her bladder. Patient now is having increasing pain on left side and is wondering if it could be diverticulitis

## 2020-10-30 ENCOUNTER — TELEPHONE (OUTPATIENT)
Dept: FAMILY MEDICINE | Facility: CLINIC | Age: 70
End: 2020-10-30

## 2020-10-30 ENCOUNTER — ANCILLARY PROCEDURE (OUTPATIENT)
Dept: CT IMAGING | Facility: CLINIC | Age: 70
End: 2020-10-30
Attending: NURSE PRACTITIONER
Payer: COMMERCIAL

## 2020-10-30 DIAGNOSIS — R82.71 BACTERIA IN URINE: Primary | ICD-10-CM

## 2020-10-30 DIAGNOSIS — R10.32 LEFT LOWER QUADRANT PAIN: ICD-10-CM

## 2020-10-30 PROCEDURE — 74177 CT ABD & PELVIS W/CONTRAST: CPT | Mod: TC

## 2020-10-30 RX ORDER — IOPAMIDOL 755 MG/ML
200 INJECTION, SOLUTION INTRAVASCULAR ONCE
Status: COMPLETED | OUTPATIENT
Start: 2020-10-30 | End: 2020-10-30

## 2020-10-30 RX ORDER — NITROFURANTOIN 25; 75 MG/1; MG/1
100 CAPSULE ORAL 2 TIMES DAILY
Qty: 14 CAPSULE | Refills: 0 | Status: SHIPPED | OUTPATIENT
Start: 2020-10-30 | End: 2020-11-06

## 2020-10-30 RX ADMIN — Medication 60 ML: at 11:38

## 2020-10-30 RX ADMIN — IOPAMIDOL 92 ML: 755 INJECTION, SOLUTION INTRAVASCULAR at 11:37

## 2020-10-30 NOTE — RESULT ENCOUNTER NOTE
Reji Hall,    Your urine is mildly positive for a UTI, I will send treatment and start a urine culture. We can still have you get the CT scan because you feel like this was similar pain you felt a few years ago. Please call if you have questions.    Thank you,  VIC Ibrahim, NP-C  [facility]

## 2020-10-30 NOTE — RESULT ENCOUNTER NOTE
Dr. Gonzalez: any other suggestions on this? See labs also.   Thank you,  VIC Ibrahim, NP-C  [facility]    Reji Hall,   The CT does not show diverticulitis, but diverticulosis. It also notes a possible hematoma (bleeding under the skin) by the left rectus muscle, likely where you have pain. I'm not sure if this occurred during surgery or not but certainly worth letting the urologist know about this.  If you are taking a daily aspirin, I would recommend stopping it for a few days, and recommend no NSADIS (ibuprofen/advil/naproxen etc). For now I still think we should treat the urine sample you left until the culture comes back. Normally a hematoma dissolves on its own, the other possibility is if the pain keeps worsening there is a worry the bleeding is worsening and not sure where it came from. If anything worsens go to the ER over the weekend. Please let me know if you have questions.  I will also send this to Dr. Gonzalez so she is aware.   Thank you,  VIC Ibrahim, NP-C  [facility]

## 2020-10-30 NOTE — TELEPHONE ENCOUNTER
Reason for Call:  Other call back    Detailed comments: Patient would like to go over CT SCAN from today's appointment     Phone Number Patient can be reached at: Cell number on file:    Telephone Information:   Mobile [phone]       Best Time: Anytime.    Can we leave a detailed message on this number? YES    Call taken on 10/30/2020 at 4:36 PM by Breonna Dial

## 2020-10-31 LAB
BACTERIA SPEC CULT: NO GROWTH
Lab: NORMAL
SPECIMEN SOURCE: NORMAL

## 2020-11-02 ENCOUNTER — OFFICE VISIT (OUTPATIENT)
Dept: FAMILY MEDICINE | Facility: CLINIC | Age: 70
End: 2020-11-02
Payer: COMMERCIAL

## 2020-11-02 VITALS
OXYGEN SATURATION: 96 % | HEART RATE: 69 BPM | TEMPERATURE: 96.2 F | SYSTOLIC BLOOD PRESSURE: 116 MMHG | DIASTOLIC BLOOD PRESSURE: 76 MMHG

## 2020-11-02 DIAGNOSIS — S30.1XXA HEMATOMA OF RECTUS SHEATH, INITIAL ENCOUNTER: Primary | ICD-10-CM

## 2020-11-02 DIAGNOSIS — C67.9 MALIGNANT NEOPLASM OF URINARY BLADDER, UNSPECIFIED SITE (H): ICD-10-CM

## 2020-11-02 PROCEDURE — 99213 OFFICE O/P EST LOW 20 MIN: CPT | Performed by: NURSE PRACTITIONER

## 2020-11-02 NOTE — PROGRESS NOTES
Subjective     Isabel Carroll is a 69 year old female who presents to clinic today for the following health issues:    HPI         Pleasant 69 year old female presents to discuss CT scan from last week and to follow up on left abdominal pain. On 10/19/2020 she had bladder scraping with Dr. Fer Dowell at [facility]. She went back to the emergency department that evening because her loera was plugged. Since then she has had left abdominal pain. She had CT last week with results below. Seeing urologist on Wednesday. Bladder pressure is better. She has a history of diverticulitis but this left abdominal discomfort felt different. No fever. No nausea or vomiting. Labs were stable, including hemoglobin, with labs 3 days ago. She was told she has bladder cancer, stage 1. No chemo, radiation or tuberculosis is needed.        CT ABDOMEN AND PELVIS WITH CONTRAST   10/30/2020 11:48 AM      HISTORY: Abdominal pain, diverticulitis suspected. Lower left quadrant  pain, also history of bladder tumor resect mid-Dec with stent  placement.      TECHNIQUE:  CT abdomen and pelvis with 92 mL Isovue-370 IV. Radiation  dose for this scan was reduced using automated exposure control,  adjustment of the mA and/or kV according to patient size, or iterative  reconstruction technique.     COMPARISON: CT abdomen and pelvis on 9/11/2020.     FINDINGS:  Lower chest: Mild bibasilar atelectasis.     Abdomen/pelvis: Post surgical changes of cholecystectomy. Mild intra  and extrahepatic biliary dilatation, likely reservoir effect  postcholecystectomy. Scattered subcentimeter hypoattenuating foci in  the liver, too small to characterize. No suspicious focal hepatic  lesion. Mild dilatation of the main pancreatic duct in the pancreatic  head area, not significantly changed as compared to 11/8/2018 exam. No  solid pancreatic mass. No splenomegaly. No adrenal nodules. No  radiodense kidney stones or hydronephrosis. There is double-J  right  ureteric stent with associated mild dilatation of the right renal  pelvis and mucosal enhancement of the renal pelvis and right ureter.     Post surgical changes of posterior right bladder tumor resection. Mild  urinary bladder wall thickening in the right posterolateral area  around the resection zone (series 3 image 176). No abnormally dilated  bowel loops. Colonic, predominately sigmoid diverticulosis without CT  evidence of acute diverticulitis. The appendix is visualized and  appears normal. There is tubular hypoattenuating area along the  anterior aspect of the midright ureter which appears to be partially  resected right gonadal vein.     Bones and soft tissues: There is approximately 6.2 x 3.0 x 8.5 cm  (axial and CC dimensions, respectively) heterogenous area in the  inferior aspect of the left rectus muscle, likely represents rectus  sheath hematoma. Multilevel degenerative changes of the spine. No  suspicious osseous lesion.                                                                      IMPRESSION:  1. There is a large heterogenous area in the inferior aspect of the  left rectus muscle, likely represents rectus sheath hematoma.  2. Post surgical changes of posterior right bladder tumor resection  with right double-J ureteral stent. There is mild prominence of the  right renal pelvis with mucosal enhancement of the renal pelvis and  ureter, could be related to presence of the stent versus underlying  infection. Mild nonfocal wall thickening of the right  posterior-lateral wall of the urinary bladder, could be postsurgical.  3. Colonic diverticulosis without CT evidence of acute diverticulitis.  4. 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     FREEMAN VILLARREAL MD    Review of Systems   Constitutional, HEENT, cardiovascular, pulmonary, GI, ,  "musculoskeletal, neuro, skin, endocrine and psych systems are negative, except as otherwise noted.      Objective    /76   Pulse 69   Temp 96.2  F (35.7  C)   LMP  (LMP Unknown)   SpO2 96%   There is no height or weight on file to calculate BMI.  Physical Exam   GENERAL: healthy, alert and no distress  ABDOMEN: soft,  Mildly tender to left low abdomen  MS: no gross musculoskeletal defects noted, no edema  PSYCH: mentation appears normal, affect normal/bright            Assessment & Plan     Hematoma of rectus sheath, initial encounter  Likely from surgery. Discussed CT results in detail. She has follow up with her urologist on Wednesday. Discomfort improving daily. Labs stable.     Malignant neoplasm of urinary bladder, unspecified site (H)  Stage 1. No chemo, radiation or TB needed. Followed by Dr. Fer Dowell at [facility].        BMI:   Estimated body mass index is 28.53 kg/m  as calculated from the following:    Height as of 10/12/20: 1.549 m (5' 1\").    Weight as of 10/14/20: 68.5 kg (151 lb).            Return in about 1 week (around 11/9/2020), or if symptoms worsen or fail to improve.    VIC Nayak CNP  [facility]    This visit took place during the COVID 19 global pandemic.   PPE worn during the visit included: surgical mask and face shield by me and mask by patient         "

## 2020-11-02 NOTE — TELEPHONE ENCOUNTER
Patient calling about CT scan results and noted the above [employer] message which she sent to provider, today 11/2/20.    I called patient and she has many questions. She said the CT scan is a lot of medical jargon. She saw the results/message via [employer]. She doesn't understand if her condition is serious? Should she be seen today? Does she need a pelvic exam? Is she still bleeding? Or does she wait it out for a couple days?    She notes her pain is not worsening. She feels a little better. She does have concern for what should she do for the persisting pain? She has intermittent pain but has had pain daily. When she presses on the pain it is about a 5/10. At times she hardly notices any pain but at other times it can get up to a 6-7/10.     Patient doesn't understand diverticulosis. Does she need a medication for that?     She did get the message about a mild UTI and to start antibiotics so she did. However, now the urine culture message says no growth and to stop antibiotics. She is ok with this and can stop the antibiotics.     Patient has been off of the Aspirin for 2 weeks but she has heart blockage and so she said she started back on the Asprin last night. Is this ok to do this?     Patient has many questions and concerns. She is asking for Dr. Gonzalez to call her back #[phone] or the cell phone however patient prefers the home phone listed. If Dr. Gonzalez cannot call her, then can she tell her what to do about if she should be seen today or wait a few days?     Routing to provider to review and advise.     Estefany Rubi RN  [facility]

## 2020-11-02 NOTE — TELEPHONE ENCOUNTER
Patient updated on the below, no questions at this time, verbalized understanding.  OV scheduled with Jinny Boyd today.    Rachel Simeon RN

## 2020-11-02 NOTE — TELEPHONE ENCOUNTER
Yes, Isabel should be seen for a follow up exam. I also think that surgery should see her for further evaluation and treatment. Alisia Boyd has an appointment opening today. I will be in clinic, but my schedule is pretty full.  Marry Gonzalez MD

## 2020-11-02 NOTE — RESULT ENCOUNTER NOTE
Reji Hall,   Your urine culture came back negative. Stop the antibiotics. How are you feeling today?   Thank you,  VIC Ibrahim, NP-C  [facility]

## 2020-11-02 NOTE — PATIENT INSTRUCTIONS
Wait to restart aspirin and fish oil until you see your urologist on Wednesday  Please do not hesitate to reach out with questions

## 2020-11-04 ENCOUNTER — TRANSFERRED RECORDS (OUTPATIENT)
Dept: HEALTH INFORMATION MANAGEMENT | Facility: CLINIC | Age: 70
End: 2020-11-04

## 2020-11-20 ENCOUNTER — OFFICE VISIT (OUTPATIENT)
Dept: PODIATRY | Facility: CLINIC | Age: 70
End: 2020-11-20
Payer: COMMERCIAL

## 2020-11-20 VITALS
BODY MASS INDEX: 28.34 KG/M2 | HEART RATE: 76 BPM | WEIGHT: 150 LBS | SYSTOLIC BLOOD PRESSURE: 145 MMHG | DIASTOLIC BLOOD PRESSURE: 78 MMHG

## 2020-11-20 DIAGNOSIS — M21.42 PES PLANUS OF BOTH FEET: Primary | ICD-10-CM

## 2020-11-20 DIAGNOSIS — M76.822 POSTERIOR TIBIAL TENDINITIS, LEFT: ICD-10-CM

## 2020-11-20 DIAGNOSIS — M21.41 PES PLANUS OF BOTH FEET: Primary | ICD-10-CM

## 2020-11-20 PROCEDURE — 99213 OFFICE O/P EST LOW 20 MIN: CPT | Performed by: PODIATRIST

## 2020-11-20 NOTE — LETTER
11/20/2020         RE: Isabel Carroll  [address]  [place] [address]        Dear Colleague,    Thank you for referring your patient, Isabel Carroll, to the [facility]. Please see a copy of my visit note below.    S:  Complains of left foot pain.  Points to posterior tibial tendon where is inserts into the navicular.  Has had this for 4 months.  Describes it as a burning pain.  Aggrevated by activity and relieved by rest.  This started when she was doing a Nathan class with shoes without arch supports.  She is pronated.  She has a history of right flatfoot surgery.  This foot is doing quite well.  She is also wondering about orthotics for her left foot.  She is no longer doing these classes.  She is has been swelling where the pain is.  She denies any ecchymosis erythema or drop in arch height.    ROS: See above       Allergies   Allergen Reactions     Sulfa Drugs Nausea     Codeine Phosphate Nausea and Vomiting     Pentazocine      Propoxyphene      Darvon     Penicillins Rash     rash and itching         Current Outpatient Medications   Medication Sig Dispense Refill     amitriptyline (ELAVIL) 10 MG tablet 10mg in the morning, 10mg in the aftternoon, and 20mg at bedtime 360 tablet 3     aspirin 81 MG tablet Take 1 tablet by mouth daily.       atorvastatin (LIPITOR) 40 MG tablet Take 1 tablet (40 mg) by mouth daily 90 tablet 3     Calcium-Vitamin D (CALCIUM + D PO) Take  by mouth.       FISH OIL        MAGNESIUM OXIDE PO Take 200 mg by mouth daily       metoprolol succinate ER (TOPROL-XL) 25 MG 24 hr tablet Take 1 tablet (25 mg) by mouth daily 90 tablet 3     Multiple Vitamins-Minerals (MULTIVITAMIN & MINERAL PO) Take  by mouth.         Patient Active Problem List   Diagnosis     Chronic rhinitis     Primary osteoarthritis of both hands     Basal cell carcinoma of skin     Coronary artery disease involving native heart with angina pectoris, unspecified  vessel or lesion type (H)     Hyperlipidemia LDL goal <100     Impaired fasting glucose     Chorioretinal scar of left eye     Sinus tachycardia     Advance care planning     Pes planus     Bronchiectasis without complication (H)     Chronic cough     Female pattern hair loss     FHx: colon cancer     History of Clostridioides difficile infection     Pseudophakia of right eye     Pseudophakia of both eyes     Primary osteoarthritis of both knees     Vocal cord dysfunction     Personal history of malignant neoplasm of bladder       Past Medical History:   Diagnosis Date     Arthritis      Basal cell carcinoma of skin 4/7/2011     Benign neoplasm of colon 10/26/2011    Overview:  Colonoscopy every 5 years  Oct 2011      Bronchiectasis without complication (H) 12/15/2016    CT chest done 2016 through COPDgene study, formal report indicates cylindrical bronchiectasis. Reviewed CT with [facility] radiologists; bronchiectasis is likely present but of unclear clinical significance, and was likely present on CT from 2010 as well (different scanner and different technique).     Chorioretinal scar of left eye 12/2/2015     Chronic rhinitis 7/28/2015     Clostridium difficile infection 12/13/2018     Coronary artery disease      Coronary atherosclerosis due to calcified coronary lesion 12/22/2011    Overview:  A)  CT Chest-Date: 07/13/10, There is coronary artery and mild aortic calcification present. B)  Nuclear Stress Test on 10/01/10, No scintigraphic evidence of ischemia or infarction. EF: 71%.       Elevated blood pressure (not hypertension) 9/19/2006     Hypercholesteremia      Hyperlipidemia 12/22/2011    Overview:  See results review for lipid profile.      Impaired fasting glucose 9/18/2006     Nonsenile cataract      Overweight 7/28/2015     PONV (postoperative nausea and vomiting)      Primary osteoarthritis of both hands 7/28/2015     Right shoulder strain, initial encounter 12/15/2016     Shingles 2009     Sinus  tachycardia 2016     Skin cancer 2013, 2015    Basal cell cancer     Vitamin D deficiency 2010       Past Surgical History:   Procedure Laterality Date     CATARACT IOL, RT/LT        SECTION      x 2     COLONOSCOPY      3 polyps     COLONOSCOPY  2016    neg     ENDOSCOPY  2016     LENGTHEN TENDON ACHILLES  2011    Procedure:LENGTHEN TENDON ACHILLES; Surgeon:AARON CÁRDENAS; [facility]     LIVER SURGERY      Bengin large cyst     MINI ARC SLING OPERATION FOR STRESS INCONTINENCE       OSTEOTOMY ANKLE  2011    Procedure:OSTEOTOMY ANKLE; Calcaneous,  Flexor Digitorum Longus Transfer       SALPINGO-OOPHORECTOMY BILATERAL  ,     Benign prophylactic for FHx ov cancer       Family History   Problem Relation Age of Onset     Other Cancer Sister 51        ovarian cancer     Cancer Sister      Thyroid Disease Sister      Colon Cancer Father 76     Brain Hemorrhage Father         accident     Macular Degeneration Mother      Diabetes Maternal Aunt      Diabetes Maternal Uncle      Cancer Paternal Aunt      Diabetes Maternal Grandmother      Hypertension No family hx of      Cerebrovascular Disease No family hx of      Glaucoma No family hx of        Social History     Tobacco Use     Smoking status: Former Smoker     Years: 16.00     Types: Cigarettes     Quit date: 1982     Years since quittin.9     Smokeless tobacco: Never Used   Substance Use Topics     Alcohol use: Yes     Alcohol/week: 0.0 standard drinks     Comment: 2-7 glasses of wine per week         Exam:  Vitals: BP (!) 145/78   Pulse 76   Wt 68 kg (150 lb)   LMP  (LMP Unknown)   BMI 28.34 kg/m    BMI: Body mass index is 28.34 kg/m .  Height: Data Unavailable    Constitutional/ general:  Pt is in no apparent distress, appears well-nourished.  Cooperative with history and physical exam.     Psych:  The patient answered questions appropriately.  Normal affect.  Seems to have reasonable  expectations, in terms of treatment.     Eyes:  Visual scanning/ tracking without deficit.     Ears:  Response to auditory stimuli is normal.    Auricles in proper alignment.     Lymphatic:  Popliteal lymph nodes not enlarged.     Lungs:  Non labored breathing, non labored speech. No cough.  No audible wheezing. Even, quiet breathing.       Vascular:  Pedal pulses are palpable bilaterally for both the DP and PT arteries.  CFT < 3 sec.   bilateral ankle edema varicosities noted Pedal hair growth noted.     Neuro:  Alert and oriented x 3. Coordinated gait.  Light touch sensation is intact to the L4, L5, S1 distributions. No obvious deficits.  No evidence of neurological-based weakness, spasticity, or contracture in the lower extremities.    Derm: Normal texture and turgor.  No erythema, ecchymosis, or cyanosis.  No open lesions.     Musculoskeletal:    Lower extremity muscle strength is normal.  Patient is ambulatory without an assistive device or brace.  No gross deformities.  Normal ROM all fore foot and rearfoot joints.  No equinus.  With weightbearing patient has bilateral pronation with left being worse.  No pain with ROM.   Pain with palpation posterior tibial tendon medial arch and towards navicular.  positive pain with stressing posterior tibial tendon.  Good calcaneal iversion with foot flexion.  negative erythema.  positive edema.  negative ecchymosis.   negative masses noted.          A:  Pronation and posterior tibial tendonitis    P:   Discussed with patient this foot is more pronated than the left.  She will stop exercising on this until resolved.  She will ice this.  She will get compression on this.  We wrote a prescription for orthotics.  We discussed an ankle brace for further immobilization if not responding.  Hopefully with time this will heal up and she will slowly increase her activities again with good support in her shoes.   RETURN TO CLINIC PRN.    Anil Balderas, MORGAN MORA, FACFAS              Again, thank you for allowing me to participate in the care of your patient.        Sincerely,        Anil Balderas DPM

## 2020-11-20 NOTE — PATIENT INSTRUCTIONS
We wish you continued good healing. If you have any questions or concerns, please do not hesitate to contact us at [phone]    [employer] (secure e-mail communication and access to your chart) to send a message or to make an appointment.    Please remember to call and schedule a follow up appointment if one was recommended at your earliest convenience.     +++OF MARCH 2020+++ LOCATION AND HOURS HAVE CHANGED    PLEASE CALL CLINICS TO VERIFY DAYS AND TIMES  PODIATRY CLINIC HOURS  TELEPHONE NUMBER    Dr. Anil FORDPRAQUEL [facility]        Clinics:  [facility]   Tuesday 1PM-6PM  [place]  Wednesday 745AM-330PM  [place]  Thursday/Friday 745AM-230PM  [place]     Specialty schedulers:   [phone] to make an appointment with any Specialty Provider.               If you need a medication refill, please contact us you may need lab work and/or a follow up visit prior to your refill (i.e. Antifungal medications).    If MRI needed please call Imaging at [phone] or [phone]    Knee scooters can be picked up at ANY Medical [employer] stores with your knee scooter order. For a list of suppliers please ask.

## 2020-11-20 NOTE — PROGRESS NOTES
S:  Complains of left foot pain.  Points to posterior tibial tendon where is inserts into the navicular.  Has had this for 4 months.  Describes it as a burning pain.  Aggrevated by activity and relieved by rest.  This started when she was doing a Nathan class with shoes without arch supports.  She is pronated.  She has a history of right flatfoot surgery.  This foot is doing quite well.  She is also wondering about orthotics for her left foot.  She is no longer doing these classes.  She is has been swelling where the pain is.  She denies any ecchymosis erythema or drop in arch height.    ROS: See above       Allergies   Allergen Reactions     Sulfa Drugs Nausea     Codeine Phosphate Nausea and Vomiting     Pentazocine      Propoxyphene      Darvon     Penicillins Rash     rash and itching         Current Outpatient Medications   Medication Sig Dispense Refill     amitriptyline (ELAVIL) 10 MG tablet 10mg in the morning, 10mg in the aftternoon, and 20mg at bedtime 360 tablet 3     aspirin 81 MG tablet Take 1 tablet by mouth daily.       atorvastatin (LIPITOR) 40 MG tablet Take 1 tablet (40 mg) by mouth daily 90 tablet 3     Calcium-Vitamin D (CALCIUM + D PO) Take  by mouth.       FISH OIL        MAGNESIUM OXIDE PO Take 200 mg by mouth daily       metoprolol succinate ER (TOPROL-XL) 25 MG 24 hr tablet Take 1 tablet (25 mg) by mouth daily 90 tablet 3     Multiple Vitamins-Minerals (MULTIVITAMIN & MINERAL PO) Take  by mouth.         Patient Active Problem List   Diagnosis     Chronic rhinitis     Primary osteoarthritis of both hands     Basal cell carcinoma of skin     Coronary artery disease involving native heart with angina pectoris, unspecified vessel or lesion type (H)     Hyperlipidemia LDL goal <100     Impaired fasting glucose     Chorioretinal scar of left eye     Sinus tachycardia     Advance care planning     Pes planus     Bronchiectasis without complication (H)     Chronic cough     Female pattern hair loss      FHx: colon cancer     History of Clostridioides difficile infection     Pseudophakia of right eye     Pseudophakia of both eyes     Primary osteoarthritis of both knees     Vocal cord dysfunction     Personal history of malignant neoplasm of bladder       Past Medical History:   Diagnosis Date     Arthritis      Basal cell carcinoma of skin 2011     Benign neoplasm of colon 10/26/2011    Overview:  Colonoscopy every 5 years  Oct 2011      Bronchiectasis without complication (H) 12/15/2016    CT chest done 2016 through COPDgene study, formal report indicates cylindrical bronchiectasis. Reviewed CT with [facility] radiologists; bronchiectasis is likely present but of unclear clinical significance, and was likely present on CT from  as well (different scanner and different technique).     Chorioretinal scar of left eye 2015     Chronic rhinitis 2015     Clostridium difficile infection 2018     Coronary artery disease      Coronary atherosclerosis due to calcified coronary lesion 2011    Overview:  A)  CT Chest-Date: 07/13/10, There is coronary artery and mild aortic calcification present. B)  Nuclear Stress Test on 10/01/10, No scintigraphic evidence of ischemia or infarction. EF: 71%.       Elevated blood pressure (not hypertension) 2006     Hypercholesteremia      Hyperlipidemia 2011    Overview:  See results review for lipid profile.      Impaired fasting glucose 2006     Nonsenile cataract      Overweight 2015     PONV (postoperative nausea and vomiting)      Primary osteoarthritis of both hands 2015     Right shoulder strain, initial encounter 12/15/2016     Shingles 2009     Sinus tachycardia 2016     Skin cancer , 2015    Basal cell cancer     Vitamin D deficiency 2010       Past Surgical History:   Procedure Laterality Date     CATARACT IOL, RT/LT        SECTION      x 2     COLONOSCOPY      3 polyps     COLONOSCOPY      neg      ENDOSCOPY  2016     LENGTHEN TENDON ACHILLES  2011    Procedure:LENGTHEN TENDON ACHILLES; Surgeon:AARON CÁRDENAS; [facility]     LIVER SURGERY      Bengin large cyst     MINI ARC SLING OPERATION FOR STRESS INCONTINENCE       OSTEOTOMY ANKLE  2011    Procedure:OSTEOTOMY ANKLE; Calcaneous,  Flexor Digitorum Longus Transfer       SALPINGO-OOPHORECTOMY BILATERAL  ,     Benign prophylactic for FHx ov cancer       Family History   Problem Relation Age of Onset     Other Cancer Sister 51        ovarian cancer     Cancer Sister      Thyroid Disease Sister      Colon Cancer Father 76     Brain Hemorrhage Father         accident     Macular Degeneration Mother      Diabetes Maternal Aunt      Diabetes Maternal Uncle      Cancer Paternal Aunt      Diabetes Maternal Grandmother      Hypertension No family hx of      Cerebrovascular Disease No family hx of      Glaucoma No family hx of        Social History     Tobacco Use     Smoking status: Former Smoker     Years: 16.00     Types: Cigarettes     Quit date: 1982     Years since quittin.9     Smokeless tobacco: Never Used   Substance Use Topics     Alcohol use: Yes     Alcohol/week: 0.0 standard drinks     Comment: 2-7 glasses of wine per week         Exam:  Vitals: BP (!) 145/78   Pulse 76   Wt 68 kg (150 lb)   LMP  (LMP Unknown)   BMI 28.34 kg/m    BMI: Body mass index is 28.34 kg/m .  Height: Data Unavailable    Constitutional/ general:  Pt is in no apparent distress, appears well-nourished.  Cooperative with history and physical exam.     Psych:  The patient answered questions appropriately.  Normal affect.  Seems to have reasonable expectations, in terms of treatment.     Eyes:  Visual scanning/ tracking without deficit.     Ears:  Response to auditory stimuli is normal.    Auricles in proper alignment.     Lymphatic:  Popliteal lymph nodes not enlarged.     Lungs:  Non labored breathing, non labored speech. No cough.  No  audible wheezing. Even, quiet breathing.       Vascular:  Pedal pulses are palpable bilaterally for both the DP and PT arteries.  CFT < 3 sec.   bilateral ankle edema varicosities noted Pedal hair growth noted.     Neuro:  Alert and oriented x 3. Coordinated gait.  Light touch sensation is intact to the L4, L5, S1 distributions. No obvious deficits.  No evidence of neurological-based weakness, spasticity, or contracture in the lower extremities.    Derm: Normal texture and turgor.  No erythema, ecchymosis, or cyanosis.  No open lesions.     Musculoskeletal:    Lower extremity muscle strength is normal.  Patient is ambulatory without an assistive device or brace.  No gross deformities.  Normal ROM all fore foot and rearfoot joints.  No equinus.  With weightbearing patient has bilateral pronation with left being worse.  No pain with ROM.   Pain with palpation posterior tibial tendon medial arch and towards navicular.  positive pain with stressing posterior tibial tendon.  Good calcaneal iversion with foot flexion.  negative erythema.  positive edema.  negative ecchymosis.   negative masses noted.          A:  Pronation and posterior tibial tendonitis    P:   Discussed with patient this foot is more pronated than the left.  She will stop exercising on this until resolved.  She will ice this.  She will get compression on this.  We wrote a prescription for orthotics.  We discussed an ankle brace for further immobilization if not responding.  Hopefully with time this will heal up and she will slowly increase her activities again with good support in her shoes.   RETURN TO CLINIC PRN.    Anil Balderas, MORGAN MORA, FACFAS

## 2021-01-11 ENCOUNTER — OFFICE VISIT (OUTPATIENT)
Dept: FAMILY MEDICINE | Facility: CLINIC | Age: 71
End: 2021-01-11
Payer: COMMERCIAL

## 2021-01-11 VITALS
HEART RATE: 83 BPM | WEIGHT: 157 LBS | DIASTOLIC BLOOD PRESSURE: 75 MMHG | HEIGHT: 61 IN | BODY MASS INDEX: 29.64 KG/M2 | SYSTOLIC BLOOD PRESSURE: 117 MMHG | OXYGEN SATURATION: 98 %

## 2021-01-11 DIAGNOSIS — I25.119 CORONARY ARTERY DISEASE INVOLVING NATIVE HEART WITH ANGINA PECTORIS, UNSPECIFIED VESSEL OR LESION TYPE (H): ICD-10-CM

## 2021-01-11 DIAGNOSIS — E78.5 HYPERLIPIDEMIA LDL GOAL <100: ICD-10-CM

## 2021-01-11 DIAGNOSIS — Z01.818 PREOP GENERAL PHYSICAL EXAM: Primary | ICD-10-CM

## 2021-01-11 DIAGNOSIS — C67.9 MALIGNANT NEOPLASM OF URINARY BLADDER, UNSPECIFIED SITE (H): ICD-10-CM

## 2021-01-11 LAB
ANION GAP SERPL CALCULATED.3IONS-SCNC: 2 MMOL/L (ref 3–14)
BUN SERPL-MCNC: 12 MG/DL (ref 7–30)
CALCIUM SERPL-MCNC: 9.4 MG/DL (ref 8.5–10.1)
CHLORIDE SERPL-SCNC: 105 MMOL/L (ref 94–109)
CO2 SERPL-SCNC: 32 MMOL/L (ref 20–32)
CREAT SERPL-MCNC: 0.8 MG/DL (ref 0.52–1.04)
GFR SERPL CREATININE-BSD FRML MDRD: 75 ML/MIN/{1.73_M2}
GLUCOSE SERPL-MCNC: 120 MG/DL (ref 70–99)
HGB BLD-MCNC: 14.1 G/DL (ref 11.7–15.7)
POTASSIUM SERPL-SCNC: 3.9 MMOL/L (ref 3.4–5.3)
SODIUM SERPL-SCNC: 139 MMOL/L (ref 133–144)

## 2021-01-11 PROCEDURE — 85018 HEMOGLOBIN: CPT | Performed by: NURSE PRACTITIONER

## 2021-01-11 PROCEDURE — 99214 OFFICE O/P EST MOD 30 MIN: CPT | Performed by: NURSE PRACTITIONER

## 2021-01-11 PROCEDURE — 36415 COLL VENOUS BLD VENIPUNCTURE: CPT | Performed by: NURSE PRACTITIONER

## 2021-01-11 PROCEDURE — 80048 BASIC METABOLIC PNL TOTAL CA: CPT | Performed by: NURSE PRACTITIONER

## 2021-01-11 ASSESSMENT — PAIN SCALES - GENERAL: PAINLEVEL: NO PAIN (0)

## 2021-01-11 ASSESSMENT — MIFFLIN-ST. JEOR: SCORE: 1164.53

## 2021-01-11 NOTE — PATIENT INSTRUCTIONS
"  Preparing for Your Surgery  Getting started  A surgery nurse will call you to review your health history and instructions. They will give you an arrival time based on your scheduled surgery time.  Please be ready to share the following:    Your doctor's clinic name and phone number    Your medical, surgical and anesthesia history    A list of allergies and sensitivities    A list of medicines, including herbal treatments and over-the-counter drugs    Whether the patient has a legal guardian (ask how to send us the papers in advance)  If your child is having surgery, please ask for a copy of Preparing for Your Child's Surgery.    Preparing for surgery    Within 30 days of surgery: Have an exam at your family clinic (primary care clinic), or go to a pre-operative clinic. This exam is called a \"History and Physical,\" or H&P.    At your H&P exam, talk to your care team about all medicines you take. If you need to stop any medicines before surgery, ask when to start taking them again.  ? We do this for your safety. Many medicines can make you bleed too much during surgery. Some change how well surgery (anesthesia) drugs work.    Call your insurance company to see what it will and won't pay for. Ask if they need to pre-approve the surgery. (If no insurance, call [phone].)    Call your surgeon's clinic if there's any change in your health. This includes signs of a cold or flu (sore throat, runny nose, cough, rash, fever). It also includes a scrape or scratch near the surgery site.    If you have questions on the day of surgery, call your surgery center.  Eating and drinking guidelines  For your safety: Unless your surgeon tells you otherwise, follow the guidelines below.    Eat and drink as usual until 8 hours before surgery. After that, no food or milk.    Drink clear liquids until 2 hours before surgery. These are liquids you can see through, like water, Gatorade and Propel Water. You may also have black coffee " and tea (no cream or milk).    Nothing by mouth within 2 hours of surgery. This includes gum, candy and breath mints.    Stop alcohol the midnight before surgery.    If your family clinic tells you to take medicine on the morning of surgery, it's okay to take it with a sip of water.  Preventing infection    Shower or bathe the night before and morning of your surgery. Follow the instructions your clinic gave you. (If no instructions, use regular soap.)    Don't shave or clip hair near your surgery site. This can lead to skin infection.    Don't smoke the morning of surgery. Smoking increases the risk of infection. You may chew nicotine gum up to 2 hours before surgery. A nicotine patch is okay.  ? Note: Some surgeries require you to completely quit smoking and nicotine. Check with your surgeon.    Your care team will make every effort to keep you safe from infection. We will:  ? Clean our hands often with soap and water (or an alcohol-based hand rub).  ? Clean the skin at your surgery site with a special soap that kills germs. We'll also remove hair from the site as needed.  ? Wear special hair covers, masks, gowns and gloves during surgery.  ? Give antibiotic medicine, if prescribed. Not all surgeries need antibiotics.  What to bring on the day of surgery    Photo ID and insurance card    Copy of your health care directive, if you have one    Glasses and hearing aides (bring cases)  ? You can't wear contacts during surgery    Inhaler and eye drops, if you use them (tell us about these when you arrive)    CPAP machine or breathing device, if you use them    A few personal items, if spending the night    If you have . . .  ? A pacemaker or ICD (cardiac defibrillator): Bring the ID card.  ? An implanted stimulator: Bring the remote control.  ? A legal guardian: Bring a copy of the certified (court-stamped) guardianship papers.  Please remove any jewelry, including body piercings. Leave jewelry and other valuables at  home.  If you're going home the day of surgery  Important: If you don't follow the rules below, we must cancel your surgery.     Arrange for someone to drive you home after surgery. You may not drive, take a taxi or take public transportation by yourself (unless you'll have local anesthesia only).    Arrange for a responsible adult to stay with you overnight. If you don't, we may keep you in the hospital overnight, and you may need to pay the costs yourself.  Questions?   If you have any questions for your care team, list them here: _________________________________________________________________________________________________________________________________________________________________________________________________________________________________________________________________________________________________________________________  For informational purposes only. Not to replace the advice of your health care provider. Copyright   7354-6010 [facility]. All rights reserved. Clinically reviewed by Citlaly Martinez MD. [employer] 513524 - REV 07/19.    At [facility], we strive to deliver an exceptional experience to you, every time we see you. If you receive a survey, please complete it as we do value your feedback.  If you have MyChart, you can expect to receive results automatically within 24 hours of their completion.  Your provider will send a note interpreting your results as well.   If you do not have MyChart, you should receive your results in about a week by mail.    Your care team:                            Family Medicine Internal Medicine   MD Guillermo Faustin MD Shantel Branch-Fleming, MD Srinivasa Vaka, MD Katya Georgiev PA-C Megan Hill, APRN LISANDRA Tijerina MD Pediatrics   Alonso Alex, PANikkiC  Abby Cotton, MD Antionette Suárez APRN CNP   MD Lindsay Torrez, MD [place]  MD Naida Gonzalez APRN CNP Christine Bouman, PA-C Emily Bunt, MD Yesi Evans MD Angela Wermerskirchen, MD      Clinic hours: Monday - Thursday 7 am-7 pm; Fridays 7 am-5 pm.   Urgent care: Monday - Friday 11 am-9 pm; Saturday and Sunday 9 am-5 pm.    Clinic: [phone]       [facility]: Monday - Thursday 8 am - 7 pm; Friday 8 am - 6 pm  [facility]: [phone]     Use [email or web address] for 24/7 diagnosis and treatment of dozens of conditions.

## 2021-01-11 NOTE — PROGRESS NOTES
[address] [address]  Phone: [phone]  Primary Provider: Marry Gonzalez  Pre-op Performing Provider: RITU SOLIZ    PREOPERATIVE EVALUATION:  Today's date: 1/11/2021    Isabel Carroll is a 70 year old female who presents for a preoperative evaluation.    Surgical Information:  Surgery/Procedure: Bx of Bladder  Surgery Location: [facility]  Surgeon: Dr. Fer Dowell  Surgery Date: 1/20/21  Time of Surgery: 7:30am  Where patient plans to recover: At home with family  Fax number for surgical facility: TBD    Type of Anesthesia Anticipated: to be determined    Subjective     HPI related to upcoming procedure: Diagnosed with stage 1 bladder cancer    Preop Questions 1/6/2021   1. Have you ever had a heart attack or stroke? No   2. Have you ever had surgery on your heart or blood vessels, such as a stent placement, a coronary artery bypass, or surgery on an artery in your head, neck, heart, or legs? No   3. Do you have chest pain with activity? No   4. Do you have a history of  heart failure? No   5. Do you currently have a cold, bronchitis or symptoms of other infection? No   6. Do you have a cough, shortness of breath, or wheezing? No   7. Do you or anyone in your family have previous history of blood clots? No   8. Do you or does anyone in your family have a serious bleeding problem such as prolonged bleeding following surgeries or cuts? No   9. Have you ever had problems with anemia or been told to take iron pills? No   10. Have you had any abnormal blood loss such as black, tarry or bloody stools, or abnormal vaginal bleeding? No   11. Have you ever had a blood transfusion? No   12. Are you willing to have a blood transfusion if it is medically needed before, during, or after your surgery? Yes   13. Have you or any of your relatives ever had problems with anesthesia? YES - Aunt   14. Do you have sleep apnea, excessive  snoring or daytime drowsiness? No   15. Do you have any artifical heart valves or other implanted medical devices like a pacemaker, defibrillator, or continuous glucose monitor? No -   16. Do you have artificial joints? Yes-Right  Ankle   17. Are you allergic to latex? No       Health Care Directive:  Patient does not have a Health Care Directive or Living Will: Discussed advance care planning with patient; however, patient declined at this time.    Preoperative Review of :   reviewed - no record of controlled substances prescribed.      CAD - Patient has a longstanding history of moderate-severe CAD. Patient denies recent chest pain or NTG use, denies exercise induced dyspnea or PND. Last Stress test 10/2010, EKG 10/14/2020.      HYPERLIPIDEMIA - Patient has a long history of significant Hyperlipidemia requiring medication for treatment with recent good control. Patient reports no problems or side effects with the medication.      HYPERTENSION - Patient has longstanding history of HTN , currently denies any symptoms referable to elevated blood pressure. Specifically denies chest pain, palpitations, dyspnea, orthopnea, PND or peripheral edema. Blood pressure readings have been in normal range. Current medication regimen is as listed below. Patient denies any side effects of medication.         Last saw cardiology Nov 2019. Per discharge instructions:     Etta Elam APRN, CNP - 11/08/2019 12:40 PM CST      Your EKG today shows a normal sinus rhythm       Your BP today was normotensive 120/70    continue metoprolol      Tests from 2016: ECHO - Normal pumping function. No significant valvular disease. Overall results look good. CAROTID US - no stenosis/blockage       Your last cholesterol numbers from November 1, 2019 were: Very good, , TG 92, LDL 55, HDL 55, AST 20    continue same dose of atorvastatin with fish oil      Medication changes today: NONE      Stay active, goal of 30 minutes of  aerobic activity most days of the week; keep doing the exercise classes!      Heart healthy diet, no more than 400 mg of cholesterol daily; stay hydrated      Follow up in Cardiology in 2 years . Our office will call to schedule     Review of Systems  CONSTITUTIONAL: NEGATIVE for fever, chills, change in weight  INTEGUMENTARY/SKIN: NEGATIVE for worrisome rashes, moles or lesions  EYES: NEGATIVE for vision changes or irritation  ENT/MOUTH: NEGATIVE for ear, mouth and throat problems  RESP: NEGATIVE for significant cough or SOB  BREAST: NEGATIVE for masses, tenderness or discharge  CV: NEGATIVE for chest pain, palpitations or peripheral edema  GI: NEGATIVE for nausea, abdominal pain, heartburn, or change in bowel habits  : NEGATIVE for frequency, dysuria, or hematuria  MUSCULOSKELETAL: NEGATIVE for significant arthralgias or myalgia  NEURO: NEGATIVE for weakness, dizziness or paresthesias  ENDOCRINE: NEGATIVE for temperature intolerance, skin/hair changes  HEME: NEGATIVE for bleeding problems  PSYCHIATRIC: NEGATIVE for changes in mood or affect    Patient Active Problem List    Diagnosis Date Noted     Malignant neoplasm of urinary bladder, unspecified site (H) 01/11/2021     Priority: Medium     Personal history of malignant neoplasm of bladder 10/29/2020     Priority: Medium     Vocal cord dysfunction 09/10/2019     Priority: Medium     Primary osteoarthritis of both knees 08/07/2019     Priority: Medium     Pseudophakia of both eyes 04/10/2019     Priority: Medium     Pseudophakia of right eye 02/22/2019     Priority: Medium     History of Clostridioides difficile infection 12/13/2018     Priority: Medium     Chronic cough 05/25/2017     Priority: Medium     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 Reviewed CT with [facility]  radiologists; bronchiectasis is likely present but of unclear clinical significance, and was likely present on CT from 2010 as well (different scanner and different technique).       Advance care planning 11/04/2016     Priority: Medium     Advance Care Planning 11/4/2016: ACP Review of Chart / Resources Provided:  Reviewed chart for advance care plan.  Isabel Carroll has been provided information and resources to begin or update their advance care plan.  Added by Tamara Mendiola             FHx: colon cancer 10/17/2016     Priority: Medium     Sinus tachycardia 07/21/2016     Priority: Medium     Chorioretinal scar of left eye 12/02/2015     Priority: Medium     Chronic rhinitis 07/28/2015     Priority: Medium     Primary osteoarthritis of both hands 07/28/2015     Priority: Medium     Coronary artery disease involving native heart with angina pectoris, unspecified vessel or lesion type (H) 12/22/2011     Priority: Medium     Overview:   A)  CT Chest-Date: 07/13/10, There is coronary artery and mild aortic calcification present.  B)  Nuclear Stress Test on 10/01/10, No scintigraphic evidence of ischemia or infarction. EF: 71%.   Overview:   A)  CT Chest-Date: 07/13/10, There is coronary artery and mild aortic calcification present.  B)  Nuclear Stress Test on 10/01/10, No scintigraphic evidence of ischemia or infarction. EF: 71%.        Hyperlipidemia LDL goal <100 12/22/2011     Priority: Medium     Basal cell carcinoma of skin 04/07/2011     Priority: Medium     Pes planus 01/24/2011     Priority: Medium     Impaired fasting glucose 09/18/2006     Priority: Medium      Past Medical History:   Diagnosis Date     Arthritis      Basal cell carcinoma of skin 4/7/2011     Benign neoplasm of colon 10/26/2011    Overview:  Colonoscopy every 5 years  Oct 2011      Bronchiectasis without complication (H) 12/15/2016    CT chest done 2016 through COPDgene study, formal report indicates cylindrical bronchiectasis.  Reviewed CT with [facility] radiologists; bronchiectasis is likely present but of unclear clinical significance, and was likely present on CT from  as well (different scanner and different technique).     Chorioretinal scar of left eye 2015     Chronic rhinitis 2015     Clostridium difficile infection 2018     Coronary artery disease      Coronary atherosclerosis due to calcified coronary lesion 2011    Overview:  A)  CT Chest-Date: 07/13/10, There is coronary artery and mild aortic calcification present. B)  Nuclear Stress Test on 10/01/10, No scintigraphic evidence of ischemia or infarction. EF: 71%.       Elevated blood pressure (not hypertension) 2006     Hypercholesteremia      Hyperlipidemia 2011    Overview:  See results review for lipid profile.      Impaired fasting glucose 2006     Nonsenile cataract      Overweight 2015     PONV (postoperative nausea and vomiting)      Primary osteoarthritis of both hands 2015     Right shoulder strain, initial encounter 12/15/2016     Shingles 2009     Sinus tachycardia 2016     Skin cancer , 2015    Basal cell cancer     Vitamin D deficiency 2010     Past Surgical History:   Procedure Laterality Date     CATARACT IOL, RT/LT        SECTION      x 2     COLONOSCOPY      3 polyps     COLONOSCOPY  2016    neg     ENDOSCOPY  2016     LENGTHEN TENDON ACHILLES  2011    Procedure:LENGTHEN TENDON ACHILLES; Surgeon:AARON CÁRDENAS; Location:[facility]     LIVER SURGERY      Bengin large cyst     MINI ARC SLING OPERATION FOR STRESS INCONTINENCE       OSTEOTOMY ANKLE  2011    Procedure:OSTEOTOMY ANKLE; Calcaneous,  Flexor Digitorum Longus Transfer       SALPINGO-OOPHORECTOMY BILATERAL  ,     Benign prophylactic for FHx ov cancer     Current Outpatient Medications   Medication Sig Dispense Refill     amitriptyline (ELAVIL) 10 MG tablet 10mg in the morning, 10mg in the aftternoon, and  "20mg at bedtime 360 tablet 3     aspirin 81 MG tablet Take 1 tablet by mouth daily.       atorvastatin (LIPITOR) 40 MG tablet Take 1 tablet (40 mg) by mouth daily 90 tablet 3     Calcium-Vitamin D (CALCIUM + D PO) Take  by mouth.       FISH OIL        MAGNESIUM OXIDE PO Take 200 mg by mouth daily       metoprolol succinate ER (TOPROL-XL) 25 MG 24 hr tablet Take 1 tablet (25 mg) by mouth daily 90 tablet 3     Multiple Vitamins-Minerals (MULTIVITAMIN & MINERAL PO) Take  by mouth.         Allergies   Allergen Reactions     Sulfa Drugs Nausea     Codeine Phosphate Nausea and Vomiting     Pentazocine      Propoxyphene      Darvon     Penicillins Rash     rash and itching          Social History     Tobacco Use     Smoking status: Former Smoker     Years: 16.00     Types: Cigarettes     Quit date: 1982     Years since quittin.0     Smokeless tobacco: Never Used   Substance Use Topics     Alcohol use: Yes     Alcohol/week: 0.0 standard drinks     Comment: 2-7 glasses of wine per week     Family History   Problem Relation Age of Onset     Other Cancer Sister 51        ovarian cancer     Cancer Sister      Thyroid Disease Sister      Colon Cancer Father 76     Brain Hemorrhage Father         accident     Macular Degeneration Mother      Diabetes Maternal Aunt      Diabetes Maternal Uncle      Cancer Paternal Aunt      Diabetes Maternal Grandmother      Hypertension No family hx of      Cerebrovascular Disease No family hx of      Glaucoma No family hx of      History   Drug Use No         Objective     /75 (BP Location: Left arm, Patient Position: Chair, Cuff Size: Adult Large)   Pulse 83   Ht 1.549 m (5' 1\")   Wt 71.2 kg (157 lb)   LMP  (LMP Unknown)   SpO2 98%   BMI 29.66 kg/m      Physical Exam    GENERAL APPEARANCE: healthy, alert and no distress     EYES: EOMI, PERRL     HENT: ear canals and TM's normal and nose and mouth without ulcers or lesions     NECK: no adenopathy, no asymmetry, masses, or " scars and thyroid normal to palpation     RESP: lungs clear to auscultation - no rales, rhonchi or wheezes     CV: regular rates and rhythm, normal S1 S2, no S3 or S4 and no murmur, click or rub     ABDOMEN:  soft, nontender, no HSM or masses and bowel sounds normal     MS: extremities normal- no gross deformities noted, no evidence of inflammation in joints, FROM in all extremities.     SKIN: no suspicious lesions or rashes     NEURO: Normal strength and tone, sensory exam grossly normal, mentation intact and speech normal     PSYCH: mentation appears normal. and affect normal/bright     LYMPHATICS: No cervical adenopathy    Recent Labs   Lab Test 10/29/20  1506 10/14/20  1047 09/09/20  1610 11/08/19  0812 11/08/19  0812   HGB 13.4 14.8 15.2   < >  --      --  253   < >  --     139  --   --   --    POTASSIUM 3.9 3.9  --   --   --    CR 0.60 0.63  --   --   --    A1C  --  5.6  --   --  5.6    < > = values in this interval not displayed.        Diagnostics:  Recent Results (from the past 48 hour(s))   Basic metabolic panel  (Ca, Cl, CO2, Creat, Gluc, K, Na, BUN)    Collection Time: 01/11/21  1:51 PM   Result Value Ref Range    Sodium 139 133 - 144 mmol/L    Potassium 3.9 3.4 - 5.3 mmol/L    Chloride 105 94 - 109 mmol/L    Carbon Dioxide 32 20 - 32 mmol/L    Anion Gap 2 (L) 3 - 14 mmol/L    Glucose 120 (H) 70 - 99 mg/dL    Urea Nitrogen 12 7 - 30 mg/dL    Creatinine 0.80 0.52 - 1.04 mg/dL    GFR Estimate 75 >60 mL/min/[1.73_m2]    GFR Estimate If Black 87 >60 mL/min/[1.73_m2]    Calcium 9.4 8.5 - 10.1 mg/dL   Hemoglobin    Collection Time: 01/11/21  1:51 PM   Result Value Ref Range    Hemoglobin 14.1 11.7 - 15.7 g/dL      No EKG this visit, completed in the last 90 days.    Revised Cardiac Risk Index (RCRI):  The patient has the following serious cardiovascular risks for perioperative complications:   - Coronary Artery Disease (MI, positive stress test, angina, Qs on EKG) = 1 point     RCRI  Interpretation: 1 point: Class II (low risk - 0.9% complication rate)           Assessment & Plan   The proposed surgical procedure is considered INTERMEDIATE risk.    Isabel was seen today for pre-op exam.    Diagnoses and all orders for this visit:    Preop general physical exam  -     Basic metabolic panel  (Ca, Cl, CO2, Creat, Gluc, K, Na, BUN)  -     Hemoglobin    Malignant neoplasm of urinary bladder, unspecified site (H)    Coronary artery disease involving native heart with angina pectoris, unspecified vessel or lesion type (H)    Hyperlipidemia LDL goal <100           Risks and Recommendations:  The patient has the following additional risks and recommendations for perioperative complications:   - Consult Hospitalist / IM to assist with post-op medical management    Medication Instructions:  Patient is to take all scheduled medications on the day of surgery EXCEPT for modifications listed below:   - aspirin: Discontinue aspirin 7-10 days prior to procedure to reduce bleeding risk. It should be resumed postoperatively.    Stop fish oil until after surgery    RECOMMENDATION:  APPROVAL GIVEN to proceed with proposed procedure, without further diagnostic evaluation.    Signed Electronically by: VIC Nayak CNP    Copy of this evaluation report is provided to requesting physician.    Preop [facility] Preop Guidelines    Revised Cardiac Risk Index    This visit took place during the COVID 19 global pandemic.   PPE worn during the visit included: surgical mask and face shield by me and mask by patient

## 2021-01-13 NOTE — PROGRESS NOTES
Pre-op and labs printed and faxed to [facility] at fax #  [phone].  Damir Pena,  For 1st Floor Primary Care

## 2021-01-15 ENCOUNTER — HEALTH MAINTENANCE LETTER (OUTPATIENT)
Age: 71
End: 2021-01-15

## 2021-01-20 ENCOUNTER — TRANSFERRED RECORDS (OUTPATIENT)
Dept: HEALTH INFORMATION MANAGEMENT | Facility: CLINIC | Age: 71
End: 2021-01-20

## 2021-02-06 ENCOUNTER — DOCUMENTATION ONLY (OUTPATIENT)
Dept: FAMILY MEDICINE | Facility: CLINIC | Age: 71
End: 2021-02-06

## 2021-02-06 NOTE — PROGRESS NOTES
Labs were recently completed for pre op exam and are up to date. Please call to cancel. Marry Gonzalez MD

## 2021-02-16 ASSESSMENT — ENCOUNTER SYMPTOMS
DIZZINESS: 0
WEAKNESS: 0
HEMATOCHEZIA: 0
NAUSEA: 0
SHORTNESS OF BREATH: 0
ABDOMINAL PAIN: 0
CHILLS: 0
HEADACHES: 0
FREQUENCY: 0
PARESTHESIAS: 0
PALPITATIONS: 0
HEARTBURN: 0
BREAST MASS: 0
DYSURIA: 0
EYE PAIN: 0
CONSTIPATION: 0
MYALGIAS: 0
DIARRHEA: 0
SORE THROAT: 0
ARTHRALGIAS: 1
NERVOUS/ANXIOUS: 0
JOINT SWELLING: 0
COUGH: 0
FEVER: 0
HEMATURIA: 0

## 2021-02-16 ASSESSMENT — ACTIVITIES OF DAILY LIVING (ADL): CURRENT_FUNCTION: NO ASSISTANCE NEEDED

## 2021-02-18 ENCOUNTER — OFFICE VISIT (OUTPATIENT)
Dept: FAMILY MEDICINE | Facility: CLINIC | Age: 71
End: 2021-02-18
Payer: COMMERCIAL

## 2021-02-18 VITALS
WEIGHT: 155 LBS | DIASTOLIC BLOOD PRESSURE: 76 MMHG | SYSTOLIC BLOOD PRESSURE: 115 MMHG | RESPIRATION RATE: 12 BRPM | HEART RATE: 84 BPM | BODY MASS INDEX: 28.52 KG/M2 | HEIGHT: 62 IN | OXYGEN SATURATION: 97 % | TEMPERATURE: 98.1 F

## 2021-02-18 DIAGNOSIS — R00.0 SINUS TACHYCARDIA: ICD-10-CM

## 2021-02-18 DIAGNOSIS — E78.5 HYPERLIPIDEMIA LDL GOAL <100: ICD-10-CM

## 2021-02-18 DIAGNOSIS — J47.9 BRONCHIECTASIS WITHOUT COMPLICATION (H): ICD-10-CM

## 2021-02-18 DIAGNOSIS — Z00.00 ENCOUNTER FOR MEDICARE ANNUAL WELLNESS EXAM: Primary | ICD-10-CM

## 2021-02-18 DIAGNOSIS — I25.119 CORONARY ARTERY DISEASE INVOLVING NATIVE HEART WITH ANGINA PECTORIS, UNSPECIFIED VESSEL OR LESION TYPE (H): ICD-10-CM

## 2021-02-18 DIAGNOSIS — Z85.51 PERSONAL HISTORY OF MALIGNANT NEOPLASM OF BLADDER: ICD-10-CM

## 2021-02-18 DIAGNOSIS — R05.3 CHRONIC COUGH: ICD-10-CM

## 2021-02-18 PROBLEM — Z96.1 PSEUDOPHAKIA OF RIGHT EYE: Status: RESOLVED | Noted: 2019-02-22 | Resolved: 2021-02-18

## 2021-02-18 PROBLEM — C67.9 MALIGNANT NEOPLASM OF URINARY BLADDER, UNSPECIFIED SITE (H): Status: RESOLVED | Noted: 2021-01-11 | Resolved: 2021-02-18

## 2021-02-18 PROCEDURE — 99397 PER PM REEVAL EST PAT 65+ YR: CPT | Performed by: FAMILY MEDICINE

## 2021-02-18 RX ORDER — METOPROLOL SUCCINATE 25 MG/1
25 TABLET, EXTENDED RELEASE ORAL DAILY
Qty: 90 TABLET | Refills: 3 | Status: SHIPPED | OUTPATIENT
Start: 2021-02-18 | End: 2021-07-16

## 2021-02-18 RX ORDER — ATORVASTATIN CALCIUM 40 MG/1
40 TABLET, FILM COATED ORAL DAILY
Qty: 90 TABLET | Refills: 3 | Status: SHIPPED | OUTPATIENT
Start: 2021-02-18 | End: 2023-10-26

## 2021-02-18 ASSESSMENT — ENCOUNTER SYMPTOMS
CONSTIPATION: 0
PARESTHESIAS: 0
EYE PAIN: 0
DYSURIA: 0
SHORTNESS OF BREATH: 0
MYALGIAS: 0
HEMATURIA: 0
BREAST MASS: 0
ABDOMINAL PAIN: 0
ARTHRALGIAS: 1
HEMATOCHEZIA: 0
COUGH: 0
DIARRHEA: 0
FEVER: 0
CHILLS: 0
HEADACHES: 0
NERVOUS/ANXIOUS: 0
HEARTBURN: 0
JOINT SWELLING: 0
FREQUENCY: 0
PALPITATIONS: 0
WEAKNESS: 0
SORE THROAT: 0
NAUSEA: 0
DIZZINESS: 0

## 2021-02-18 ASSESSMENT — ACTIVITIES OF DAILY LIVING (ADL): CURRENT_FUNCTION: NO ASSISTANCE NEEDED

## 2021-02-18 ASSESSMENT — MIFFLIN-ST. JEOR: SCORE: 1163.39

## 2021-02-18 NOTE — PATIENT INSTRUCTIONS
Preventive Health Recommendations    See your health care provider every year to    Review health changes.     Discuss preventive care.      Review your medicines if your doctor has prescribed any.    You no longer need a yearly Pap test unless you've had an abnormal Pap test in the past 10 years. If you have vaginal symptoms, such as bleeding or discharge, be sure to talk with your provider about a Pap test.    Every 1 to 2 years, have a mammogram.  If you are over 69, talk with your health care provider about whether or not you want to continue having screening mammograms.    Every 10 years, have a colonoscopy. Or, have a yearly FIT test (stool test). These exams will check for colon cancer.     Have a cholesterol test every 5 years, or more often if your doctor advises it.     Have a diabetes test (fasting glucose) every three years. If you are at risk for diabetes, you should have this test more often.     At age 65, have a bone density scan (DEXA) to check for osteoporosis (brittle bone disease).    Shots:    Get a flu shot each year.    Get a tetanus shot every 10 years.    Talk to your doctor about your pneumonia vaccines. There are now two you should receive - Pneumovax (PPSV 23) and Prevnar (PCV 13).    Talk to your pharmacist about the shingles vaccine.    Talk to your doctor about the hepatitis B vaccine.    Nutrition:     Eat at least 5 servings of fruits and vegetables each day.    Eat whole-grain bread, whole-wheat pasta and brown rice instead of white grains and rice.    Get adequate Calcium and Vitamin D.     Lifestyle    Exercise at least 150 minutes a week (30 minutes a day, 5 days a week). This will help you control your weight and prevent disease.    Limit alcohol to one drink per day.    No smoking.     Wear sunscreen to prevent skin cancer.     See your dentist twice a year for an exam and cleaning.    See your eye doctor every 1 to 2 years to screen for conditions such as glaucoma, macular  degeneration and cataracts.    Personalized Prevention Plan  You are due for the preventive services outlined below.  Your care team is available to assist you in scheduling these services.  If you have already completed any of these items, please share that information with your care team to update in your medical record.  Health Maintenance   Topic Date Due     FALL RISK ASSESSMENT  11/14/2020     ZOSTER IMMUNIZATION (3 of 3) 01/05/2021     DTAP/TDAP/TD IMMUNIZATION (5 - Td) 09/09/2021     COLORECTAL CANCER SCREENING  11/16/2021     MEDICARE ANNUAL WELLNESS VISIT  02/18/2022     MAMMO SCREENING  09/29/2022     ADVANCE CARE PLANNING  11/14/2024     LIPID  10/14/2025     DEXA  12/22/2029     HEPATITIS C SCREENING  Completed     PHQ-2  Completed     INFLUENZA VACCINE  Completed     Pneumococcal Vaccine: Pediatrics (0 to 5 Years) and At-Risk Patients (6 to 64 Years)  Completed     Pneumococcal Vaccine: 65+ Years  Completed     IPV IMMUNIZATION  Aged Out     MENINGITIS IMMUNIZATION  Aged Out     HEPATITIS B IMMUNIZATION  Aged Out         At [facility], we strive to deliver an exceptional experience to you, every time we see you. If you receive a survey, please complete it as we do value your feedback.  If you have [employer], you can expect to receive results automatically within 24 hours of their completion.  Your provider will send a note interpreting your results as well.   If you do not have MyChart, you should receive your results in about a week by mail.    Your care team:                            Family Medicine Internal Medicine   MD Guillermo Faustin MD Shantel Branch-Fleming, MD Srinivasa Vaka, MD Katya Belousova, PAESTRELLA Boyd APRN LISANDRA Tijerina MD Pediatrics   Alonso Alex, OZ Cotton, MD Antionette Suárez APRN CNP   MD Lindsay Torrez MD Deborah Mielke, MD Kim Thein, APRN CNP       Clinic hours: Monday - Thursday 7 am-6 pm; Fridays 7 am-5 pm.   Urgent care: Monday - Friday 11 am-9 pm; Saturday and Sunday 9 am-5 pm.    Clinic: [phone]       [facility]: Monday - Thursday 8 am - 7 pm; Friday 8 am - 6 pm  [facility]: [phone]     Use [email or web address] for 24/7 diagnosis and treatment of dozens of conditions.    Patient Education   Personalized Prevention Plan  You are due for the preventive services outlined below.  Your care team is available to assist you in scheduling these services.  If you have already completed any of these items, please share that information with your care team to update in your medical record.  Health Maintenance Due   Topic Date Due     FALL RISK ASSESSMENT  11/14/2020     Zoster (Shingles) Vaccine (3 of 3) 01/05/2021       Urinary Incontinence, Female (Adult)   Urinary incontinence means loss of bladder control. This problem affects many women, especially as they get older. If you have incontinence, you may be embarrassed to ask for help. But know that this problem can be treated.   Types of Incontinence  There are different types of incontinence. Two of the main types are described here. You can have more than one type.     Stress incontinence. With this type, urine leaks when pressure (stress) is put on the bladder. This may happen when you cough, sneeze, or laugh. Stress incontinence most often occurs because the pelvic floor muscles that support the bladder and urethra are weak. This can happen after pregnancy and vaginal childbirth or a hysterectomy. It can also be due to excess body weight or hormone changes.    Urge incontinence (also called overactive bladder). With this type, a sudden urge to urinate is felt often. This may happen even though there may not be much urine in the bladder. The need to urinate often during the night is common. Urge incontinence most often occurs because of bladder spasms. This may  be due to bladder irritation or infection. Damage to bladder nerves or pelvic muscles, constipation, and certain medicines can also lead to urge incontinence.  Treatment depends on the cause. Further evaluation is needed to find the type you have. This will likely include an exam and certain tests. Based on the results, you and your healthcare provider can then plan treatment. Until a diagnosis is made, the home care tips below can help ease symptoms.   Home care    Do pelvic floor muscle exercises, if they are prescribed. The pelvic floor muscles help support the bladder and urethra. Many women find that their symptoms improve when doing special exercises that strengthen these muscles. To do the exercises, contract the muscles you would use to stop your stream of urine. But do this when you re not urinating. Hold for 10 seconds, then relax. Repeat 10 to 20 times in a row, at least 3 times a day. Your healthcare provider may give you other instructions for how to do the exercises and how often.    Keep a bladder diary. This helps track how often and how much you urinate over a set period of time. Bring this diary with you to your next visit with the provider. The information can help your provider learn more about your bladder problem.    Lose weight, if advised to by your provider. Extra weight puts pressure on the bladder. Your provider can help you create a weight-loss plan that s right for you. This may include exercising more and making certain diet changes.    Don't have foods and drinks that may irritate the bladder. These can include alcohol and caffeinated drinks.    Quit smoking. Smoking and other tobacco use can lead to a long-term (chronic) cough that strains the pelvic floor muscles. Smoking may also damage the bladder and urethra. Talk with your provider about treatments or methods you can use to quit smoking.    If drinking large amounts of fluid makes you have symptoms, you may be advised to limit  your fluid intake. You may also be advised to drink most of your fluids during the day and to limit fluids at night.    If you re worried about urine leakage or accidents, you may wear absorbent pads to catch urine. Change the pads often. This helps reduce discomfort. It may also reduce the risk of skin or bladder infections.    Follow-up care  Follow up with your healthcare provider, or as directed. It may take some to find the right treatment for your problem. But healthy lifestyle changes can be made right away. These include such things as exercising on a regular basis, eating a healthy diet, losing weight (if needed), and quitting smoking. Your treatment plan may include special therapies or medicines. Certain procedures or surgery may also be options. Talk about any questions you have with your provider.   When to seek medical advice  Call the healthcare provider right away if any of these occur:    Fever of 100.4 F (38 C) or higher, or as directed by your provider    Bladder pain or fullness    Belly swelling    Nausea or vomiting    Back pain    Weakness, dizziness, or fainting  [employer] last reviewed this educational content on 1/1/2020 2000-2020 [employer]. [address] [place] [address]. All rights reserved. This information is not intended as a substitute for professional medical care. Always follow your healthcare professional's instructions.           Patient Education     Eating Heart-Healthy Foods  Eating has a big impact on your heart health. In fact, eating healthier can improve several of your heart risks at once. For instance, it helps you manage weight, cholesterol, and blood pressure. Here are ideas to help you make heart-healthy changes without giving up all the foods and flavors you love.   Getting started    Talk with your healthcare provider about eating plans, such as the DASH or Mediterranean diet. You may also be referred to a dietitian.    Change a few  things at a time. Give yourself time to get used to a few eating changes before adding more.    Work to create a tasty, healthy eating plan that you can stick to for the rest of your life.    Goals for healthy eating  Below are some tips to improve your eating habits:     Limit saturated fats and trans fats. Saturated fats raise your levels of cholesterol, so keep these fats to a minimum. They are found in foods such as fatty meats, whole milk, cheese, and palm and coconut oils. Avoid trans fats because they lower good cholesterol as well as raise bad cholesterol. Trans fats are most often found in processed foods, such as pastries, cookies, pies, muffins, fried foods, stick margarines, and shortening.    Reduce how much sodium (salt) you have. Eating too much salt may increase your blood pressure. Limit your sodium intake to 2,300 milligrams (mg) per day (the amount in 1 teaspoon of salt), or less if your healthcare provider recommends it. Dining out less often and eating fewer processed foods are two great ways to decrease the amount of salt you consume. At home, flavor your foods with other spices and herbs instead of salt.    Managing calories. A calorie is a unit of energy. Your body burns calories for fuel, but if you eat more calories than your body burns, the extras are stored as fat. Your healthcare provider can help you create a diet plan to manage your calories. This will likely include eating healthier foods and getting regular exercise. To help you track your progress, keep a diary to record what you eat and how often you exercise.  Choose the right foods  Aim to make these foods staples of your diet. If you have diabetes, you may have different recommendations than what is listed here:     Fruits and vegetables provide plenty of nutrients without a lot of calories. At meals, fill half your plate with these foods. Choose between fresh, frozen, canned, or dried without added sauces, salt, or sugars.  Split the other half of your plate between whole grains and lean protein.    Whole grains are high in fiber and rich in vitamins and nutrients. Good choices include whole wheat bread, pasta, oats, and brown rice. Make at least half of your grains whole grains.    Lean proteins give you nutrition with less fat. Good choices include fish, skinless chicken and turkey, and beans. Draining the fat from cooked ground meat is another way to reduce the amount of fat you eat.    Low-fat and nonfat dairy provide nutrients without a lot of fat. Try low-fat or nonfat milk, cheese, or yogurt.    Healthy fats can be good for you in small amounts. These are unsaturated fats, such as olive oil, nuts, and fish. Try to have at least 2 servings per week of fatty fish, such as salmon, sardines, mackerel, rainbow trout, and albacore tuna. These contain omega-3 fatty acids, which are good for your heart. Flaxseed and walnuts are other sources of heart-healthy fats.  More on heart-healthy eating  Read food labels  Healthy eating starts at the grocery store. Be sure to pay attention to food labels on packaged foods. Look for products that are high in fiber and protein, and low in saturated fat, added sugars, and sodium. Avoid products that contain trans fat. And pay close attention to serving size. For instance, if you plan to eat two servings, double all the numbers on the label.   Prepare food right  A key part of healthy cooking is cutting down on added fat, sugar and salt. Look on the internet for lower-fat, lower-sodium recipes without a lot of added sugars. Also try these tips:     Remove fat from meat and skin from poultry before cooking.    Skim fat from the surface of soups and sauces.    Broil, roast, boil, bake, steam, grill, or microwave food without added fats.    Choose ingredients that spice up your food without adding calories, fat, sugar, or sodium. Try these items: horseradish, hot sauce, lemon, mustard, nonfat salad  dressings, and vinegar. Small amounts of olive oil-based vinaigrettes are OK, too. For salt-free herbs and spices, try basil, cilantro, cinnamon, cumin, paprika, pepper, and rosemary.  [employer] last reviewed this educational content on 7/1/2020 2000-2020 [employer]. [address] [place] [address], [place] [address]. All rights reserved. This information is not intended as a substitute for professional medical care. Always follow your healthcare professional's instructions.           Patient Education     Aerobic Exercise for a Healthy Heart  Exercise is a lot more than an energy booster and a stress reliever. It also strengthens your heart muscle, lowers your blood pressure and cholesterol, and burns calories. It can also improve your resting muscle tone, and your mood.     Choose an aerobic activity  Choose an activity that makes your heart and lungs work harder than they do when you rest or walk normally. This aerobic exercise can improve the way your heart and other muscles use oxygen. Make it fun by exercising with a friend and choosing an activity you enjoy. Here are some ideas:    Walking    Swimming    Bicycling    Stair climbing    Dancing    Jogging    Gardening  Exercise regularly  If you haven t been exercising regularly,  get your doctor s OK first. Then start slowly.  Here are some tips:    Begin exercising 3 times a week for 5 to 10 minutes at a time.    When you feel comfortable, add a few minutes each session.    Slowly build up to exercising 3 to 4 times each week. Each session should last for 40 minutes, on average, and involve moderate- to vigorous-intensity physical activity.    Your goal should be at least 30 minutes of moderate-intensity aerobic activity at least 5 days per week for a total of 150 or at least 25 minutes of vigorous aerobic activity at least 3 days per week for a total of 75 minutes    If you have been given nitroglycerin, be sure to carry it when you exercise.    If  you get chest pain (angina) when you re exercising, stop what you re doing, take your nitroglycerin, and call your doctor.  [employer] last reviewed this educational content on 6/1/2019 2000-2020 [employer]. [address] [place] [address], [place] [address]. All rights reserved. This information is not intended as a substitute for professional medical care. Always follow your healthcare professional's instructions.

## 2021-02-18 NOTE — PROGRESS NOTES
"SUBJECTIVE:   Isabel Carroll is a 70 year old female who presents for Preventive Visit.    Patient has been advised of split billing requirements and indicates understanding: Yes   Are you in the first 12 months of your Medicare coverage?  No    Healthy Habits:     In general, how would you rate your overall health?  Excellent    Frequency of exercise:  2-3 days/week    Duration of exercise:  30-45 minutes    Do you usually eat at least 4 servings of fruit and vegetables a day, include whole grains    & fiber and avoid regularly eating high fat or \"junk\" foods?  Yes    Taking medications regularly:  Yes    Barriers to taking medications:  None    Medication side effects:  None    Ability to successfully perform activities of daily living:  No assistance needed    Home Safety:  No safety concerns identified    Hearing Impairment:  No hearing concerns    In the past 6 months, have you been bothered by leaking of urine? Yes    In general, how would you rate your overall mental or emotional health?  Excellent      PHQ-2 Total Score: 0    Additional concerns today:  No    Do you feel safe in your environment? Yes    Have you ever done Advance Care Planning? (For example, a Health Directive, POLST, or a discussion with a medical provider or your loved ones about your wishes): Yes, advance care planning is on file.    Fall risk  Fallen 2 or more times in the past year?: No  Any fall with injury in the past year?: No    Cognitive Screening   1) Repeat 3 items (Leader, Season, Table)   2) Clock draw: NORMAL  3) 3 item recall:Recalls 3 objects  Results: NORMAL clock, 1-2 items recalled: COGNITIVE IMPAIRMENT LESS LIKELY    Mini-CogTM Copyright CELIA Bustos. Licensed by the author for use in [facility]; reprinted with permission (dianne@.[facility]). All rights reserved.      Do you have sleep apnea, excessive snoring or daytime drowsiness?: no    Reviewed and updated as needed this visit by clinical staff  Tobacco  " Allergies  Meds   Med Hx  Surg Hx  Fam Hx  Soc Hx        Reviewed and updated as needed this visit by Provider                Social History     Tobacco Use     Smoking status: Former Smoker     Years: 16.00     Types: Cigarettes     Quit date: 1982     Years since quittin.1     Smokeless tobacco: Never Used   Substance Use Topics     Alcohol use: Yes     Alcohol/week: 0.0 standard drinks     Comment: 2-7 glasses of wine per week     If you drink alcohol do you typically have >3 drinks per day or >7 drinks per week? No    No flowsheet data found.        Hyperlipidemia Follow-Up      Are you regularly taking any medication or supplement to lower your cholesterol?   Yes- simvastatin    Are you having muscle aches or other side effects that you think could be caused by your cholesterol lowering medication?  No    Vascular Disease Follow-up      How often do you take nitroglycerin? Never    Do you take an aspirin every day? Yes      Current providers sharing in care for this patient include:   Patient Care Team:  Marry Gonzalez MD as PCP - General (Family Practice)  Alisia Boyd APRN CNP as Assigned PCP  Swapnil Shah MD as Assigned Surgical Provider  Anil Balderas DPM as Assigned Musculoskeletal Provider    The following health maintenance items are reviewed in Epic and correct as of today:  Health Maintenance   Topic Date Due     FALL RISK ASSESSMENT  2020     ZOSTER IMMUNIZATION (3 of 3) 2021     DTAP/TDAP/TD IMMUNIZATION (5 - Td) 2021     COLORECTAL CANCER SCREENING  2021     MEDICARE ANNUAL WELLNESS VISIT  2022     MAMMO SCREENING  2022     LIPID  10/14/2025     ADVANCE CARE PLANNING  2026     DEXA  2029     HEPATITIS C SCREENING  Completed     PHQ-2  Completed     INFLUENZA VACCINE  Completed     Pneumococcal Vaccine: Pediatrics (0 to 5 Years) and At-Risk Patients (6 to 64 Years)  Completed     Pneumococcal Vaccine: 65+ Years   Completed     IPV IMMUNIZATION  Aged Out     MENINGITIS IMMUNIZATION  Aged Out     HEPATITIS B IMMUNIZATION  Aged Out     Lab work is in process  Labs reviewed in EPIC  BP Readings from Last 3 Encounters:   21 115/76   21 117/75   20 (!) 145/78    Wt Readings from Last 3 Encounters:   21 70.3 kg (155 lb)   21 71.2 kg (157 lb)   20 68 kg (150 lb)                  Patient Active Problem List   Diagnosis     Chronic rhinitis     Primary osteoarthritis of both hands     Basal cell carcinoma of skin     Coronary artery disease involving native heart with angina pectoris, unspecified vessel or lesion type (H)     Hyperlipidemia LDL goal <100     Impaired fasting glucose     Chorioretinal scar of left eye     Sinus tachycardia     Advance care planning     Pes planus     Bronchiectasis without complication (H)     Chronic cough     Female pattern hair loss     FHx: colon cancer     History of Clostridioides difficile infection     Pseudophakia of right eye     Pseudophakia of both eyes     Primary osteoarthritis of both knees     Vocal cord dysfunction     Personal history of malignant neoplasm of bladder     Malignant neoplasm of urinary bladder, unspecified site (H)     Past Surgical History:   Procedure Laterality Date     CATARACT IOL, RT/LT        SECTION      x 2     COLONOSCOPY      3 polyps     COLONOSCOPY  2016    neg     ENDOSCOPY  2016     LENGTHEN TENDON ACHILLES  2011    Procedure:LENGTHEN TENDON ACHILLES; Surgeon:AARON CÁRDENAS; Location:[facility]     LIVER SURGERY      Bengin large cyst     MINI ARC SLING OPERATION FOR STRESS INCONTINENCE  2007     OSTEOTOMY ANKLE  2011    Procedure:OSTEOTOMY ANKLE; Calcaneous,  Flexor Digitorum Longus Transfer       SALPINGO-OOPHORECTOMY BILATERAL  ,     Benign prophylactic for FHx ov cancer       Social History     Tobacco Use     Smoking status: Former Smoker     Years: 16.00     Types: Cigarettes      Quit date: 1982     Years since quittin.1     Smokeless tobacco: Never Used   Substance Use Topics     Alcohol use: Yes     Alcohol/week: 0.0 standard drinks     Comment: 2-7 glasses of wine per week     Family History   Problem Relation Age of Onset     Other Cancer Sister 51        ovarian cancer     Cancer Sister      Thyroid Disease Sister      Colon Cancer Father 76     Brain Hemorrhage Father         accident     Macular Degeneration Mother      Diabetes Maternal Aunt      Diabetes Maternal Uncle      Cancer Paternal Aunt      Diabetes Maternal Grandmother      Hypertension No family hx of      Cerebrovascular Disease No family hx of      Glaucoma No family hx of          Current Outpatient Medications   Medication Sig Dispense Refill     amitriptyline (ELAVIL) 10 MG tablet 10mg in the morning, 10mg in the aftternoon, and 20mg at bedtime 360 tablet 3     aspirin 81 MG tablet Take 1 tablet by mouth daily.       atorvastatin (LIPITOR) 40 MG tablet Take 1 tablet (40 mg) by mouth daily 90 tablet 3     Calcium-Vitamin D (CALCIUM + D PO) Take  by mouth.       FISH OIL        MAGNESIUM OXIDE PO Take 200 mg by mouth daily       metoprolol succinate ER (TOPROL-XL) 25 MG 24 hr tablet Take 1 tablet (25 mg) by mouth daily 90 tablet 3     Multiple Vitamins-Minerals (MULTIVITAMIN & MINERAL PO) Take  by mouth.       Allergies   Allergen Reactions     Sulfa Drugs Nausea     Codeine Phosphate Nausea and Vomiting     Pentazocine      Propoxyphene      Darvon     Penicillins Rash     rash and itching       Recent Labs   Lab Test 21  1351 10/29/20  1506 10/14/20  1047 19  0812 19  0812 18  1109 05/22/18 10/17/17  0820   A1C  --   --  5.6  --  5.6  --   --  5.8   LDL  --   --  39  --   --   --  48 119*   HDL  --   --  47*  --   --   --  48 44*   TRIG  --   --  280*  --   --   --  182* 251*   ALT  --  35  --   --   --  40 45  --    CR 0.80 0.60 0.63  --   --  0.72  --   --    GFRESTIMATED 75 >90  ">90   < >  --  80  --   --    GFRESTBLACK 87 >90 >90   < >  --  >90  --   --    POTASSIUM 3.9 3.9 3.9  --   --  4.2  --   --     < > = values in this interval not displayed.      Mammogram Screening: Recommended mammography every 1-2 years with patient discussion and risk factor consideration    Review of Systems   Constitutional: Negative for chills and fever.   HENT: Negative for congestion, ear pain, hearing loss and sore throat.    Eyes: Negative for pain and visual disturbance.   Respiratory: Negative for cough and shortness of breath.    Cardiovascular: Negative for chest pain, palpitations and peripheral edema.   Gastrointestinal: Negative for abdominal pain, constipation, diarrhea, heartburn, hematochezia and nausea.   Breasts:  Negative for tenderness, breast mass and discharge.   Genitourinary: Negative for dysuria, frequency, genital sores, hematuria, pelvic pain, urgency, vaginal bleeding and vaginal discharge.   Musculoskeletal: Positive for arthralgias. Negative for joint swelling and myalgias.   Skin: Negative for rash.   Neurological: Negative for dizziness, weakness, headaches and paresthesias.   Psychiatric/Behavioral: Negative for mood changes. The patient is not nervous/anxious.      Had a 2-3 day episode of right lower quadrant pain that went away.     OBJECTIVE:   /76 (BP Location: Left arm, Patient Position: Sitting, Cuff Size: Adult Large)   Pulse 84   Temp 98.1  F (36.7  C) (Tympanic)   Resp 12   Ht 1.562 m (5' 1.5\")   Wt 70.3 kg (155 lb)   LMP  (LMP Unknown)   SpO2 97%   BMI 28.81 kg/m   Estimated body mass index is 28.81 kg/m  as calculated from the following:    Height as of this encounter: 1.562 m (5' 1.5\").    Weight as of this encounter: 70.3 kg (155 lb).  Physical Exam  GENERAL APPEARANCE: healthy, alert and no distress  EYES: Eyes grossly normal to inspection, PERRL and conjunctivae and sclerae normal  HENT: ear canals and TM's normal, nose and mouth without ulcers or " lesions, oropharynx clear and oral mucous membranes moist  NECK: no adenopathy, no asymmetry, masses, or scars and thyroid normal to palpation  RESP: lungs clear to auscultation - no rales, rhonchi or wheezes  CV: regular rates and rhythm, normal S1 S2, no S3 or S4, no murmur, click or rub, no peripheral edema and peripheral pulses strong  ABDOMEN: soft, nontender, no hepatosplenomegaly, no masses and bowel sounds normal  MS: no musculoskeletal defects are noted and gait is age appropriate without ataxia  SKIN: no suspicious lesions or rashes  NEURO: Normal strength and tone, sensory exam grossly normal, mentation intact and speech normal  PSYCH: mentation appears normal and affect normal/bright     Diagnostic Test Results:  Labs reviewed in Epic  No results found for this or any previous visit (from the past 24 hour(s)).     Office Visit on 01/11/2021   Component Date Value Ref Range Status     Sodium 01/11/2021 139  133 - 144 mmol/L Final     Potassium 01/11/2021 3.9  3.4 - 5.3 mmol/L Final     Chloride 01/11/2021 105  94 - 109 mmol/L Final     Carbon Dioxide 01/11/2021 32  20 - 32 mmol/L Final     Anion Gap 01/11/2021 2* 3 - 14 mmol/L Final     Glucose 01/11/2021 120* 70 - 99 mg/dL Final    Non Fasting     Urea Nitrogen 01/11/2021 12  7 - 30 mg/dL Final     Creatinine 01/11/2021 0.80  0.52 - 1.04 mg/dL Final     GFR Estimate 01/11/2021 75  >60 mL/min/[1.73_m2] Final    Comment: Non  GFR Calc  Starting 12/18/2018, serum creatinine based estimated GFR (eGFR) will be   calculated using the Chronic Kidney Disease Epidemiology Collaboration   (CKD-EPI) equation.       GFR Estimate If Black 01/11/2021 87  >60 mL/min/[1.73_m2] Final    Comment:  GFR Calc  Starting 12/18/2018, serum creatinine based estimated GFR (eGFR) will be   calculated using the Chronic Kidney Disease Epidemiology Collaboration   (CKD-EPI) equation.       Calcium 01/11/2021 9.4  8.5 - 10.1 mg/dL Final     Hemoglobin  "01/11/2021 14.1  11.7 - 15.7 g/dL Final          ASSESSMENT / PLAN:       ICD-10-CM    1. Encounter for Medicare annual wellness exam  Z00.00    2. Coronary artery disease involving native heart with angina pectoris, unspecified vessel or lesion type (H)  I25.119 aspirin (ASA) 81 MG EC tablet, no recurrent chest pain or dyspnea   3. Bronchiectasis without complication (H)  J47.9 Stable cough   4. Hyperlipidemia LDL goal <100  E78.5 atorvastatin (LIPITOR) 40 MG tablet   5. Chronic cough  R05 Stable on amitriptyline    6. Personal history of malignant neoplasm of bladder  Z85.51 Managed by urology with scheduled cystoscopy. Occasional right lower quadrant abdominal pain that does not last very long. None currently.    7. Sinus tachycardia  R00.0 metoprolol succinate ER (TOPROL-XL) 25 MG 24 hr tablet- stable       Patient has been advised of split billing requirements and indicates understanding: No  COUNSELING:  Reviewed preventive health counseling, as reflected in patient instructions       Regular exercise       Healthy diet/nutrition       Dental care       Osteoporosis prevention/bone health    Estimated body mass index is 28.81 kg/m  as calculated from the following:    Height as of this encounter: 1.562 m (5' 1.5\").    Weight as of this encounter: 70.3 kg (155 lb).        She reports that she quit smoking about 39 years ago. Her smoking use included cigarettes. She quit after 16.00 years of use. She has never used smokeless tobacco.      Appropriate preventive services were discussed with this patient, including applicable screening as appropriate for cardiovascular disease, diabetes, osteopenia/osteoporosis, and glaucoma.  As appropriate for age/gender, discussed screening for colorectal cancer, prostate cancer, breast cancer, and cervical cancer. Checklist reviewing preventive services available has been given to the patient.    Reviewed patients plan of care and provided an AVS. The Basic Care Plan (routine " screening as documented in Health Maintenance) for Isabel meets the Care Plan requirement. This Care Plan has been established and reviewed with the Patient.    Counseling Resources:  ATP IV Guidelines  Pooled Cohorts Equation Calculator  Breast Cancer Risk Calculator  Breast Cancer: Medication to Reduce Risk  FRAX Risk Assessment  ICSI Preventive Guidelines  Dietary Guidelines for Americans, 2010  USDA's MyPlate  ASA Prophylaxis  Lung CA Screening    Marry Gonzalez MD  [facility]    Identified Health Risks:

## 2021-04-26 ENCOUNTER — MYC MEDICAL ADVICE (OUTPATIENT)
Dept: FAMILY MEDICINE | Facility: CLINIC | Age: 71
End: 2021-04-26

## 2021-05-10 ENCOUNTER — TRANSFERRED RECORDS (OUTPATIENT)
Dept: HEALTH INFORMATION MANAGEMENT | Facility: CLINIC | Age: 71
End: 2021-05-10

## 2021-05-12 PROBLEM — Z85.51 PERSONAL HISTORY OF MALIGNANT NEOPLASM OF BLADDER: Status: ACTIVE | Noted: 2020-10-29

## 2021-08-06 ENCOUNTER — OFFICE VISIT (OUTPATIENT)
Dept: PODIATRY | Facility: CLINIC | Age: 71
End: 2021-08-06
Payer: COMMERCIAL

## 2021-08-06 ENCOUNTER — ANCILLARY PROCEDURE (OUTPATIENT)
Dept: GENERAL RADIOLOGY | Facility: CLINIC | Age: 71
End: 2021-08-06
Attending: PODIATRIST
Payer: COMMERCIAL

## 2021-08-06 VITALS
BODY MASS INDEX: 28.52 KG/M2 | WEIGHT: 155 LBS | SYSTOLIC BLOOD PRESSURE: 113 MMHG | HEIGHT: 62 IN | HEART RATE: 77 BPM | DIASTOLIC BLOOD PRESSURE: 74 MMHG

## 2021-08-06 DIAGNOSIS — M76.822 POSTERIOR TIBIAL TENDON DYSFUNCTION, LEFT: Primary | ICD-10-CM

## 2021-08-06 DIAGNOSIS — M19.072 ARTHRITIS OF LEFT SUBTALAR JOINT: ICD-10-CM

## 2021-08-06 DIAGNOSIS — M25.572 SINUS TARSI SYNDROME, LEFT: ICD-10-CM

## 2021-08-06 PROCEDURE — 73630 X-RAY EXAM OF FOOT: CPT | Mod: LT | Performed by: RADIOLOGY

## 2021-08-06 PROCEDURE — 99213 OFFICE O/P EST LOW 20 MIN: CPT | Performed by: PODIATRIST

## 2021-08-06 RX ORDER — OXYBUTYNIN CHLORIDE 10 MG/1
1 TABLET, EXTENDED RELEASE ORAL DAILY
COMMUNITY
End: 2023-02-07 | Stop reason: ALTCHOICE

## 2021-08-06 ASSESSMENT — PAIN SCALES - GENERAL: PAINLEVEL: SEVERE PAIN (7)

## 2021-08-06 ASSESSMENT — MIFFLIN-ST. JEOR: SCORE: 1163.39

## 2021-08-06 NOTE — LETTER
8/6/2021         RE: Isabel Carroll  [address] [id number]        Dear Colleague,    Thank you for referring your patient, Isabel Carroll, to the [facility]. Please see a copy of my visit note below.    PATIENT HISTORY:  Isabel Carroll is a 70 year old female who presents to clinic  with a chief complaint of a painful left foot.  The patient relates the pain is primarily located around the back of the left foot.  The patient relates that the problem has been going on for several months and is getting worse.  The patient relates trying different shoes and orthotics with little relief.   Any previous notes and studies that pertain to the patient's condition were reviewed.    Pertinent medical, surgical and family history was reviewed in Epic chart and include coronary artery disease, osteoarthritis, status post right foot reconstructive surgery    Medications:   Current Outpatient Medications:      amitriptyline (ELAVIL) 10 MG tablet, 10mg in the morning, 10mg in the aftternoon, and 20mg at bedtime, Disp: 360 tablet, Rfl: 3     aspirin (ASA) 81 MG EC tablet, Take 1 tablet (81 mg) by mouth daily, Disp: 100 tablet, Rfl: 3     atorvastatin (LIPITOR) 40 MG tablet, Take 1 tablet (40 mg) by mouth daily, Disp: 90 tablet, Rfl: 3     Calcium-Vitamin D (CALCIUM + D PO), Take  by mouth., Disp: , Rfl:      FISH OIL, , Disp: , Rfl:      MAGNESIUM OXIDE PO, Take 200 mg by mouth daily, Disp: , Rfl:      metoprolol succinate ER (TOPROL-XL) 25 MG 24 hr tablet, Take 1 tablet (25 mg) by mouth 2 times daily, Disp: 180 tablet, Rfl: 3     Multiple Vitamins-Minerals (MULTIVITAMIN & MINERAL PO), Take  by mouth., Disp: , Rfl:      oxybutynin ER (DITROPAN-XL) 10 MG 24 hr tablet, Take 1 tablet by mouth daily, Disp: , Rfl:      Allergies:    Allergies   Allergen Reactions     Sulfa Drugs Nausea     Codeine Phosphate Nausea and Vomiting     Pentazocine       "Propoxyphene      Darvon     Penicillins Rash     rash and itching         Vitals: /74   Pulse 77   Ht 1.562 m (5' 1.5\")   Wt 70.3 kg (155 lb)   LMP  (LMP Unknown)   BMI 28.81 kg/m    BMI= Body mass index is 28.81 kg/m .    LOWER EXTREMITY PHYSICAL EXAM    Dermatologic: Skin is intact to left lower extremity without significant lesions, rash or abrasion.        Vascular: DP & PT pulses are intact & regular on the left.   CFT and skin temperature is normal to the left lower extremity.     Neurologic: Lower extremity sensation is intact to light touch.  No evidence of weakness in the left lower extremity.        Musculoskeletal: Patient is ambulatory without assistive device or brace.  No gross ankle deformity noted.  No foot or ankle joint effusion is noted.  Noted pain on palpation plantar left.  Noted pain with range of motion of the subtalar joint on the left.  Noted pain on palpation over the posterior tibial tendon on the left.    Diagnostics:  Radiographs included three views of the left foot demonstrating a collapsed medial longitudinal arch with subluxation of the talonavicular joint along with degenerative changes noted to the medial aspect of the talonavicular joint and posterior aspect of the subtalar joint.  Noted mild degenerative changes to the first metatarsophalangeal joint.  No cortical erosions or periosteal elevation.  All other joint margins appear stable.  There is no apparent fracture or tumor formation noted.  There is no evidence of foreign body.  The images were independently reviewed by myself along with the patient explaining the findings.      ASSESSMENT / PLAN:     ICD-10-CM    1. Posterior tibial tendon dysfunction, left  M76.822 XR Foot Left G/E 3 Views   2. Sinus tarsi syndrome, left  M25.572 XR Foot Left G/E 3 Views   3. Arthritis of left subtalar joint  M19.072 XR Foot Left G/E 3 Views     Orthopedic  Referral       I have explained to Isabel about the conditions.  " We discussed the underlying contributing factors to the condition as well as treatment options along with expected length of recovery.  At this time, I recommended trying a steroid injection to the subtalar joint to help relieve any possible arthritic pain in the area.  The patient was referred to [facility] sports medicine clinic for ultrasound-guided steroid injection into the subtalar joint on the left foot.  The patient will continue wearing her orthotics for support.  The patient may return to the office if problems persist.    Isabel verbalized agreement with and understanding of the rational for the diagnosis and treatment plan.  All questions were answered to best of my ability and the patient's satisfaction. The patient was advised to contact the clinic with any questions that may arise after the clinic visit.      Disclaimer: This note consists of symbols derived from keyboarding, dictation and/or voice recognition software. As a result, there may be errors in the script that have gone undetected. Please consider this when interpreting information found in this chart.       ROSENDO Bustillo.P.M., F.A.C.F.A.S.        Again, thank you for allowing me to participate in the care of your patient.        Sincerely,        Oni Serrato DPM

## 2021-08-06 NOTE — PATIENT INSTRUCTIONS
OSTEOARTHRITIS OF THE FOOT & ANKLE  Osteoarthritis is a condition characterized by the breakdown and eventual loss of cartilage in one or more joints. Cartilage (the connective tissue found at the end of the bones in the joints) protects and cushions the bones during movement. When cartilage deteriorates or is lost, symptoms develop that can restrict one s ability to easily perform daily activities.  Osteoarthritis is also known as degenerative arthritis, reflecting its nature to develop as part of the aging process. As the most common form of arthritis, osteoarthritis affects millions of Americans. Some people refer to osteoarthritis simply as arthritis, even though there are many different types of arthritis.  Osteoarthritis appears at various joints throughout the body, including the hands, feet, spine, hips, and knees. In the foot, the disease most frequently occurs in the big toe, although it is also often found in the midfoot and ankle.  CAUSES  Osteoarthritis is considered a  wear and tear  disease because the cartilage in the joint wears down with repeated stress and use over time. As the cartilage deteriorates and gets thinner, the bones lose their protective covering and eventually may rub together, causing pain and inflammation of the joint.  An injury may also lead to osteoarthritis, although it may take months or years after the injury for the condition to develop. For example, osteoarthritis in the big toe is often caused by kicking or jamming the toe, or by dropping something on the toe. Osteoarthritis in the midfoot is often caused by dropping something on it, or by a sprain or fracture. In the ankle, osteoarthritis is usually caused by a fracture and occasionally by a severe sprain.  Sometimes osteoarthritis develops as a result of abnormal foot mechanics such as flat feet or high arches. A flat foot causes less stability in the ligaments (bands of tissue that connect bones), resulting in excessive  strain on the joints, which can cause arthritis. A high arch is rigid and lacks mobility, causing a jamming of joints that creates an increased risk of arthritis.  SYMPTOMS  People with osteoarthritis in the foot or ankle experience, in varying degrees, one or more of the following: Pain and stiffness in the joint, swelling in or near the joint, or difficulty walking or bending the joint.   Some patients with osteoarthritis also develop a bone spur (a bony protrusion) at the affected joint. Shoe pressure may cause pain at the site of a bone spur, and in some cases blisters or calluses may form over its surface. Bone spurs can also limit the movement of the joint.    DIAGNOSIS  In diagnosing osteoarthritis, the foot and ankle surgeon will examine the foot thoroughly, looking for swelling in the joint, limited mobility, and pain with movement. In some cases, deformity and/or enlargement (spur) of the joint may be noted. X-rays may be ordered to evaluate the extent of the disease.  NON-SURGICAL TREATMENT  To help relieve symptoms, the surgeon may begin treating osteoarthritis with one or more of the following non-surgical approaches:  Oral medications. Nonsteroidal anti-inflammatory drugs (NSAIDs), such as ibuprofen, are often helpful in reducing the inflammation and pain. Occasionally a prescription for a steroid medication is needed to adequately reduce symptoms.   Orthotic devices. Custom orthotic devices (shoe inserts) are often prescribed to provide support to improve the foot s mechanics or cushioning to help minimize pain.   Bracing. Bracing, which restricts motion and supports the joint, can reduce pain during walking and help prevent further deformity.   Immobilization. Protecting the foot from movement by wearing a cast or removable cast-boot may be necessary to allow the inflammation to resolve.   Steroid injections. In some cases, steroid injections are applied to the affected joint to deliver  anti-inflammatory medication.   Physical therapy. Exercises to strengthen the muscles, especially when the osteoarthritis occurs in the ankle, may give the patient greater stability and help avoid injury that might worsen the condition.   DO I NEED SURGERY?  When osteoarthritis has progressed substantially or failed to improve with non-surgical treatment, surgery may be recommended. In advanced cases, surgery may be the only option. The goal of surgery is to decrease pain and improve function. The foot and ankle surgeon will consider a number of factors when selecting the procedure best suited to the patient s condition and lifestyle.

## 2021-08-06 NOTE — PROGRESS NOTES
"PATIENT HISTORY:  Isabel Carroll is a 70 year old female who presents to clinic  with a chief complaint of a painful left foot.  The patient relates the pain is primarily located around the back of the left foot.  The patient relates that the problem has been going on for several months and is getting worse.  The patient relates trying different shoes and orthotics with little relief.   Any previous notes and studies that pertain to the patient's condition were reviewed.    Pertinent medical, surgical and family history was reviewed in Epic chart and include coronary artery disease, osteoarthritis, status post right foot reconstructive surgery    Medications:   Current Outpatient Medications:      amitriptyline (ELAVIL) 10 MG tablet, 10mg in the morning, 10mg in the aftternoon, and 20mg at bedtime, Disp: 360 tablet, Rfl: 3     aspirin (ASA) 81 MG EC tablet, Take 1 tablet (81 mg) by mouth daily, Disp: 100 tablet, Rfl: 3     atorvastatin (LIPITOR) 40 MG tablet, Take 1 tablet (40 mg) by mouth daily, Disp: 90 tablet, Rfl: 3     Calcium-Vitamin D (CALCIUM + D PO), Take  by mouth., Disp: , Rfl:      FISH OIL, , Disp: , Rfl:      MAGNESIUM OXIDE PO, Take 200 mg by mouth daily, Disp: , Rfl:      metoprolol succinate ER (TOPROL-XL) 25 MG 24 hr tablet, Take 1 tablet (25 mg) by mouth 2 times daily, Disp: 180 tablet, Rfl: 3     Multiple Vitamins-Minerals (MULTIVITAMIN & MINERAL PO), Take  by mouth., Disp: , Rfl:      oxybutynin ER (DITROPAN-XL) 10 MG 24 hr tablet, Take 1 tablet by mouth daily, Disp: , Rfl:      Allergies:    Allergies   Allergen Reactions     Sulfa Drugs Nausea     Codeine Phosphate Nausea and Vomiting     Pentazocine      Propoxyphene      Darvon     Penicillins Rash     rash and itching         Vitals: /74   Pulse 77   Ht 1.562 m (5' 1.5\")   Wt 70.3 kg (155 lb)   LMP  (LMP Unknown)   BMI 28.81 kg/m    BMI= Body mass index is 28.81 kg/m .    LOWER EXTREMITY PHYSICAL EXAM    Dermatologic: Skin " is intact to left lower extremity without significant lesions, rash or abrasion.        Vascular: DP & PT pulses are intact & regular on the left.   CFT and skin temperature is normal to the left lower extremity.     Neurologic: Lower extremity sensation is intact to light touch.  No evidence of weakness in the left lower extremity.        Musculoskeletal: Patient is ambulatory without assistive device or brace.  No gross ankle deformity noted.  No foot or ankle joint effusion is noted.  Noted pain on palpation plantar left.  Noted pain with range of motion of the subtalar joint on the left.  Noted pain on palpation over the posterior tibial tendon on the left.    Diagnostics:  Radiographs included three views of the left foot demonstrating a collapsed medial longitudinal arch with subluxation of the talonavicular joint along with degenerative changes noted to the medial aspect of the talonavicular joint and posterior aspect of the subtalar joint.  Noted mild degenerative changes to the first metatarsophalangeal joint.  No cortical erosions or periosteal elevation.  All other joint margins appear stable.  There is no apparent fracture or tumor formation noted.  There is no evidence of foreign body.  The images were independently reviewed by myself along with the patient explaining the findings.      ASSESSMENT / PLAN:     ICD-10-CM    1. Posterior tibial tendon dysfunction, left  M76.822 XR Foot Left G/E 3 Views   2. Sinus tarsi syndrome, left  M25.572 XR Foot Left G/E 3 Views   3. Arthritis of left subtalar joint  M19.072 XR Foot Left G/E 3 Views     Orthopedic  Referral       I have explained to Isabel about the conditions.  We discussed the underlying contributing factors to the condition as well as treatment options along with expected length of recovery.  At this time, I recommended trying a steroid injection to the subtalar joint to help relieve any possible arthritic pain in the area.  The patient was  referred to [facility] sports medicine clinic for ultrasound-guided steroid injection into the subtalar joint on the left foot.  The patient will continue wearing her orthotics for support.  The patient may return to the office if problems persist.    Isabel verbalized agreement with and understanding of the rational for the diagnosis and treatment plan.  All questions were answered to best of my ability and the patient's satisfaction. The patient was advised to contact the clinic with any questions that may arise after the clinic visit.      Disclaimer: This note consists of symbols derived from keyboarding, dictation and/or voice recognition software. As a result, there may be errors in the script that have gone undetected. Please consider this when interpreting information found in this chart.       GABBY Serrato D.P.M., F.A.C.F.A.S.

## 2021-08-17 NOTE — PROGRESS NOTES
Isabel Carroll  :  1950  DOS: 2021  MRN: [id number]    Sports Medicine Clinic Procedure    Ultrasound Guided Left Subtalar Joint    Clinical History: Gradual onset of left posterior lateral ankle pain over the past several months.    Diagnosis:   1. Arthritis of left subtalar joint    2. Sinus tarsi syndrome of left ankle      Referring Physician: BRYAN Serrato DPM  Medium Joint Injection/Arthrocentesis: L ankle    Date/Time: 2021 4:55 PM  Performed by: Gilbert Loving DO  Authorized by: Gilbert Loving DO     Needle Size:  25 G  Guidance: ultrasound    Location:  Ankle  Site:  L ankle  Medications:  2 mL ropivacaine 5 MG/ML; 40 mg triamcinolone 40 MG/ML  Outcome:  Tolerated well, no immediate complications  Procedure discussed: discussed risks, benefits, and alternatives    Timeout: timeout called immediately prior to procedure    Prep: patient was prepped and draped in usual sterile fashion        Impression:  Successful Subtalar Joint Injection    Plan:  Follow up as directed by Dr Serrato  Expectations and goals of CSI reviewed  Often 2-3 days for steroid effect, and can take up to two weeks for maximum effect  We discussed modified progressive pain-free activity as tolerated  Do not overuse in first two weeks if feeling better due to concern for vulnerability while steroid is working  Supportive care reviewed  All questions were answered today  Contact us with additional questions or concerns  Signs and sx of concern reviewed        Gilbert Loving DO, GARTH  Primary Care Sports Medicine  [facility]

## 2021-08-25 ENCOUNTER — OFFICE VISIT (OUTPATIENT)
Dept: ORTHOPEDICS | Facility: CLINIC | Age: 71
End: 2021-08-25
Attending: PODIATRIST
Payer: COMMERCIAL

## 2021-08-25 VITALS — BODY MASS INDEX: 28.52 KG/M2 | WEIGHT: 155 LBS | HEIGHT: 62 IN

## 2021-08-25 DIAGNOSIS — M19.072 ARTHRITIS OF LEFT SUBTALAR JOINT: Primary | ICD-10-CM

## 2021-08-25 DIAGNOSIS — M25.572 SINUS TARSI SYNDROME OF LEFT ANKLE: ICD-10-CM

## 2021-08-25 PROCEDURE — 20606 DRAIN/INJ JOINT/BURSA W/US: CPT | Mod: LT | Performed by: FAMILY MEDICINE

## 2021-08-25 RX ADMIN — TRIAMCINOLONE ACETONIDE 40 MG: 40 INJECTION, SUSPENSION INTRA-ARTICULAR; INTRAMUSCULAR at 16:55

## 2021-08-25 RX ADMIN — ROPIVACAINE HYDROCHLORIDE 2 ML: 5 INJECTION, SOLUTION EPIDURAL; INFILTRATION; PERINEURAL at 16:55

## 2021-08-25 ASSESSMENT — MIFFLIN-ST. JEOR: SCORE: 1163.39

## 2021-08-25 NOTE — LETTER
2021         RE: Isabel Carroll  [address] [place] [address]        Dear Colleague,    Thank you for referring your patient, Isabel Carroll, to the [facility]. Please see a copy of my visit note below.    Isabel Carroll  :  1950  DOS: 2021  MRN: [id number]    Sports Medicine Clinic Procedure    Ultrasound Guided Left Subtalar Joint    Clinical History: Gradual onset of left posterior lateral ankle pain over the past several months.    Diagnosis:   1. Arthritis of left subtalar joint    2. Sinus tarsi syndrome of left ankle      Referring Physician: BRYAN Serrato DPM  Medium Joint Injection/Arthrocentesis: L ankle    Date/Time: 2021 4:55 PM  Performed by: Gilbert Loving DO  Authorized by: Gilbert Loving DO     Needle Size:  25 G  Guidance: ultrasound    Location:  Ankle  Site:  L ankle  Medications:  2 mL ropivacaine 5 MG/ML; 40 mg triamcinolone 40 MG/ML  Outcome:  Tolerated well, no immediate complications  Procedure discussed: discussed risks, benefits, and alternatives    Timeout: timeout called immediately prior to procedure    Prep: patient was prepped and draped in usual sterile fashion        Impression:  Successful Subtalar Joint Injection    Plan:  Follow up as directed by Dr Serrato  Expectations and goals of CSI reviewed  Often 2-3 days for steroid effect, and can take up to two weeks for maximum effect  We discussed modified progressive pain-free activity as tolerated  Do not overuse in first two weeks if feeling better due to concern for vulnerability while steroid is working  Supportive care reviewed  All questions were answered today  Contact us with additional questions or concerns  Signs and sx of concern reviewed        Gilbert Loving DO, CAQ  Primary Care Sports Medicine  [facility]         Again, thank you for allowing me to participate in the  care of your patient.        Sincerely,        Giblert Loving, DO

## 2021-08-26 RX ORDER — ROPIVACAINE HYDROCHLORIDE 5 MG/ML
2 INJECTION, SOLUTION EPIDURAL; INFILTRATION; PERINEURAL
Status: DISCONTINUED | OUTPATIENT
Start: 2021-08-25 | End: 2022-02-24

## 2021-08-26 RX ORDER — TRIAMCINOLONE ACETONIDE 40 MG/ML
40 INJECTION, SUSPENSION INTRA-ARTICULAR; INTRAMUSCULAR
Status: DISCONTINUED | OUTPATIENT
Start: 2021-08-25 | End: 2022-02-24

## 2021-08-26 NOTE — PROGRESS NOTES
History of Present Illness - Isabel Carroll is a 70 year old female here in follow up from 10/12/2020, seen for chronic cough.    To review, this has been a very longstanding problem, over 20 years.  She has slowly developed a persistent dry tickling cough.  It can happen any time and has no pattern.  She has no chest pain, no heartburn, no pain.  No issues with swallowing or eating.  No coughing up blood.  No previous ENT surgery or head and neck disease no post nasal drainage.    She has had work up from [facility], including barium swallow, pH Probe, Allergy testing, work up from Pulmonary Medicine, and everything has been negative.    After the visit, and evaluation, I felt that this was most likely a neurogenic cough.  I started her on a Elavil titration with a goal of 10mg three times daily, and at follow up she noticed a major improvement, at least 50% better.     She is still taking the Elavil, 30mg at bedtime and 20mg qAM.  She still has a very minor cough, but overall it is still better than it was before.  It is quite intermittent at this point,     Past Medical History -   Patient Active Problem List   Diagnosis     Chronic rhinitis     Primary osteoarthritis of both hands     Basal cell carcinoma of skin     Coronary artery disease involving native heart with angina pectoris, unspecified vessel or lesion type (H)     Hyperlipidemia LDL goal <100     Impaired fasting glucose     Chorioretinal scar of left eye     Sinus tachycardia     Advance care planning     Pes planus     Bronchiectasis without complication (H)     Chronic cough     Female pattern hair loss     FHx: colon cancer     History of Clostridioides difficile infection     Pseudophakia of both eyes     Primary osteoarthritis of both knees     Vocal cord dysfunction     Personal history of malignant neoplasm of bladder       Current Medications -   Current Outpatient Medications:      amitriptyline (ELAVIL) 10 MG tablet, 10mg in the morning,  10mg in the aftternoon, and 20mg at bedtime, Disp: 360 tablet, Rfl: 3     aspirin (ASA) 81 MG EC tablet, Take 1 tablet (81 mg) by mouth daily, Disp: 100 tablet, Rfl: 3     atorvastatin (LIPITOR) 40 MG tablet, Take 1 tablet (40 mg) by mouth daily, Disp: 90 tablet, Rfl: 3     Calcium-Vitamin D (CALCIUM + D PO), Take  by mouth., Disp: , Rfl:      FISH OIL, , Disp: , Rfl:      MAGNESIUM OXIDE PO, Take 200 mg by mouth daily, Disp: , Rfl:      metoprolol succinate ER (TOPROL-XL) 25 MG 24 hr tablet, Take 1 tablet (25 mg) by mouth 2 times daily, Disp: 180 tablet, Rfl: 3     Multiple Vitamins-Minerals (MULTIVITAMIN & MINERAL PO), Take  by mouth., Disp: , Rfl:      oxybutynin ER (DITROPAN-XL) 10 MG 24 hr tablet, Take 1 tablet by mouth daily, Disp: , Rfl:     Current Facility-Administered Medications:      ropivacaine (NAROPIN) injection 2 mL, 2 mL, , , Gilbert Loving DO, 2 mL at 21     triamcinolone (KENALOG-40) injection 40 mg, 40 mg, , , Gilbert Loving DO, 40 mg at 21 1655    Allergies -   Allergies   Allergen Reactions     Sulfa Drugs Nausea     Codeine Phosphate Nausea and Vomiting     Pentazocine      Propoxyphene      Darvon     Penicillins Rash     rash and itching         Social History -   Social History     Socioeconomic History     Marital status:      Spouse name: Darrian     Number of children: 2     Years of education: 17.5     Highest education level: Not on file   Occupational History     Occupation: Retired     Comment: School counselor, 2015   Social Needs     Financial resource strain: Not on file     Food insecurity:     Worry: Not on file     Inability: Not on file     Transportation needs:     Medical: Not on file     Non-medical: Not on file   Tobacco Use     Smoking status: Former Smoker     Years: 16.00     Types: Cigarettes     Last attempt to quit: 1982     Years since quittin.7     Smokeless tobacco: Never Used   Substance and Sexual Activity      Alcohol use: Yes     Alcohol/week: 0.0 oz     Comment: 2-7 glasses of wine per week     Drug use: No     Sexual activity: Never     Partners: Male     Birth control/protection: Post-menopausal   Lifestyle     Physical activity:     Days per week: Not on file     Minutes per session: Not on file     Stress: Not on file   Relationships     Social connections:     Talks on phone: Not on file     Gets together: Not on file     Attends Druze service: Not on file     Active member of club or organization: Not on file     Attends meetings of clubs or organizations: Not on file     Relationship status: Not on file     Intimate partner violence:     Fear of current or ex partner: Not on file     Emotionally abused: Not on file     Physically abused: Not on file     Forced sexual activity: Not on file   Other Topics Concern     Parent/sibling w/ CABG, MI or angioplasty before 65F 55M? Not Asked      Service Not Asked     Blood Transfusions Not Asked     Caffeine Concern Not Asked     Occupational Exposure Not Asked     Hobby Hazards Not Asked     Sleep Concern Not Asked     Stress Concern Not Asked     Weight Concern Not Asked     Special Diet Not Asked     Back Care Not Asked     Exercise Yes     Comment: jazzercise     Bike Helmet Not Asked     Seat Belt Not Asked     Self-Exams Not Asked   Social History Narrative     Not on file       Family History -   Family History   Problem Relation Age of Onset     Other Cancer Sister 51        ovarian cancer     Cancer Sister      Thyroid Disease Sister      Colon Cancer Father 76     Brain Hemorrhage Father         accident     Macular Degeneration Mother      Diabetes Maternal Aunt      Diabetes Maternal Uncle      Cancer Paternal Aunt      Diabetes Maternal Grandmother      Hypertension No family hx of      Cerebrovascular Disease No family hx of      Glaucoma No family hx of        Review of Systems - As per HPI and PMHx, otherwise 10+ system review of the head  and neck, and general constitution is negative.    Physical Exam  LMP  (LMP Unknown)     General - The patient is well nourished and well developed, and appears to have good nutritional status.  Alert and oriented to person and place, answers questions and cooperates with examination appropriately.   Head and Face - Normocephalic and atraumatic, with no gross asymmetry noted of the contour of the facial features.  The facial nerve is intact, with strong symmetric movements.  Voice and Breathing - The patient was breathing comfortably without the use of accessory muscles. There was no wheezing, stridor, or stertor.  The patients voice was clear and strong, and had appropriate pitch and quality.  Ears - The tympanic membranes are normal in appearance, bony landmarks are intact.  No retraction, perforation, or masses.  No fluid or purulence was seen in the external canal or the middle ear. No evidence of infection of the middle ear or external canal, cerumen was normal in appearance.  Eyes - Extraocular movements intact, and the pupils were reactive to light.  Sclera were not icteric or injected, conjunctiva were pink and moist.  Mouth - Examination of the oral cavity showed pink, healthy oral mucosa. No lesions or ulcerations noted.  The tongue was mobile and midline, and the dentition were in good condition.    Throat - The walls of the oropharynx were smooth, pink, moist, symmetric, and had no lesions or ulcerations.  The tonsillar pillars and soft palate were symmetric.  The uvula was midline on elevation.    Neck - Normal midline excursion of the laryngotracheal complex during swallowing.  Full range of motion on passive movement.  Palpation of the occipital, submental, submandibular, internal jugular chain, and supraclavicular nodes did not demonstrate any abnormal lymph nodes or masses.  The carotid pulse was palpable bilaterally.  Palpation of the thyroid was soft and smooth, with no nodules or goiter  appreciated.  The trachea was mobile and midline.  Nose - External contour is symmetric, no gross deflection or scars.  Nasal mucosa is pink and moist with no abnormal mucus.  The septum was midline and non-obstructive, turbinates of normal size and position.  No polyps, masses, or purulence noted on examination.      A/P - Isabel Carroll is a 70 year old female  (J38.3) Vocal cord dysfunction  (primary encounter diagnosis)  (R05) Chronic cough     I will try her on Baclofen for a several week trial to see if that works better than the Elavil.  If not, go back on the Elavil.  Another possible option would be to try inhaled steroids.    I have also counseled her at length that given her tendency to Neurogenic cough, this will likely happen again the next time she has a bad URI or bronchitis, then we will just do it again.

## 2021-08-30 ENCOUNTER — OFFICE VISIT (OUTPATIENT)
Dept: OTOLARYNGOLOGY | Facility: CLINIC | Age: 71
End: 2021-08-30
Payer: COMMERCIAL

## 2021-08-30 VITALS
DIASTOLIC BLOOD PRESSURE: 80 MMHG | HEART RATE: 82 BPM | WEIGHT: 155 LBS | BODY MASS INDEX: 28.81 KG/M2 | OXYGEN SATURATION: 98 % | SYSTOLIC BLOOD PRESSURE: 133 MMHG

## 2021-08-30 DIAGNOSIS — R05.3 CHRONIC COUGH: ICD-10-CM

## 2021-08-30 DIAGNOSIS — J38.3 VOCAL CORD DYSFUNCTION: Primary | ICD-10-CM

## 2021-08-30 PROCEDURE — 99214 OFFICE O/P EST MOD 30 MIN: CPT | Performed by: OTOLARYNGOLOGY

## 2021-08-30 RX ORDER — BACLOFEN 10 MG/1
10 TABLET ORAL 2 TIMES DAILY
Qty: 90 TABLET | Refills: 3 | Status: SHIPPED | OUTPATIENT
Start: 2021-08-30 | End: 2022-04-28

## 2021-08-30 NOTE — LETTER
8/30/2021         RE: Isabel Carroll  [address], located within [facility] [address]        Dear Colleague,    Thank you for referring your patient, Isabel Carroll, to the [facility]. Please see a copy of my visit note below.    History of Present Illness - Isabel Carroll is a 70 year old female here in follow up from 10/12/2020, seen for chronic cough.    To review, this has been a very longstanding problem, over 20 years.  She has slowly developed a persistent dry tickling cough.  It can happen any time and has no pattern.  She has no chest pain, no heartburn, no pain.  No issues with swallowing or eating.  No coughing up blood.  No previous ENT surgery or head and neck disease no post nasal drainage.    She has had work up from [facility], including barium swallow, pH Probe, Allergy testing, work up from Pulmonary Medicine, and everything has been negative.    After the visit, and evaluation, I felt that this was most likely a neurogenic cough.  I started her on a Elavil titration with a goal of 10mg three times daily, and at follow up she noticed a major improvement, at least 50% better.     She is still taking the Elavil, 30mg at bedtime and 20mg qAM.  She still has a very minor cough, but overall it is still better than it was before.  It is quite intermittent at this point,     Past Medical History -   Patient Active Problem List   Diagnosis     Chronic rhinitis     Primary osteoarthritis of both hands     Basal cell carcinoma of skin     Coronary artery disease involving native heart with angina pectoris, unspecified vessel or lesion type (H)     Hyperlipidemia LDL goal <100     Impaired fasting glucose     Chorioretinal scar of left eye     Sinus tachycardia     Advance care planning     Pes planus     Bronchiectasis without complication (H)     Chronic cough     Female pattern hair loss     FHx: colon cancer     History of Clostridioides difficile infection      Pseudophakia of both eyes     Primary osteoarthritis of both knees     Vocal cord dysfunction     Personal history of malignant neoplasm of bladder       Current Medications -   Current Outpatient Medications:      amitriptyline (ELAVIL) 10 MG tablet, 10mg in the morning, 10mg in the aftternoon, and 20mg at bedtime, Disp: 360 tablet, Rfl: 3     aspirin (ASA) 81 MG EC tablet, Take 1 tablet (81 mg) by mouth daily, Disp: 100 tablet, Rfl: 3     atorvastatin (LIPITOR) 40 MG tablet, Take 1 tablet (40 mg) by mouth daily, Disp: 90 tablet, Rfl: 3     Calcium-Vitamin D (CALCIUM + D PO), Take  by mouth., Disp: , Rfl:      FISH OIL, , Disp: , Rfl:      MAGNESIUM OXIDE PO, Take 200 mg by mouth daily, Disp: , Rfl:      metoprolol succinate ER (TOPROL-XL) 25 MG 24 hr tablet, Take 1 tablet (25 mg) by mouth 2 times daily, Disp: 180 tablet, Rfl: 3     Multiple Vitamins-Minerals (MULTIVITAMIN & MINERAL PO), Take  by mouth., Disp: , Rfl:      oxybutynin ER (DITROPAN-XL) 10 MG 24 hr tablet, Take 1 tablet by mouth daily, Disp: , Rfl:     Current Facility-Administered Medications:      ropivacaine (NAROPIN) injection 2 mL, 2 mL, , , Gilbert Loving DO, 2 mL at 08/25/21 1655     triamcinolone (KENALOG-40) injection 40 mg, 40 mg, , , Gilbert Loving DO, 40 mg at 08/25/21 1655    Allergies -   Allergies   Allergen Reactions     Sulfa Drugs Nausea     Codeine Phosphate Nausea and Vomiting     Pentazocine      Propoxyphene      Darvon     Penicillins Rash     rash and itching         Social History -   Social History     Socioeconomic History     Marital status:      Spouse name: Darrian     Number of children: 2     Years of education: 17.5     Highest education level: Not on file   Occupational History     Occupation: Retired     Comment: School counselor, June 2015   Social Needs     Financial resource strain: Not on file     Food insecurity:     Worry: Not on file     Inability: Not on file     Transportation needs:      Medical: Not on file     Non-medical: Not on file   Tobacco Use     Smoking status: Former Smoker     Years: 16.00     Types: Cigarettes     Last attempt to quit: 1982     Years since quittin.7     Smokeless tobacco: Never Used   Substance and Sexual Activity     Alcohol use: Yes     Alcohol/week: 0.0 oz     Comment: 2-7 glasses of wine per week     Drug use: No     Sexual activity: Never     Partners: Male     Birth control/protection: Post-menopausal   Lifestyle     Physical activity:     Days per week: Not on file     Minutes per session: Not on file     Stress: Not on file   Relationships     Social connections:     Talks on phone: Not on file     Gets together: Not on file     Attends Jehovah's witness service: Not on file     Active member of club or organization: Not on file     Attends meetings of clubs or organizations: Not on file     Relationship status: Not on file     Intimate partner violence:     Fear of current or ex partner: Not on file     Emotionally abused: Not on file     Physically abused: Not on file     Forced sexual activity: Not on file   Other Topics Concern     Parent/sibling w/ CABG, MI or angioplasty before 65F 55M? Not Asked      Service Not Asked     Blood Transfusions Not Asked     Caffeine Concern Not Asked     Occupational Exposure Not Asked     Hobby Hazards Not Asked     Sleep Concern Not Asked     Stress Concern Not Asked     Weight Concern Not Asked     Special Diet Not Asked     Back Care Not Asked     Exercise Yes     Comment: jazzercise     Bike Helmet Not Asked     Seat Belt Not Asked     Self-Exams Not Asked   Social History Narrative     Not on file       Family History -   Family History   Problem Relation Age of Onset     Other Cancer Sister 51        ovarian cancer     Cancer Sister      Thyroid Disease Sister      Colon Cancer Father 76     Brain Hemorrhage Father         accident     Macular Degeneration Mother      Diabetes Maternal Aunt      Diabetes  Maternal Uncle      Cancer Paternal Aunt      Diabetes Maternal Grandmother      Hypertension No family hx of      Cerebrovascular Disease No family hx of      Glaucoma No family hx of        Review of Systems - As per HPI and PMHx, otherwise 10+ system review of the head and neck, and general constitution is negative.    Physical Exam  LMP  (LMP Unknown)     General - The patient is well nourished and well developed, and appears to have good nutritional status.  Alert and oriented to person and place, answers questions and cooperates with examination appropriately.   Head and Face - Normocephalic and atraumatic, with no gross asymmetry noted of the contour of the facial features.  The facial nerve is intact, with strong symmetric movements.  Voice and Breathing - The patient was breathing comfortably without the use of accessory muscles. There was no wheezing, stridor, or stertor.  The patients voice was clear and strong, and had appropriate pitch and quality.  Ears - The tympanic membranes are normal in appearance, bony landmarks are intact.  No retraction, perforation, or masses.  No fluid or purulence was seen in the external canal or the middle ear. No evidence of infection of the middle ear or external canal, cerumen was normal in appearance.  Eyes - Extraocular movements intact, and the pupils were reactive to light.  Sclera were not icteric or injected, conjunctiva were pink and moist.  Mouth - Examination of the oral cavity showed pink, healthy oral mucosa. No lesions or ulcerations noted.  The tongue was mobile and midline, and the dentition were in good condition.    Throat - The walls of the oropharynx were smooth, pink, moist, symmetric, and had no lesions or ulcerations.  The tonsillar pillars and soft palate were symmetric.  The uvula was midline on elevation.    Neck - Normal midline excursion of the laryngotracheal complex during swallowing.  Full range of motion on passive movement.  Palpation of  the occipital, submental, submandibular, internal jugular chain, and supraclavicular nodes did not demonstrate any abnormal lymph nodes or masses.  The carotid pulse was palpable bilaterally.  Palpation of the thyroid was soft and smooth, with no nodules or goiter appreciated.  The trachea was mobile and midline.  Nose - External contour is symmetric, no gross deflection or scars.  Nasal mucosa is pink and moist with no abnormal mucus.  The septum was midline and non-obstructive, turbinates of normal size and position.  No polyps, masses, or purulence noted on examination.      A/P - Isabel Carroll is a 70 year old female  (J38.3) Vocal cord dysfunction  (primary encounter diagnosis)  (R05) Chronic cough     I will try her on Baclofen for a several week trial to see if that works better than the Elavil.  If not, go back on the Elavil.  Another possible option would be to try inhaled steroids.    I have also counseled her at length that given her tendency to Neurogenic cough, this will likely happen again the next time she has a bad URI or bronchitis, then we will just do it again.           Again, thank you for allowing me to participate in the care of your patient.        Sincerely,        Swapnil Shah MD

## 2021-08-30 NOTE — NURSING NOTE
"Chief Complaint   Patient presents with     RECHECK     follow up on medication       Vitals:    08/30/21 1405   BP: 133/80   BP Location: Right arm   Patient Position: Sitting   Cuff Size: Adult Regular   Pulse: 82   SpO2: 98%   Weight: 70.3 kg (155 lb)     Wt Readings from Last 1 Encounters:   08/30/21 70.3 kg (155 lb)     Ht Readings from Last 1 Encounters:   08/25/21 1.562 m (5' 1.5\")       [facility], 8/30/2021 2:05 PM    "

## 2021-09-11 ENCOUNTER — HEALTH MAINTENANCE LETTER (OUTPATIENT)
Age: 71
End: 2021-09-11

## 2021-09-21 ENCOUNTER — MYC MEDICAL ADVICE (OUTPATIENT)
Dept: FAMILY MEDICINE | Facility: CLINIC | Age: 71
End: 2021-09-21

## 2021-09-21 DIAGNOSIS — Z12.11 SPECIAL SCREENING FOR MALIGNANT NEOPLASMS, COLON: ICD-10-CM

## 2021-09-21 DIAGNOSIS — Z80.0 FHX: COLON CANCER: Primary | ICD-10-CM

## 2021-09-27 NOTE — TELEPHONE ENCOUNTER
Patient called to check the status of this referral, explain to patient that provider was out last week but is in clinic this week and will respond to this request.  Marcella Lara   [facility]  Central Scheduler

## 2021-10-07 ENCOUNTER — TELEPHONE (OUTPATIENT)
Dept: FAMILY MEDICINE | Facility: CLINIC | Age: 71
End: 2021-10-07

## 2021-10-07 DIAGNOSIS — R73.01 IMPAIRED FASTING GLUCOSE: ICD-10-CM

## 2021-10-07 DIAGNOSIS — I25.119 CORONARY ARTERY DISEASE INVOLVING NATIVE HEART WITH ANGINA PECTORIS, UNSPECIFIED VESSEL OR LESION TYPE (H): ICD-10-CM

## 2021-10-07 DIAGNOSIS — J47.9 BRONCHIECTASIS WITHOUT COMPLICATION (H): ICD-10-CM

## 2021-10-07 DIAGNOSIS — E78.5 HYPERLIPIDEMIA LDL GOAL <100: Primary | ICD-10-CM

## 2021-10-07 DIAGNOSIS — Z85.51 PERSONAL HISTORY OF MALIGNANT NEOPLASM OF BLADDER: ICD-10-CM

## 2021-10-07 NOTE — TELEPHONE ENCOUNTER
Pt states she is going in to see a cardiologist nov 16  Pt needs a lipid panel done before her appointment.     Pt is on atorvastatin and is due for a lipid panel now.    Can you put in orders for this and then have  call pt to schedule fasting lab appointment.    Tori DOBBSN, RN

## 2021-10-07 NOTE — TELEPHONE ENCOUNTER
I put in the order for a fasting lipid profile and added other labs that we are tracking over time. Marry Gonzalez MD

## 2021-10-08 ENCOUNTER — TELEPHONE (OUTPATIENT)
Dept: GASTROENTEROLOGY | Facility: CLINIC | Age: 71
End: 2021-10-08

## 2021-10-08 ENCOUNTER — ANCILLARY PROCEDURE (OUTPATIENT)
Dept: MAMMOGRAPHY | Facility: CLINIC | Age: 71
End: 2021-10-08
Payer: COMMERCIAL

## 2021-10-08 DIAGNOSIS — Z12.31 VISIT FOR SCREENING MAMMOGRAM: ICD-10-CM

## 2021-10-08 PROCEDURE — 77063 BREAST TOMOSYNTHESIS BI: CPT | Mod: TC | Performed by: RADIOLOGY

## 2021-10-08 PROCEDURE — 77067 SCR MAMMO BI INCL CAD: CPT | Mod: TC | Performed by: RADIOLOGY

## 2021-10-08 NOTE — TELEPHONE ENCOUNTER
Screening Questions  1. Are you active on mychart? Yes    2. What insurance is in the chart? [employer]are    2.  Ordering/Referring Provider:Marry Gonzalez     3. BMI 29.3    4. Do you have any pulmonary issues? Yes: No    5. Have you had a heart, lung, or liver transplant? No    6. Are you currently on dialysis or have chronic kidney disease? No    7. Have you had a stroke or Transient ischemic atttack (TIA) within 6 months? No    8. In the past 6 months, have you had any heart related issues including cardiomyopathy or heart attack? No      If yes, did it require cardiac stenting or other implantable device?No      9. Do you have any implantable devices in your body (pacemaker, defib, LVAD)? No    10. Do you take nitroglycerin? If yes, how often? No    11. Are you currently taking any blood thinners?No    12. Are you a diabetic? No    13. (Females) Are you currently pregnant? No  If yes, how many weeks?      15. Are you taking any prescription pain medications on a routine schedule? No If yes, MAC sedation.    16. Do you have any chemical dependencies such as alcohol, street drugs, or methadone? No If yes, MAC sedation.    17. Do you have any history of post-traumatic stress syndrome, severe anxiety or history of psychosis? No    18. Do you transfer independently? Yes    19.  Do you have any issues with constipation? No    20. Preferred Pharmacy for Pre Prescription [facility] [address] [place] [address]    Scheduling Details    Which Colonoscopy Prep was Sent?: Miralax  Procedure Scheduled: Colonscopy  Provider/Surgeon: Vicenta  Date of Procedure: 12/3  Location:   Caller (Please ask for phone number if not scheduled by patient): Isabel      Sedation Type: CS  Conscious Sedation- Needs  for 6 hours after the procedure  MAC/General-Needs  for 24 hours after procedure    Pre-op Required at [facility] [place] [place] and OR for MAC sedation:   (if yes advise patient they will need a pre-op  prior to procedure)      Is patient on blood thinners? -No (If yes- inform patient to follow up with PCP or provider for follow up instructions)     Informed patient they will need an adult  Yes  Cannot take any type of public or medical transportation alone    [facility] or Externally: Yes - PT will call to schedule when it gets closer to procedure    Confirmed Nurse will call to complete assessment Yes    Additional comments:

## 2021-10-11 NOTE — RESULT ENCOUNTER NOTE
Dear Isabel Carroll,    I am happy to let you know that your mammogram was normal. You may schedule a follow up mammogram in 1-2 years depending on your risk factors and family history. Please let me know if you have any questions about your results. You should be receiving a letter from the radiologist.    You may call me at [phone] with any questions or send me a [employer] message.     Marry Gonzalez MD

## 2021-10-12 ENCOUNTER — ALLIED HEALTH/NURSE VISIT (OUTPATIENT)
Dept: FAMILY MEDICINE | Facility: CLINIC | Age: 71
End: 2021-10-12
Payer: COMMERCIAL

## 2021-10-12 DIAGNOSIS — Z23 IMMUNIZATION DUE: Primary | ICD-10-CM

## 2021-10-12 PROCEDURE — 90714 TD VACC NO PRESV 7 YRS+ IM: CPT

## 2021-10-12 PROCEDURE — 90472 IMMUNIZATION ADMIN EACH ADD: CPT

## 2021-10-12 PROCEDURE — 90662 IIV NO PRSV INCREASED AG IM: CPT

## 2021-10-12 PROCEDURE — 99207 PR NO CHARGE NURSE ONLY: CPT

## 2021-10-12 PROCEDURE — G0008 ADMIN INFLUENZA VIRUS VAC: HCPCS

## 2021-10-12 NOTE — PROGRESS NOTES
Prior to immunization administration, verified patients identity using patient s name and date of birth. Please see Immunization Activity for additional information.     Screening Questionnaire for Adult Immunization    Are you sick today?   No   Do you have allergies to medications, food, a vaccine component or latex?   Yes   Have you ever had a serious reaction after receiving a vaccination?   Yes   Do you have a long-term health problem with heart, lung, kidney, or metabolic disease (e.g., diabetes), asthma, a blood disorder, no spleen, complement component deficiency, a cochlear implant, or a spinal fluid leak?  Are you on long-term aspirin therapy?   No   Do you have cancer, leukemia, HIV/AIDS, or any other immune system problem?   No   Do you have a parent, brother, or sister with an immune system problem?   No   In the past 3 months, have you taken medications that affect  your immune system, such as prednisone, other steroids, or anticancer drugs; drugs for the treatment of rheumatoid arthritis, Crohn s disease, or psoriasis; or have you had radiation treatments?   No   Have you had a seizure, or a brain or other nervous system problem?   No   During the past year, have you received a transfusion of blood or blood    products, or been given immune (gamma) globulin or antiviral drug?   No   For women: Are you pregnant or is there a chance you could become       pregnant during the next month?   No   Have you received any vaccinations in the past 4 weeks?   No     Immunization questionnaire answers were all negative.        Per orders of Dr. Gonzalez, injection of FLU, TD  given by Celia Huff. Patient instructed to remain in clinic for 15 minutes afterwards, and to report any adverse reaction to me immediately.       Screening performed by Celia Huff on 10/12/2021 at 1:19 PM.

## 2021-11-01 DIAGNOSIS — Z11.59 ENCOUNTER FOR SCREENING FOR OTHER VIRAL DISEASES: ICD-10-CM

## 2021-11-05 ENCOUNTER — LAB (OUTPATIENT)
Dept: LAB | Facility: CLINIC | Age: 71
End: 2021-11-05
Payer: COMMERCIAL

## 2021-11-05 DIAGNOSIS — R73.01 IMPAIRED FASTING GLUCOSE: ICD-10-CM

## 2021-11-05 DIAGNOSIS — I25.119 CORONARY ARTERY DISEASE INVOLVING NATIVE HEART WITH ANGINA PECTORIS, UNSPECIFIED VESSEL OR LESION TYPE (H): ICD-10-CM

## 2021-11-05 DIAGNOSIS — Z85.51 PERSONAL HISTORY OF MALIGNANT NEOPLASM OF BLADDER: ICD-10-CM

## 2021-11-05 DIAGNOSIS — E78.5 HYPERLIPIDEMIA LDL GOAL <100: ICD-10-CM

## 2021-11-05 LAB
ALBUMIN SERPL-MCNC: 4.3 G/DL (ref 3.4–5)
ALT SERPL W P-5'-P-CCNC: 41 U/L (ref 0–50)
ANION GAP SERPL CALCULATED.3IONS-SCNC: 3 MMOL/L (ref 3–14)
BUN SERPL-MCNC: 12 MG/DL (ref 7–30)
CALCIUM SERPL-MCNC: 9.7 MG/DL (ref 8.5–10.1)
CHLORIDE BLD-SCNC: 104 MMOL/L (ref 94–109)
CHOLEST SERPL-MCNC: 144 MG/DL
CO2 SERPL-SCNC: 31 MMOL/L (ref 20–32)
CREAT SERPL-MCNC: 0.7 MG/DL (ref 0.52–1.04)
CREAT UR-MCNC: 111 MG/DL
ERYTHROCYTE [DISTWIDTH] IN BLOOD BY AUTOMATED COUNT: 11.7 % (ref 10–15)
FASTING STATUS PATIENT QL REPORTED: YES
GFR SERPL CREATININE-BSD FRML MDRD: 87 ML/MIN/1.73M2
GLUCOSE BLD-MCNC: 145 MG/DL (ref 70–99)
HBA1C MFR BLD: 6.3 % (ref 0–5.6)
HCT VFR BLD AUTO: 42.9 % (ref 35–47)
HDLC SERPL-MCNC: 49 MG/DL
HGB BLD-MCNC: 14.9 G/DL (ref 11.7–15.7)
LDLC SERPL CALC-MCNC: 59 MG/DL
MAGNESIUM SERPL-MCNC: 2 MG/DL (ref 1.6–2.3)
MCH RBC QN AUTO: 32.7 PG (ref 26.5–33)
MCHC RBC AUTO-ENTMCNC: 34.7 G/DL (ref 31.5–36.5)
MCV RBC AUTO: 94 FL (ref 78–100)
MICROALBUMIN UR-MCNC: 6 MG/L
MICROALBUMIN/CREAT UR: 5.41 MG/G CR (ref 0–25)
NONHDLC SERPL-MCNC: 95 MG/DL
PHOSPHATE SERPL-MCNC: 4 MG/DL (ref 2.5–4.5)
PLATELET # BLD AUTO: 268 10E3/UL (ref 150–450)
POTASSIUM BLD-SCNC: 3.9 MMOL/L (ref 3.4–5.3)
RBC # BLD AUTO: 4.55 10E6/UL (ref 3.8–5.2)
SODIUM SERPL-SCNC: 138 MMOL/L (ref 133–144)
TRIGL SERPL-MCNC: 180 MG/DL
WBC # BLD AUTO: 5.5 10E3/UL (ref 4–11)

## 2021-11-05 PROCEDURE — 82043 UR ALBUMIN QUANTITATIVE: CPT

## 2021-11-05 PROCEDURE — 84460 ALANINE AMINO (ALT) (SGPT): CPT

## 2021-11-05 PROCEDURE — 80061 LIPID PANEL: CPT

## 2021-11-05 PROCEDURE — 80069 RENAL FUNCTION PANEL: CPT

## 2021-11-05 PROCEDURE — 83036 HEMOGLOBIN GLYCOSYLATED A1C: CPT

## 2021-11-05 PROCEDURE — 85027 COMPLETE CBC AUTOMATED: CPT

## 2021-11-05 PROCEDURE — 36415 COLL VENOUS BLD VENIPUNCTURE: CPT

## 2021-11-05 PROCEDURE — 83735 ASSAY OF MAGNESIUM: CPT

## 2021-11-09 NOTE — RESULT ENCOUNTER NOTE
Dear Isabel    Your test results are attached. I am happy to let you know that they are stable.    The blood sugar is normal and you do not have diabetes. The A1C is in prediabetic range and needs to be repeated in 1 year. The kidneys are healthy. The cholesterol looks good for you also. The magnesium is in normal range. We can recheck labs in 1 year.     Please contact me by Listen Editiont if you have any questions about your labs or management. You may also call my office number [phone] for any questions.     Marry Gonzalez MD

## 2021-11-10 ENCOUNTER — TRANSFERRED RECORDS (OUTPATIENT)
Dept: HEALTH INFORMATION MANAGEMENT | Facility: CLINIC | Age: 71
End: 2021-11-10
Payer: COMMERCIAL

## 2021-11-18 ENCOUNTER — MYC MEDICAL ADVICE (OUTPATIENT)
Dept: FAMILY MEDICINE | Facility: CLINIC | Age: 71
End: 2021-11-18
Payer: COMMERCIAL

## 2021-11-18 ENCOUNTER — TELEPHONE (OUTPATIENT)
Dept: FAMILY MEDICINE | Facility: CLINIC | Age: 71
End: 2021-11-18
Payer: COMMERCIAL

## 2021-11-18 NOTE — TELEPHONE ENCOUNTER
Called patient, left message with phone number to call back.       If patient calls back, please direct call to the Andreea Dinero RN team.      Priscilla Servin RN, [facility]

## 2021-11-18 NOTE — TELEPHONE ENCOUNTER
Routing to provider to review and advise.    Pt called and wanted to know if she should continue taking  trospium ER 60 mg capsule,extended release 24 hr Take 1 capsule every day by oral route because she is having constipation and believes it's from med. She is also concerned that it might affect her colonoscopy procedure on 12/3/21.  She doesn't want constipation to affect procedure and wants to know if she should stop taking it before 12/3/21.      Pt is receiving this med from [facility].     Pt has taken christofer lax and stool softeners to help with the constipation.        Aylin Hawkins RN  [facility]

## 2021-11-19 NOTE — TELEPHONE ENCOUNTER
Spoke to pt and she said she will call urology to talk to her provider more about the medication and see what they recommend.    Said she has a CT scan today because she is having right side pain and urology requested it.  Will try to send CT results to us.      Aylin Hawkins RN  [facility]

## 2021-11-19 NOTE — TELEPHONE ENCOUNTER
This writer attempted to contact patent on 11/19/21      Reason for call providers message bellow and left message.      If patient calls back:   Registered Nurse called. Refer call back to Andreea Dinero RN Team      Aylin Hawkins RN  [facility]

## 2021-11-19 NOTE — TELEPHONE ENCOUNTER
Routing to provider to review and advise.     Pt is updating on what has been done by her urologist.        Aylin Hawkins RN  [facility]

## 2021-11-19 NOTE — TELEPHONE ENCOUNTER
Pt called back and waited previously on hold for a long time.    Pt requesting a call back before 2:30 (pt has scheduled a CT scan at that time).    Can call back at [phone], or [phone], can leave a detailed message.    Georgina Beckham,   [facility]

## 2021-11-29 ENCOUNTER — TRANSFERRED RECORDS (OUTPATIENT)
Dept: HEALTH INFORMATION MANAGEMENT | Facility: CLINIC | Age: 71
End: 2021-11-29
Payer: COMMERCIAL

## 2021-11-30 ENCOUNTER — LAB (OUTPATIENT)
Dept: URGENT CARE | Facility: URGENT CARE | Age: 71
End: 2021-11-30
Attending: FAMILY MEDICINE
Payer: COMMERCIAL

## 2021-11-30 DIAGNOSIS — Z11.59 ENCOUNTER FOR SCREENING FOR OTHER VIRAL DISEASES: ICD-10-CM

## 2021-11-30 PROCEDURE — U0003 INFECTIOUS AGENT DETECTION BY NUCLEIC ACID (DNA OR RNA); SEVERE ACUTE RESPIRATORY SYNDROME CORONAVIRUS 2 (SARS-COV-2) (CORONAVIRUS DISEASE [COVID-19]), AMPLIFIED PROBE TECHNIQUE, MAKING USE OF HIGH THROUGHPUT TECHNOLOGIES AS DESCRIBED BY CMS-2020-01-R: HCPCS

## 2021-11-30 PROCEDURE — U0005 INFEC AGEN DETEC AMPLI PROBE: HCPCS

## 2021-12-01 LAB — SARS-COV-2 RNA RESP QL NAA+PROBE: NEGATIVE

## 2021-12-03 ENCOUNTER — HOSPITAL ENCOUNTER (OUTPATIENT)
Facility: AMBULATORY SURGERY CENTER | Age: 71
Discharge: HOME | End: 2021-12-03
Attending: FAMILY MEDICINE | Admitting: FAMILY MEDICINE
Payer: COMMERCIAL

## 2021-12-03 VITALS
HEART RATE: 75 BPM | SYSTOLIC BLOOD PRESSURE: 135 MMHG | OXYGEN SATURATION: 98 % | TEMPERATURE: 97.2 F | RESPIRATION RATE: 16 BRPM | DIASTOLIC BLOOD PRESSURE: 76 MMHG

## 2021-12-03 DIAGNOSIS — Z80.0 FHX: COLON CANCER: Primary | ICD-10-CM

## 2021-12-03 LAB — COLONOSCOPY: NORMAL

## 2021-12-03 PROCEDURE — G8907 PT DOC NO EVENTS ON DISCHARG: HCPCS

## 2021-12-03 PROCEDURE — G8918 PT W/O PREOP ORDER IV AB PRO: HCPCS

## 2021-12-03 PROCEDURE — 45385 COLONOSCOPY W/LESION REMOVAL: CPT | Mod: PT | Performed by: FAMILY MEDICINE

## 2021-12-03 PROCEDURE — 45380 COLONOSCOPY AND BIOPSY: CPT | Mod: XS,PT

## 2021-12-03 PROCEDURE — 88305 TISSUE EXAM BY PATHOLOGIST: CPT | Mod: 59 | Performed by: PATHOLOGY

## 2021-12-03 PROCEDURE — 45385 COLONOSCOPY W/LESION REMOVAL: CPT | Mod: PT

## 2021-12-03 PROCEDURE — 99152 MOD SED SAME PHYS/QHP 5/>YRS: CPT | Mod: 59 | Performed by: FAMILY MEDICINE

## 2021-12-03 PROCEDURE — 45380 COLONOSCOPY AND BIOPSY: CPT | Mod: 59 | Performed by: FAMILY MEDICINE

## 2021-12-03 RX ORDER — ONDANSETRON 2 MG/ML
4 INJECTION INTRAMUSCULAR; INTRAVENOUS
Status: COMPLETED | OUTPATIENT
Start: 2021-12-03 | End: 2021-12-03

## 2021-12-03 RX ORDER — FENTANYL CITRATE 50 UG/ML
INJECTION, SOLUTION INTRAMUSCULAR; INTRAVENOUS PRN
Status: DISCONTINUED | OUTPATIENT
Start: 2021-12-03 | End: 2021-12-03 | Stop reason: HOSPADM

## 2021-12-03 RX ORDER — LIDOCAINE 40 MG/G
CREAM TOPICAL
Status: DISCONTINUED | OUTPATIENT
Start: 2021-12-03 | End: 2021-12-04 | Stop reason: HOSPADM

## 2021-12-03 RX ADMIN — ONDANSETRON 4 MG: 2 INJECTION INTRAMUSCULAR; INTRAVENOUS at 08:07

## 2021-12-03 NOTE — H&P
Pre-Endoscopy History and Physical     Isabel Carroll MRN# [id number]   YOB: 1950 Age: 71 year old     Date of Procedure: 12/3/2021  Primary care provider: Marry Gonzalez  Type of Endoscopy: colonoscopy  Reason for Procedure: family history colon cancer  Type of Anesthesia Anticipated: Moderate Sedation    HPI:    Isabel is a 71 year old female who will be undergoing the above procedure.      A history and physical has been performed. The patient's medications and allergies have been reviewed. The risks and benefits of the procedure and the sedation options and risks were discussed with the patient.  All questions were answered and informed consent was obtained.      She denies a personal or family history of anesthesia complications or bleeding disorders.     Allergies   Allergen Reactions     Sulfa Drugs Nausea     Codeine Phosphate Nausea and Vomiting     Pentazocine      Propoxyphene      Darvon     Penicillins Rash     rash and itching          Cannot display prior to admission medications because the patient has not been admitted in this contact.       Patient Active Problem List   Diagnosis     Chronic rhinitis     Primary osteoarthritis of both hands     Basal cell carcinoma of skin     Coronary artery disease involving native heart with angina pectoris, unspecified vessel or lesion type (H)     Hyperlipidemia LDL goal <100     Impaired fasting glucose     Chorioretinal scar of left eye     Sinus tachycardia     Advance care planning     Pes planus     Bronchiectasis without complication (H)     Chronic cough     Female pattern hair loss     FHx: colon cancer     History of Clostridioides difficile infection     Pseudophakia of both eyes     Primary osteoarthritis of both knees     Vocal cord dysfunction     Personal history of malignant neoplasm of bladder        Past Medical History:   Diagnosis Date     Arthritis      Basal cell carcinoma of skin 4/7/2011     Benign neoplasm of  colon 10/26/2011    Overview:  Colonoscopy every 5 years  Oct 2011      Bronchiectasis without complication (H) 12/15/2016    CT chest done 2016 through COPDgene study, formal report indicates cylindrical bronchiectasis. Reviewed CT with [facility] radiologists; bronchiectasis is likely present but of unclear clinical significance, and was likely present on CT from  as well (different scanner and different technique).     Chorioretinal scar of left eye 2015     Chronic rhinitis 2015     Clostridium difficile infection 2018     Coronary artery disease      Coronary atherosclerosis due to calcified coronary lesion 2011    Overview:  A)  CT Chest-Date: 07/13/10, There is coronary artery and mild aortic calcification present. B)  Nuclear Stress Test on 10/01/10, No scintigraphic evidence of ischemia or infarction. EF: 71%.       Elevated blood pressure (not hypertension) 2006     Hypercholesteremia      Hyperlipidemia 2011    Overview:  See results review for lipid profile.      Impaired fasting glucose 2006     Nonsenile cataract      Overweight 2015     PONV (postoperative nausea and vomiting)      Primary osteoarthritis of both hands 2015     Right shoulder strain, initial encounter 12/15/2016     Shingles 2009     Sinus tachycardia 2016     Skin cancer 2013, 2015    Basal cell cancer     Vitamin D deficiency 2010        Past Surgical History:   Procedure Laterality Date     CATARACT IOL, RT/LT        SECTION      x 2     COLONOSCOPY      3 polyps     COLONOSCOPY  2016    neg     ENDOSCOPY  2016     GENITOURINARY SURGERY      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 10/2020     LENGTHEN TENDON ACHILLES  2011    Procedure:LENGTHEN TENDON ACHILLES; Surgeon:AARON CÁRDENAS; Location:[facility]     LIVER SURGERY      Alex large cyst     MINI ARC SLING OPERATION FOR STRESS INCONTINENCE  2007     OSTEOTOMY ANKLE  2011  "   Procedure:OSTEOTOMY ANKLE; Calcaneous,  Flexor Digitorum Longus Transfer       SALPINGO-OOPHORECTOMY BILATERAL  ,     Benign prophylactic for FHx ov cancer       Social History     Tobacco Use     Smoking status: Former Smoker     Years: 16.00     Types: Cigarettes     Quit date: 1982     Years since quittin.9     Smokeless tobacco: Never Used   Substance Use Topics     Alcohol use: Yes     Alcohol/week: 0.0 standard drinks     Comment: 2-5 glasses of wine per week       Family History   Problem Relation Age of Onset     Other Cancer Sister 51        ovarian cancer     Cancer Sister      Thyroid Disease Sister      Colon Cancer Father 76     Brain Hemorrhage Father         accident     Macular Degeneration Mother      Diabetes Maternal Aunt      Diabetes Maternal Uncle      Cancer Paternal Aunt      Diabetes Maternal Grandmother      Hypertension No family hx of      Cerebrovascular Disease No family hx of      Glaucoma No family hx of        REVIEW OF SYSTEMS:     5 point ROS negative except as noted above in HPI, including Gen., Resp., CV, GI &  system review.      PHYSICAL EXAM:   BP (!) 155/96   Temp (!) 96.7  F (35.9  C) (Temporal)   Resp 16   LMP  (LMP Unknown)   SpO2 97%  Estimated body mass index is 28.81 kg/m  as calculated from the following:    Height as of 21: 1.562 m (5' 1.5\").    Weight as of 21: 70.3 kg (155 lb).   GENERAL APPEARANCE: healthy, alert and no distress  MENTAL STATUS: alert and oriented x 3  AIRWAY EXAM: Mallampatti Class I (visualization of the soft palate, fauces, uvula, anterior and posterior pillars)  RESP: lungs clear to auscultation - no rales, rhonchi or wheezes  CV: regular rates and rhythm and normal S1 S2, no S3 or S4      DIAGNOSTICS:    Not indicated      IMPRESSION   ASA Class 2 - Mild systemic disease        PLAN:       Plan for colonoscopy. We discussed the risks, benefits and alternatives and the patient wished to proceed.    The above " has been forwarded to the consulting provider.      Signed Electronically by: Prudence Hu MD  December 3, 2021

## 2021-12-07 LAB
PATH REPORT.COMMENTS IMP SPEC: NORMAL
PATH REPORT.GROSS SPEC: NORMAL
PATH REPORT.MICROSCOPIC SPEC OTHER STN: NORMAL
PATH REPORT.RELEVANT HX SPEC: NORMAL
PHOTO IMAGE: NORMAL

## 2022-02-24 ENCOUNTER — LAB (OUTPATIENT)
Dept: LAB | Facility: CLINIC | Age: 72
End: 2022-02-24
Payer: COMMERCIAL

## 2022-02-24 ENCOUNTER — VIRTUAL VISIT (OUTPATIENT)
Dept: FAMILY MEDICINE | Facility: CLINIC | Age: 72
End: 2022-02-24
Payer: COMMERCIAL

## 2022-02-24 ENCOUNTER — TELEPHONE (OUTPATIENT)
Dept: FAMILY MEDICINE | Facility: CLINIC | Age: 72
End: 2022-02-24

## 2022-02-24 DIAGNOSIS — R30.0 DYSURIA: Primary | ICD-10-CM

## 2022-02-24 DIAGNOSIS — R30.0 DYSURIA: ICD-10-CM

## 2022-02-24 DIAGNOSIS — I25.119 CORONARY ARTERY DISEASE INVOLVING NATIVE HEART WITH ANGINA PECTORIS, UNSPECIFIED VESSEL OR LESION TYPE (H): ICD-10-CM

## 2022-02-24 DIAGNOSIS — J47.9 BRONCHIECTASIS WITHOUT COMPLICATION (H): ICD-10-CM

## 2022-02-24 LAB
ALBUMIN UR-MCNC: NEGATIVE MG/DL
APPEARANCE UR: CLEAR
BACTERIA #/AREA URNS HPF: ABNORMAL /HPF
BILIRUB UR QL STRIP: NEGATIVE
CLUE CELLS: ABNORMAL
COLOR UR AUTO: YELLOW
GLUCOSE UR STRIP-MCNC: NEGATIVE MG/DL
HGB UR QL STRIP: ABNORMAL
KETONES UR STRIP-MCNC: NEGATIVE MG/DL
LEUKOCYTE ESTERASE UR QL STRIP: NEGATIVE
NITRATE UR QL: NEGATIVE
PH UR STRIP: 6 [PH] (ref 5–7)
RBC #/AREA URNS AUTO: ABNORMAL /HPF
SP GR UR STRIP: 1.01 (ref 1–1.03)
TRICHOMONAS, WET PREP: ABNORMAL
UROBILINOGEN UR STRIP-ACNC: 0.2 E.U./DL
WBC #/AREA URNS AUTO: ABNORMAL /HPF
WBC'S/HIGH POWER FIELD, WET PREP: ABNORMAL
YEAST, WET PREP: ABNORMAL

## 2022-02-24 PROCEDURE — 81001 URINALYSIS AUTO W/SCOPE: CPT

## 2022-02-24 PROCEDURE — 99441 PR PHYSICIAN TELEPHONE EVALUATION 5-10 MIN: CPT | Mod: 95 | Performed by: NURSE PRACTITIONER

## 2022-02-24 PROCEDURE — 87210 SMEAR WET MOUNT SALINE/INK: CPT

## 2022-02-24 NOTE — TELEPHONE ENCOUNTER
Patient is calling with concern of symptoms of a bladder infection.    Patient states she is experiencing right sided twinges/pain on her bladder.    Patient states she saw urologist in November and had a urine sample and cat scan due to her history of bladder cancer. Patient states there was no new growth at this time.     Patient was an established patient of provider who retired.    Patient states she does not want to come into the clinic.  Patient wants to drop off urine sample to rule out possible bladder infection.    Recommended patient schedule a phone visit with a provider to discuss symptoms and possible order for urinalysis.     Assisted patient to schedule phone visit with provider at 2pm. Patient verbalized agreement to plan.    Kerri Blankenship RN  [facility]

## 2022-02-24 NOTE — PROGRESS NOTES
"Isabel is a 71 year old who is being evaluated via a billable telephone visit.      What phone number would you like to be contacted at? [phone]  How would you like to obtain your AVS? MyChart    Assessment & Plan     Dysuria  Push po fluids, Ua is negative for UTi, return to clinic if not improved, new, or worsening symptoms.   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      Ordering of each unique test  13 minutes spent on the date of the encounter doing chart review, history and exam, documentation and further activities per the note       BMI:   Estimated body mass index is 28.81 kg/m  as calculated from the following:    Height as of 8/25/21: 1.562 m (5' 1.5\").    Weight as of 8/30/21: 70.3 kg (155 lb).       See Patient Instructions    No follow-ups on file.    VIC Nicole CNP  [facility]    James Hall is a 71 year old who presents for the following health issues    HPI     Genitourinary - Female Right Side Pain   Onset/Duration: off & on for about 5-7 days    Description:   Painful urination (Dysuria): no           Frequency: no  Blood in urine (Hematuria): no  Delay in urine (Hesitency): no  Intensity: mild  Progression of Symptoms:  same  Accompanying Signs & Symptoms:  Fever/chills: no  Flank pain: no  Nausea and vomiting: no  Vaginal symptoms: none  Abdominal/Pelvic Pain: no  History:   History of frequent UTI s: YES  History of kidney stones: no  Sexually Active: no  Possibility of pregnancy: No  Precipitating or alleviating factors: None  Therapies tried and outcome:  none   PMH significant for malignant tumor of urinary bldder and overactive bladder, followed by [facility], last OV 11/10/2021, will have " repeat cystoscopy 5/2022. . No vaginal itching, burning, discharge.     She is also followed by Cardiology for CAD, HLD, and palpitations- she denies needing to use NTG, denies cp, shortness of breath, palpitations, exercises regularly without symptoms.    Finally, she has history of bronchiectasis- cough is unchanged, no purulent discharge, no significant shortness of breath or wheezing.    Review of Systems   Constitutional, HEENT, cardiovascular, pulmonary, gi and gu systems are negative, except as otherwise noted.      Objective           Vitals:  No vitals were obtained today due to virtual visit.    Physical Exam   healthy, alert and no distress  PSYCH: Alert and oriented times 3; coherent speech, normal   rate and volume, able to articulate logical thoughts, able   to abstract reason, no tangential thoughts, no hallucinations   or delusions  Her affect is normal and pleasant  RESP: No cough, no audible wheezing, able to talk in full sentences  Remainder of exam unable to be completed due to telephone visits    Results for orders placed or performed in visit on 02/24/22   UA Macro with Reflex to Micro and Culture - lab collect     Status: Abnormal    Specimen: Urine, Midstream   Result Value Ref Range    Color Urine Yellow Colorless, Straw, Light Yellow, Yellow    Appearance Urine Clear Clear    Glucose Urine Negative Negative mg/dL    Bilirubin Urine Negative Negative    Ketones Urine Negative Negative mg/dL    Specific Gravity Urine 1.010 1.003 - 1.035    Blood Urine Trace (A) Negative    pH Urine 6.0 5.0 - 7.0    Protein Albumin Urine Negative Negative mg/dL    Urobilinogen Urine 0.2 0.2, 1.0 E.U./dL    Nitrite Urine Negative Negative    Leukocyte Esterase Urine Negative Negative   Urine Microscopic     Status: Abnormal   Result Value Ref Range    Bacteria Urine Few (A) None Seen /HPF    RBC Urine None Seen 0-2 /HPF /HPF    WBC Urine 0-5 0-5 /HPF /HPF    Narrative    Urine Culture not indicated   Wet prep -  lab collect     Status: Abnormal    Specimen: Vagina; Swab   Result Value Ref Range    Trichomonas Absent Absent    Yeast Absent Absent    Clue Cells Absent Absent    WBCs/high power field 3+ (A) None             Phone call duration: 9  minutes

## 2022-02-24 NOTE — PATIENT INSTRUCTIONS
At [facility], we strive to deliver an exceptional experience to you, every time we see you. If you receive a survey, please complete it as we do value your feedback.  If you have MyChart, you can expect to receive results automatically within 24 hours of their completion.  Your provider will send a note interpreting your results as well.   If you do not have MyChart, you should receive your results in about a week by mail.    Your care team:                            Family Medicine Internal Medicine   MD Guillermo Faustin MD Shantel Branch-Fleming, MD Srinivasa Vaka, MD Katya Belousova, PAESTRELLA Boyd, APRN CNP    Joey Tijerina, MD Pediatrics   Alonso Alex, PAESTRELLA Cotton, CNP MD Antionette Mcmahon APRN CNP   MD Lindsay Torrez MD Deborah Mielke, MD Naida Collins, APRN [facility]      Clinic hours: Monday - Thursday 7 am-6 pm; Fridays 7 am-5 pm.   Urgent care: Monday - Friday 10 am- 8 pm; Saturday and Sunday 9 am-5 pm.    Clinic: [phone]       [facility]: Monday - Thursday 8 am - 7 pm; Friday 8 am - 6 pm  [facility]: [phone]     Use [email or web address] for 24/7 diagnosis and treatment of dozens of conditions.

## 2022-03-08 ENCOUNTER — TRANSFERRED RECORDS (OUTPATIENT)
Dept: HEALTH INFORMATION MANAGEMENT | Facility: CLINIC | Age: 72
End: 2022-03-08
Payer: COMMERCIAL

## 2022-04-17 ENCOUNTER — HEALTH MAINTENANCE LETTER (OUTPATIENT)
Age: 72
End: 2022-04-17

## 2022-04-28 ENCOUNTER — OFFICE VISIT (OUTPATIENT)
Dept: FAMILY MEDICINE | Facility: CLINIC | Age: 72
End: 2022-04-28
Payer: COMMERCIAL

## 2022-04-28 VITALS
BODY MASS INDEX: 28.49 KG/M2 | TEMPERATURE: 97.7 F | RESPIRATION RATE: 18 BRPM | HEART RATE: 73 BPM | WEIGHT: 154.8 LBS | OXYGEN SATURATION: 98 % | SYSTOLIC BLOOD PRESSURE: 130 MMHG | DIASTOLIC BLOOD PRESSURE: 81 MMHG | HEIGHT: 62 IN

## 2022-04-28 DIAGNOSIS — R73.03 PRE-DIABETES: ICD-10-CM

## 2022-04-28 DIAGNOSIS — R05.3 CHRONIC COUGH: ICD-10-CM

## 2022-04-28 DIAGNOSIS — I25.119 CORONARY ARTERY DISEASE INVOLVING NATIVE HEART WITH ANGINA PECTORIS, UNSPECIFIED VESSEL OR LESION TYPE (H): ICD-10-CM

## 2022-04-28 DIAGNOSIS — Z00.00 ENCOUNTER FOR MEDICARE ANNUAL WELLNESS EXAM: Primary | ICD-10-CM

## 2022-04-28 LAB — HBA1C MFR BLD: 6.1 % (ref 0–5.6)

## 2022-04-28 PROCEDURE — 99397 PER PM REEVAL EST PAT 65+ YR: CPT | Performed by: NURSE PRACTITIONER

## 2022-04-28 PROCEDURE — 83036 HEMOGLOBIN GLYCOSYLATED A1C: CPT | Performed by: NURSE PRACTITIONER

## 2022-04-28 PROCEDURE — 36415 COLL VENOUS BLD VENIPUNCTURE: CPT | Performed by: NURSE PRACTITIONER

## 2022-04-28 ASSESSMENT — ENCOUNTER SYMPTOMS
COUGH: 1
HEARTBURN: 0
HEADACHES: 0
DIZZINESS: 1
CONSTIPATION: 0
NAUSEA: 0
EYE PAIN: 0
SORE THROAT: 0
DYSURIA: 0
PALPITATIONS: 0
HEMATURIA: 0
BREAST MASS: 0
HEMATOCHEZIA: 0
CHILLS: 0
ARTHRALGIAS: 1
WEAKNESS: 0
FREQUENCY: 0
DIARRHEA: 0
PARESTHESIAS: 0
JOINT SWELLING: 0
MYALGIAS: 1
NERVOUS/ANXIOUS: 0
FEVER: 0
SHORTNESS OF BREATH: 0
ABDOMINAL PAIN: 0

## 2022-04-28 ASSESSMENT — ACTIVITIES OF DAILY LIVING (ADL): CURRENT_FUNCTION: NO ASSISTANCE NEEDED

## 2022-04-28 ASSESSMENT — PAIN SCALES - GENERAL: PAINLEVEL: NO PAIN (0)

## 2022-04-28 NOTE — PATIENT INSTRUCTIONS
Patient Education   Personalized Prevention Plan  You are due for the preventive services outlined below.  Your care team is available to assist you in scheduling these services.  If you have already completed any of these items, please share that information with your care team to update in your medical record.  Health Maintenance Due   Topic Date Due     ANNUAL REVIEW OF HM ORDERS  Never done     Annual Wellness Visit  02/18/2022     FALL RISK ASSESSMENT  02/18/2022

## 2022-04-28 NOTE — PROGRESS NOTES
"SUBJECTIVE:   Isabel Carroll is a 71 year old female who presents for Preventive Visit.        Are you in the first 12 months of your Medicare coverage?  No    Healthy Habits:     In general, how would you rate your overall health?  Excellent    Frequency of exercise:  4-5 days/week    Duration of exercise:  30-45 minutes    Do you usually eat at least 4 servings of fruit and vegetables a day, include whole grains    & fiber and avoid regularly eating high fat or \"junk\" foods?  Yes    Taking medications regularly:  Yes    Medication side effects:  None    Ability to successfully perform activities of daily living:  No assistance needed    Home Safety:  No safety concerns identified    Hearing Impairment:  No hearing concerns    In the past 6 months, have you been bothered by leaking of urine?  No    In general, how would you rate your overall mental or emotional health?  Excellent      PHQ-2 Total Score: 0    Additional concerns today:  No    Do you feel safe in your environment? Yes    Have you ever done Advance Care Planning? (For example, a Health Directive, POLST, or a discussion with a medical provider or your loved ones about your wishes): Yes, patient states has an Advance Care Planning document and will bring a copy to the clinic.       Fall risk  Fallen 2 or more times in the past year?: (P) No  Any fall with injury in the past year?: (P) No    Cognitive Screening   1) Repeat 3 items (Leader, Season, Table)    2) Clock draw: NORMAL  3) 3 item recall: Recalls 3 objects  Results: 3 items recalled: COGNITIVE IMPAIRMENT LESS LIKELY    Mini-CogTM Copyright CELIA Bustos. Licensed by the author for use in [facility]; reprinted with permission (dianne@.[facility]). All rights reserved.      Do you have sleep apnea, excessive snoring or daytime drowsiness?: no    Reviewed and updated as needed this visit by clinical staff   Tobacco  Allergies  Meds   Med Hx  Surg Hx  Fam Hx  Soc Hx          Reviewed " and updated as needed this visit by Provider                   Social History     Tobacco Use     Smoking status: Former Smoker     Years: 16.00     Types: Cigarettes     Quit date: 1982     Years since quittin.3     Smokeless tobacco: Never Used   Substance Use Topics     Alcohol use: Yes     Alcohol/week: 0.0 standard drinks     Comment: 2-5 glasses of wine per week     If you drink alcohol do you typically have >3 drinks per day or >7 drinks per week? No    Alcohol Use 2022   Prescreen: >3 drinks/day or >7 drinks/week? No   Prescreen: >3 drinks/day or >7 drinks/week? -           Cough - saw Dr. Shah and was on meds  Now on baclofen - stopped because not helpful  In COPD study but the can't help with the cough  Saw GI who did more testing  Recently had CT which showed hiatal hernia that could be causing reflux    Hx Vestibular issues      Current providers sharing in care for this patient include:   Patient Care Team:  Marry Gonzalez MD as PCP - General (Family Practice)  Gilbert Loving DO as Assigned Musculoskeletal Provider  Swapnil Shah MD as Assigned Surgical Provider  Alisia Boyd APRN CNP as Assigned PCP    The following health maintenance items are reviewed in Epic and correct as of today:  Health Maintenance Due   Topic Date Due     ANNUAL REVIEW OF HM ORDERS  Never done     FALL RISK ASSESSMENT  2022     Lab work is in process  Labs reviewed in EPIC  BP Readings from Last 3 Encounters:   22 130/81   21 135/76   21 133/80    Wt Readings from Last 3 Encounters:   22 70.2 kg (154 lb 12.8 oz)   21 70.3 kg (155 lb)   21 70.3 kg (155 lb)                  Patient Active Problem List   Diagnosis     Chronic rhinitis     Primary osteoarthritis of both hands     Basal cell carcinoma of skin     Coronary artery disease involving native heart with angina pectoris, unspecified vessel or lesion type (H)     Hyperlipidemia LDL goal  <100     Impaired fasting glucose     Chorioretinal scar of left eye     Sinus tachycardia     Advance care planning     Pes planus     Bronchiectasis without complication (H)     Chronic cough     Female pattern hair loss     FHx: colon cancer     History of Clostridioides difficile infection     Pseudophakia of both eyes     Primary osteoarthritis of both knees     Vocal cord dysfunction     Personal history of malignant neoplasm of bladder     Past Surgical History:   Procedure Laterality Date     CATARACT IOL, RT/LT        SECTION      x 2     COLONOSCOPY      3 polyps     COLONOSCOPY  2016    neg     COLONOSCOPY N/A 12/3/2021    Procedure: COLONOSCOPY, FLEXIBLE, WITH LESION REMOVAL USING SNARE;  Surgeon: Prudence Hu MD;  Location: [facility] OR     COLONOSCOPY N/A 12/3/2021    Procedure: COLONOSCOPY, WITH POLYPECTOMY AND BIOPSY;  Surgeon: Prudence Hu MD;  Location: [facility] OR     COLONOSCOPY WITH CO2 INSUFFLATION N/A 12/3/2021    Procedure: COLONOSCOPY, WITH CO2 INSUFFLATION;  Surgeon: Prudence Hu MD;  Location: [facility] OR     ENDOSCOPY       GENITOURINARY SURGERY      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 10/2020     LENGTHEN TENDON ACHILLES  2011    Procedure:LENGTHEN TENDON ACHILLES; Surgeon:AARON CÁRDENAS; Location:[facility]     LIVER SURGERY      Bengin large cyst     MINI ARC SLING OPERATION FOR STRESS INCONTINENCE       OSTEOTOMY ANKLE  2011    Procedure:OSTEOTOMY ANKLE; Calcaneous,  Flexor Digitorum Longus Transfer       SALPINGO-OOPHORECTOMY BILATERAL  ,     Benign prophylactic for FHx ov cancer       Social History     Tobacco Use     Smoking status: Former Smoker     Years: 16.00     Types: Cigarettes     Quit date: 1982     Years since quittin.3     Smokeless tobacco: Never Used   Substance Use Topics     Alcohol use: Yes     Alcohol/week: 0.0 standard drinks     Comment: 2-5 glasses of wine per week     Family History    Problem Relation Age of Onset     Other Cancer Sister 51        ovarian cancer     Cancer Sister      Thyroid Disease Sister      Colon Cancer Father 76     Brain Hemorrhage Father         accident     Macular Degeneration Mother      Diabetes Maternal Aunt      Diabetes Maternal Uncle      Cancer Paternal Aunt      Diabetes Maternal Grandmother      Hypertension No family hx of      Cerebrovascular Disease No family hx of      Glaucoma No family hx of          Current Outpatient Medications   Medication Sig Dispense Refill     aspirin (ASA) 81 MG EC tablet Take 1 tablet (81 mg) by mouth daily 100 tablet 3     atorvastatin (LIPITOR) 40 MG tablet Take 1 tablet (40 mg) by mouth daily 90 tablet 3     Calcium-Vitamin D (CALCIUM + D PO) Take  by mouth.       FISH OIL        MAGNESIUM OXIDE PO Take 200 mg by mouth daily       metoprolol succinate ER (TOPROL-XL) 25 MG 24 hr tablet Take 1 tablet (25 mg) by mouth 2 times daily 180 tablet 3     Multiple Vitamins-Minerals (MULTIVITAMIN & MINERAL PO) Take  by mouth.       oxybutynin ER (DITROPAN-XL) 10 MG 24 hr tablet Take 1 tablet by mouth daily       baclofen (LIORESAL) 10 MG tablet Take 1 tablet (10 mg) by mouth 2 times daily (Patient not taking: Reported on 4/28/2022) 90 tablet 3     Allergies   Allergen Reactions     Sulfa Drugs Nausea     Codeine Phosphate Nausea and Vomiting     Pentazocine      Propoxyphene      Darvon     Penicillins Rash     rash and itching       Recent Labs   Lab Test 11/05/21  0850 01/11/21  1351 10/29/20  1506 10/14/20  1047 09/11/20  1440 11/08/19  0812 11/06/18  1109 05/22/18  0000   A1C 6.3*  --   --  5.6  --  5.6  --   --    LDL 59  --   --  39  --   --   --  48   HDL 49*  --   --  47*  --   --   --  48   TRIG 180*  --   --  280*  --   --   --  182*   ALT 41  --  35  --   --   --  40 45   CR 0.70 0.80 0.60 0.63  --   --  0.72  --    GFRESTIMATED 87 75 >90 >90   < >  --  80  --    GFRESTBLACK  --  87 >90 >90   < >  --  >90  --    POTASSIUM  "3.9 3.9 3.9 3.9  --   --  4.2  --     < > = values in this interval not displayed.              Review of Systems   Constitutional: Negative for chills and fever.   HENT: Negative for congestion, ear pain, hearing loss and sore throat.    Eyes: Negative for pain and visual disturbance.   Respiratory: Positive for cough. Negative for shortness of breath.    Cardiovascular: Negative for chest pain, palpitations and peripheral edema.   Gastrointestinal: Negative for abdominal pain, constipation, diarrhea, heartburn, hematochezia and nausea.   Breasts:  Negative for tenderness, breast mass and discharge.   Genitourinary: Negative for dysuria, frequency, genital sores, hematuria, pelvic pain, urgency, vaginal bleeding and vaginal discharge.   Musculoskeletal: Positive for arthralgias and myalgias. Negative for joint swelling.   Skin: Negative for rash.   Neurological: Positive for dizziness. Negative for weakness, headaches and paresthesias.   Psychiatric/Behavioral: Negative for mood changes. The patient is not nervous/anxious.          OBJECTIVE:   /81 (BP Location: Left arm, Patient Position: Sitting, Cuff Size: Adult Regular)   Pulse 73   Temp 97.7  F (36.5  C) (Tympanic)   Resp 18   Ht 1.575 m (5' 2\")   Wt 70.2 kg (154 lb 12.8 oz)   LMP  (LMP Unknown)   SpO2 98%   BMI 28.31 kg/m   Estimated body mass index is 28.31 kg/m  as calculated from the following:    Height as of this encounter: 1.575 m (5' 2\").    Weight as of this encounter: 70.2 kg (154 lb 12.8 oz).  Physical Exam  GENERAL: healthy, alert and no distress  EYES: Eyes grossly normal to inspection, PERRL and conjunctivae and sclerae normal  HENT: ear canals and TM's normal, nose and mouth without ulcers or lesions  NECK: no adenopathy, no asymmetry, masses, or scars and thyroid normal to palpation  RESP: lungs clear to auscultation - no rales, rhonchi or wheezes  CV: regular rate and rhythm, normal S1 S2, no S3 or S4, no murmur, click or rub, no " "peripheral edema and peripheral pulses strong  ABDOMEN: soft, nontender, no hepatosplenomegaly, no masses and bowel sounds normal  MS: no gross musculoskeletal defects noted, no edema  SKIN: no suspicious lesions or rashes  NEURO: Normal strength and tone, mentation intact and speech normal  PSYCH: mentation appears normal, affect normal/bright    Diagnostic Test Results:  Labs reviewed in Epic  Results for orders placed or performed in visit on 04/28/22 (from the past 24 hour(s))   Hemoglobin A1c   Result Value Ref Range    Hemoglobin A1C 6.1 (H) 0.0 - 5.6 %       ASSESSMENT / PLAN:   (Z00.00) Encounter for Medicare annual wellness exam  (primary encounter diagnosis)    (R05.3) Chronic cough  Comment: Trial the below. She has already seen otolaryngology (ENT) and GI. She thinks PPI may have helped in the past.  Plan: omeprazole (PRILOSEC) 20 MG DR capsule          (I25.119) Coronary artery disease involving native heart with angina pectoris, unspecified vessel or lesion type (H)  Comment: continue ASA and statin. No chest pain, shortness of breath, exercise intolerance, fatigue, etc.    (R73.03) Pre-diabetes  Comment: rechecking  Plan: Hemoglobin A1c                  COUNSELING:  Reviewed preventive health counseling, as reflected in patient instructions       Regular exercise       Healthy diet/nutrition    Estimated body mass index is 28.31 kg/m  as calculated from the following:    Height as of this encounter: 1.575 m (5' 2\").    Weight as of this encounter: 70.2 kg (154 lb 12.8 oz).        She reports that she quit smoking about 40 years ago. Her smoking use included cigarettes. She quit after 16.00 years of use. She has never used smokeless tobacco.      Appropriate preventive services were discussed with this patient, including applicable screening as appropriate for cardiovascular disease, diabetes, osteopenia/osteoporosis, and glaucoma.  As appropriate for age/gender, discussed screening for colorectal " cancer, prostate cancer, breast cancer, and cervical cancer. Checklist reviewing preventive services available has been given to the patient.    Reviewed patients plan of care and provided an AVS. The Basic Care Plan (routine screening as documented in Health Maintenance) for Isabel meets the Care Plan requirement. This Care Plan has been established and reviewed with the Patient.    Counseling Resources:  ATP IV Guidelines  Pooled Cohorts Equation Calculator  Breast Cancer Risk Calculator  Breast Cancer: Medication to Reduce Risk  FRAX Risk Assessment  ICSI Preventive Guidelines  Dietary Guidelines for Americans, 2010  USDA's MyPlate  ASA Prophylaxis  Lung CA Screening    VIC Nayak CNP  [facility]    Identified Health Risks:

## 2022-05-12 ENCOUNTER — TRANSFERRED RECORDS (OUTPATIENT)
Dept: HEALTH INFORMATION MANAGEMENT | Facility: CLINIC | Age: 72
End: 2022-05-12
Payer: COMMERCIAL

## 2022-05-25 ENCOUNTER — VIRTUAL VISIT (OUTPATIENT)
Dept: FAMILY MEDICINE | Facility: CLINIC | Age: 72
End: 2022-05-25
Payer: COMMERCIAL

## 2022-05-25 ENCOUNTER — ALLIED HEALTH/NURSE VISIT (OUTPATIENT)
Dept: FAMILY MEDICINE | Facility: CLINIC | Age: 72
End: 2022-05-25
Payer: COMMERCIAL

## 2022-05-25 DIAGNOSIS — J01.90 ACUTE NON-RECURRENT SINUSITIS, UNSPECIFIED LOCATION: ICD-10-CM

## 2022-05-25 DIAGNOSIS — J02.9 SORE THROAT: Primary | ICD-10-CM

## 2022-05-25 DIAGNOSIS — J02.9 SORE THROAT: ICD-10-CM

## 2022-05-25 LAB — DEPRECATED S PYO AG THROAT QL EIA: NEGATIVE

## 2022-05-25 PROCEDURE — 87651 STREP A DNA AMP PROBE: CPT

## 2022-05-25 PROCEDURE — 99207 PR NO CHARGE NURSE ONLY: CPT

## 2022-05-25 PROCEDURE — 99213 OFFICE O/P EST LOW 20 MIN: CPT | Mod: 95 | Performed by: INTERNAL MEDICINE

## 2022-05-25 RX ORDER — AZITHROMYCIN 250 MG/1
TABLET, FILM COATED ORAL
Qty: 6 TABLET | Refills: 0 | Status: SHIPPED | OUTPATIENT
Start: 2022-05-25 | End: 2022-05-30

## 2022-05-25 ASSESSMENT — ENCOUNTER SYMPTOMS: SORE THROAT: 1

## 2022-05-25 NOTE — PROGRESS NOTES
Isabel is a 71 year old who is being evaluated via a billable telephone visit.      What phone number would [phone] like to be contacted at?   How would you like to obtain your AVS? MyChart    Assessment & Plan     Sore throat  Started having sore throat 10 days ago  Initially got better  Since last night it is gotten worse again  Denies any fever  2 COVID tests have been negative  Discussed about empirically treating for strep throat as she was concerned about it versus testing and treating  She would like to get tested first and then start treatment  She is allergic to penicillins and sulfa drugs  - azithromycin (ZITHROMAX) 250 MG tablet; Take 2 tablets (500 mg) by mouth daily for 1 day, THEN 1 tablet (250 mg) daily for 4 days.  - Streptococcus A Rapid Scr w Reflx to PCR - Lab Collect; Future    Acute non-recurrent sinusitis, unspecified location  She is also been having some greenish discharge and some cheekbone pain  Advised about steam inhalation        25 minutes spent on the date of the encounter doing chart review, history and exam, documentation and further activities per the note       [id number]}    Return in about 4 weeks (around 6/22/2022), or if symptoms worsen or fail to improve.    Baudilio Zamorano MD  [facility]   Isabel is a 71 year old who presents for the following health issues     Pharyngitis          Acute Illness  Acute illness concerns: sore throat, stuffy nose  Onset/Duration: 2 weeks, off and on  Symptoms:  Fever: no  Chills/Sweats: no  Headache (location?): no  Sinus Pressure: YES  Conjunctivitis:  no  Ear Pain: no  Rhinorrhea: no  Congestion: YES  Sore Throat: YES  Cough: junky cough  Wheeze: no  Decreased Appetite: no  Nausea: no  Vomiting: no  Diarrhea: no  Dysuria/Freq.: no  Dysuria or Hematuria: no  Fatigue/Achiness: YES  Sick/Strep Exposure: no  Therapies tried and outcome: Delsym, tylenol    Review of Systems   HENT: Positive for sore throat.        Constitutional, HEENT, cardiovascular, pulmonary, gi and gu systems are negative, except as otherwise noted.      Objective    Vitals - Patient Reported  Pain Score: Moderate Pain (5)  Pain Loc:  (throat)        Physical Exam   healthy, alert and no distress  PSYCH: Alert and oriented times 3; coherent speech, normal   rate and volume, able to articulate logical thoughts, able   to abstract reason, no tangential thoughts, no hallucinations   or delusions  Her affect is normal  RESP: No cough, no audible wheezing, able to talk in full sentences  Remainder of exam unable to be completed due to telephone visits                Phone call duration: 10 minutes

## 2022-05-26 LAB — GROUP A STREP BY PCR: NOT DETECTED

## 2022-08-03 NOTE — PROGRESS NOTES
SUBJECTIVE:   Isabel Carroll is a 68 year old female who presents to clinic today for the following health issues:      ABDOMINAL   PAIN     Onset: 2 days ago    Description:   Character: Dull ache  Location: left lower quadrant  Radiation: None    Intensity: mild    Progression of Symptoms:  improving    Accompanying Signs & Symptoms:  Fever/Chills?: YES- chills  Gas/Bloating: no   Nausea: YES  Vomitting: no   Diarrhea?: no   Constipation:no   Dysuria or Hematuria: no    History:   Trauma: no   Previous similar pain: YES   Previous tests done: CT    Precipitating factors:   Does the pain change with:     Food: no      BM: no     Urination: no     Alleviating factors:  None    Therapies Tried and outcome: None    LMP:  Unknown       68 year old female presents with the above concerns. Hx diverticulitis and c diff. Felt like mild diverticulitis 2 days ago but now pain has resolved. Normal BMs. Last BM this morning. No fever. Last colonoscopy 2016 - normal. Has eye surgery on Thursday and wants to check for infection. No urinary symptoms including dysuria, urgency, frequency, hematuria.    Problem list and histories reviewed & adjusted, as indicated.  Additional history: as documented    Patient Active Problem List   Diagnosis     Overweight     Chronic rhinitis     Primary osteoarthritis of both hands     Basal cell carcinoma of skin     Coronary artery disease involving native heart with angina pectoris, unspecified vessel or lesion type (H)     Hyperlipidemia     Impaired fasting glucose     Chorioretinal scar of left eye     Sinus tachycardia     Advance care planning     Pes planus     Right shoulder strain, initial encounter     Bronchiectasis without complication (H)     Chronic cough     Female pattern hair loss     FHx: colon cancer     Clostridium difficile infection     Pseudophakia of right eye     Past Surgical History:   Procedure Laterality Date     CATARACT IOL, RT/LT        SECTION       x 2     COLONOSCOPY      3 polyps     COLONOSCOPY  2016    neg     ENDOSCOPY  2016     LENGTHEN TENDON ACHILLES  2011    Procedure:LENGTHEN TENDON ACHILLES; Surgeon:AARON CÁRDENAS; [facility]     LIVER SURGERY      Bengin large cyst     MINI ARC SLING OPERATION FOR STRESS INCONTINENCE       OSTEOTOMY ANKLE  2011    Procedure:OSTEOTOMY ANKLE; Calcaneous,  Flexor Digitorum Longus Transfer       SALPINGO-OOPHORECTOMY BILATERAL  ,     Benign prophylactic for FHx ov cancer       Social History     Tobacco Use     Smoking status: Former Smoker     Years: 16.00     Types: Cigarettes     Last attempt to quit: 1982     Years since quittin.1     Smokeless tobacco: Never Used   Substance Use Topics     Alcohol use: Yes     Alcohol/week: 0.0 oz     Comment: 2-7 glasses of wine per week     Family History   Problem Relation Age of Onset     Other Cancer Sister 51        ovarian cancer     Cancer Sister      Thyroid Disease Sister      Colon Cancer Father 76     Brain Hemorrhage Father         accident     Macular Degeneration Mother      Diabetes Maternal Aunt      Diabetes Maternal Uncle      Cancer Paternal Aunt      Diabetes Maternal Grandmother      Hypertension No family hx of      Cerebrovascular Disease No family hx of      Glaucoma No family hx of          Current Outpatient Medications   Medication Sig Dispense Refill     aspirin 81 MG tablet Take 1 tablet by mouth daily.       atorvastatin (LIPITOR) 40 MG tablet 40 mg       Calcium-Vitamin D (CALCIUM + D PO) Take  by mouth.       diclofenac (VOLTAREN) 1 % GEL 2 grams to hands four times daily using enclosed dosing card. 100 g 1     FISH OIL        ketorolac tromethamine (ACULAR-LS) 0.4 % SOLN ophthalmic solution        MAGNESIUM OXIDE PO Take 200 mg by mouth daily       metoprolol (TOPROL-XL) 25 MG 24 hr tablet Take 1 tablet (25 mg) by mouth daily 90 tablet 3     Multiple Vitamins-Minerals (MULTIVITAMIN & MINERAL  "PO) Take  by mouth.       ofloxacin (OCUFLOX) 0.3 % ophthalmic solution        prednisoLONE acetate (PRED FORTE) 1 % ophthalmic suspension        Pseudoephedrine HCl (SUDAFED 12 HOUR PO) Take  by mouth.       Allergies   Allergen Reactions     Codeine Phosphate Nausea and Vomiting     Pentazocine      Propoxyphene      Darvon     Penicillins Rash     rash and itching         Reviewed and updated as needed this visit by clinical staff  Tobacco  Allergies  Meds  Problems  Med Hx  Surg Hx  Fam Hx  Soc Hx        Reviewed and updated as needed this visit by Provider  Tobacco  Allergies  Meds  Problems  Med Hx  Surg Hx  Fam Hx         ROS:  Constitutional, HEENT, cardiovascular, pulmonary, GI, , musculoskeletal, neuro, skin, endocrine and psych systems are negative, except as otherwise noted.    OBJECTIVE:     /68 (BP Location: Right arm, Patient Position: Chair, Cuff Size: Adult Regular)   Pulse 74   Temp 97.7  F (36.5  C) (Oral)   Resp 16   Ht 1.555 m (5' 1.22\")   Wt 66.2 kg (146 lb)   LMP  (LMP Unknown)   SpO2 99%   Breastfeeding? No   BMI 27.39 kg/m    Body mass index is 27.39 kg/m .  GENERAL: healthy, alert and no distress  EYES: Eyes grossly normal to inspection, PERRL and conjunctivae and sclerae normal  HENT: ear canals and TM's normal, nose and mouth without ulcers or lesions  NECK: no adenopathy, no asymmetry, masses, or scars and thyroid normal to palpation  RESP: lungs clear to auscultation - no rales, rhonchi or wheezes  CV: regular rate and rhythm, normal S1 S2, no S3 or S4, no murmur, click or rub, no peripheral edema and peripheral pulses strong  ABDOMEN: soft, nontender, no hepatosplenomegaly, no masses and bowel sounds normal  MS: no gross musculoskeletal defects noted, no edema  PSYCH: mentation appears normal, affect normal/bright    Diagnostic Test Results:  No results found for this or any previous visit (from the past 24 hour(s)).    ASSESSMENT/PLAN:       ICD-10-CM  "   1. LLQ abdominal pain R10.32 CBC with platelets differential     UA reflex to Microscopic and Culture     Labs pending. We will notify her with results when available. If pain returns, instructed her to let us and her eye doctor know.    See Patient Instructions    Return precautions discussed, including when to seek urgent/emergent care.    Patient verbalizes understanding and agrees with plan of care. Patient stable for discharge.      VIC Nayak [facility]   Trilobed Flap Text: The defect edges were debeveled with a #15 scalpel blade.  Given the location of the defect and the proximity to free margins a trilobed flap was deemed most appropriate.  Using a sterile surgical marker, an appropriate trilobed flap drawn around the defect.    The area thus outlined was incised deep to adipose tissue with a #15 scalpel blade.  The skin margins were undermined to an appropriate distance in all directions utilizing iris scissors.

## 2022-09-16 ENCOUNTER — IMMUNIZATION (OUTPATIENT)
Dept: NURSING | Facility: CLINIC | Age: 72
End: 2022-09-16
Payer: COMMERCIAL

## 2022-09-16 PROCEDURE — G0008 ADMIN INFLUENZA VIRUS VAC: HCPCS | Mod: 59

## 2022-09-16 PROCEDURE — 0124A COVID-19,PF,PFIZER BOOSTER BIVALENT: CPT

## 2022-09-16 PROCEDURE — 90662 IIV NO PRSV INCREASED AG IM: CPT

## 2022-09-16 PROCEDURE — 91312 COVID-19,PF,PFIZER BOOSTER BIVALENT: CPT

## 2022-11-04 ENCOUNTER — ANCILLARY PROCEDURE (OUTPATIENT)
Dept: MAMMOGRAPHY | Facility: CLINIC | Age: 72
End: 2022-11-04
Attending: NURSE PRACTITIONER
Payer: COMMERCIAL

## 2022-11-04 DIAGNOSIS — Z12.31 SCREENING MAMMOGRAM FOR HIGH-RISK PATIENT: ICD-10-CM

## 2022-11-04 PROCEDURE — 77067 SCR MAMMO BI INCL CAD: CPT | Mod: TC | Performed by: RADIOLOGY

## 2022-11-04 PROCEDURE — 77063 BREAST TOMOSYNTHESIS BI: CPT | Mod: TC | Performed by: RADIOLOGY

## 2022-11-10 ENCOUNTER — TRANSFERRED RECORDS (OUTPATIENT)
Dept: HEALTH INFORMATION MANAGEMENT | Facility: CLINIC | Age: 72
End: 2022-11-10

## 2022-11-11 NOTE — PROGRESS NOTES
"Isabel Carroll  :  1950  DOS: 2022  MRN: [id number]    Sports Medicine Clinic Visit    PCP: No Ref-Primary, Physician    Isabel Carroll is a 72 year old female who is seen in follow-up presenting with left ankle pain.    Injury: {Precipitating Event:822157::\"Gradual onset of pain over the past \"}*** {DAY/WEEK/MONTH/YEAR:645071}.  Pain located over {side:5002} ***, {RADIATING PAIN:400351}.  Additional Features:  Positive: {Additional Features:376923}.  Symptoms are better with {RELIEVING FACTORS/MSPAIN:627871}.  Symptoms are worse with: {Painful Motions:991853}.  Other evaluation and/or treatments so far consists of: {RELIEVING FACTORS/MSPAIN:271644}.  Prior imaging: {Imagin}.  Prior History of related problems: ***. Left ankle corticosteroid injection performed on 2021 allowed for *** relief in pain.    Social History: {FSOCJob:232198}    Review of Systems  Musculoskeletal: as above  Remainder of review of systems is negative including constitutional, CV, pulmonary, GI, Skin and Neurologic except as noted in HPI or medical history.    Past Medical History:   Diagnosis Date     Arthritis      Basal cell carcinoma of skin 2011     Benign neoplasm of colon 10/26/2011    Overview:  Colonoscopy every 5 years  Oct 2011      Bronchiectasis without complication (H) 12/15/2016    CT chest done  through COPDgene study, formal report indicates cylindrical bronchiectasis. Reviewed CT with [facility] radiologists; bronchiectasis is likely present but of unclear clinical significance, and was likely present on CT from  as well (different scanner and different technique).     Chorioretinal scar of left eye 2015     Chronic rhinitis 2015     Clostridium difficile infection 2018     Coronary artery disease      Coronary atherosclerosis due to calcified coronary lesion 2011    Overview:  A)  CT Chest-Date: 07/13/10, There is coronary artery and mild aortic " calcification present. B)  Nuclear Stress Test on 10/01/10, No scintigraphic evidence of ischemia or infarction. EF: 71%.       Elevated blood pressure (not hypertension) 2006     Hypercholesteremia      Hyperlipidemia 2011    Overview:  See results review for lipid profile.      Impaired fasting glucose 2006     Nonsenile cataract      Overweight 2015     PONV (postoperative nausea and vomiting)      Primary osteoarthritis of both hands 2015     Right shoulder strain, initial encounter 12/15/2016     Shingles 2009     Sinus tachycardia 2016     Skin cancer , 2015    Basal cell cancer     Vitamin D deficiency 2010     Past Surgical History:   Procedure Laterality Date     CATARACT IOL, RT/LT        SECTION      x 2     COLONOSCOPY      3 polyps     COLONOSCOPY      neg     COLONOSCOPY N/A 12/3/2021    Procedure: COLONOSCOPY, FLEXIBLE, WITH LESION REMOVAL USING SNARE;  Surgeon: Prudence Hu MD;  Location: [facility]     COLONOSCOPY N/A 12/3/2021    Procedure: COLONOSCOPY, WITH POLYPECTOMY AND BIOPSY;  Surgeon: Prudence Hu MD;  Location: [facility]     COLONOSCOPY WITH CO2 INSUFFLATION N/A 12/3/2021    Procedure: COLONOSCOPY, WITH CO2 INSUFFLATION;  Surgeon: Prudence Hu MD;  Location: [facility]     ENDOSCOPY       GENITOURINARY SURGERY      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 10/2020     LENGTHEN TENDON ACHILLES  2011    Procedure:LENGTHEN TENDON ACHILLES; Surgeon:AARON CÁRDENAS; [facility]     LIVER SURGERY      Bengin large cyst     MINI ARC SLING OPERATION FOR STRESS INCONTINENCE       OSTEOTOMY ANKLE  2011    Procedure:OSTEOTOMY ANKLE; Calcaneous,  Flexor Digitorum Longus Transfer       SALPINGO-OOPHORECTOMY BILATERAL  ,     Benign prophylactic for FHx ov cancer     Family History   Problem Relation Age of Onset     Other Cancer Sister 51        ovarian cancer     Cancer Sister       "Thyroid Disease Sister      Colon Cancer Father 76     Brain Hemorrhage Father         accident     Macular Degeneration Mother      Diabetes Maternal Aunt      Diabetes Maternal Uncle      Cancer Paternal Aunt      Diabetes Maternal Grandmother      Hypertension No family hx of      Cerebrovascular Disease No family hx of      Glaucoma No family hx of        Objective  LMP  (LMP Unknown)       General: healthy, alert and in no distress      HEENT: no scleral icterus or conjunctival erythema     Skin: no suspicious lesions or rash. No jaundice.     CV: regular rhythm by palpation, 2+ distal pulses, no pedal edema      Resp: normal respiratory effort without conversational dyspnea     Psych: normal mood and affect      Gait: ***antalgic, appropriate coordination and balance     Neuro: normal light touch sensory exam of the extremities. Motor strength as noted below     {RIGHT/LEFT:804692::\"Bilateral\"} Ankle/Foot Exam:    Inspection:  {ankle inspection:817344::\"     no visible ecchymosis\",\"     no visible edema or effusion\"}    Foot inspection:  {foot inspection:090812::\"     no deformity noted\"}    ROM:   {ankle ROM:335092::\"     full ROM with dorsiflexion, plantarflexion, inversion and eversion\"}    Tender:  {ankel tender:404154}    Non-Tender:  {ankle nontender:078870::\"     remainder of foot and ankle\"}    Skin:  {skin tests:951938::\"     capillary refill less than 2 seconds\",\"     well perfused\"}    Special Tests:  {ankle special tests:668373}    Gait:  {gait:085126}    Proprioception:   {propriorception:420978}      Radiology:  ***  LEFT FOOT THREE OR MORE VIEWS  8/6/2021 9:44 AM      HISTORY:  Posterior tibial tendon dysfunction, left. Sinus tarsi  syndrome, left. Arthritis of left subtalar joint.                                                                      IMPRESSION: Minimal calcaneal spurring. Minimal first MTP  osteoarthritis. Otherwise unremarkable. No fracture identified.      DENISHA POND MD " "      Assessment:  No diagnosis found.    Plan:  Discussed the assessment with the patient.  {[facility] [place]:044001::\"Follow up: ***\"}          "

## 2022-11-15 DIAGNOSIS — R05.3 CHRONIC COUGH: ICD-10-CM

## 2022-11-18 ENCOUNTER — OFFICE VISIT (OUTPATIENT)
Dept: ORTHOPEDICS | Facility: CLINIC | Age: 72
End: 2022-11-18
Payer: COMMERCIAL

## 2022-11-18 VITALS
WEIGHT: 154 LBS | DIASTOLIC BLOOD PRESSURE: 86 MMHG | HEART RATE: 64 BPM | BODY MASS INDEX: 28.17 KG/M2 | SYSTOLIC BLOOD PRESSURE: 143 MMHG

## 2022-11-18 DIAGNOSIS — G89.29 CHRONIC PAIN OF LEFT ANKLE: Primary | ICD-10-CM

## 2022-11-18 DIAGNOSIS — M25.572 CHRONIC PAIN OF LEFT ANKLE: Primary | ICD-10-CM

## 2022-11-18 PROCEDURE — 20606 DRAIN/INJ JOINT/BURSA W/US: CPT | Mod: LT | Performed by: FAMILY MEDICINE

## 2022-11-18 RX ADMIN — ROPIVACAINE HYDROCHLORIDE 0.5 ML: 5 INJECTION, SOLUTION EPIDURAL; INFILTRATION; PERINEURAL at 11:41

## 2022-11-18 RX ADMIN — TRIAMCINOLONE ACETONIDE 40 MG: 40 INJECTION, SUSPENSION INTRA-ARTICULAR; INTRAMUSCULAR at 11:41

## 2022-11-18 NOTE — LETTER
2022         RE: Isabel Carroll  [address] [place] [address]        Dear Colleague,    Thank you for referring your patient, Isabel Carroll, to the [facility]. Please see a copy of my visit note below.    Isabel Carroll  :  1950  DOS: 2021  MRN: [id number]    Sports Medicine Clinic Procedure    Ultrasound Guided Left Ankle Joint CSI    Clinical History: Gradual onset of left posterior lateral ankle pain over the past several months.    Diagnosis:   1. Chronic pain of left ankle         Referring Physician: BRYAN Serrato DPM    Medium Joint Injection/Arthrocentesis: L ankle    Date/Time: 2022 11:41 AM  Performed by: Gilbert Loving DO  Authorized by: Gilbert Loving DO     Indications:  Pain  Needle Size:  21 G  Guidance: ultrasound    Location:  Ankle  Site:  L ankle  Medications:  0.5 mL ropivacaine 5 MG/ML; 40 mg triamcinolone 40 MG/ML  Outcome:  Tolerated well, no immediate complications  Timeout: timeout called immediately prior to procedure    Prep: patient was prepped and draped in usual sterile fashion     Ultrasound images of procedure were permanently stored.         Impression:  Successful tibiotalar Joint Injection with corticosteroid, US guided    Plan:  Follow up as directed by Dr Serrato  Ankle brace provided for comfort/suppoirt with activity or when painful  Expectations and goals of CSI reviewed  Often 2-3 days for steroid effect, and can take up to two weeks for maximum effect  We discussed modified progressive pain-free activity as tolerated  Do not overuse in first two weeks if feeling better due to concern for vulnerability while steroid is working  Supportive care reviewed  All questions were answered today  Contact us with additional questions or concerns  Signs and sx of concern reviewed      Gilbert Loving DO, CAQ  Primary Care Sports Medicine  [facility]         Again, thank you for allowing me to participate in the care of your patient.        Sincerely,        Gilbert Loving, DO

## 2022-11-18 NOTE — PROGRESS NOTES
Isabel Carroll  :  1950  DOS: 2021  MRN: [id number]    Sports Medicine Clinic Procedure    Ultrasound Guided Left Ankle Joint CSI    Clinical History: Gradual onset of left posterior lateral ankle pain over the past several months.    Diagnosis:   1. Chronic pain of left ankle         Referring Physician: BRYAN Serrato DPM    Medium Joint Injection/Arthrocentesis: L ankle    Date/Time: 2022 11:41 AM  Performed by: Gilbert Loving DO  Authorized by: Gilbert Loving DO     Indications:  Pain  Needle Size:  21 G  Guidance: ultrasound    Location:  Ankle  Site:  L ankle  Medications:  0.5 mL ropivacaine 5 MG/ML; 40 mg triamcinolone 40 MG/ML  Outcome:  Tolerated well, no immediate complications  Timeout: timeout called immediately prior to procedure    Prep: patient was prepped and draped in usual sterile fashion     Ultrasound images of procedure were permanently stored.         Impression:  Successful tibiotalar Joint Injection with corticosteroid, US guided    Plan:  Follow up as directed by Dr Serrato  Ankle brace provided for comfort/suppoirt with activity or when painful  Expectations and goals of CSI reviewed  Often 2-3 days for steroid effect, and can take up to two weeks for maximum effect  We discussed modified progressive pain-free activity as tolerated  Do not overuse in first two weeks if feeling better due to concern for vulnerability while steroid is working  Supportive care reviewed  All questions were answered today  Contact us with additional questions or concerns  Signs and sx of concern reviewed      Gilbert Loving DO, CAQ  Primary Care Sports Medicine  [facility]

## 2022-11-18 NOTE — PROGRESS NOTES
Isabel Carroll  :  1950  DOS: 2021  MRN: [id number]    Sports Medicine Clinic Procedure    Ultrasound Guided Left Subtalar Joint    Clinical History: Gradual onset of left posterior lateral ankle pain over the past several months.    Diagnosis:   No diagnosis found.  Referring Physician: BRYAN Serrato DPM  Impression:  Successful Subtalar Joint Injection    Plan:  Follow up as directed by Dr Serrato  Expectations and goals of CSI reviewed  Often 2-3 days for steroid effect, and can take up to two weeks for maximum effect  We discussed modified progressive pain-free activity as tolerated  Do not overuse in first two weeks if feeling better due to concern for vulnerability while steroid is working  Supportive care reviewed  All questions were answered today  Contact us with additional questions or concerns  Signs and sx of concern reviewed        Gilbert Loving DO, CAJES  Primary Care Sports Medicine  [facility]

## 2022-11-30 RX ORDER — TRIAMCINOLONE ACETONIDE 40 MG/ML
40 INJECTION, SUSPENSION INTRA-ARTICULAR; INTRAMUSCULAR
Status: SHIPPED | OUTPATIENT
Start: 2022-11-18

## 2022-11-30 RX ORDER — ROPIVACAINE HYDROCHLORIDE 5 MG/ML
0.5 INJECTION, SOLUTION EPIDURAL; INFILTRATION; PERINEURAL
Status: SHIPPED | OUTPATIENT
Start: 2022-11-18

## 2023-01-13 ENCOUNTER — TELEPHONE (OUTPATIENT)
Dept: PODIATRY | Facility: CLINIC | Age: 73
End: 2023-01-13
Payer: COMMERCIAL

## 2023-01-13 DIAGNOSIS — M76.822 POSTERIOR TIBIAL TENDON DYSFUNCTION, LEFT: Primary | ICD-10-CM

## 2023-01-13 NOTE — TELEPHONE ENCOUNTER
Spoke with patient regarding order for new inserts. Patient stated the last time she had inserts made was approximately 4 years ago. Patient stated her current shoes are old and worn, so purchased new shoes and would like to have inserts made to fit the shoes.     Patient is scheduled with [facility] in [place] on January 19, 2023 at 10:00 am. Patient is also aware that her insurance does not cover orthotics and will pay out of pocket.     Routed request to Dr. Balderas for review.    Byron Miranda RN on 1/13/2023 at 12:41 PM

## 2023-01-13 NOTE — TELEPHONE ENCOUNTER
[facility] Call Center    Phone Message    May a detailed message be left on voicemail: yes     Reason for Call Patient need New Inserts . She need a Order to be Placed from Doctor .  Patient has Appointment Next Week. She need Doctor to Reorder Her Inserts . Please call Patient     Action Taken: Message routed to:  Other: [facility]    Travel Screening: Not Applicable

## 2023-01-18 NOTE — TELEPHONE ENCOUNTER
Per Dr. Balderas, patient fine for new set of orthotics.      Byron Miranda RN on 1/18/2023 at 1:57 PM

## 2023-01-20 ENCOUNTER — MYC MEDICAL ADVICE (OUTPATIENT)
Dept: FAMILY MEDICINE | Facility: CLINIC | Age: 73
End: 2023-01-20
Payer: COMMERCIAL

## 2023-01-20 DIAGNOSIS — E78.5 HYPERLIPIDEMIA LDL GOAL <100: Primary | ICD-10-CM

## 2023-01-20 DIAGNOSIS — I25.119 CORONARY ARTERY DISEASE INVOLVING NATIVE HEART WITH ANGINA PECTORIS, UNSPECIFIED VESSEL OR LESION TYPE (H): ICD-10-CM

## 2023-01-20 DIAGNOSIS — R73.03 PRE-DIABETES: ICD-10-CM

## 2023-01-20 NOTE — TELEPHONE ENCOUNTER
Patient requesting labs to recheck cholesterol and glucose.    Routing to provider to review and advise.    Carlotta Smart RN  [facility]

## 2023-01-26 ENCOUNTER — TELEPHONE (OUTPATIENT)
Dept: FAMILY MEDICINE | Facility: CLINIC | Age: 73
End: 2023-01-26

## 2023-01-26 ENCOUNTER — LAB (OUTPATIENT)
Dept: LAB | Facility: CLINIC | Age: 73
End: 2023-01-26
Payer: COMMERCIAL

## 2023-01-26 DIAGNOSIS — E11.9 TYPE 2 DIABETES MELLITUS WITHOUT COMPLICATION, WITHOUT LONG-TERM CURRENT USE OF INSULIN (H): Primary | ICD-10-CM

## 2023-01-26 DIAGNOSIS — R73.03 PRE-DIABETES: ICD-10-CM

## 2023-01-26 DIAGNOSIS — E78.5 HYPERLIPIDEMIA LDL GOAL <100: ICD-10-CM

## 2023-01-26 DIAGNOSIS — I25.119 CORONARY ARTERY DISEASE INVOLVING NATIVE HEART WITH ANGINA PECTORIS, UNSPECIFIED VESSEL OR LESION TYPE (H): ICD-10-CM

## 2023-01-26 LAB
ALBUMIN SERPL-MCNC: 3.8 G/DL (ref 3.4–5)
ALP SERPL-CCNC: 157 U/L (ref 40–150)
ALT SERPL W P-5'-P-CCNC: 38 U/L (ref 0–50)
ANION GAP SERPL CALCULATED.3IONS-SCNC: 5 MMOL/L (ref 3–14)
AST SERPL W P-5'-P-CCNC: 18 U/L (ref 0–45)
BILIRUB SERPL-MCNC: 0.4 MG/DL (ref 0.2–1.3)
BUN SERPL-MCNC: 13 MG/DL (ref 7–30)
CALCIUM SERPL-MCNC: 9 MG/DL (ref 8.5–10.1)
CHLORIDE BLD-SCNC: 104 MMOL/L (ref 94–109)
CHOLEST SERPL-MCNC: 129 MG/DL
CO2 SERPL-SCNC: 33 MMOL/L (ref 20–32)
CREAT SERPL-MCNC: 0.66 MG/DL (ref 0.52–1.04)
ERYTHROCYTE [DISTWIDTH] IN BLOOD BY AUTOMATED COUNT: 11.5 % (ref 10–15)
FASTING STATUS PATIENT QL REPORTED: YES
GFR SERPL CREATININE-BSD FRML MDRD: >90 ML/MIN/1.73M2
GLUCOSE BLD-MCNC: 150 MG/DL (ref 70–99)
HBA1C MFR BLD: 7.1 % (ref 0–5.6)
HCT VFR BLD AUTO: 41.2 % (ref 35–47)
HDLC SERPL-MCNC: 42 MG/DL
HGB BLD-MCNC: 14.5 G/DL (ref 11.7–15.7)
LDLC SERPL CALC-MCNC: 45 MG/DL
MCH RBC QN AUTO: 33 PG (ref 26.5–33)
MCHC RBC AUTO-ENTMCNC: 35.2 G/DL (ref 31.5–36.5)
MCV RBC AUTO: 94 FL (ref 78–100)
NONHDLC SERPL-MCNC: 87 MG/DL
PLATELET # BLD AUTO: 268 10E3/UL (ref 150–450)
POTASSIUM BLD-SCNC: 3.9 MMOL/L (ref 3.4–5.3)
PROT SERPL-MCNC: 7.2 G/DL (ref 6.8–8.8)
RBC # BLD AUTO: 4.39 10E6/UL (ref 3.8–5.2)
SODIUM SERPL-SCNC: 142 MMOL/L (ref 133–144)
TRIGL SERPL-MCNC: 210 MG/DL
WBC # BLD AUTO: 5 10E3/UL (ref 4–11)

## 2023-01-26 PROCEDURE — 80053 COMPREHEN METABOLIC PANEL: CPT

## 2023-01-26 PROCEDURE — 80061 LIPID PANEL: CPT

## 2023-01-26 PROCEDURE — 36415 COLL VENOUS BLD VENIPUNCTURE: CPT

## 2023-01-26 PROCEDURE — 85027 COMPLETE CBC AUTOMATED: CPT

## 2023-01-26 PROCEDURE — 83036 HEMOGLOBIN GLYCOSYLATED A1C: CPT

## 2023-01-26 RX ORDER — METFORMIN HCL 500 MG
500 TABLET, EXTENDED RELEASE 24 HR ORAL
Qty: 90 TABLET | Refills: 1 | Status: SHIPPED | OUTPATIENT
Start: 2023-01-26 | End: 2023-05-02

## 2023-01-26 RX ORDER — LANCETS
EACH MISCELLANEOUS
Qty: 100 EACH | Refills: 6 | Status: SHIPPED | OUTPATIENT
Start: 2023-01-26 | End: 2024-05-24

## 2023-01-26 RX ORDER — GLUCOSAMINE HCL/CHONDROITIN SU 500-400 MG
CAPSULE ORAL
Qty: 100 EACH | Refills: 3 | Status: SHIPPED | OUTPATIENT
Start: 2023-01-26

## 2023-01-26 NOTE — TELEPHONE ENCOUNTER
Patient calling regarding provider's result note indicating pt has new diabetes diagnosis. Patient states she is going on vacation next week and is wondering how soon she needs to see primary care provider. Writer advised picking up medication that was prescribed for her asap, and can see provider for appointment once she returns from vacation. Patient verbalized understanding and agreement to plan. Assisted pt to schedule an appointment on 2/7/22.     Pt also stating she had a spot on her abdomen biopsied several weeks ago and now there is redness surrounding the area. Patient states she called derm clinic and they told her to use Aquaphor, but does not feel this is helpful. Patient states there is about a nickel sized redness surrounding biopsy site and it is painful to touch. There is no drainage. She does not have a fever. Writer advised pt can come into urgent care for evaluation. Pt prefers an appointment. Assisted to schedule appointment tomorrow with a primary care provider for evaluation.   Patient verbalized understanding and agreement to plan.     Kerri Blankenship RN    [facility]- Primary Care

## 2023-01-27 ENCOUNTER — OFFICE VISIT (OUTPATIENT)
Dept: FAMILY MEDICINE | Facility: CLINIC | Age: 73
End: 2023-01-27
Payer: COMMERCIAL

## 2023-01-27 VITALS
WEIGHT: 157.2 LBS | DIASTOLIC BLOOD PRESSURE: 80 MMHG | HEIGHT: 62 IN | BODY MASS INDEX: 28.93 KG/M2 | SYSTOLIC BLOOD PRESSURE: 126 MMHG | HEART RATE: 78 BPM | TEMPERATURE: 96.3 F | OXYGEN SATURATION: 96 %

## 2023-01-27 DIAGNOSIS — L98.9 SKIN LESION: Primary | ICD-10-CM

## 2023-01-27 DIAGNOSIS — I25.119 CORONARY ARTERY DISEASE INVOLVING NATIVE HEART WITH ANGINA PECTORIS, UNSPECIFIED VESSEL OR LESION TYPE (H): ICD-10-CM

## 2023-01-27 DIAGNOSIS — E11.9 TYPE 2 DIABETES MELLITUS WITHOUT COMPLICATION, WITHOUT LONG-TERM CURRENT USE OF INSULIN (H): ICD-10-CM

## 2023-01-27 PROCEDURE — 82570 ASSAY OF URINE CREATININE: CPT | Performed by: FAMILY MEDICINE

## 2023-01-27 PROCEDURE — 99214 OFFICE O/P EST MOD 30 MIN: CPT | Performed by: FAMILY MEDICINE

## 2023-01-27 PROCEDURE — 82043 UR ALBUMIN QUANTITATIVE: CPT | Performed by: FAMILY MEDICINE

## 2023-01-27 ASSESSMENT — PAIN SCALES - GENERAL: PAINLEVEL: MILD PAIN (3)

## 2023-01-27 NOTE — PROGRESS NOTES
"  Assessment & Plan     Skin lesion  -History of shave biopsy in her abdominal region 3 weeks ago at dermatologist office.  -Has been noticing redness around scab , wanted to make sure it is not infected since Isabel is going on a trip to [place] next week.    PLAN:  -Mild erythema noted around the scab site.    -Continue with topical antibiotic cream and Aquaphor.  -If erythema gets worse or noticing pus around the site, go to urgent care at [facility] to get antibiotics.  -Recommended to avoid swimming if possible and keep the area clean/avoid frequent touching.    Type 2 diabetes mellitus without complication, without long-term current use of insulin (H)  -HbA1c of 7.1 yesterday.  -Yet to start on metformin 500 extended release.  -Monofilament testing normal.  -Isabel has follow-up scheduled with our clinic provider when she comes back from vacation for diabetic discussion.    - Adult Eye  Referral; Future  - Albumin Random Urine Quantitative with Creat Ratio; Future    Coronary artery disease involving native heart with angina pectoris, unspecified vessel or lesion type (H)  -Stable on aspirin/Lipitor/metoprolol ER 25 mg           BMI:   Estimated body mass index is 28.75 kg/m  as calculated from the following:    Height as of this encounter: 1.575 m (5' 2\").    Weight as of this encounter: 71.3 kg (157 lb 3.2 oz).           No follow-ups on file.    Yas Lama MD  [facility]    James Hall is a 72 year old, presenting for the following health issues:  No chief complaint on file.      History of Present Illness       Reason for visit:  Possible infection from a biopsy procedure  Symptom onset:  3-7 days ago    She eats 2-3 servings of fruits and vegetables daily.She consumes 1 sweetened beverage(s) daily.She exercises with enough effort to increase her heart rate 30 to 60 minutes per day.  She exercises with enough effort to increase her heart rate 5 " days per week.   She is taking medications regularly.           Review of Systems   Constitutional, HEENT, cardiovascular, pulmonary, gi and gu systems are negative, except as otherwise noted.      Objective    LMP  (LMP Unknown)   There is no height or weight on file to calculate BMI.  Physical Exam   GENERAL: healthy, alert and no distress  NECK: no adenopathy, no asymmetry, masses, or scars and thyroid normal to palpation  RESP: lungs clear to auscultation - no rales, rhonchi or wheezes  CV: regular rate and rhythm, normal S1 S2, no S3 or S4, no murmur, click or rub, no peripheral edema and peripheral pulses strong  ABDOMEN: soft, nontender, no hepatosplenomegaly, no masses and bowel sounds normal  MS: no gross musculoskeletal defects noted, no edema

## 2023-01-30 ENCOUNTER — TELEPHONE (OUTPATIENT)
Dept: FAMILY MEDICINE | Facility: CLINIC | Age: 73
End: 2023-01-30
Payer: COMMERCIAL

## 2023-01-30 NOTE — TELEPHONE ENCOUNTER
Diabetes Education Scheduling Outreach #1:    Call to patient to schedule. Left message with phone number to call to schedule.    Also sent [employer] message for second attempt. Requested patient to call to schedule.    Tori Bullock OnCall  Diabetes and Nutrition Scheduling

## 2023-01-31 LAB
CREAT UR-MCNC: 58 MG/DL
MICROALBUMIN UR-MCNC: <5 MG/L
MICROALBUMIN/CREAT UR: NORMAL MG/G{CREAT}

## 2023-02-07 ENCOUNTER — MYC MEDICAL ADVICE (OUTPATIENT)
Dept: FAMILY MEDICINE | Facility: CLINIC | Age: 73
End: 2023-02-07

## 2023-02-07 ENCOUNTER — OFFICE VISIT (OUTPATIENT)
Dept: FAMILY MEDICINE | Facility: CLINIC | Age: 73
End: 2023-02-07
Payer: COMMERCIAL

## 2023-02-07 VITALS
RESPIRATION RATE: 18 BRPM | HEART RATE: 72 BPM | DIASTOLIC BLOOD PRESSURE: 79 MMHG | SYSTOLIC BLOOD PRESSURE: 133 MMHG | WEIGHT: 151.6 LBS | BODY MASS INDEX: 27.9 KG/M2 | OXYGEN SATURATION: 97 % | HEIGHT: 62 IN

## 2023-02-07 DIAGNOSIS — E11.9 TYPE 2 DIABETES MELLITUS WITHOUT COMPLICATION, WITHOUT LONG-TERM CURRENT USE OF INSULIN (H): Primary | ICD-10-CM

## 2023-02-07 PROCEDURE — 99213 OFFICE O/P EST LOW 20 MIN: CPT | Performed by: NURSE PRACTITIONER

## 2023-02-07 ASSESSMENT — PAIN SCALES - GENERAL: PAINLEVEL: NO PAIN (0)

## 2023-02-07 NOTE — PATIENT INSTRUCTIONS
At [facility], we strive to deliver an exceptional experience to you, every time we see you. If you receive a survey, please complete it as we do value your feedback.  If you have MyChart, you can expect to receive results automatically within 24 hours of their completion.  Your provider will send a note interpreting your results as well.   If you do not have MyChart, you should receive your results in about a week by mail.    Your care team:                            Family Medicine Internal Medicine   MD Guillermo Faustin MD Shantel Branch-Fleming, MD Srinivasa Vaka, MD Katya Belousova, PAVIC Cleaning CNP, MD ([facility]   Alonso Alex, MD Brandee Peraza MD Amelia Massimini APRN LISANDRA Collins APRN MD Yas Bain MD          Clinic hours: Monday - Thursday 7 am-6 pm; Fridays 7 am-5 pm.   Urgent care: Monday - Friday 10 am- 8 pm; Saturday and Sunday 9 am-5 pm.    Clinic: [phone]       [facility]: Monday - Thursday 8 am - 7 pm; Friday 8 am - 6 pm  [facility]: [phone]

## 2023-02-07 NOTE — PROGRESS NOTES
"  Assessment & Plan     Type 2 diabetes mellitus without complication, without long-term current use of insulin (H)  She saw Dr. Lama last week who helped her get started. She hasn't yet checked BG from past traumatic experience. We will try to get a freestyle allison sensor for her. Follow up 3 months, sooner prn.  - Continuous Blood Gluc  (FREESTYLE ALLISON 2 READER) LANE; 1 each once for 1 dose Use to read blood sugars per 's instructions.  - Continuous Blood Gluc Sensor (FREESTYLE ALLISON 2 SENSOR) MISC; 1 each every 14 days Use 1 sensor every 14 days. Use to read blood sugars per 's instructions.         BMI:   Estimated body mass index is 28.18 kg/m  as calculated from the following:    Height as of this encounter: 1.562 m (5' 1.5\").    Weight as of this encounter: 68.8 kg (151 lb 9.6 oz).   Weight management plan: Discussed healthy diet and exercise guidelines    See Patient Instructions    Return in about 3 months (around 5/7/2023).     Return precautions discussed, including when to seek urgent/emergent care.    Patient verbalizes understanding and agrees with plan of care. Patient stable for discharge.      VIC OLMOS CNP  [facility]    James Hall is a 72 year old, presenting for the following health issues:  Diabetes (Newly diagnosed )      HPI     Diabetes Follow-up      How often are you checking your blood sugar? Not at all    What concerns do you have today about your diabetes? None and Other: having to poke fingers does not like needles, if she gets her weight down can she go of meds     Do you have any of these symptoms? (Select all that apply)  No numbness or tingling in feet.  No redness, sores or blisters on feet.  No complaints of excessive thirst.  No reports of blurry vision.  No significant changes to weight.    Have you had a diabetic eye exam in the last 12 months? No        BP Readings from Last 2 Encounters: " "  02/07/23 133/79   01/27/23 126/80     Hemoglobin A1C (%)   Date Value   01/26/2023 7.1 (H)   04/28/2022 6.1 (H)   10/14/2020 5.6   11/08/2019 5.6     LDL Cholesterol Calculated (mg/dL)   Date Value   01/26/2023 45   11/05/2021 59   10/14/2020 39   05/22/2018 48           How many servings of fruits and vegetables do you eat daily?  2-3    On average, how many sweetened beverages do you drink each day (Examples: soda, juice, sweet tea, etc.  Do NOT count diet or artificially sweetened beverages)?   0    How many days per week do you exercise enough to make your heart beat faster? 3 or less    How many minutes a day do you exercise enough to make your heart beat faster? 9 or less    How many days per week do you miss taking your medication? 0    Struggling with checking BG  Had traumatic experience as a young child with mom and a spurting varicose vein and is hesitant about pricking self    Went to [facility] on vacation  Since then trying to be better about diet      Review of Systems   Constitutional, HEENT, cardiovascular, pulmonary, gi and gu systems are negative, except as otherwise noted.      Objective    /79 (BP Location: Right arm, Patient Position: Sitting, Cuff Size: Adult Regular)   Pulse 72   Resp 18   Ht 1.562 m (5' 1.5\")   Wt 68.8 kg (151 lb 9.6 oz)   LMP  (LMP Unknown)   SpO2 97%   BMI 28.18 kg/m    Body mass index is 28.18 kg/m .  Physical Exam   GENERAL: healthy, alert and no distress  PSYCH: mentation appears normal, affect normal/bright                    "

## 2023-02-14 ENCOUNTER — TELEPHONE (OUTPATIENT)
Dept: FAMILY MEDICINE | Facility: CLINIC | Age: 73
End: 2023-02-14

## 2023-02-14 NOTE — TELEPHONE ENCOUNTER
PRIOR AUTHORIZATION DENIED    Medication: FREESTYLE ANABELL 2 SENSOR  Denial Date: 2/14/2023    Denial Rationale: Patient does not meet criteria for CGM to be covered by insurance- must be treated with insulin at least once daily or have history of documented problematic hypoglycemia. I did include in PA request that patient has needle phobia but criteria was still not met.          Appeal Information: If provider would like to appeal this decision please attach a detailed letter of medical necessity to the patient's chart and route the encounter back to the PA Team.

## 2023-02-14 NOTE — TELEPHONE ENCOUNTER
Alisia Vizcarra, APRN CNP   8:07 PM  I am not able to see if Dexcom is covered at this time unfortunately  I did already send the Freestyle 2  Please send PA        Per provider, Initiate PA for Freestyle allison 2.      Routing to PA team.      Aylin Hawkins RN  [facility]

## 2023-02-14 NOTE — TELEPHONE ENCOUNTER
Please let patient know PA was denied  Will need to proceed with blood glucose monitor with meter  She can start with 3 times per week if that would help (Mon/Wed/Fri)

## 2023-02-14 NOTE — TELEPHONE ENCOUNTER
PA Initiation    Medication: FREESTYLE ANABELL 2 SENSOR  Insurance Company: [employer] - Phone [phone] Fax [phone]  Pharmacy Filling the Rx: [facility] [address] [place] [address] - [address] [place] [address]  Filling Pharmacy Phone: [phone]  Filling Pharmacy Fax: [phone]  Start Date: 2/14/2023

## 2023-02-14 NOTE — TELEPHONE ENCOUNTER
I am not able to see if Dexcom is covered at this time unfortunately  I did already send the Freestyle 2  Please send PA

## 2023-02-15 NOTE — TELEPHONE ENCOUNTER
Diabetes educator,     Please see message below. Patient has appointment on 3/14/23, but needs sooner appointment for glucometer teaching and diabetes education. Is there anyway her appointment can be moved up?  Please let us know if not, so we can coordinate care.     Naida Turner RN    [facility]

## 2023-02-15 NOTE — TELEPHONE ENCOUNTER
Schedulers please check if there is anything sooner.     This is a new diagnosis with an A1c of 7.1 on 500 mg of metformin per day. If there is nothing sooner than 3/14/23 ok to wait.

## 2023-02-15 NOTE — TELEPHONE ENCOUNTER
Left message with spouse. [place] and surrounding clinics ([place], [place], [place], [place]) do not have any appointments sooner. Provider spouse with [facility]'s number to call and see if they have anything sooner.    Tori WHATLEY  Central Scheduler

## 2023-02-15 NOTE — TELEPHONE ENCOUNTER
Called pt and relayed message.    Pt states that she has a problem with needles and needs to be taught how to use the meter device.   Pt has her appointment with the diabetic educator on 3/14/23.    Pt requested to see provider to discuss diabetes and meter device.     Routing to provider as VALORIE Hawkins RN  [facility]

## 2023-02-21 ENCOUNTER — ALLIED HEALTH/NURSE VISIT (OUTPATIENT)
Dept: EDUCATION SERVICES | Facility: CLINIC | Age: 73
End: 2023-02-21
Attending: NURSE PRACTITIONER
Payer: COMMERCIAL

## 2023-02-21 DIAGNOSIS — E11.9 TYPE 2 DIABETES MELLITUS WITHOUT COMPLICATION, WITHOUT LONG-TERM CURRENT USE OF INSULIN (H): ICD-10-CM

## 2023-02-21 PROCEDURE — G0108 DIAB MANAGE TRN  PER INDIV: HCPCS

## 2023-02-21 NOTE — PATIENT INSTRUCTIONS
PLAN:  *Check blood sugar 2-3 times per week fasting  *Target fasting blood sugar goal     FOLLOW-UP:     *5/2 with Alisia Vizcarra  *Diabetes Education as needed

## 2023-02-21 NOTE — PROGRESS NOTES
Diabetes Self-Management Education & Support  Isabel Carroll presents today for education related to Type 2 diabetes    Patient is being treated with:  oral agents  She is accompanied by self    Year of diagnosis: 2023  Referring provider:  Alisia Vizcarra  Living Situation: At home with spouse  Employment: Retired counselor    PATIENT CONCERNS RELATED TO DIABETES SELF MANAGEMENT: Coming off Metformin,wants to manage with diet and exercise      ASSESSMENT:    Taking Medication:   Yes    Current Diabetes Management per Patient:  Taking diabetes medications?   yes:     Diabetes Medication(s)     Biguanides       metFORMIN (GLUCOPHAGE XR) 500 MG 24 hr tablet    Take 1 tablet (500 mg) by mouth daily (with dinner)          Monitoring  Patient glucose self monitoring as follows: rarely  BG meter:   BG results: not available     Patient's most recent   Lab Results   Component Value Date    A1C 7.1 01/26/2023    A1C 5.6 10/14/2020      Patient's A1C goal: <7.0    Activity: 2 times per week, Yoga, Strength training    Healthy Eating:   Patient currently eats 3 meals 1-2 snacks per day   Breakfast: English muffin, yogurt, coffee with half and half. Apple cider  Lunch: Chicken parmesan, salad with veggies, blueberries, strawberries, pineapple, diet coke  Snack: Almonds, string cheese  Dinner: Pot roast, carrots, 1/2 cup mashed potatoes, fruit, thin slice ice cream cake  Diet coke, sugar free lemonade, water throughout day    Problem Solving:    Patient is at risk of hypoglycemia?: NO  Hospitalizations for hyper or hypoglycemia: No    Healthy Coping and Stress Management:   Sources of stress identified by patient  Other (please specify):  New diagnosis T2  Coping mechanisms identified by patient:  Other (please specify):  Watch grandchildren, Cook      EDUCATION and INSTRUCTION PROVIDED AT THIS VISIT:    Comprehensive T2 review at the request of Alisia Vizcarra. A1C 7.1, up from 6.1 9 months ago. Patient was started on  Metformin 500 mg with dinner 3 weeks ago; tolerating without side effects. GDM without insulin during pregnancy. Strong maternal family history of T2. Diet recall reviewed. Used to be member of weight watchers; patient very aware regarding portion sizes and healthy choices. Offered appointment with Dietician; declined at this time. Has been member of [employer], exercises 2-3 times per week. Reviewed: pathophysiology and progression of diagnosis, purpose of Metformin, effect of carbs on blood sugar, recommended exercise, complications from chronic high blood sugars, and what can increase blood sugar values such as steroid and illness. Fearful of checking BG due to witnessing mother hemorrhage. Discussed sensor option; she is not eligible since not on insulin. Informed sensor need is probably not necessary at this time. Patient agreed. Taught SBMG using the One Touch Verio meter. Instructed in the following skills:Wash hands prior to testing. Keep test strips closed and out of direct sunlight or temp extremes when not in use. Operation of lancing device, including changing lancet, avoiding using on other people, depth finder, appropriate disposal of lancets. Meter characteristics:  Accessing memory, BG averaging, battery location and replacement, error messages and how to troubleshoot, using the control solution and contacting the company if problems. BG goals, timing of BG testing, recording values.  BG at time of [id number] (2 hours post breakfast).  Demonstrated correct technique and performed fingerstick without difficulty. She will follow up with Diabetes Education as needed per patient preference. No changes to Metformin dose.    Yoli Zafar RN, Diabetes Educator  Diabetes Education Department  [facility], [facility] [place]  [phone]    Patient-stated goal written and given to Isabel Carroll.  Verbalized and demonstrated understanding of instructions.      PLAN:  *Continue Metformin 500 mg  *Check blood sugar 2-3 times per week fasting  *Target fasting blood sugar goal     FOLLOW-UP:     *5/2 with Alisia Vizcarra  *Diabetes Education as needed    Time spent with patient at today's visit was 65 minutes.      Any diabetes medication dose changes were made via the CDE Protocol and Collaborative Practice Agreement with [place] and  [facility].  A copy of this encounter was provided to patient's referring provider.

## 2023-02-23 ENCOUNTER — TELEPHONE (OUTPATIENT)
Dept: FAMILY MEDICINE | Facility: CLINIC | Age: 73
End: 2023-02-23

## 2023-02-24 ENCOUNTER — ALLIED HEALTH/NURSE VISIT (OUTPATIENT)
Dept: FAMILY MEDICINE | Facility: CLINIC | Age: 73
End: 2023-02-24
Payer: COMMERCIAL

## 2023-02-24 DIAGNOSIS — Z71.89 ENCOUNTER FOR DIABETES EDUCATION: Primary | ICD-10-CM

## 2023-02-24 PROCEDURE — 99207 PR NO CHARGE NURSE ONLY: CPT

## 2023-02-24 NOTE — PROGRESS NOTES
Chart reviewed, patient met with diabetes educator on 2/21/23 and was taught glucometer use. RN will see what further questions patient has.     Pt reports concerns with lancing device, states does not work.   RN watch patient set up lancing device and noticed that there is a used lancet in device. Pt states that they must of forgot how to review remove and dispose of used lancet. RN demonstrated how to remove the lancing device and properly dispose. Advise patient to place in hard plastic container such as plastic juice container or laundry detergent container or can also buy biohazard bin from [employer] or [employer]. Advise pt to contact their waste management or county (can look up on website) for proper disposal of needles.     Pt demonstrated to RN how to properly use glucometer and how to appropriately remove and dispose of needles.  Pt checked glucose during office visit and at 151. Pt reports she is fasting. Pt has concerns with value wants to know why it is high. RN reviewed with patient that multiple factors (diet, activity level, stress, forgot to take meds, or sleep). could be causing slightly high BS. RN advise patient to not worry too much on value as patient has not been consistently checking BS. Did advise to follow diabetes edudcator's plan and/or follow-up with them if has further questions. Pt verbalized understanding and agrees with plan.    Reviewed hypo/hyperglycemia signs and symptoms and what actions to take and reasons for call back or follow-up.  Reviewed f/u appointment with Alisia Vizcarra on 5/2/23. Pt verbalized understanding and agrees with plan.    No further questions/concerns.     Naida Turner RN    [facility]

## 2023-03-01 ENCOUNTER — TELEPHONE (OUTPATIENT)
Dept: FAMILY MEDICINE | Facility: CLINIC | Age: 73
End: 2023-03-01
Payer: COMMERCIAL

## 2023-03-01 NOTE — TELEPHONE ENCOUNTER
Patient Quality Outreach    Patient is due for the following:   Diabetes -  Eye Exam    Next Steps:   Patient has upcoming appointment, these items will be addressed at that time.    Type of outreach:    Chart review performed, no outreach needed.      Questions for provider review:    None     Haydee Nicholas

## 2023-03-02 ENCOUNTER — OFFICE VISIT (OUTPATIENT)
Dept: OPTOMETRY | Facility: CLINIC | Age: 73
End: 2023-03-02
Payer: COMMERCIAL

## 2023-03-02 DIAGNOSIS — H52.13 MYOPIA OF BOTH EYES: ICD-10-CM

## 2023-03-02 DIAGNOSIS — H02.839 DERMATOCHALASIS OF EYELID: ICD-10-CM

## 2023-03-02 DIAGNOSIS — H52.4 PRESBYOPIA: ICD-10-CM

## 2023-03-02 DIAGNOSIS — H52.222 REGULAR ASTIGMATISM OF LEFT EYE: ICD-10-CM

## 2023-03-02 DIAGNOSIS — H02.403 PTOSIS OF BOTH EYELIDS: ICD-10-CM

## 2023-03-02 DIAGNOSIS — H31.002 CHORIORETINAL SCAR OF LEFT EYE: ICD-10-CM

## 2023-03-02 DIAGNOSIS — E11.9 TYPE 2 DIABETES MELLITUS WITHOUT COMPLICATION, WITHOUT LONG-TERM CURRENT USE OF INSULIN (H): Primary | ICD-10-CM

## 2023-03-02 DIAGNOSIS — Z96.1 PSEUDOPHAKIA OF BOTH EYES: ICD-10-CM

## 2023-03-02 PROCEDURE — 92015 DETERMINE REFRACTIVE STATE: CPT | Performed by: OPTOMETRIST

## 2023-03-02 PROCEDURE — 92014 COMPRE OPH EXAM EST PT 1/>: CPT | Performed by: OPTOMETRIST

## 2023-03-02 ASSESSMENT — KERATOMETRY
OS_K1POWER_DIOPTERS: 42.50
OS_K2POWER_DIOPTERS: 45.00
OS_AXISANGLE2_DEGREES: 170
OD_K2POWER_DIOPTERS: 44.50
OD_AXISANGLE2_DEGREES: 180
OD_AXISANGLE_DEGREES: 090
OD_K1POWER_DIOPTERS: 42.50
OS_AXISANGLE_DEGREES: 080

## 2023-03-02 ASSESSMENT — VISUAL ACUITY
OS_CC: 20/60
OD_CC+: -1
OD_CC: 20/50
OD_CC: 20/25
OS_SC+: -2
CORRECTION_TYPE: GLASSES
METHOD: SNELLEN - LINEAR
OS_SC: 20/60
OS_CC: 20/30
OS_CC+: -1
OD_SC: 20/30
OS_PH_CC: 20/25

## 2023-03-02 ASSESSMENT — CONF VISUAL FIELD
OD_INFERIOR_TEMPORAL_RESTRICTION: 0
OD_SUPERIOR_TEMPORAL_RESTRICTION: 0
OS_SUPERIOR_NASAL_RESTRICTION: 0
OD_SUPERIOR_NASAL_RESTRICTION: 0
OD_NORMAL: 1
OS_NORMAL: 1
OS_SUPERIOR_TEMPORAL_RESTRICTION: 0
OS_INFERIOR_NASAL_RESTRICTION: 0
OS_INFERIOR_TEMPORAL_RESTRICTION: 0
OD_INFERIOR_NASAL_RESTRICTION: 0

## 2023-03-02 ASSESSMENT — REFRACTION_WEARINGRX
OD_SPHERE: PLANO
OD_CYLINDER: +0.25
SPECS_TYPE: PAL
OS_CYLINDER: SPHERE
OD_ADD: +2.75
OS_ADD: +2.75
OS_SPHERE: PLANO
OD_AXIS: 050

## 2023-03-02 ASSESSMENT — REFRACTION_MANIFEST
METHOD_AUTOREFRACTION: 1
OS_CYLINDER: +0.50
OD_CYLINDER: SPHERE
OD_SPHERE: -0.50
OS_ADD: +3.00
OD_ADD: +3.00
OS_SPHERE: -1.00
OS_AXIS: 075

## 2023-03-02 ASSESSMENT — TONOMETRY
OD_IOP_MMHG: 19
OS_IOP_MMHG: 20
IOP_METHOD: TONOPEN

## 2023-03-02 ASSESSMENT — CUP TO DISC RATIO
OD_RATIO: 0.3
OS_RATIO: 0.3

## 2023-03-02 ASSESSMENT — EXTERNAL EXAM - LEFT EYE: OS_EXAM: NORMAL

## 2023-03-02 ASSESSMENT — EXTERNAL EXAM - RIGHT EYE: OD_EXAM: NORMAL

## 2023-03-02 NOTE — PATIENT INSTRUCTIONS
There are not any signs of the diabetes affecting the eyes today.  It is important that you get your eyes dilated once yearly and keep good control of your diabetes.    Referral to [facility]. Eldon [facility]ather at the [facility] for evaluation.    Thera tears- 1 drop both eyes 2-4 x daily or other artificial tear.    Recommend new glasses.    Return in 1 year for a complete eye exam or sooner if needed.    Mj Frederick, OD    The affects of the dilating drops last for 4- 6 hours.  You will be more sensitive to light and vision will be blurry up close.  Do not drive if you do not feel comfortable.  Mydriatic sunglasses were given if needed.    Patient Education   Diabetes weakens the blood vessels all over the body, including the eyes. Damage to the blood vessels in the eyes can cause swelling or bleeding into part of the eye (called the retina). This is called diabetic retinopathy (MARTÍN-tin-AH-puh-thee). If not treated, this disease can cause vision loss or blindness.   Symptoms may include blurred or distorted vision, but many people have no symptoms. It's important to see your eye doctor regularly to check for problems.   Early treatment and good control can help protect your vision. Here are the things you can do to help prevent vision loss:      1. Keep your blood sugar levels under tight control.      2. Bring high blood pressure under control.      3. No smoking.      4. Have yearly dilated eye exams.         Optometry Providers       Clinic Locations                                 Telephone Number   Dr. Sruthi Dinero/[facility] [place] [phone]     [facility] Hours:                [facility] Hours:       [facility] Hours:   [address]   [address]     [address] [facility]  [address] [address] [address]   [place] [address]    [place] [address]  Phone: [phone]                     Phone: [phone]     Phone: [phone]                      Monday 8:00-6:00                          Monday 8:00-6:00                          Monday 8:00-6:00              Tuesday 8:00-6:00                          Tuesday 8:00-6:00                          Tuesday 8:00-6:00              Wednesday 8:00-6:00                  Wednesday 8:00-6:00                   Wednesday 8:00-6:00      Thursday 8:00-6:00                        Thursday 8:00-6:00                         Thursday 8:00-6:00            Friday 8:00-5:00                              Friday 8:00-5:00                              Friday 8:00-5:00    [facility] Hours:   [address] [place] [address]  [phone]    Monday 9:00-6:00  Tuesday 9:00-6:00  Wednesday 9:00-6:00  Thursday 9:00-6:00  Friday 9:00-5:00  Please log on to [email or web address] to order your contact lenses.  The link is found on the [facility] Care [facility] Vision Services page.  As always, Thank you for trusting us with your health care needs!

## 2023-03-02 NOTE — PROGRESS NOTES
Chief Complaint   Patient presents with     Diabetic Eye Exam        Chief Complaint(s) and History of Present Illness(es)     Diabetic Eye Exam            Diabetes Type: Type 2 and taking oral medications    Duration: 1 month    Blood Sugars: is controlled               Lab Results   Component Value Date    A1C 7.1 01/26/2023    A1C 6.1 04/28/2022    A1C 6.3 11/05/2021    A1C 5.6 10/14/2020    A1C 5.6 11/08/2019    A1C 5.8 10/17/2017    A1C 5.5 10/14/2016    A1C 5.8 05/22/2016     Last Eye Exam: 10-  Dilated Previously: Yes    What are you currently using to see?  glasses    Distance Vision Acuity: Noticed gradual change in both eyes    Near Vision Acuity: Satisfied with vision while reading  with glasses    Eye Comfort: good  Do you use eye drops? : Yes: thera tears  Occupation or Hobbies: retired school counselor     Cataract surgery both eyes   Right eye-3/7/2019  Left eye-2/21/2019 with Anthony Coulter MD    Had eyelid evaluation with Dr. Colón in 2017- didn't qualify for insurance coverage.  She does feel it is worse and would like to revisit that.    Razia Baez Optometric Assistant, A.B.O.C.     Medical, surgical and family histories reviewed and updated 3/2/2023.       OBJECTIVE: See Ophthalmology exam    ASSESSMENT:    ICD-10-CM    1. Type 2 diabetes mellitus without complication, without long-term current use of insulin (H)  E11.9 Adult Eye  Referral     EYE EXAM (SIMPLE-NONBILLABLE)    Negative diabetic retinopathy both eyes      2. Pseudophakia of both eyes  Z96.1 EYE EXAM (SIMPLE-NONBILLABLE)      3. Chorioretinal scar of left eye  H31.002 EYE EXAM (SIMPLE-NONBILLABLE)      4. Ptosis of both eyelids  H02.403 EYE EXAM (SIMPLE-NONBILLABLE)     Adult Eye  Referral      5. Dermatochalasis of eyelid  H02.839 EYE EXAM (SIMPLE-NONBILLABLE)     Adult Eye  Referral      6. Myopia of both eyes  H52.13 REFRACTION      7. Regular astigmatism of left eye  H52.222 REFRACTION      8.  Presbyopia  H52.4 REFRACTION          PLAN:    Isabel Carroll aware  eye exam results will be sent to No Ref-Primary, Physician.  Patient Instructions   There are not any signs of the diabetes affecting the eyes today.  It is important that you get your eyes dilated once yearly and keep good control of your diabetes.    Referral to Dr. Eldon Prather at the [facility] for evaluation- [phone].    Thera tears- 1 drop both eyes 2-4 x daily or other artificial tear.    Recommend new glasses.    Return in 1 year for a complete eye exam or sooner if needed.    Mj Frederick, OD

## 2023-03-02 NOTE — LETTER
3/2/2023         RE: Isabel Carroll  [address] [address]        Dear Colleague,    Thank you for referring your patient, Isabel Carroll, to the [facility]. Please see a copy of my visit note below.    Chief Complaint   Patient presents with     Diabetic Eye Exam        Chief Complaint(s) and History of Present Illness(es)     Diabetic Eye Exam            Diabetes Type: Type 2 and taking oral medications    Duration: 1 month    Blood Sugars: is controlled               Lab Results   Component Value Date    A1C 7.1 01/26/2023    A1C 6.1 04/28/2022    A1C 6.3 11/05/2021    A1C 5.6 10/14/2020    A1C 5.6 11/08/2019    A1C 5.8 10/17/2017    A1C 5.5 10/14/2016    A1C 5.8 05/22/2016     Last Eye Exam: 10-  Dilated Previously: Yes    What are you currently using to see?  glasses    Distance Vision Acuity: Noticed gradual change in both eyes    Near Vision Acuity: Satisfied with vision while reading  with glasses    Eye Comfort: good  Do you use eye drops? : Yes: thera tears  Occupation or Hobbies: retired school counselor     Cataract surgery both eyes   Right eye-3/7/2019  Left eye-2/21/2019 with Anthony Coulter MD    Had eyelid evaluation with Dr. Colón in 2017- didn't qualify for insurance coverage.  She does feel it is worse and would like to revisit that.    Razia Baez Optometric Assistant, A.B.O.C.     Medical, surgical and family histories reviewed and updated 3/2/2023.       OBJECTIVE: See Ophthalmology exam    ASSESSMENT:    ICD-10-CM    1. Type 2 diabetes mellitus without complication, without long-term current use of insulin (H)  E11.9 Adult Eye  Referral     EYE EXAM (SIMPLE-NONBILLABLE)    Negative diabetic retinopathy both eyes      2. Pseudophakia of both eyes  Z96.1 EYE EXAM (SIMPLE-NONBILLABLE)      3. Chorioretinal scar of left eye  H31.002 EYE EXAM (SIMPLE-NONBILLABLE)      4. Ptosis of both eyelids  H02.403 EYE  EXAM (SIMPLE-NONBILLABLE)     Adult Eye  Referral      5. Dermatochalasis of eyelid  H02.839 EYE EXAM (SIMPLE-NONBILLABLE)     Adult Eye  Referral      6. Myopia of both eyes  H52.13 REFRACTION      7. Regular astigmatism of left eye  H52.222 REFRACTION      8. Presbyopia  H52.4 REFRACTION          PLAN:    Isabel Carroll aware  eye exam results will be sent to No Ref-Primary, Physician.  Patient Instructions   There are not any signs of the diabetes affecting the eyes today.  It is important that you get your eyes dilated once yearly and keep good control of your diabetes.    Referral to [facility]on [facility]ather at the [facility] for evaluation- [phone].    Thera tears- 1 drop both eyes 2-4 x daily or other artificial tear.    Recommend new glasses.    Return in 1 year for a complete eye exam or sooner if needed.    Mj Frederick, OD               Again, thank you for allowing me to participate in the care of your patient.        Sincerely,        Mj Frederick, OD

## 2023-04-25 ENCOUNTER — TELEPHONE (OUTPATIENT)
Dept: FAMILY MEDICINE | Facility: CLINIC | Age: 73
End: 2023-04-25
Payer: COMMERCIAL

## 2023-04-25 DIAGNOSIS — E11.9 TYPE 2 DIABETES MELLITUS WITHOUT COMPLICATION, WITHOUT LONG-TERM CURRENT USE OF INSULIN (H): Primary | ICD-10-CM

## 2023-04-25 DIAGNOSIS — E78.5 HYPERLIPIDEMIA LDL GOAL <100: ICD-10-CM

## 2023-04-25 ASSESSMENT — ENCOUNTER SYMPTOMS
NAUSEA: 0
CHILLS: 0
BREAST MASS: 0
HEMATURIA: 0
ARTHRALGIAS: 1
FREQUENCY: 0
SHORTNESS OF BREATH: 0
DIARRHEA: 0
NERVOUS/ANXIOUS: 0
ABDOMINAL PAIN: 0
HEARTBURN: 0
SORE THROAT: 0
CONSTIPATION: 0
HEMATOCHEZIA: 0
PALPITATIONS: 0
WEAKNESS: 0
JOINT SWELLING: 0
MYALGIAS: 0
EYE PAIN: 0
PARESTHESIAS: 0
HEADACHES: 0
DYSURIA: 0
DIZZINESS: 0
FEVER: 0
COUGH: 0

## 2023-04-25 ASSESSMENT — ACTIVITIES OF DAILY LIVING (ADL): CURRENT_FUNCTION: NO ASSISTANCE NEEDED

## 2023-04-25 NOTE — TELEPHONE ENCOUNTER
Order/Referral Request    Who is requesting: Patient    Orders being requested: labs: requesting annual labs: including glucose and a1c.     Reason service is needed/diagnosis: Patient has annual wellness visit on 5/2/23    When are orders needed by: by 4/28/23, before 8:15 am lab appointment    Has this been discussed with Provider: No    Does patient have a preference on a Group/Provider/Facility? no    Does patient have an appointment scheduled?: Yes: 5/2/23    Where to send orders: Place orders within Epic    [facility] or would you prefer to receive a phone call?:   No preference   Okay to leave a detailed message?: Yes at Cell number on file:    Telephone Information:   Mobile [phone]

## 2023-04-28 ENCOUNTER — LAB (OUTPATIENT)
Dept: LAB | Facility: CLINIC | Age: 73
End: 2023-04-28
Payer: COMMERCIAL

## 2023-04-28 DIAGNOSIS — E11.9 TYPE 2 DIABETES MELLITUS WITHOUT COMPLICATION, WITHOUT LONG-TERM CURRENT USE OF INSULIN (H): ICD-10-CM

## 2023-04-28 DIAGNOSIS — E78.5 HYPERLIPIDEMIA LDL GOAL <100: ICD-10-CM

## 2023-04-28 LAB
ALBUMIN SERPL-MCNC: 4 G/DL (ref 3.4–5)
ALP SERPL-CCNC: 108 U/L (ref 40–150)
ALT SERPL W P-5'-P-CCNC: 40 U/L (ref 0–50)
ANION GAP SERPL CALCULATED.3IONS-SCNC: 5 MMOL/L (ref 3–14)
AST SERPL W P-5'-P-CCNC: 20 U/L (ref 0–45)
BASOPHILS # BLD AUTO: 0 10E3/UL (ref 0–0.2)
BASOPHILS NFR BLD AUTO: 1 %
BILIRUB SERPL-MCNC: 0.6 MG/DL (ref 0.2–1.3)
BUN SERPL-MCNC: 11 MG/DL (ref 7–30)
CALCIUM SERPL-MCNC: 9.3 MG/DL (ref 8.5–10.1)
CHLORIDE BLD-SCNC: 102 MMOL/L (ref 94–109)
CHOLEST SERPL-MCNC: 113 MG/DL
CO2 SERPL-SCNC: 29 MMOL/L (ref 20–32)
CREAT SERPL-MCNC: 0.74 MG/DL (ref 0.52–1.04)
EOSINOPHIL # BLD AUTO: 0.1 10E3/UL (ref 0–0.7)
EOSINOPHIL NFR BLD AUTO: 2 %
ERYTHROCYTE [DISTWIDTH] IN BLOOD BY AUTOMATED COUNT: 11.4 % (ref 10–15)
FASTING STATUS PATIENT QL REPORTED: YES
GFR SERPL CREATININE-BSD FRML MDRD: 85 ML/MIN/1.73M2
GLUCOSE BLD-MCNC: 104 MG/DL (ref 70–99)
HBA1C MFR BLD: 6 % (ref 0–5.6)
HCT VFR BLD AUTO: 42.1 % (ref 35–47)
HDLC SERPL-MCNC: 41 MG/DL
HGB BLD-MCNC: 14.5 G/DL (ref 11.7–15.7)
IMM GRANULOCYTES # BLD: 0 10E3/UL
IMM GRANULOCYTES NFR BLD: 0 %
LDLC SERPL CALC-MCNC: 44 MG/DL
LYMPHOCYTES # BLD AUTO: 1.6 10E3/UL (ref 0.8–5.3)
LYMPHOCYTES NFR BLD AUTO: 37 %
MCH RBC QN AUTO: 32.4 PG (ref 26.5–33)
MCHC RBC AUTO-ENTMCNC: 34.4 G/DL (ref 31.5–36.5)
MCV RBC AUTO: 94 FL (ref 78–100)
MONOCYTES # BLD AUTO: 0.3 10E3/UL (ref 0–1.3)
MONOCYTES NFR BLD AUTO: 8 %
NEUTROPHILS # BLD AUTO: 2.3 10E3/UL (ref 1.6–8.3)
NEUTROPHILS NFR BLD AUTO: 53 %
NONHDLC SERPL-MCNC: 72 MG/DL
PLATELET # BLD AUTO: 239 10E3/UL (ref 150–450)
POTASSIUM BLD-SCNC: 4 MMOL/L (ref 3.4–5.3)
PROT SERPL-MCNC: 7.2 G/DL (ref 6.8–8.8)
RBC # BLD AUTO: 4.48 10E6/UL (ref 3.8–5.2)
SODIUM SERPL-SCNC: 136 MMOL/L (ref 133–144)
TRIGL SERPL-MCNC: 142 MG/DL
WBC # BLD AUTO: 4.4 10E3/UL (ref 4–11)

## 2023-04-28 PROCEDURE — 36415 COLL VENOUS BLD VENIPUNCTURE: CPT

## 2023-04-28 PROCEDURE — 80061 LIPID PANEL: CPT

## 2023-04-28 PROCEDURE — 85025 COMPLETE CBC W/AUTO DIFF WBC: CPT

## 2023-04-28 PROCEDURE — 83036 HEMOGLOBIN GLYCOSYLATED A1C: CPT

## 2023-04-28 PROCEDURE — 80053 COMPREHEN METABOLIC PANEL: CPT

## 2023-05-02 ENCOUNTER — OFFICE VISIT (OUTPATIENT)
Dept: FAMILY MEDICINE | Facility: CLINIC | Age: 73
End: 2023-05-02
Payer: COMMERCIAL

## 2023-05-02 VITALS
RESPIRATION RATE: 16 BRPM | HEIGHT: 61 IN | HEART RATE: 66 BPM | BODY MASS INDEX: 26.96 KG/M2 | DIASTOLIC BLOOD PRESSURE: 80 MMHG | OXYGEN SATURATION: 97 % | WEIGHT: 142.8 LBS | SYSTOLIC BLOOD PRESSURE: 136 MMHG | TEMPERATURE: 97.3 F

## 2023-05-02 DIAGNOSIS — Z00.00 ENCOUNTER FOR MEDICARE ANNUAL WELLNESS EXAM: Primary | ICD-10-CM

## 2023-05-02 DIAGNOSIS — E11.9 TYPE 2 DIABETES MELLITUS WITHOUT COMPLICATION, WITHOUT LONG-TERM CURRENT USE OF INSULIN (H): ICD-10-CM

## 2023-05-02 PROCEDURE — G0439 PPPS, SUBSEQ VISIT: HCPCS | Performed by: NURSE PRACTITIONER

## 2023-05-02 PROCEDURE — 99213 OFFICE O/P EST LOW 20 MIN: CPT | Mod: 25 | Performed by: NURSE PRACTITIONER

## 2023-05-02 RX ORDER — METFORMIN HCL 500 MG
500 TABLET, EXTENDED RELEASE 24 HR ORAL
Qty: 90 TABLET | Refills: 1 | Status: SHIPPED | OUTPATIENT
Start: 2023-05-02 | End: 2023-11-19

## 2023-05-02 ASSESSMENT — ENCOUNTER SYMPTOMS
MYALGIAS: 0
DYSURIA: 0
BREAST MASS: 0
FREQUENCY: 0
ARTHRALGIAS: 1
PALPITATIONS: 0
HEMATOCHEZIA: 0
DIARRHEA: 0
SORE THROAT: 0
NAUSEA: 0
EYE PAIN: 0
FEVER: 0
PARESTHESIAS: 0
NERVOUS/ANXIOUS: 0
HEMATURIA: 0
DIZZINESS: 0
CONSTIPATION: 0
JOINT SWELLING: 0
SHORTNESS OF BREATH: 0
COUGH: 0
HEARTBURN: 0
CHILLS: 0
ABDOMINAL PAIN: 0
HEADACHES: 0
WEAKNESS: 0

## 2023-05-02 ASSESSMENT — PAIN SCALES - GENERAL: PAINLEVEL: NO PAIN (0)

## 2023-05-02 ASSESSMENT — ACTIVITIES OF DAILY LIVING (ADL): CURRENT_FUNCTION: NO ASSISTANCE NEEDED

## 2023-05-02 NOTE — PROGRESS NOTES
"SUBJECTIVE:   Isabel is a 72 year old who presents for Preventive Visit.      5/2/2023    12:54 PM   Additional Questions   Roomed by Donna       Are you in the first 12 months of your Medicare coverage?  No    Healthy Habits:     In general, how would you rate your overall health?  Excellent    Frequency of exercise:  2-3 days/week    Duration of exercise:  30-45 minutes    Do you usually eat at least 4 servings of fruit and vegetables a day, include whole grains    & fiber and avoid regularly eating high fat or \"junk\" foods?  Yes    Taking medications regularly:  Yes    Medication side effects:  Not applicable    Ability to successfully perform activities of daily living:  No assistance needed    Home Safety:  No safety concerns identified    Hearing Impairment:  No hearing concerns    In the past 6 months, have you been bothered by leaking of urine?  No    In general, how would you rate your overall mental or emotional health?  Excellent      PHQ-2 Total Score: 0    Additional concerns today:  No      Have you ever done Advance Care Planning? (For example, a Health Directive, POLST, or a discussion with a medical provider or your loved ones about your wishes): Yes, advance care planning is on file.       Fall risk  Fallen 2 or more times in the past year?: No  Any fall with injury in the past year?: No    Cognitive Screening   1) Repeat 3 items (Leader, Season, Table)    2) Clock draw: NORMAL  3) 3 item recall: Recalls 3 objects  Results: 3 items recalled: COGNITIVE IMPAIRMENT LESS LIKELY    Mini-CogTM Copyright CELIA Bustos. Licensed by the author for use in [facility]; reprinted with permission (dianne@.[email or web address]). All rights reserved.          Reviewed and updated as needed this visit by clinical staff   Tobacco  Allergies  Meds  Problems  Med Hx  Surg Hx  Fam Hx          Reviewed and updated as needed this visit by Provider   Tobacco  Allergies  Meds  Problems  Med Hx  Surg Hx  Fam Hx       "   Social History     Tobacco Use     Smoking status: Former     Packs/day: 1.00     Years: 15.00     Pack years: 15.00     Types: Cigarettes     Start date: 1966     Quit date: 1982     Years since quittin.0     Smokeless tobacco: Never     Tobacco comments:     Stopped smoking in .   Vaping Use     Vaping status: Never Used   Substance Use Topics     Alcohol use: Yes     Comment: 2-5 glasses of wine per week             2023    10:31 AM   Alcohol Use   Prescreen: >3 drinks/day or >7 drinks/week? No     Do you have a current opioid prescription? No  Do you use any other controlled substances or medications that are not prescribed by a provider? None          Hx bladder ca - has follow up with urologist on Thursday    Diabetes - well controlled. Working on diet/exericse. Takes metformin 500 mg daily    Current providers sharing in care for this patient include:   Patient Care Team:  No Ref-Primary, Physician as PCP - Gilbert Thornton DO as Assigned Musculoskeletal Provider  Alisia Vizcarra APRN CNP as Assigned PCP  Mj Frederick OD (Optometry)  Yoli Zafar RN as Diabetes Educator  Eldon Prather MD as MD (Ophthalmology)  Mj Frederick OD as Assigned Surgical Provider    The following health maintenance items are reviewed in Epic and correct as of today:  Health Maintenance   Topic Date Due     MEDICARE ANNUAL WELLNESS VISIT  2023     A1C  2023     MICROALBUMIN  2024     DIABETIC FOOT EXAM  2024     EYE EXAM  2024     BMP  2024     LIPID  2024     ANNUAL REVIEW OF HM ORDERS  2024     FALL RISK ASSESSMENT  2024     MAMMO SCREENING  2024     COLORECTAL CANCER SCREENING  2026     ADVANCE CARE PLANNING  2028     DEXA  2029     DTAP/TDAP/TD IMMUNIZATION (4 - Td or Tdap) 10/12/2031     HEPATITIS C SCREENING  Completed     PHQ-2 (once per calendar year)  Completed     INFLUENZA VACCINE   Completed     Pneumococcal Vaccine: 65+ Years  Completed     ZOSTER IMMUNIZATION  Completed     COVID-19 Vaccine  Completed     IPV IMMUNIZATION  Aged Out     MENINGITIS IMMUNIZATION  Aged Out     Labs reviewed in EPIC  BP Readings from Last 3 Encounters:   23 136/80   23 133/79   23 126/80    Wt Readings from Last 3 Encounters:   23 64.8 kg (142 lb 12.8 oz)   23 68.8 kg (151 lb 9.6 oz)   23 71.3 kg (157 lb 3.2 oz)                  Patient Active Problem List   Diagnosis     Chronic rhinitis     Primary osteoarthritis of both hands     Basal cell carcinoma of skin     Coronary artery disease involving native heart with angina pectoris, unspecified vessel or lesion type (H)     Hyperlipidemia LDL goal <100     Impaired fasting glucose     Chorioretinal scar of left eye     Sinus tachycardia     Advance care planning     Pes planus     Chronic cough     Female pattern hair loss     FHx: colon cancer     History of Clostridioides difficile infection     Pseudophakia of both eyes     Primary osteoarthritis of both knees     Vocal cord dysfunction     Personal history of malignant neoplasm of bladder     Diabetes mellitus, type 2 (H)     Past Surgical History:   Procedure Laterality Date     BIOPSY  skin cancer, three x    since , bladder 10/2020     CATARACT IOL, RT/LT        SECTION      x 2     CHOLECYSTECTOMY       COLONOSCOPY      3 polyps     COLONOSCOPY      neg     COLONOSCOPY N/A 2021    Procedure: COLONOSCOPY, FLEXIBLE, WITH LESION REMOVAL USING SNARE;  Surgeon: Prudence Hu MD;  Location: [facility] OR     COLONOSCOPY N/A 2021    Procedure: COLONOSCOPY, WITH POLYPECTOMY AND BIOPSY;  Surgeon: Prudence Hu MD;  Location: [facility] OR     COLONOSCOPY WITH CO2 INSUFFLATION N/A 2021    Procedure: COLONOSCOPY, WITH CO2 INSUFFLATION;  Surgeon: Prudence Hu MD;  Location: [facility] OR     ENDOSCOPY  2016     ENT SURGERY  2016    chronic cough      GENITOURINARY SURGERY      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 10/2020     LENGTHEN TENDON ACHILLES  2011    Procedure:LENGTHEN TENDON ACHILLES; Surgeon:AARON CÁRDENAS; Location:[facility]     LIVER SURGERY      Bengin large cyst     MINI ARC SLING OPERATION FOR STRESS INCONTINENCE  2007     OSTEOTOMY ANKLE  2011    Procedure:OSTEOTOMY ANKLE; Calcaneous,  Flexor Digitorum Longus Transfer       SALPINGO-OOPHORECTOMY BILATERAL  ,     Benign prophylactic for FHx ov cancer       Social History     Tobacco Use     Smoking status: Former     Packs/day: 1.00     Years: 15.00     Pack years: 15.00     Types: Cigarettes     Start date: 1966     Quit date: 1982     Years since quittin.0     Smokeless tobacco: Never     Tobacco comments:     Stopped smoking in .   Vaping Use     Vaping status: Never Used   Substance Use Topics     Alcohol use: Yes     Comment: 2-5 glasses of wine per week     Family History   Problem Relation Age of Onset     Other Cancer Sister         ovarian cancer     Cancer Sister      Thyroid Disease Sister      Colon Cancer Father      Brain Hemorrhage Father         accident     Substance Abuse Father         Alcohol     Macular Degeneration Mother      Anxiety Disorder Mother      Osteoporosis Mother      Diabetes Maternal Aunt      Diabetes Maternal Uncle      Cancer Paternal Aunt      Diabetes Maternal Grandmother         Diabetes diagnosis 2023     Coronary Artery Disease Cousin      Hyperlipidemia Cousin      Anesthesia Reaction Other      Asthma Other      Asthma Niece      Asthma Nephew      Asthma Brother      Hypertension No family hx of      Cerebrovascular Disease No family hx of      Glaucoma No family hx of          Current Outpatient Medications   Medication Sig Dispense Refill     alcohol swab prep pads Use to swab area of injection/lise as directed. 100 each 3     aspirin (ASA) 81 MG EC tablet Take 1  tablet (81 mg) by mouth daily 100 tablet 3     atorvastatin (LIPITOR) 40 MG tablet Take 1 tablet (40 mg) by mouth daily 90 tablet 3     blood glucose (NO BRAND SPECIFIED) test strip Use to test blood sugar 1 time daily or as directed. To accompany: Blood Glucose Monitor Brands: per insurance. 100 strip 6     blood glucose calibration (NO BRAND SPECIFIED) solution To accompany: Blood Glucose Monitor Brands: per insurance. 1 each 11     blood glucose monitoring (NO BRAND SPECIFIED) meter device kit Use to test blood sugar 1 time daily or as directed. Preferred blood glucose meter OR supplies to accompany: Blood Glucose Monitor Brands: per insurance. 1 kit 0     Calcium-Vitamin D (CALCIUM + D PO) Take  by mouth.       FISH OIL        MAGNESIUM OXIDE PO Take 200 mg by mouth daily       metFORMIN (GLUCOPHAGE XR) 500 MG 24 hr tablet Take 1 tablet (500 mg) by mouth daily (with dinner) 90 tablet 1     metoprolol succinate ER (TOPROL-XL) 25 MG 24 hr tablet Take 1 tablet (25 mg) by mouth 2 times daily 180 tablet 3     Multiple Vitamins-Minerals (MULTIVITAMIN & MINERAL PO) Take  by mouth.       omeprazole (PRILOSEC) 20 MG DR capsule Take 1 capsule (20 mg) by mouth daily 90 capsule 1     Solifenacin Succinate (VESICARE PO)        thin (NO BRAND SPECIFIED) lancets Use with lanceting device. To accompany: Blood Glucose Monitor Brands: per insurance. 100 each 6     Allergies   Allergen Reactions     Sulfa Antibiotics Nausea     Codeine Phosphate Nausea and Vomiting     Pentazocine      Propoxyphene      Darvon     Penicillins Rash     rash and itching       Recent Labs   Lab Test 04/28/23  0829 01/26/23  0759 04/28/22  1351 11/05/21  0850 11/05/21  0850 01/11/21  1351 10/29/20  1506   A1C 6.0* 7.1* 6.1*   < > 6.3*  --   --    LDL 44 45  --   --  59  --   --    HDL 41* 42*  --   --  49*  --   --    TRIG 142 210*  --   --  180*  --   --    ALT 40 38  --   --  41  --  35   CR 0.74 0.66  --    < > 0.70 0.80 0.60   GFRESTIMATED 85 >90   "--    < > 87 75 >90   GFRESTBLACK  --   --   --   --   --  87 >90   POTASSIUM 4.0 3.9  --    < > 3.9 3.9 3.9    < > = values in this interval not displayed.      Mammogram Screening: Mammogram Screening: Recommended mammography every 1-2 years with patient discussion and risk factor consideration        Review of Systems   Constitutional: Negative for chills and fever.   HENT: Negative for congestion, ear pain, hearing loss and sore throat.    Eyes: Positive for visual disturbance. Negative for pain.   Respiratory: Negative for cough and shortness of breath.    Cardiovascular: Negative for chest pain, palpitations and peripheral edema.   Gastrointestinal: Negative for abdominal pain, constipation, diarrhea, heartburn, hematochezia and nausea.   Breasts:  Negative for tenderness, breast mass and discharge.   Genitourinary: Negative for dysuria, frequency, genital sores, hematuria, pelvic pain, urgency, vaginal bleeding and vaginal discharge.   Musculoskeletal: Positive for arthralgias. Negative for joint swelling and myalgias.   Skin: Negative for rash.   Neurological: Negative for dizziness, weakness, headaches and paresthesias.   Psychiatric/Behavioral: Negative for mood changes. The patient is not nervous/anxious.          OBJECTIVE:   /80 (BP Location: Left arm, Patient Position: Sitting, Cuff Size: Adult Regular)   Pulse 66   Temp 97.3  F (36.3  C) (Tympanic)   Resp 16   Ht 1.557 m (5' 1.3\")   Wt 64.8 kg (142 lb 12.8 oz)   LMP  (LMP Unknown)   SpO2 97%   BMI 26.72 kg/m   Estimated body mass index is 26.72 kg/m  as calculated from the following:    Height as of this encounter: 1.557 m (5' 1.3\").    Weight as of this encounter: 64.8 kg (142 lb 12.8 oz).  Physical Exam  GENERAL: healthy, alert and no distress  EYES: Eyes grossly normal to inspection, PERRL and conjunctivae and sclerae normal  HENT: ear canals and TM's normal, nose and mouth without ulcers or lesions  NECK: no adenopathy, no asymmetry, " masses, or scars and thyroid normal to palpation  RESP: lungs clear to auscultation - no rales, rhonchi or wheezes  CV: regular rate and rhythm, normal S1 S2, no S3 or S4, no murmur, click or rub, no peripheral edema and peripheral pulses strong  ABDOMEN: soft, nontender, no hepatosplenomegaly, no masses and bowel sounds normal  MS: no gross musculoskeletal defects noted, no edema  SKIN: no suspicious lesions or rashes  NEURO: Normal strength and tone, mentation intact and speech normal  PSYCH: mentation appears normal, affect normal/bright    Diagnostic Test Results:  Labs reviewed in Epic  No results found for any visits on 05/02/23.    ASSESSMENT / PLAN:   (Z00.00) Encounter for Medicare annual wellness exam  (primary encounter diagnosis)    (E11.9) Type 2 diabetes mellitus without complication, without long-term current use of insulin (H)  Comment: good control  Plan: Nutrition Referral, Hemoglobin A1c, Basic         metabolic panel  (Ca, Cl, CO2, Creat, Gluc, K,         Na, BUN), metFORMIN (GLUCOPHAGE XR) 500 MG 24         hr tablet                    COUNSELING:  Reviewed preventive health counseling, as reflected in patient instructions       Regular exercise       Healthy diet/nutrition        She reports that she quit smoking about 41 years ago. Her smoking use included cigarettes. She started smoking about 56 years ago. She has a 15.00 pack-year smoking history. She has never used smokeless tobacco.      Appropriate preventive services were discussed with this patient, including applicable screening as appropriate for cardiovascular disease, diabetes, osteopenia/osteoporosis, and glaucoma.  As appropriate for age/gender, discussed screening for colorectal cancer, prostate cancer, breast cancer, and cervical cancer. Checklist reviewing preventive services available has been given to the patient.    Reviewed patients plan of care and provided an AVS. The Intermediate Care Plan ( asthma action plan, low back  pain action plan, and migraine action plan) for Isabel meets the Care Plan requirement. This Care Plan has been established and reviewed with the Patient.    The benefits, risks and potential side effects were discussed in detail. Black box warnings discussed as relevant. All patient questions were answered. The patient was instructed to follow up immediately if any adverse reactions develop.    Return precautions discussed, including when to seek urgent/emergent care.    Patient verbalizes understanding and agrees with plan of care. Patient stable for discharge.          VIC OLMOS CNP  [facility]    Identified Health Risks:    I have reviewed Opioid Use Disorder and Substance Use Disorder risk factors and made any needed referrals.

## 2023-05-02 NOTE — PATIENT INSTRUCTIONS
Patient Education   Personalized Prevention Plan  You are due for the preventive services outlined below.  Your care team is available to assist you in scheduling these services.  If you have already completed any of these items, please share that information with your care team to update in your medical record.  Health Maintenance Due   Topic Date Due     ANNUAL REVIEW OF HM ORDERS  04/28/2023     Annual Wellness Visit  04/28/2023

## 2023-05-03 ENCOUNTER — OFFICE VISIT (OUTPATIENT)
Dept: OPHTHALMOLOGY | Facility: CLINIC | Age: 73
End: 2023-05-03
Attending: OPTOMETRIST
Payer: COMMERCIAL

## 2023-05-03 DIAGNOSIS — H04.129 DRY EYE: Primary | ICD-10-CM

## 2023-05-03 DIAGNOSIS — H02.839 DERMATOCHALASIS OF EYELID: ICD-10-CM

## 2023-05-03 DIAGNOSIS — H02.403 PTOSIS OF BOTH EYELIDS: ICD-10-CM

## 2023-05-03 PROCEDURE — 92081 LIMITED VISUAL FIELD XM: CPT | Performed by: OPHTHALMOLOGY

## 2023-05-03 PROCEDURE — 99204 OFFICE O/P NEW MOD 45 MIN: CPT | Performed by: OPHTHALMOLOGY

## 2023-05-03 PROCEDURE — 92285 EXTERNAL OCULAR PHOTOGRAPHY: CPT | Performed by: OPHTHALMOLOGY

## 2023-05-03 ASSESSMENT — EXTERNAL EXAM - LEFT EYE: OS_EXAM: NORMAL

## 2023-05-03 ASSESSMENT — TONOMETRY
OD_IOP_MMHG: 21
IOP_METHOD: ICARE
OS_IOP_MMHG: 20

## 2023-05-03 ASSESSMENT — VISUAL ACUITY
METHOD: SNELLEN - LINEAR
OD_CC: 20/20
OS_CC: 20/20
CORRECTION_TYPE: GLASSES

## 2023-05-03 ASSESSMENT — EXTERNAL EXAM - RIGHT EYE: OD_EXAM: NORMAL
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
## 2023-05-04 ENCOUNTER — TRANSFERRED RECORDS (OUTPATIENT)
Dept: HEALTH INFORMATION MANAGEMENT | Facility: CLINIC | Age: 73
End: 2023-05-04
Payer: COMMERCIAL

## 2023-05-04 ENCOUNTER — TELEPHONE (OUTPATIENT)
Dept: SURGERY | Facility: CLINIC | Age: 73
End: 2023-05-04
Payer: COMMERCIAL

## 2023-05-04 PROBLEM — H02.839 DERMATOCHALASIS OF EYELID: Status: ACTIVE | Noted: 2023-05-04

## 2023-05-04 PROBLEM — H02.403 PTOSIS OF BOTH EYELIDS: Status: ACTIVE | Noted: 2023-05-04

## 2023-05-04 NOTE — TELEPHONE ENCOUNTER
Patient called in to schedule surgery with Dr. Prather on 6/1 in [place].    H&P with PCP within 30 days of surgery.    Post-op scheduled for 6/14.     Writer will mail surgery packet.     No further questions/concerns at this time.

## 2023-05-16 DIAGNOSIS — R05.3 CHRONIC COUGH: ICD-10-CM

## 2023-05-25 ENCOUNTER — OFFICE VISIT (OUTPATIENT)
Dept: FAMILY MEDICINE | Facility: CLINIC | Age: 73
End: 2023-05-25
Payer: COMMERCIAL

## 2023-05-25 DIAGNOSIS — H02.839 DERMATOCHALASIS OF EYELID: ICD-10-CM

## 2023-05-25 DIAGNOSIS — E11.9 TYPE 2 DIABETES MELLITUS WITHOUT COMPLICATION, WITHOUT LONG-TERM CURRENT USE OF INSULIN (H): ICD-10-CM

## 2023-05-25 DIAGNOSIS — Z01.818 PREOP GENERAL PHYSICAL EXAM: Primary | ICD-10-CM

## 2023-05-25 DIAGNOSIS — E78.5 HYPERLIPIDEMIA LDL GOAL <100: ICD-10-CM

## 2023-05-25 DIAGNOSIS — H02.403 PTOSIS OF BOTH EYELIDS: ICD-10-CM

## 2023-05-25 DIAGNOSIS — I25.119 CORONARY ARTERY DISEASE INVOLVING NATIVE HEART WITH ANGINA PECTORIS, UNSPECIFIED VESSEL OR LESION TYPE (H): ICD-10-CM

## 2023-05-25 PROCEDURE — 99214 OFFICE O/P EST MOD 30 MIN: CPT | Performed by: NURSE PRACTITIONER

## 2023-05-25 ASSESSMENT — PAIN SCALES - GENERAL: PAINLEVEL: NO PAIN (0)

## 2023-05-25 NOTE — PATIENT INSTRUCTIONS
For informational purposes only. Not to replace the advice of your health care provider. Copyright   2003,  [facility] [employer] [facility]. All rights reserved. Clinically reviewed by Citlaly Martinez MD. [employer] [id number] - REV .  Preparing for Your Surgery  Getting started  A nurse will call you to review your health history and instructions. They will give you an arrival time based on your scheduled surgery time. Please be ready to share:  Your doctor's clinic name and phone number  Your medical, surgical, and anesthesia history  A list of allergies and sensitivities  A list of medicines, including herbal treatments and over-the-counter drugs  Whether the patient has a legal guardian (ask how to send us the papers in advance)  Please tell us if you're pregnant--or if there's any chance you might be pregnant. Some surgeries may injure a fetus (unborn baby), so they require a pregnancy test. Surgeries that are safe for a fetus don't always need a test, and you can choose whether to have one.   If you have a child who's having surgery, please ask for a copy of Preparing for Your Child's Surgery.    Preparing for surgery  Within 10 to 30 days of surgery: Have a pre-op exam (sometimes called an H&P, or History and Physical). This can be done at a clinic or pre-operative center.  If you're having a , you may not need this exam. Talk to your care team.  At your pre-op exam, talk to your care team about all medicines you take. If you need to stop any medicines before surgery, ask when to start taking them again.  We do this for your safety. Many medicines can make you bleed too much during surgery. Some change how well surgery (anesthesia) drugs work.  Call your insurance company to let them know you're having surgery. (If you don't have insurance, call [phone].)  Call your clinic if there's any change in your health. This includes signs of a cold or flu (sore throat, runny nose, cough, rash, fever). It  also includes a scrape or scratch near the surgery site.  If you have questions on the day of surgery, call your hospital or surgery center.  Eating and drinking guidelines  For your safety: Unless your surgeon tells you otherwise, follow the guidelines below.  Eat and drink as usual until 8 hours before you arrive for surgery. After that, no food or milk.  Drink clear liquids until 2 hours before you arrive. These are liquids you can see through, like water, Gatorade, and Propel Water. They also include plain black coffee and tea (no cream or milk), candy, and breath mints. You can spit out gum when you arrive.  If you drink alcohol: Stop drinking it the night before surgery.  If your care team tells you to take medicine on the morning of surgery, it's okay to take it with a sip of water.  Preventing infection  Shower or bathe the night before and morning of your surgery. Follow the instructions your clinic gave you. (If no instructions, use regular soap.)  Don't shave or clip hair near your surgery site. We'll remove the hair if needed.  Don't smoke or vape the morning of surgery. You may chew nicotine gum up to 2 hours before surgery. A nicotine patch is okay.  Note: Some surgeries require you to completely quit smoking and nicotine. Check with your surgeon.  Your care team will make every effort to keep you safe from infection. We will:  Clean our hands often with soap and water (or an alcohol-based hand rub).  Clean the skin at your surgery site with a special soap that kills germs.  Give you a special gown to keep you warm. (Cold raises the risk of infection.)  Wear special hair covers, masks, gowns and gloves during surgery.  Give antibiotic medicine, if prescribed. Not all surgeries need antibiotics.  What to bring on the day of surgery  Photo ID and insurance card  Copy of your health care directive, if you have one  Glasses and hearing aids (bring cases)  You can't wear contacts during surgery  Inhaler and  eye drops, if you use them (tell us about these when you arrive)  CPAP machine or breathing device, if you use them  A few personal items, if spending the night  If you have . . .  A pacemaker, ICD (cardiac defibrillator) or other implant: Bring the ID card.  An implanted stimulator: Bring the remote control.  A legal guardian: Bring a copy of the certified (court-stamped) guardianship papers.  Please remove any jewelry, including body piercings. Leave jewelry and other valuables at home.  If you're going home the day of surgery  You must have a responsible adult drive you home. They should stay with you overnight as well.  If you don't have someone to stay with you, and you aren't safe to go home alone, we may keep you overnight. Insurance often won't pay for this.  After surgery  If it's hard to control your pain or you need more pain medicine, please call your surgeon's office.  Questions?   If you have any questions for your care team, list them here: _________________________________________________________________________________________________________________________________________________________________________ ____________________________________ ____________________________________ ____________________________________    How to Take Your Medication Before Surgery  - HOLD (do not take) your METFORMIN on the morning of surgery.  - HOLD (do not take) Aspirin for 7 days  - STOP taking all vitamins and herbal supplements 14 days before surgery. Stop fish oil now

## 2023-05-25 NOTE — PROGRESS NOTES
[address] [address]  Phone: [phone]  Primary Provider: No Ref-Primary, Physician  Pre-op Performing Provider: RITU SCHAFFER      PREOPERATIVE EVALUATION:  Today's date: 5/25/2023    Isabel Carroll is a 72 year old female who presents for a preoperative evaluation.      5/2/2023    12:54 PM   Additional Questions   Roomed by Donna     Surgical Information:  Surgery/Procedure: Bilateral upper eyelid ptosis repair  Surgery Location: [facility]  Surgeon:Eldon Prather MD  Surgery Date: 6/1/23  Time of Surgery: 11:15  Where patient plans to recover: At home with family  Fax number for surgical facility: Note does not need to be faxed, will be available electronically in Epic.    Assessment & Plan     The proposed surgical procedure is considered LOW risk.    Preop general physical exam      Ptosis of both eyelids  Reason for procedure    Dermatochalasis of eyelid  Reason for procedure    Coronary artery disease involving native heart with angina pectoris, unspecified vessel or lesion type (H)  On ASA. Follows with cardiology. Last visit February and doing well.     Type 2 diabetes mellitus without complication, without long-term current use of insulin (H)  Excellent control with metformin, diet and exercise.     Hyperlipidemia LDL goal <100  Well controlled on statin.             Risks and Recommendations:  The patient has the following additional risks and recommendations for perioperative complications:  Diabetes:  - Patient is not on insulin therapy: regular NPO guidelines can be followed.     Antiplatelet or Anticoagulation Medication Instructions:   - Patient is on no antiplatelet or anticoagulation medications.    Additional Medication Instructions:  Patient is to take all scheduled medications on the day of surgery EXCEPT for modifications listed below:   - HOLD (do not take)  your METFORMIN on the morning of surgery.  - HOLD (do not take) Aspirin for 7 days  - STOP taking all vitamins and herbal supplements 14 days before surgery. Stop fish oil now    RECOMMENDATION:  APPROVAL GIVEN to proceed with proposed procedure, without further diagnostic evaluation.            Subjective       HPI related to upcoming procedure: Patient with drooping eyelids affecting her vision.           5/25/2023     2:44 PM   Preop Questions   1. Have you ever had a heart attack or stroke? No   2. Have you ever had surgery on your heart or blood vessels, such as a stent placement, a coronary artery bypass, or surgery on an artery in your head, neck, heart, or legs? No   3. Do you have chest pain with activity? No   4. Do you have a history of  heart failure? No   5. Do you currently have a cold, bronchitis or symptoms of other infection? No   6. Do you have a cough, shortness of breath, or wheezing? No   7. Do you or anyone in your family have previous history of blood clots? No   8. Do you or does anyone in your family have a serious bleeding problem such as prolonged bleeding following surgeries or cuts? No   9. Have you ever had problems with anemia or been told to take iron pills? No   10. Have you had any abnormal blood loss such as black, tarry or bloody stools, or abnormal vaginal bleeding? No   11. Have you ever had a blood transfusion? No   12. Are you willing to have a blood transfusion if it is medically needed before, during, or after your surgery? Yes   13. Have you or any of your relatives ever had problems with anesthesia? No   14. Do you have sleep apnea, excessive snoring or daytime drowsiness? No   15. Do you have any artifical heart valves or other implanted medical devices like a pacemaker, defibrillator, or continuous glucose monitor? No   16. Do you have artificial joints? No   17. Are you allergic to latex? No       Preoperative Review of :   reviewed - no record of controlled  substances prescribed.      Status of Chronic Conditions:  CAD - Patient has a longstanding history of moderate-severe CAD. Patient denies recent chest pain or NTG use, denies exercise induced dyspnea or PND. Last Stress test 2016, EKG Feb 2023 with cardiology.     HYPERLIPIDEMIA - Patient has a long history of significant Hyperlipidemia requiring medication for treatment with recent good control. Patient reports no problems or side effects with the medication.       Last saw cardiology Feb 2023. EKG with normal sinus rhythm and not changed significantly compared to previous EKGs per cardiology note.      Review of Systems  Constitutional, neuro, ENT, endocrine, pulmonary, cardiac, gastrointestinal, genitourinary, musculoskeletal, integument and psychiatric systems are negative, except as otherwise noted.    Patient Active Problem List    Diagnosis Date Noted     Ptosis of both eyelids 05/04/2023     Priority: Medium     Added automatically from request for surgery [id number]       Dermatochalasis of eyelid 05/04/2023     Priority: Medium     Added automatically from request for surgery [id number]       Diabetes mellitus, type 2 (H) 01/27/2023     Priority: Medium     Personal history of malignant neoplasm of bladder 10/29/2020     Priority: Medium     Cystoscopy follow up 5- [facility]. See results.        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  Isabel Carroll has been provided information and  resources to begin or update their advance care plan.  Added by Tamara Mendiola             FHx: colon cancer 10/17/2016     Priority: Medium     Sinus tachycardia 07/21/2016     Priority: Medium     Chorioretinal scar of left eye 12/02/2015     Priority: Medium     Chronic rhinitis 07/28/2015     Priority: Medium     Primary osteoarthritis of both hands 07/28/2015     Priority: Medium     Coronary artery disease involving native heart with angina pectoris, unspecified vessel or lesion type (H) 12/22/2011     Priority: Medium     Overview:   A)  CT Chest-Date: 07/13/10, There is coronary artery and mild aortic calcification present.  B)  Nuclear Stress Test on 10/01/10, No scintigraphic evidence of ischemia or infarction. EF: 71%.   Overview:   A)  CT Chest-Date: 07/13/10, There is coronary artery and mild aortic calcification present.  B)  Nuclear Stress Test on 10/01/10, No scintigraphic evidence of ischemia or infarction. EF: 71%.        Hyperlipidemia LDL goal <100 12/22/2011     Priority: Medium     Basal cell carcinoma of skin 04/07/2011     Priority: Medium     Pes planus 01/24/2011     Priority: Medium     Impaired fasting glucose 09/18/2006     Priority: Medium      Past Medical History:   Diagnosis Date     Arthritis      Basal cell carcinoma of skin 04/07/2011     Benign neoplasm of colon 10/26/2011    Overview:  Colonoscopy every 5 years  Oct 2011      Bronchiectasis without complication (H) 12/15/2016    CT chest done 2016 through COPDgene study, formal report indicates cylindrical bronchiectasis. Reviewed CT with [facility] radiologists; bronchiectasis is likely present but of unclear clinical significance, and was likely present on CT from 2010 as well (different scanner and different technique).     Chorioretinal scar of left eye 12/02/2015     Chronic rhinitis 07/28/2015     Clostridium difficile infection 12/13/2018     Coronary artery disease      Coronary atherosclerosis due to calcified coronary lesion  2011    Overview:  A)  CT Chest-Date: 07/13/10, There is coronary artery and mild aortic calcification present. B)  Nuclear Stress Test on 10/01/10, No scintigraphic evidence of ischemia or infarction. EF: 71%.       Diabetes (H)      Elevated blood pressure (not hypertension) 2006     Hypercholesteremia      Hyperlipidemia 2011    Overview:  See results review for lipid profile.      Impaired fasting glucose 2006     Nonsenile cataract      Overweight 2015     PONV (postoperative nausea and vomiting)      Primary osteoarthritis of both hands 2015     Right shoulder strain, initial encounter 12/15/2016     Shingles 2009     Sinus tachycardia 2016     Skin cancer , 2015    Basal cell cancer     Vitamin D deficiency 2010     Past Surgical History:   Procedure Laterality Date     BIOPSY  skin cancer, three x    since , bladder 10/2020     CATARACT IOL, RT/LT        SECTION      x 2     CHOLECYSTECTOMY       COLONOSCOPY      3 polyps     COLONOSCOPY      neg     COLONOSCOPY N/A 2021    Procedure: COLONOSCOPY, FLEXIBLE, WITH LESION REMOVAL USING SNARE;  Surgeon: Prudence Hu MD;  Location: [facility] OR     COLONOSCOPY N/A 2021    Procedure: COLONOSCOPY, WITH POLYPECTOMY AND BIOPSY;  Surgeon: Prudence Hu MD;  Location: [facility] OR     COLONOSCOPY WITH CO2 INSUFFLATION N/A 2021    Procedure: COLONOSCOPY, WITH CO2 INSUFFLATION;  Surgeon: Prudence Hu MD;  Location:  OR     ENDOSCOPY       ENT SURGERY  2016    chronic cough     GENITOURINARY SURGERY      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 10/2020     LENGTHEN TENDON ACHILLES  2011    Procedure:LENGTHEN TENDON ACHILLES; Surgeon:AARON CÁRDENAS; Location:[facility]     LIVER SURGERY      Bengin large cyst     MINI ARC SLING OPERATION FOR STRESS INCONTINENCE  2007     OSTEOTOMY ANKLE  2011    Procedure:OSTEOTOMY ANKLE;  Calcaneous,  Flexor Digitorum Longus Transfer       SALPINGO-OOPHORECTOMY BILATERAL  1998, 2000    Benign prophylactic for FHx ov cancer     Current Outpatient Medications   Medication Sig Dispense Refill     alcohol swab prep pads Use to swab area of injection/lise as directed. 100 each 3     aspirin (ASA) 81 MG EC tablet Take 1 tablet (81 mg) by mouth daily 100 tablet 3     atorvastatin (LIPITOR) 40 MG tablet Take 1 tablet (40 mg) by mouth daily 90 tablet 3     blood glucose (NO BRAND SPECIFIED) test strip Use to test blood sugar 1 time daily or as directed. To accompany: Blood Glucose Monitor Brands: per insurance. 100 strip 6     blood glucose calibration (NO BRAND SPECIFIED) solution To accompany: Blood Glucose Monitor Brands: per insurance. 1 each 11     Calcium-Vitamin D (CALCIUM + D PO) Take  by mouth.       FISH OIL        MAGNESIUM OXIDE PO Take 200 mg by mouth daily       metFORMIN (GLUCOPHAGE XR) 500 MG 24 hr tablet Take 1 tablet (500 mg) by mouth daily (with dinner) 90 tablet 1     metoprolol succinate ER (TOPROL-XL) 25 MG 24 hr tablet Take 1 tablet (25 mg) by mouth 2 times daily 180 tablet 3     Multiple Vitamins-Minerals (MULTIVITAMIN & MINERAL PO) Take  by mouth.       omeprazole (PRILOSEC) 20 MG DR capsule Take 1 capsule (20 mg) by mouth daily 90 capsule 1     Solifenacin Succinate (VESICARE PO)        thin (NO BRAND SPECIFIED) lancets Use with lanceting device. To accompany: Blood Glucose Monitor Brands: per insurance. 100 each 6     blood glucose monitoring (NO BRAND SPECIFIED) meter device kit Use to test blood sugar 1 time daily or as directed. Preferred blood glucose meter OR supplies to accompany: Blood Glucose Monitor Brands: per insurance. (Patient not taking: Reported on 5/25/2023) 1 kit 0     diphenhydrAMINE-acetaminophen (TYLENOL PM)  MG tablet Take 1 tablet by mouth nightly as needed         Allergies   Allergen Reactions     Sulfa Antibiotics Nausea     Codeine Phosphate Nausea  "and Vomiting     Pentazocine      Pentazocine-Naloxone Hcl      Propoxyphene      Darvon     Penicillins Rash     rash and itching          Social History     Tobacco Use     Smoking status: Former     Packs/day: 1.00     Years: 15.00     Pack years: 15.00     Types: Cigarettes     Start date: 1966     Quit date: 1982     Years since quittin.0     Smokeless tobacco: Never     Tobacco comments:     Stopped smoking in .   Vaping Use     Vaping status: Never Used   Substance Use Topics     Alcohol use: Yes     Comment: 2-5 glasses of wine per week     Family History   Problem Relation Age of Onset     Other Cancer Sister         ovarian cancer     Cancer Sister      Thyroid Disease Sister      Colon Cancer Father      Brain Hemorrhage Father         accident     Substance Abuse Father         Alcohol     Macular Degeneration Mother      Anxiety Disorder Mother      Osteoporosis Mother      Diabetes Maternal Aunt      Diabetes Maternal Uncle      Cancer Paternal Aunt      Diabetes Maternal Grandmother         Diabetes diagnosis 2023     Coronary Artery Disease Cousin      Hyperlipidemia Cousin      Anesthesia Reaction Other      Asthma Other      Asthma Niece      Asthma Nephew      Asthma Brother      Hypertension No family hx of      Cerebrovascular Disease No family hx of      Glaucoma No family hx of      History   Drug Use No         Objective     /75 (BP Location: Left arm, Patient Position: Chair, Cuff Size: Adult Regular)   Pulse 73   Temp 98.3  F (36.8  C) (Tympanic)   Resp 16   Ht 1.556 m (5' 1.25\")   Wt 64 kg (141 lb)   LMP  (LMP Unknown)   SpO2 96%   BMI 26.42 kg/m      Physical Exam    GENERAL APPEARANCE: healthy, alert and no distress     EYES: EOMI, PERRL     HENT: ear canals and TM's normal and nose and mouth without ulcers or lesions     NECK: no adenopathy, no asymmetry, masses, or scars and thyroid normal to palpation     RESP: lungs clear to auscultation - no " rales, rhonchi or wheezes     CV: regular rates and rhythm, normal S1 S2, no S3 or S4 and no murmur, click or rub     ABDOMEN:  soft, nontender, no HSM or masses and bowel sounds normal     MS: extremities normal- no gross deformities noted, no evidence of inflammation in joints, FROM in all extremities.     SKIN: no suspicious lesions or rashes     NEURO: Normal strength and tone, sensory exam grossly normal, mentation intact and speech normal     PSYCH: mentation appears normal. and affect normal/bright     LYMPHATICS: No cervical adenopathy    Recent Labs   Lab Test 04/28/23  0829 01/26/23  0759   HGB 14.5 14.5    268    142   POTASSIUM 4.0 3.9   CR 0.74 0.66   A1C 6.0* 7.1*        Diagnostics:  No labs were ordered during this visit.   No EKG this visit, completed in the last 90 days.    Revised Cardiac Risk Index (RCRI):  The patient has the following serious cardiovascular risks for perioperative complications:   - Coronary Artery Disease (MI, positive stress test, angina, Qs on EKG) = 1 point     RCRI Interpretation: 1 point: Class II (low risk - 0.9% complication rate)           Signed Electronically by: VIC OLMOS CNP  Copy of this evaluation report is provided to requesting physician.

## 2023-05-26 ENCOUNTER — TELEPHONE (OUTPATIENT)
Dept: FAMILY MEDICINE | Facility: CLINIC | Age: 73
End: 2023-05-26
Payer: COMMERCIAL

## 2023-05-26 VITALS
TEMPERATURE: 98.3 F | WEIGHT: 141 LBS | OXYGEN SATURATION: 96 % | HEART RATE: 73 BPM | DIASTOLIC BLOOD PRESSURE: 75 MMHG | RESPIRATION RATE: 16 BRPM | BODY MASS INDEX: 26.62 KG/M2 | HEIGHT: 61 IN | SYSTOLIC BLOOD PRESSURE: 121 MMHG

## 2023-05-26 NOTE — TELEPHONE ENCOUNTER
RN called patient and relayed message from provider below.     Patient verbalized understanding and denies further questions or concerns at this time.     DILIA VenturaN, RN  [facility]  Nurse Triage, Family Practice

## 2023-05-26 NOTE — TELEPHONE ENCOUNTER
Patient calling with a couple questions in regards to upcoming procedure on 6/1/23.    1. Patient states that she was instructed to stop multiple meds one week before her procedure on 6/1/23.   Pt wondering about Tylenol PM.     States she takes Tylenol PM Acetaminophen 500mg/Diphenhydramine 25mg as needed. States has been taking one capsule at night PRN. Pt explains she takes one capsule daily pretty regularly for aches and pains.    Pt is wondering if she needs to stop this medication as well prior to procedure. If so, when should she stop taking it?    RN did review visit notes, but did not find anything that mentions the Tylenol PM.      Pt does apologize for forgetting to update Alisia Vizcarra regarding this med.     Pt added as historical on medlist.       2. Pt states her AVS states she weight 150lbs. Pt states this is an error as when was in clinic yesterday for pre-op appointment her weight is 141lbs per clinic office scale. Pt is wondering if it was entered incorrectly.     Pt wants this fixed in her chart as she is concerned she will get more anesthesia than needed.     Will send to [place] to corrected weight in chart.     Routing to provider to advise on the Tylenol medication.

## 2023-05-31 ENCOUNTER — ANESTHESIA EVENT (OUTPATIENT)
Dept: SURGERY | Facility: AMBULATORY SURGERY CENTER | Age: 73
End: 2023-05-31
Payer: COMMERCIAL

## 2023-05-31 RX ORDER — HYDROMORPHONE HYDROCHLORIDE 1 MG/ML
0.4 INJECTION, SOLUTION INTRAMUSCULAR; INTRAVENOUS; SUBCUTANEOUS EVERY 5 MIN PRN
Status: CANCELLED | OUTPATIENT
Start: 2023-05-31

## 2023-05-31 RX ORDER — SODIUM CHLORIDE, SODIUM LACTATE, POTASSIUM CHLORIDE, CALCIUM CHLORIDE 600; 310; 30; 20 MG/100ML; MG/100ML; MG/100ML; MG/100ML
INJECTION, SOLUTION INTRAVENOUS CONTINUOUS
Status: CANCELLED | OUTPATIENT
Start: 2023-05-31

## 2023-05-31 RX ORDER — FENTANYL CITRATE 50 UG/ML
50 INJECTION, SOLUTION INTRAMUSCULAR; INTRAVENOUS EVERY 5 MIN PRN
Status: CANCELLED | OUTPATIENT
Start: 2023-05-31

## 2023-05-31 RX ORDER — HYDROMORPHONE HYDROCHLORIDE 1 MG/ML
0.2 INJECTION, SOLUTION INTRAMUSCULAR; INTRAVENOUS; SUBCUTANEOUS EVERY 5 MIN PRN
Status: CANCELLED | OUTPATIENT
Start: 2023-05-31

## 2023-05-31 RX ORDER — OXYCODONE HYDROCHLORIDE 5 MG/1
5 TABLET ORAL
Status: CANCELLED | OUTPATIENT
Start: 2023-05-31

## 2023-05-31 RX ORDER — ONDANSETRON 4 MG/1
4 TABLET, ORALLY DISINTEGRATING ORAL EVERY 30 MIN PRN
Status: CANCELLED | OUTPATIENT
Start: 2023-05-31

## 2023-05-31 RX ORDER — ONDANSETRON 2 MG/ML
4 INJECTION INTRAMUSCULAR; INTRAVENOUS EVERY 30 MIN PRN
Status: CANCELLED | OUTPATIENT
Start: 2023-05-31

## 2023-05-31 RX ORDER — FENTANYL CITRATE 50 UG/ML
25 INJECTION, SOLUTION INTRAMUSCULAR; INTRAVENOUS EVERY 5 MIN PRN
Status: CANCELLED | OUTPATIENT
Start: 2023-05-31

## 2023-05-31 RX ORDER — OXYCODONE HYDROCHLORIDE 5 MG/1
10 TABLET ORAL
Status: CANCELLED | OUTPATIENT
Start: 2023-05-31

## 2023-06-01 ENCOUNTER — HOSPITAL ENCOUNTER (OUTPATIENT)
Facility: AMBULATORY SURGERY CENTER | Age: 73
Discharge: HOME | End: 2023-06-01
Attending: OPHTHALMOLOGY
Payer: COMMERCIAL

## 2023-06-01 ENCOUNTER — ANESTHESIA (OUTPATIENT)
Dept: SURGERY | Facility: AMBULATORY SURGERY CENTER | Age: 73
End: 2023-06-01
Payer: COMMERCIAL

## 2023-06-01 VITALS
HEIGHT: 61 IN | WEIGHT: 141 LBS | RESPIRATION RATE: 18 BRPM | BODY MASS INDEX: 26.62 KG/M2 | OXYGEN SATURATION: 98 % | TEMPERATURE: 97 F | HEART RATE: 60 BPM | SYSTOLIC BLOOD PRESSURE: 122 MMHG | DIASTOLIC BLOOD PRESSURE: 72 MMHG

## 2023-06-01 DIAGNOSIS — H02.403 PTOSIS OF BOTH EYELIDS: ICD-10-CM

## 2023-06-01 DIAGNOSIS — H02.839 DERMATOCHALASIS OF EYELID: ICD-10-CM

## 2023-06-01 LAB — GLUCOSE BLDC GLUCOMTR-MCNC: 142 MG/DL (ref 70–99)

## 2023-06-01 PROCEDURE — 82962 GLUCOSE BLOOD TEST: CPT | Performed by: PATHOLOGY

## 2023-06-01 PROCEDURE — 67904 REPAIR EYELID DEFECT: CPT | Mod: 50 | Performed by: OPHTHALMOLOGY

## 2023-06-01 PROCEDURE — 67904 REPAIR EYELID DEFECT: CPT | Mod: LT

## 2023-06-01 RX ORDER — TETRACAINE HYDROCHLORIDE 5 MG/ML
SOLUTION OPHTHALMIC PRN
Status: DISCONTINUED | OUTPATIENT
Start: 2023-06-01 | End: 2023-06-01 | Stop reason: HOSPADM

## 2023-06-01 RX ORDER — LIDOCAINE 40 MG/G
CREAM TOPICAL
Status: DISCONTINUED | OUTPATIENT
Start: 2023-06-01 | End: 2023-06-02 | Stop reason: HOSPADM

## 2023-06-01 RX ORDER — PROPOFOL 10 MG/ML
INJECTION, EMULSION INTRAVENOUS PRN
Status: DISCONTINUED | OUTPATIENT
Start: 2023-06-01 | End: 2023-06-01

## 2023-06-01 RX ORDER — ERYTHROMYCIN 5 MG/G
OINTMENT OPHTHALMIC PRN
Status: DISCONTINUED | OUTPATIENT
Start: 2023-06-01 | End: 2023-06-01 | Stop reason: HOSPADM

## 2023-06-01 RX ORDER — LIDOCAINE HYDROCHLORIDE 20 MG/ML
INJECTION, SOLUTION INFILTRATION; PERINEURAL PRN
Status: DISCONTINUED | OUTPATIENT
Start: 2023-06-01 | End: 2023-06-01

## 2023-06-01 RX ORDER — ACETAMINOPHEN 325 MG/1
975 TABLET ORAL ONCE
Status: COMPLETED | OUTPATIENT
Start: 2023-06-01 | End: 2023-06-01

## 2023-06-01 RX ORDER — ERYTHROMYCIN 5 MG/G
OINTMENT OPHTHALMIC
Qty: 3.5 G | Refills: 0 | Status: SHIPPED | OUTPATIENT
Start: 2023-06-01 | End: 2023-10-26

## 2023-06-01 RX ORDER — SODIUM CHLORIDE, SODIUM LACTATE, POTASSIUM CHLORIDE, CALCIUM CHLORIDE 600; 310; 30; 20 MG/100ML; MG/100ML; MG/100ML; MG/100ML
INJECTION, SOLUTION INTRAVENOUS CONTINUOUS
Status: DISCONTINUED | OUTPATIENT
Start: 2023-06-01 | End: 2023-06-02 | Stop reason: HOSPADM

## 2023-06-01 RX ORDER — SODIUM CHLORIDE, SODIUM LACTATE, POTASSIUM CHLORIDE, CALCIUM CHLORIDE 600; 310; 30; 20 MG/100ML; MG/100ML; MG/100ML; MG/100ML
INJECTION, SOLUTION INTRAVENOUS CONTINUOUS PRN
Status: DISCONTINUED | OUTPATIENT
Start: 2023-06-01 | End: 2023-06-01

## 2023-06-01 RX ADMIN — PROPOFOL 40 MG: 10 INJECTION, EMULSION INTRAVENOUS at 10:49

## 2023-06-01 RX ADMIN — LIDOCAINE HYDROCHLORIDE 60 MG: 20 INJECTION, SOLUTION INFILTRATION; PERINEURAL at 10:49

## 2023-06-01 RX ADMIN — SODIUM CHLORIDE, SODIUM LACTATE, POTASSIUM CHLORIDE, CALCIUM CHLORIDE: 600; 310; 30; 20 INJECTION, SOLUTION INTRAVENOUS at 10:45

## 2023-06-01 RX ADMIN — ACETAMINOPHEN 975 MG: 325 TABLET ORAL at 09:22

## 2023-06-01 ASSESSMENT — COPD QUESTIONNAIRES: COPD: 0

## 2023-06-01 ASSESSMENT — ENCOUNTER SYMPTOMS: ORTHOPNEA: 0

## 2023-06-01 ASSESSMENT — LIFESTYLE VARIABLES: TOBACCO_USE: 1

## 2023-06-01 NOTE — ANESTHESIA PREPROCEDURE EVALUATION
Anesthesia Pre-Procedure Evaluation    Patient: Isabel Carroll   MRN: [id number] : 1950        Procedure : Procedure(s):  Bilateral upper eyelid ptosis repair          Past Medical History:   Diagnosis Date     Arthritis      Basal cell carcinoma of skin 2011     Benign neoplasm of colon 10/26/2011    Overview:  Colonoscopy every 5 years  Oct 2011      Bronchiectasis without complication (H) 12/15/2016    CT chest done  through COPDgene study, formal report indicates cylindrical bronchiectasis. Reviewed CT with [facility] radiologists; bronchiectasis is likely present but of unclear clinical significance, and was likely present on CT from  as well (different scanner and different technique).     Chorioretinal scar of left eye 2015     Chronic rhinitis 2015     Clostridium difficile infection 2018     Coronary artery disease      Coronary atherosclerosis due to calcified coronary lesion 2011    Overview:  A)  CT Chest-Date: 07/13/10, There is coronary artery and mild aortic calcification present. B)  Nuclear Stress Test on 10/01/10, No scintigraphic evidence of ischemia or infarction. EF: 71%.       Diabetes (H)      Elevated blood pressure (not hypertension) 2006     Hypercholesteremia      Hyperlipidemia 2011    Overview:  See results review for lipid profile.      Impaired fasting glucose 2006     Nonsenile cataract      Overweight 2015     PONV (postoperative nausea and vomiting)      Primary osteoarthritis of both hands 2015     Right shoulder strain, initial encounter 12/15/2016     Shingles 2009     Sinus tachycardia 2016     Skin cancer , 2015    Basal cell cancer     Vitamin D deficiency 2010      Past Surgical History:   Procedure Laterality Date     BIOPSY  skin cancer, three x    since , bladder 10/2020     CATARACT IOL, RT/LT        SECTION      x 2     CHOLECYSTECTOMY       COLONOSCOPY      3  polyps     COLONOSCOPY  2016    neg     COLONOSCOPY N/A 2021    Procedure: COLONOSCOPY, FLEXIBLE, WITH LESION REMOVAL USING SNARE;  Surgeon: Prudence Hu MD;  Location: [facility] OR     COLONOSCOPY N/A 2021    Procedure: COLONOSCOPY, WITH POLYPECTOMY AND BIOPSY;  Surgeon: Prudence Hu MD;  Location: [facility] OR     COLONOSCOPY WITH CO2 INSUFFLATION N/A 2021    Procedure: COLONOSCOPY, WITH CO2 INSUFFLATION;  Surgeon: Prudence Hu MD;  Location: [facility] OR     ENDOSCOPY  2016     ENT SURGERY  2016    chronic cough     GENITOURINARY SURGERY      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 10/2020     LENGTHEN TENDON ACHILLES  2011    Procedure:LENGTHEN TENDON ACHILLES; Surgeon:AARON CÁRDENAS; [facility]     LIVER SURGERY      Bengin large cyst     MINI ARC SLING OPERATION FOR STRESS INCONTINENCE       OSTEOTOMY ANKLE  2011    Procedure:OSTEOTOMY ANKLE; Calcaneous,  Flexor Digitorum Longus Transfer       SALPINGO-OOPHORECTOMY BILATERAL  ,     Benign prophylactic for FHx ov cancer      Allergies   Allergen Reactions     Sulfa Antibiotics Nausea     Codeine Phosphate Nausea and Vomiting     Pentazocine      Pentazocine-Naloxone Hcl      Propoxyphene      Darvon     Penicillins Rash     rash and itching        Social History     Tobacco Use     Smoking status: Former     Packs/day: 1.00     Years: 15.00     Pack years: 15.00     Types: Cigarettes     Start date: 1966     Quit date: 1982     Years since quittin.1     Smokeless tobacco: Never     Tobacco comments:     Stopped smoking in .   Vaping Use     Vaping status: Never Used   Substance Use Topics     Alcohol use: Yes     Comment: 2-5 glasses of wine per week      Wt Readings from Last 1 Encounters:   23 64 kg (141 lb)        Anesthesia Evaluation   Pt has had prior anesthetic. Type: General.    No history of anesthetic complications       ROS/MED HX  ENT/Pulmonary:      (+) tobacco use (Remote history, quite in 1980s), Past use,  (-) asthma, COPD and recent URI   Neurologic:       Cardiovascular: Comment: Metoprolol for history of palpitations - denies any syncope/presyncope   (-) CARDENAS, orthopnea/PND and syncope   METS/Exercise Tolerance: >4 METS Comment: Does cardio classes multiple times a week   Hematologic:       Musculoskeletal:       GI/Hepatic:     (+) GERD, Asymptomatic on medication,     Renal/Genitourinary:       Endo:     (+) type II DM, Last HgA1c: 6, date: 4/2023, Not using insulin,     Psychiatric/Substance Use:       Infectious Disease:       Malignancy:       Other:            Physical Exam    Airway        Mallampati: II   TM distance: > 3 FB   Neck ROM: full   Mouth opening: > 3 cm    Respiratory Devices and Support         Dental         B=Bridge, C=Chipped, L=Loose, M=Missing    Cardiovascular          Rhythm and rate: regular and normal     Pulmonary           breath sounds clear to auscultation           OUTSIDE LABS:  CBC:   Lab Results   Component Value Date    WBC 4.4 04/28/2023    WBC 5.0 01/26/2023    HGB 14.5 04/28/2023    HGB 14.5 01/26/2023    HCT 42.1 04/28/2023    HCT 41.2 01/26/2023     04/28/2023     01/26/2023     BMP:   Lab Results   Component Value Date     04/28/2023     01/26/2023    POTASSIUM 4.0 04/28/2023    POTASSIUM 3.9 01/26/2023    CHLORIDE 102 04/28/2023    CHLORIDE 104 01/26/2023    CO2 29 04/28/2023    CO2 33 (H) 01/26/2023    BUN 11 04/28/2023    BUN 13 01/26/2023    CR 0.74 04/28/2023    CR 0.66 01/26/2023     (H) 04/28/2023     (H) 01/26/2023     COAGS: No results found for: PTT, INR, FIBR  POC: No results found for: BGM, HCG, HCGS  HEPATIC:   Lab Results   Component Value Date    ALBUMIN 4.0 04/28/2023    PROTTOTAL 7.2 04/28/2023    ALT 40 04/28/2023    AST 20 04/28/2023    ALKPHOS 108 04/28/2023    BILITOTAL 0.6 04/28/2023     OTHER:   Lab Results   Component Value Date    A1C 6.0 (H)  04/28/2023    ROSIO 9.3 04/28/2023    PHOS 4.0 11/05/2021    MAG 2.0 11/05/2021    LIPASE 110 11/06/2018    TSH 0.506 09/22/2012       Anesthesia Plan    ASA Status:  2   NPO Status:  NPO Appropriate    Anesthesia Type: MAC.     - Reason for MAC: straight local not clinically adequate              Consents    Anesthesia Plan(s) and associated risks, benefits, and realistic alternatives discussed. Questions answered and patient/representative(s) expressed understanding.    - Discussed:     - Discussed with:  Patient         Postoperative Care    Pain management: IV analgesics, Oral pain medications.        Comments:    Other Comments: Discussed plan for MAC, including possibility of awareness, risk of aspiration pneumonia, risk of hypoxia/low oxygen/airway obstruction requiring additional airway support/devices. Discussed alternatives. Discussed backup plan of general anesthesia and risks of general anesthesia, including sore throat/hoarse voice, abrasions/damage to lips/tongue/teeth. Ensured understanding, invited questions and all questions were answered.       H&P reviewed: Unable to attach H&P to encounter due to EHR limitations. H&P Update: appropriate H&P reviewed, patient examined. No interval changes since H&P (within 30 days).         Sondra Bullard MD

## 2023-06-01 NOTE — OP NOTE
PREOPERATIVE DIAGNOSIS: Ptosis, bilateral upper lid.   POSTOPERATIVE DIAGNOSIS:  Ptosis,bilateral upper lid.   PROCEDURE:Both  upper eyelid  ptosis repair by external levator resection.   SURGEON: Eldon Prather MD  ASSISTANT: Estella Bautista MD and Marci Saucedo MD  ANESTHESIA: Monitored with local infiltration of a 50/50 mixture of 2% lidocaine with epinephrine and 0.5% Marcaine.   COMPLICATIONS: None.   ESTIMATED BLOOD LOSS: Less than 5 mL.   HISTORY: Isabel Carroll  presented with ptosis of bilateral upper lid interfering with the superior visual field and activities of daily living. After the risks, benefits and alternatives to the proposed procedure were explained, informed consent was obtained.   DESCRIPTION OF PROCEDURE: Isabel Carroll  was brought to the operating room and placed supine on the operating table. Intravenous sedation was given. The bilateral upper lid crease and an ellipse of skin was marked with a marking pen and infiltrated with local anesthetic. The area was prepped and draped in the typical sterile ophthalmic fashion. Attention was directed to the right  side. The previously marked ellipse was incised with a 15 blade and the skin flap was excised with high temperature cautery. The orbital septum was opened horizontally. Nasal septum was conservatively debulked. The levator aponeurosis was identified and dissected from the superior tarsal border and the underlying Britton's muscle and advanced with a 6-0 Vicryl suture to bring the lid into a normal height and contour.  The patient was asked to open the eye and the suture adjusted for height and contour. Attention was directed to the other side where the same procedure was performed. The skin was closed with with running 6-0 plain gut sutures.   The patient tolerated the procedure well and left the operating room in stable condition.     Eldon Prather MD

## 2023-06-01 NOTE — DISCHARGE INSTRUCTIONS
Post-operative Instructions  Ophthalmic Plastic and Reconstructive Surgery    Eldon Prather M.D.     All instructions apply to the operated eye(s) or eyelid(s).    Wound care and personal care  ? If a patch or bandage has been placed, please leave this in place until seen by your physician. Ensure that the bandage does not get wet when you take a shower.   ? Apply ice compresses and gentle pressure 15 minutes on, 15 minutes off, for 2 days. If you are sleeping, you don't need to wake up to ice. As long as there is no further bleeding, after two days, switch to warm water compresses for five minutes, four times a day until seen by your physician.   ? You may shower or wash your hair the day after surgery. Do not go swimming for at least 2 weeks to prevent contamination of your wounds.  ? You may go for walks and light activity is ok, but no heavy (over 15 pounds) lifting, bending or excessive straining for one week.   ? Do not apply make-up to the eyes or eyelids for 2 weeks after surgery.  ? Expect some swelling, bruising, black eye (even into the lower eyelids and cheeks). Also expect serum caking, crusting and discharge from the eye and/or incisions. A small amount of surface bleeding, and depending on the type of surgery, bleeding from the inside of the eyelid, is normal for the first 48 hours.  ? Avoid straining, bending at the waist, or lifting more than 15 pounds for 1 week. Sleeping with your head elevated, such as in a recliner, for the first several days can help swelling resolve more quickly.   ? Do continue to ambulate (walk) as you normally would - being sedentary after surgery can cause blood clots.   ? Your eye(s) and eyelid(s) may be painful and tender. This is normal after surgery.      Contact information and follow-up  ? Return to the Eye Clinic for a follow-up appointment with your physician as scheduled. If no appointment has been scheduled:   - [facility] eye clinic:  [phone] for an appointment with Dr. Prather within 2 to 3 weeks from your date of surgery.     ? For severe pain, bleeding, or loss of vision, call the [facility] at [phone] or [facility] at [phone].     After hours or on weekends and holidays, call [phone] and ask to speak with the ophthalmologist on call.    An on call person can be reached after hours for concerns. The on call doctor should not call in medication refill requests after hours or on weekends, so please plan accordingly. An effort has been made to provide adequate pain medications following every surgery, and refills will not be provided in most instances.     Medications  ? Restart all regular home medications and eye drops. If you take Plavix or Aspirin on a regular basis, wait for 72 hours after your surgery before restarting these in order to decrease the risk of bleeding complications.  ? Avoid aspirin and aspirin-like medications (Motrin, Aleve, Ibuprofen, Dayanna-Orient etc) for 72 hours to reduce the risk of bleeding. You may take Tylenol (acetaminophen) for pain.  ? In addition to your home medications, take the following post-operative medications as prescribed by your physician.    ? Apply antibiotic ointment to all sutures three times a day, and into the operated eye(s) at night.   Once you run out, you can apply Vaseline or Aquaphor (over the counter) to the incisions. Don't put the Vaseline or Aquaphor into your eyes.   ? If you have ocular irritation, you can use over the counter artificial tears such as Refresh, Systane, or Blink. Do not use Visine, Clear Eyes, or any other drop that gets the red out.     [facility]  Home Care Following Anesthesia  For 24 hours after surgery:  Get plenty of rest.  A responsible adult must stay with you for at least 24 hours after you leave the surgery center.  Do not drive or use heavy equipment.  If  "you have weakness or tingling, don't drive or use heavy equipment until this feeling goes away.   Do not drink alcohol.   Avoid strenuous or risky activities.  Ask for help when climbing stairs.  You may feel lightheaded.  IF so, sit for a few minutes before standing.  Have someone help you get up.   If you have nausea (feel sick to your stomach): Drink only clear liquids such as apple juice, ginger ale, broth or 7-Up.  Rest may also help.  Be sure to drink enough fluids.  Move to a regular diet as you feel able.   You may have a slight fever.  Call the doctor if your fever is over 100 F (37.7 C) (taken under the tongue) or lasts longer than 24 hours.  You may have a dry mouth, a sore throat, muscle aches or trouble sleeping. These should go away after 24 hours.  Do not make important or legal decisions.   It is recommended to avoid smoking.        Today you received a Marcaine or bupivacaine block to numb the nerves near your surgery site.  This is a block using local anesthetic or \"numbing\" medication injected around the nerves to anesthetize or \"numb\" the area supplied by those nerves.  This block is injected into the muscle layer near your surgical site.  The medication may numb the location where you had surgery for 6-18 hours, but may last up to 24 hours.  If your surgical site is an arm or leg you should be careful with your affected limb, since it is possible to injure your limb without being aware of it due to the numbing.  Until full feeling returns, you should guard against bumping or hitting your limb, and avoid extreme hot or cold temperatures on the skin.  As the block wears off, the feeling will return as a tingling or prickly sensation near your surgical site.  You will experience more discomfort from your incision as the feeling returns.  You may want to take a pain pill (a narcotic or Tylenol if this was prescribed by your surgeon) when you start to experience mild pain before the pain beccomes " more severe.  If your pain medications do not control your pain you should notifiy your surgeon.    Tips for taking pain medications  To get the best pain relief possible, remember these points:  Take pain medications as directed, before pain becomes severe.  Pain medication can upset your stomach: taking it with food may help.  Constipation is a common side effect of pain medication. Drink plenty of  fluids.  Eat foods high in fiber. Take a stool softener if recommended by your doctor or pharmacist.  Do not drink alcohol, drive or operate machinery while taking pain medications.  Ask about other ways to control pain, such as with heat, ice or relaxation.    Tylenol/Acetaminophen Consumption  To help encourage the safe use of acetaminophen, the makers of TYLENOL  have lowered the maximum daily dose for single-ingredient Extra Strength TYLENOL  (acetaminophen) products sold in the [place] from 8 pills per day (4,000 mg) to 6 pills per day (3,000 mg). The dosing interval has also changed from 2 pills every 4-6 hours to 2 pills every 6 hours.  If you feel your pain relief is insufficient, you may take Tylenol/Acetaminophen in addition to your narcotic pain medication.   Be careful not to exceed 3,000 mg of Tylenol/Acetaminophen in a 24 hour period from all sources.  If you are taking extra strength Tylenol/acetaminophen (500 mg), the maximum dose is 6 tablets in 24 hours.  If you are taking regular strength acetaminophen (325 mg), the maximum dose is 9 tablets in 24 hours.    Call a doctor for any of the following:  Signs of infection (fever, growing tenderness at the surgery site, a large amount of drainage or bleeding, severe pain, foul-smelling drainage, redness, swelling).  It has been over 8 to 10 hours since surgery and you are still not able to urinate (pass water).  Headache for over 24 hours.  Numbness, tingling or weakness the day after surgery (if you had spinal anesthesia).  Signs of Covid-19 infection  (temperature over 100 degrees, shortness of breath, cough, loss of taste/smell, generalized body aches, persistent headache, chills, sore throat, nausea/vomiting/diarrhea)  Your doctor is:       Dr. Eldon Prather, Ophthalmology: [phone]               Or dial [phone] and ask for the resident on call for:  Ophthalmology  For emergency care, call the:  [facility]:  [phone] (TTY for hearing impaired: [phone])  Tylenol  975 mg  at 930 am.  Next available dose at 330 pm.

## 2023-06-01 NOTE — ANESTHESIA CARE TRANSFER NOTE
Patient: Isabel Carroll    Procedure: Procedure(s):  Bilateral upper eyelid ptosis repair       Diagnosis: Ptosis of both eyelids [H02.403]  Dermatochalasis of eyelid [H02.839]  Diagnosis Additional Information: No value filed.    Anesthesia Type:   No value filed.     Note:      Level of Consciousness: awake  Oxygen Supplementation: room air    Independent Airway: airway patency satisfactory and stable        Patient transferred to: Phase II    Handoff Report: Identifed the Patient, Identified the Reponsible Provider, Reviewed the pertinent medical history, Discussed the surgical course, Reviewed Intra-OP anesthesia mangement and issues during anesthesia, Set expectations for post-procedure period and Allowed opportunity for questions and acknowledgement of understanding      Vitals:  Vitals Value Taken Time   BP     Temp     Pulse     Resp     SpO2         Electronically Signed By: VIC Foreman CRNA  June 1, 2023  11:21 AM

## 2023-06-01 NOTE — ANESTHESIA POSTPROCEDURE EVALUATION
Patient: Isabel Carroll    Procedure: Procedure(s):  Bilateral upper eyelid ptosis repair       Anesthesia Type:  MAC    Note:  Disposition: Outpatient   Postop Pain Control: Uneventful            Sign Out: Well controlled pain   PONV: No   Neuro/Psych: Uneventful            Sign Out: Acceptable/Baseline neuro status   Airway/Respiratory: Uneventful            Sign Out: Acceptable/Baseline resp. status   CV/Hemodynamics: Uneventful            Sign Out: Acceptable CV status; No obvious hypovolemia; No obvious fluid overload   Other NRE:    DID A NON-ROUTINE EVENT OCCUR?            Last vitals:  Vitals Value Taken Time   /72 06/01/23 1140   Temp 36.1  C (97  F) 06/01/23 1140   Pulse 60 06/01/23 1140   Resp 18 06/01/23 1140   SpO2 98 % 06/01/23 1140       Electronically Signed By: Sondra Bullard MD  June 1, 2023  1:47 PM

## 2023-06-06 ENCOUNTER — TELEPHONE (OUTPATIENT)
Dept: OPHTHALMOLOGY | Facility: CLINIC | Age: 73
End: 2023-06-06
Payer: COMMERCIAL

## 2023-06-06 NOTE — TELEPHONE ENCOUNTER
[facility] Call Center    Phone Message    May a detailed message be left on voicemail: yes     Reason for Call: Symptoms or Concerns     If patient has red-flag symptoms, warm transfer to triage line    Current symptom or concern: Puffy, itching in both eyes,     Symptoms have been present for:  2 day(s)    Has patient previously been seen for this? No    By : n/a    Date: n/a    Are there any new or worsening symptoms? No  Pt thinks it may be a reaction to the erythromycin but is unsure.    Action Taken: Message routed to:  [facility] eye    Travel Screening: Not Applicable

## 2023-06-06 NOTE — TELEPHONE ENCOUNTER
Spoke with pt, she is unable to send photos. Wanted to be seen today, offered [facility]. Pt opted to wait until tomorrow to see Dr. Colón in [facility]. Scheduled for 2:15pm 6/7/23.     Nasrin Ghosh, NEGAR, 8:28 AM 06/06/2023

## 2023-06-07 ENCOUNTER — OFFICE VISIT (OUTPATIENT)
Dept: OPHTHALMOLOGY | Facility: CLINIC | Age: 73
End: 2023-06-07
Payer: COMMERCIAL

## 2023-06-07 DIAGNOSIS — H02.839 DERMATOCHALASIS OF EYELID: ICD-10-CM

## 2023-06-07 DIAGNOSIS — H02.403 PTOSIS OF BOTH EYELIDS: Primary | ICD-10-CM

## 2023-06-07 PROCEDURE — 99024 POSTOP FOLLOW-UP VISIT: CPT | Performed by: OPHTHALMOLOGY

## 2023-06-07 ASSESSMENT — TONOMETRY
OS_IOP_MMHG: 21
OD_IOP_MMHG: 21
IOP_METHOD: ICARE

## 2023-06-07 ASSESSMENT — VISUAL ACUITY
OS_CC: 20/20
METHOD: SNELLEN - LINEAR
OD_CC: 20/30
CORRECTION_TYPE: GLASSES

## 2023-06-07 NOTE — NURSING NOTE
Chief Complaints and History of Present Illnesses   Patient presents with     Post Op (Ophthalmology) Both Eyes       Chief Complaint(s) and History of Present Illness(es)     Post Op (Ophthalmology) Both Eyes            Laterality: both eyes          Comments    S/p both upper eyelid ptosis repair 6/1/23. Pt states both upper eyelid have been very puffy and itchy. Wondering if she is having a reaction to the erythromycin or the stitches. Has still been using the erythromycin TID and at night inside lower eyelids. Did cold compresses yesterday and it seemed to help the puffiness.                 Nasrin Ghosh, COT

## 2023-06-07 NOTE — PROGRESS NOTES
"Chief Complaint(s) and History of Present Illness(es)     Post Op (Ophthalmology) Both Eyes            Laterality: both eyes          Comments    S/p both upper eyelid ptosis repair 6/1/23. Pt states both upper eyelid   have been very puffy and itchy. Wondering if she is having a reaction to   the erythromycin or the stitches. Has still been using the erythromycin   TID and at night inside lower eyelids. Did cold compresses yesterday and   it seemed to help the puffiness.             Sutures removed.     Patient Instructions   - Apply warm compresses for 1 minute three times a day until your bruising has resolved.  - Cool compresses can help with swelling and itching, you can alternate cool compresses with warm compresses if you find it helpful.  - Apply Aquaphor or Vaseline to the incision at bedtime.   - If you have symptoms of eye irritation, it is good to use over the counter artificial tears. Good brands include Refresh, Blink, and Systane. Do NOT get drops that are for \"red eyes.\"   - It is normal for the incision to appear raised, red, itch and have small lumps. You can gently massage any small bumps along the incision line. These can take up to six months to resolve.      Return in about 2 months (around 8/7/2023).      Attending Physician Attestation: Complete documentation of historical and exam elements from today's encounter can be found in the full encounter summary report (not reduplicated in this progress note). I personally obtained the chief complaint(s) and history of present illness. I confirmed and edited as necessary the review of systems, past medical/surgical history, family history, social history, and examination findings as documented by others; and I examined the patient myself. I personally reviewed the relevant tests, images, and reports as documented above. I formulated and edited as necessary the assessment and plan and discussed the findings and management plan with the patient and " family. I personally reviewed the ophthalmic test(s) associated with this encounter, agree with the interpretation(s) as documented by the resident/fellow, and have edited the corresponding report(s) as necessary. Eldon Prather MD

## 2023-06-29 ENCOUNTER — MYC MEDICAL ADVICE (OUTPATIENT)
Dept: FAMILY MEDICINE | Facility: CLINIC | Age: 73
End: 2023-06-29
Payer: COMMERCIAL

## 2023-07-05 ENCOUNTER — TELEPHONE (OUTPATIENT)
Dept: FAMILY MEDICINE | Facility: CLINIC | Age: 73
End: 2023-07-05
Payer: COMMERCIAL

## 2023-07-05 NOTE — TELEPHONE ENCOUNTER
Patient calling to request the following.    1. Patient requesting office appointment to follow-up with provider regarding diabetes. She would like to complete lab work and discuss coming off Metformin. Patient declined scheduling virtual visit.    Please advise if okay to schedule same day for Mon 7/24 after 1300.     2. Patient inquiring if she needs the Hepatitis B vaccine if she hasn't received it before. She states that she has been in conversation with a diabetes educator and was asked if she has ever gotten this series.    GLADYS Laguerre  [facility] Primary Care Triage

## 2023-07-06 NOTE — TELEPHONE ENCOUNTER
1. Ok for same day spot  2. She is likely low risk for hep B, but if she's never gotten vaccine she could consider this

## 2023-07-10 ENCOUNTER — VIRTUAL VISIT (OUTPATIENT)
Dept: FAMILY MEDICINE | Facility: CLINIC | Age: 73
End: 2023-07-10
Payer: COMMERCIAL

## 2023-07-10 DIAGNOSIS — E11.9 TYPE 2 DIABETES MELLITUS WITHOUT COMPLICATION, WITHOUT LONG-TERM CURRENT USE OF INSULIN (H): ICD-10-CM

## 2023-07-10 DIAGNOSIS — R05.1 ACUTE COUGH: Primary | ICD-10-CM

## 2023-07-10 PROCEDURE — 99442 PR PHYSICIAN TELEPHONE EVALUATION 11-20 MIN: CPT | Mod: 95 | Performed by: NURSE PRACTITIONER

## 2023-07-10 RX ORDER — BENZONATATE 100 MG/1
100 CAPSULE ORAL 3 TIMES DAILY PRN
Qty: 20 CAPSULE | Refills: 0 | Status: SHIPPED | OUTPATIENT
Start: 2023-07-10 | End: 2023-10-18

## 2023-07-10 NOTE — PROGRESS NOTES
Isabel is a 72 year old who is being evaluated via a billable telephone visit.      What phone number would you like to be contacted at?   [phone]      How would you like to obtain your AVS? Benjy    Distant Location (provider location):  On-site    Assessment & Plan     Acute cough  Likely viral. Improving. Will send benzonatate for cough.   - benzonatate (TESSALON) 100 MG capsule; Take 1 capsule (100 mg) by mouth 3 times daily as needed for cough    Type 2 diabetes mellitus without complication, without long-term current use of insulin (H)  Patient requesting to have her glucometer checked against our lab. She will make RN visit to have BG checked in lab and then will check BG with her meter in clinic. If there is a discrepancy, RN will assist patient to schedule follow up with provider for new supply orders.  - Glucose, whole blood; Future         The benefits, risks and potential side effects were discussed in detail. Black box warnings discussed as relevant. All patient questions were answered. The patient was instructed to follow up immediately if any adverse reactions develop.    Return precautions discussed, including when to seek urgent/emergent care.    Patient verbalizes understanding and agrees with plan of care.     [facility]    James Hall is a 72 year old, presenting for the following health issues:  Diabetes and URI        7/10/2023     8:08 AM   Additional Questions   Roomed by Evelina     History of Present Illness       Diabetes:   She presents for follow up of diabetes.  She is checking home blood glucose one time daily. She checks blood glucose before meals.  Blood glucose is never over 200 and never under 70. When her blood glucose is low, the patient is asymptomatic for confusion, blurred vision, lethargy and reports not feeling dizzy, shaky, or weak.  She has no concerns regarding her diabetes at this time.  She is not  experiencing numbness or burning in feet, excessive thirst, blurry vision, weight changes or redness, sores or blisters on feet.         She eats 2-3 servings of fruits and vegetables daily.She consumes 0 sweetened beverage(s) daily.She exercises with enough effort to increase her heart rate 60 or more minutes per day.  She exercises with enough effort to increase her heart rate 3 or less days per week.   She is taking medications regularly.      - Been Having cough for almost a week, has been taking diabetic cough medicine         -120 in the AM  Fri 130  This  and 136. The other day 90 and 108 on 2 different fingers  Wonders if glucometer is working  Recently with upper respiratory infection - cough is bad   with similar. Went to  and told viral  Both her and  have resolving symptoms  Eye mattery over the weekend - better now  upper respiratory infection is better today. Was more hoarse with cough  Taking tussin for diabetics  No fever  Green from nose at times    Wonders about getting Hep B vaccine - hasn't gotten before and not on [facility]    Review of Systems   Constitutional, HEENT, cardiovascular, pulmonary, gi and gu systems are negative, except as otherwise noted.      Objective           Vitals:  No vitals were obtained today due to virtual visit.    Physical Exam   healthy, alert and no distress  PSYCH: Alert and oriented times 3; coherent speech, normal   rate and volume, able to articulate logical thoughts, able   to abstract reason, no tangential thoughts, no hallucinations   or delusions  Her affect is normal  RESP: No cough, no audible wheezing, able to talk in full sentences  Remainder of exam unable to be completed due to telephone visits                Phone call duration: 13:46 minutes

## 2023-07-28 ENCOUNTER — VIRTUAL VISIT (OUTPATIENT)
Dept: NUTRITION | Facility: CLINIC | Age: 73
End: 2023-07-28
Attending: NURSE PRACTITIONER
Payer: COMMERCIAL

## 2023-07-28 DIAGNOSIS — E11.9 TYPE 2 DIABETES MELLITUS WITHOUT COMPLICATION, WITHOUT LONG-TERM CURRENT USE OF INSULIN (H): ICD-10-CM

## 2023-07-28 PROCEDURE — 97802 MEDICAL NUTRITION INDIV IN: CPT | Mod: 95 | Performed by: DIETITIAN, REGISTERED

## 2023-07-28 NOTE — PATIENT INSTRUCTIONS
Aim for 30-45 gm carbohydrates- usually better for weight loss, but going up to 60 gm is OK for females.  Keeping snacks between 15-30 gm is a good amount for snacks.     2. To help with lowering fasting blood glucose, often staying active, weight loss, and possibly a snack at bedtime are best ways to help with this, most of which you are already doing!    3. Guidelines for activity: 150 minutes aerobic activity a week and 2-3 days strength/resistance training.    4. Ok for meter to be off up to 10%- blood glucose clumps differently in blood stream so will be a little different number each time you check your blood glucose.    FOLLOW UP: Please reach out if you need any nutrition resources or with questions.    NEXT APPOINTMENT: Will call you again on Friday, January 26th around 1pm.    Alexandra Mckenna RDN, LD, [facility]   [phone]

## 2023-07-28 NOTE — PROGRESS NOTES
Type of Service: Telephone Visit    Audio only visit done, as patient does not have access to audio-visual technology.    Individual visit provided, given no group classes are available for 2 months.     Originating Location (Patient Location): Home  Distant Location (Provider Location): Offsite  Mode of Communication:  Telephone    Telephone Visit Start Time:  1:00pm  Telephone Visit End Time (telephone visit stop time): 1:56pm    How would patient like to obtain [facility]S? [facility]    Medical Nutrition Therapy for Diabetes  Visit Type:Initial assessment and intervention    Isabel Carroll presents today for MNT and education related to type 2 diabetes and weight management.   She is accompanied by self.     ASSESSMENT:   Patient comments/concerns relating to nutrition: Says went in for a 3 month follow up- was diagnosed in January. Had wellness check in May. She was told her blood glucose was 104 mg/dL.  Says she was told may be able to go off the Metformin. Has a number of things on her diet- has some conflicts that she heard from other.   Says she did a 3 day food record. Says she is checking blood glucose in the morning. She is ok with staying on Metformin if blood glucose is <120 mg/dL. Says she is working with a nurse through [facility]. Says on Tuesday, she plans to get her meter check.    Says lot of diabetes in the family- an aunt and uncle had to have to had amputations. Found has to be careful with rice- sees higher blood glucose when eats too much rice.     She did Weight Watchers since 2010 due to having heart-disease. She liked what she learned and implemented a lot of changes back then. She was told to work things into her diet in moderation.     Says she was at 150 lbs in January and weight is down to 135 lbs at home scale.     NUTRITION HISTORY:    Breakfast: 1/2 cup Raisin bran, 1/2 cup lactose free milk, 2 slices lite bread with 2 T. PB and 8 oz lite cranberry juice (1 gm  carbohydrate in juice) OR 1/2 cup 5 thais cranberry juice, 1/2 cup lactose free milk, 1/2 Cup raisin bran and coffee with 1/2 and 1/2, and 1 cup cooked oatmeal, 1/2 tsp brown sugar, splash of milk  Lunch: [facility] salad kit with slice turkey and veggies, crunchy strips and dressing, slice of pineapple OR 3 anderson, egg, 1/2 English muffin with butter and 1 Tbsp. Green pepper jelly and water OR iced tea with sugar sub, 1/2 strawberry chicken salad with balsamic dressing and tiramisu cake (sliver)  PM: 8 almonds OR small bag potato chips 2x/week  Dinner: fish and low-carbohydrate vegetable (broccoli, asparagus, green beans) and mixed fruit with cool-whip OR [place] white fish with shrimp, asparagus, 1/2 cup rice, and 4 oz red wine OR 4 oz chicken, slice garlic bread with butter, 1 cup onion, pepper, mushrooms, 2 T. Hummus with baby carrots, 2 long tortilla strips and queso, 1/2 cup mixed fruit with cool whip (2T.) and SF mineral water OR 1/2 strawberry salad, diet coke and 2/3 low-carbohydrate ice-cream in cake cone  Snacks: 8 almonds OR popcorn   Beverages: Juice 4-8 oz lite cranberry juice/day, Milk 1/2 cup lactose-free/day, Pop (Diet) 0-1x/day, and Water all day/Selzer water.    Misses meals? none  Eats out:  1-2 meals/per week     Previous diet education:  No     Food allergies/intolerances: none    Diet is high in: fiber, fruits, and vegetables  Diet is low in: calcium    EXERCISE: Silver Sneakers at [facility] 3 times a week. With summer, watching grandkids on M,W, F. Trying to go on T/Th for both pool and regular aerobics. Goes to [place] with [place] and does her own shopping and walking around the stores. She is hoping to get more exercise in the fall once more kids are in school.     SOCIO/ECONOMIC:   Lives with: spouse    BLOOD GLUCOSE MONITORING:  Patient glucose self monitoring as follows: one time daily.   BG meter: unknown meter  BG results: Fasting 102-119 mg/dL   119/118, 109    BG values are:  in goal  Patient's most recent   Lab Results   Component Value Date    A1C 6.0 04/28/2023    A1C 5.6 10/14/2020       MEDICATIONS:  Current Outpatient Medications   Medication    alcohol swab prep pads    aspirin (ASA) 81 MG EC tablet    atorvastatin (LIPITOR) 40 MG tablet    benzonatate (TESSALON) 100 MG capsule    blood glucose (NO BRAND SPECIFIED) test strip    blood glucose calibration (NO BRAND SPECIFIED) solution    blood glucose monitoring (NO BRAND SPECIFIED) meter device kit    Calcium-Vitamin D (CALCIUM + D PO)    diphenhydrAMINE-acetaminophen (TYLENOL PM)  MG tablet    erythromycin (ROMYCIN) 5 MG/GM ophthalmic ointment    FISH OIL    MAGNESIUM OXIDE PO    metFORMIN (GLUCOPHAGE XR) 500 MG 24 hr tablet    metoprolol succinate ER (TOPROL-XL) 25 MG 24 hr tablet    Multiple Vitamins-Minerals (MULTIVITAMIN & MINERAL PO)    omeprazole (PRILOSEC) 20 MG DR capsule    Solifenacin Succinate (VESICARE PO)    thin (NO BRAND SPECIFIED) lancets     Current Facility-Administered Medications   Medication    ropivacaine (NAROPIN) injection 0.5 mL    triamcinolone (KENALOG-40) injection 40 mg       LABS:  Lab Results   Component Value Date    A1C 6.0 04/28/2023    A1C 5.6 10/14/2020     Lab Results   Component Value Date     06/01/2023     04/28/2023     01/11/2021     Lab Results   Component Value Date    LDL 44 04/28/2023    LDL 39 10/14/2020     HDL Cholesterol   Date Value Ref Range Status   10/14/2020 47 (L) >49 mg/dL Final     Direct Measure HDL   Date Value Ref Range Status   04/28/2023 41 (L) >=50 mg/dL Final   ]  GFR Estimate   Date Value Ref Range Status   04/28/2023 85 >60 mL/min/1.73m2 Final     Comment:     eGFR calculated using 2021 CKD-EPI equation.   01/11/2021 75 >60 mL/min/[1.73_m2] Final     Comment:     Non  GFR Calc  Starting 12/18/2018, serum creatinine based estimated GFR (eGFR) will be   calculated using the Chronic Kidney Disease Epidemiology Collaboration  "  (CKD-EPI) equation.       Lab Results   Component Value Date    CR 0.74 04/28/2023    CR 0.80 01/11/2021     No results found for: MICROALBUMIN    ANTHROPOMETRICS:  Vitals: LMP  (LMP Unknown)   Estimated body mass index is 26.42 kg/m  as calculated from the following:    Height as of 6/1/23: 1.556 m (5' 1.25\").    Weight as of 6/1/23: 64 kg (141 lb).       Wt Readings from Last 5 Encounters:   06/01/23 64 kg (141 lb)   05/25/23 64 kg (141 lb)   05/02/23 64.8 kg (142 lb 12.8 oz)   02/07/23 68.8 kg (151 lb 9.6 oz)   01/27/23 71.3 kg (157 lb 3.2 oz)       Weight Change: stable - no change between the past 2 readings.    ESTIMATED KCAL REQUIREMENTS:  1310 kcal per day (based on last clinic weight on 6/1/23)    NUTRITION DIAGNOSIS: Food- and nutrition-related knowledge deficit related to new diabetes diagnosis as evidenced by no prior education and patient stating uncertainty with what she is able to eat.     NUTRITION INTERVENTION:  Nutrition Prescription: Carbohydrate Intake: 30-45 grams/meal and 15-30 grams/snacks  Education given to support: general nutrition guidelines, weight reduction, consistent meals, free foods, carb counting, labeling, exercise, fiber, behavior modification, portion control, and heart healthy diet  Education Materials Provided: None- patient not feel like need any handouts today- has been able to find information on healthy eating from Weight Watchers and carbohydrate information (also working with a nurse through her insurance).  Motivational Interviewing    Discussion: Isabel has already implemented healthy changes to eating and is watching her carbohydrate intake. Answered her question on amounts and if trying to lose weight, suggested trying to keep carbohydrates between 30-45 gm, but up to 60 gm is usually tolerated, and snacks 15-30 gm. She seems close to this range already. Has been working with a nurse, provided by her [employer] insurance, so has already received some education. "     Answered her diabetes related questions on target blood glucose pre/post-meals, difference in blood glucose when checking at same time but on different fingers, and why blood glucose sometimes high in the morning (kalpana phenomenon). Also reviewed physiology of diabetes and how often see decline in insulin production over time.  Encouraged her to stay active and commended on weight loss, both of which may help improve blood glucose control. Discussed potential of trying a snack before bed to see if fasting blood glucose improve, if hungry at night. Pt verbalized understanding of concepts discussed and recommendations provided.       PATIENT'S BEHAVIOR CHANGE GOALS:   See Patient Instructions for patient stated behavior change goals. AVS sent by [employer].    MONITOR / EVALUATE:  RD will monitor/evaluate:  Blood Glucose / A1c  Food / Beverage / Nutrient intake   Pertinent Labs  Progress toward meeting stated nutrition-related goals  Weight change    FOLLOW-UP:  Call RD with questions/concerns.   Follow-up appointment scheduled on 1/26/24.     Alexandra Mckenna RDN, LD, ELAINE   Time spent in minutes: 56 minutes  Encounter: Individual telephone visit

## 2023-08-01 ENCOUNTER — ALLIED HEALTH/NURSE VISIT (OUTPATIENT)
Dept: FAMILY MEDICINE | Facility: CLINIC | Age: 73
End: 2023-08-01
Payer: COMMERCIAL

## 2023-08-01 ENCOUNTER — LAB (OUTPATIENT)
Dept: LAB | Facility: CLINIC | Age: 73
End: 2023-08-01
Payer: COMMERCIAL

## 2023-08-01 DIAGNOSIS — Z71.89 ENCOUNTER FOR GLUCOMETER INSTRUCTION: Primary | ICD-10-CM

## 2023-08-01 DIAGNOSIS — E11.9 TYPE 2 DIABETES MELLITUS WITHOUT COMPLICATION, WITHOUT LONG-TERM CURRENT USE OF INSULIN (H): ICD-10-CM

## 2023-08-01 LAB
ANION GAP SERPL CALCULATED.3IONS-SCNC: 11 MMOL/L (ref 7–15)
BUN SERPL-MCNC: 11 MG/DL (ref 8–23)
CALCIUM SERPL-MCNC: 9.4 MG/DL (ref 8.8–10.2)
CHLORIDE SERPL-SCNC: 102 MMOL/L (ref 98–107)
CREAT SERPL-MCNC: 0.68 MG/DL (ref 0.51–0.95)
DEPRECATED HCO3 PLAS-SCNC: 27 MMOL/L (ref 22–29)
GFR SERPL CREATININE-BSD FRML MDRD: >90 ML/MIN/1.73M2
GLUCOSE BLD-MCNC: 116 MG/DL (ref 60–99)
GLUCOSE SERPL-MCNC: 115 MG/DL (ref 70–99)
HBA1C MFR BLD: 5.7 % (ref 0–5.6)
POTASSIUM SERPL-SCNC: 3.9 MMOL/L (ref 3.4–5.3)
SODIUM SERPL-SCNC: 140 MMOL/L (ref 136–145)

## 2023-08-01 PROCEDURE — 99207 PR NO CHARGE NURSE ONLY: CPT

## 2023-08-01 PROCEDURE — 82947 ASSAY GLUCOSE BLOOD QUANT: CPT | Mod: 59

## 2023-08-01 PROCEDURE — 80048 BASIC METABOLIC PNL TOTAL CA: CPT

## 2023-08-01 PROCEDURE — 83036 HEMOGLOBIN GLYCOSYLATED A1C: CPT

## 2023-08-01 PROCEDURE — 36415 COLL VENOUS BLD VENIPUNCTURE: CPT

## 2023-08-01 NOTE — PROGRESS NOTES
119 bg at 730 am at home- personal glucometer    116 bg lab at 850 am- venipuncture    Patient did in front of writer:  106 bg at 905 am- on personal glucometer    Patient has no concerns. Patient wanted to ensure her glucometer is within 10-12 mg/dL with the lab result- which it was.    Patient had questions regarding metformin- writer made an appt with a new provider to review medications and establish care.    Patient forget lancets at home- writer obtained an adult lancet from Lab. Writer assisted the patient in using the lancet since she was not familiar with it.     Patient demonstrated use of glucometer correctly and no concerns at this time.     Cayla Yeboah RN on 8/1/2023 at 9:15 AM

## 2023-08-09 ENCOUNTER — OFFICE VISIT (OUTPATIENT)
Dept: OPHTHALMOLOGY | Facility: CLINIC | Age: 73
End: 2023-08-09
Payer: COMMERCIAL

## 2023-08-09 DIAGNOSIS — Z98.890 POSTOPERATIVE EYE STATE: Primary | ICD-10-CM

## 2023-08-09 PROCEDURE — 99024 POSTOP FOLLOW-UP VISIT: CPT | Mod: GC | Performed by: OPHTHALMOLOGY

## 2023-08-09 ASSESSMENT — TONOMETRY
IOP_METHOD: ICARE
OD_IOP_MMHG: 18
OS_IOP_MMHG: 20

## 2023-08-09 ASSESSMENT — VISUAL ACUITY
OS_CC: 20/15
CORRECTION_TYPE: GLASSES
OD_CC: 20/20
METHOD: SNELLEN - LINEAR

## 2023-08-09 ASSESSMENT — SLIT LAMP EXAM - LIDS
COMMENTS: INCISIONS C/D/I
COMMENTS: INCISIONS C/D/I

## 2023-08-09 ASSESSMENT — EXTERNAL EXAM - LEFT EYE: OS_EXAM: NORMAL

## 2023-08-09 ASSESSMENT — EXTERNAL EXAM - RIGHT EYE: OD_EXAM: NORMAL

## 2023-08-09 NOTE — NURSING NOTE
Chief Complaints and History of Present Illnesses   Patient presents with    Post Op (Ophthalmology) Both Eyes       Chief Complaint(s) and History of Present Illness(es)       Post Op (Ophthalmology) Both Eyes              Laterality: both eyes              Comments    S/P Both  upper eyelid  ptosis repair by external levator resection, 06/01/2023  Some bumps along incision line.   Also feels the is a crease of excess skin on both upper lids near her nose.   No pain.   Using Systane drops. Using Aquaphor every night along incision line                     Montrell Fry Ophthalmic Assistant

## 2023-08-09 NOTE — PROGRESS NOTES
Chief Complaint(s) and History of Present Illness(es)     Post Op (Ophthalmology) Both Eyes            Laterality: both eyes          Comments    S/P Both  upper eyelid  ptosis repair by external levator resection,   06/01/2023  Some bumps along incision line. Also feels the is a crease of excess skin on both upper lids near her   nose. No pain. Using Systane drops. Using Aquaphor every night along incision line     Assessment & Plan     Isabel Carroll is a 72 year old female with the following diagnoses:     ICD-10-CM    1. Postoperative eye state  Z98.890           Lids in good position and symmetric. Margin to reflex distance 1 about 3 mm both eyes.   Bothered by eyelid crease appearance medially - we reviewed old pictures. She has mild NF that is still there, but I would not recommend revision.    She has a small central lid crease cyst on the left upper eyelid. Getting smaller. If still present in December we can have her return to excise it.     Continue AT PRN  Continue massaging incisions with bland lubricating ointment  Patient disposition:   Return in about 4 months (around 12/9/2023). Can cancel if improves     Sis Rico MD  Oculoplastic Surgery Fellow       Attending Physician Attestation: Complete documentation of historical and exam elements from today's encounter can be found in the full encounter summary report (not reduplicated in this progress note). I personally obtained the chief complaint(s) and history of present illness. I confirmed and edited as necessary the review of systems, past medical/surgical history, family history, social history, and examination findings as documented by others; and I examined the patient myself. I personally reviewed the relevant tests, images, and reports as documented above. I formulated and edited as necessary the assessment and plan and discussed the findings and management plan with the patient.  -Eldon Prather MD

## 2023-08-10 ENCOUNTER — TELEPHONE (OUTPATIENT)
Dept: OPHTHALMOLOGY | Facility: CLINIC | Age: 73
End: 2023-08-10
Payer: COMMERCIAL

## 2023-08-10 NOTE — TELEPHONE ENCOUNTER
Left Voicemail (1st Attempt) for the patient to call back and schedule the following:    Appointment type: return  Provider: dr. walker  Return date: 12/9/2023  Specialty phone number: [phone]   Additonal Notes: Return in about 4 months (around 12/9/2023)     Pat barnett Procedure   Orthopedics, Podiatry, Sports Medicine, Ent ,Eye , Audiology, Adult Endocrine & Diabetes, Nutrition & Medication Therapy Management Specialties   [facility]

## 2023-09-14 ENCOUNTER — IMMUNIZATION (OUTPATIENT)
Dept: FAMILY MEDICINE | Facility: CLINIC | Age: 73
End: 2023-09-14
Payer: COMMERCIAL

## 2023-09-14 DIAGNOSIS — Z23 IMMUNIZATION DUE: Primary | ICD-10-CM

## 2023-09-14 PROCEDURE — G0008 ADMIN INFLUENZA VIRUS VAC: HCPCS

## 2023-09-14 PROCEDURE — 99207 PR NO CHARGE NURSE ONLY: CPT

## 2023-09-14 PROCEDURE — 90686 IIV4 VACC NO PRSV 0.5 ML IM: CPT

## 2023-09-18 ENCOUNTER — TELEPHONE (OUTPATIENT)
Dept: FAMILY MEDICINE | Facility: CLINIC | Age: 73
End: 2023-09-18
Payer: COMMERCIAL

## 2023-09-18 NOTE — TELEPHONE ENCOUNTER
General Call    Contacts         Type Contact Phone/Fax    09/18/2023 08:29 AM CDT Phone (Incoming) Isabel Carroll (Self) [phone] (H)          Reason for Call: Knee pain     What are your questions or concerns:  Patient has been experiencing knee pain for 4-6 weeks. She previously has seen an orthopedic doctor and is wondering if she should go back to orthopedics or see a primary care doctor.    Date of last appointment with provider: Patient saw Dr. rFost on 7/10/2023    Could we send this information to you in [facility] or would you prefer to receive a phone call?:   Patient would prefer a phone call   Okay to leave a detailed message?: Yes at Home number on file [phone] (home)

## 2023-09-18 NOTE — TELEPHONE ENCOUNTER
Called and spoke to the patient and she will call back to schedule an appointment.  Sandi Carrera MA  [facility]   Primary Care

## 2023-09-18 NOTE — TELEPHONE ENCOUNTER
If pt has a referral to ortho, she should follow up with them.   Since I have never seen her before she would have to make an appointment with any FM provider to have this addressed and get ortho referral     Dana Mills PAC

## 2023-09-21 ENCOUNTER — ANCILLARY PROCEDURE (OUTPATIENT)
Dept: GENERAL RADIOLOGY | Facility: CLINIC | Age: 73
End: 2023-09-21
Attending: PHYSICIAN ASSISTANT
Payer: COMMERCIAL

## 2023-09-21 ENCOUNTER — OFFICE VISIT (OUTPATIENT)
Dept: FAMILY MEDICINE | Facility: CLINIC | Age: 73
End: 2023-09-21
Payer: COMMERCIAL

## 2023-09-21 VITALS
TEMPERATURE: 98 F | SYSTOLIC BLOOD PRESSURE: 120 MMHG | OXYGEN SATURATION: 98 % | DIASTOLIC BLOOD PRESSURE: 72 MMHG | HEIGHT: 62 IN | RESPIRATION RATE: 18 BRPM | BODY MASS INDEX: 24.8 KG/M2 | HEART RATE: 66 BPM | WEIGHT: 134.8 LBS

## 2023-09-21 DIAGNOSIS — M25.561 CHRONIC PAIN OF RIGHT KNEE: ICD-10-CM

## 2023-09-21 DIAGNOSIS — G89.29 CHRONIC PAIN OF RIGHT KNEE: Primary | ICD-10-CM

## 2023-09-21 DIAGNOSIS — G89.29 CHRONIC PAIN OF RIGHT KNEE: ICD-10-CM

## 2023-09-21 DIAGNOSIS — M25.561 CHRONIC PAIN OF RIGHT KNEE: Primary | ICD-10-CM

## 2023-09-21 PROCEDURE — 73562 X-RAY EXAM OF KNEE 3: CPT | Mod: TC | Performed by: RADIOLOGY

## 2023-09-21 PROCEDURE — 99213 OFFICE O/P EST LOW 20 MIN: CPT | Performed by: PHYSICIAN ASSISTANT

## 2023-09-21 ASSESSMENT — ENCOUNTER SYMPTOMS
WEAKNESS: 1
MYALGIAS: 0
PARESTHESIAS: 0
NUMBNESS: 0
ARTHRALGIAS: 1
FEVER: 0

## 2023-09-21 ASSESSMENT — PAIN SCALES - GENERAL: PAINLEVEL: MODERATE PAIN (5)

## 2023-09-21 NOTE — PATIENT INSTRUCTIONS
For further evaluation of the knee pain we are completing x-rays today.  You can continue to use Tylenol, ibuprofen-take with food if needed, and topical Voltaren for treatment.  I have placed a referral to physical therapy so that they can help strengthen the muscles supporting your knee.  I also placed a referral to orthopedics.  The scheduling team will call you to set up these appointments.  Please reach out with questions or concerns.

## 2023-09-21 NOTE — PROGRESS NOTES
Assessment & Plan     Chronic pain of right knee  Patient is a 72-year-old female who presents to clinic due to chronic right knee pain that has worsened.    patient's knee gives out at times and clicks.  Vital signs normal.  Physical exam significant for tenderness palpation along medial joint line as well as equivocal Anabel's laterally.  Additionally, patient has external rotation of the foot/ankle with gait.  X-rays completed showing mild osteoarthritis.  Discussed neck steps with patient and patient would like referral to see orthopedics.  Referral placed.  Also recommended physical therapy and referral placed.  Patient may continue with Tylenol/ibuprofen and topical Voltaren.  - XR Knee Right 3 Views; Future  - Orthopedic  Referral; Future  - Physical Therapy Referral; Future      See Patient Instructions    Cecilia Moss PA-C  [facility] JOSE Hall is a 72 year old, presenting for the following health issues:  Referral (Patient is having knee concerns and would like a referral.)          9/21/2023     7:26 AM   Additional Questions   Roomed by Sherry PRESTON MA   Accompanied by No one         9/21/2023     7:26 AM   Patient Reported Additional Medications   Patient reports taking the following new medications None       History of Present Illness       Reason for visit:  Recurring problem with knee pain    She eats 2-3 servings of fruits and vegetables daily.She consumes 0 sweetened beverage(s) daily.She exercises with enough effort to increase her heart rate 20 to 29 minutes per day.    She is taking medications regularly.       Patient fell in a parking lot around 15 years ago. Patient completed PT at the time and got an xray. She use to get shots in her right knee. It was doing better but in the past 3-4 weeks it buckled when she was trying to go up the small flight of stairs into her home and had a very hard time getting back up. Patient also said that there is a  "clicking when she bends it. Patient is using Acetaminophen and Voltaren. She would like referral to orthopedics.     To note, history of right foot and ankle surgery in 2011: Lengthening Tendon: Achilles, Calcaneous, flexor digitorum longus tendon transfer. Patient notes she has walked with right foot rotated outwards most of life.     XR KNEE Bilat 2019:  IMPRESSION: Focal subchondral radiolucency of both patellas,  presumably related to overlying chondromalacia. Otherwise  unremarkable.      Review of Systems   Constitutional:  Negative for fever.   Musculoskeletal:  Positive for arthralgias. Negative for myalgias.   Neurological:  Positive for weakness. Negative for numbness and paresthesias.            Objective    /72   Pulse 66   Temp 98  F (36.7  C) (Temporal)   Resp 18   Ht 1.563 m (5' 1.54\")   Wt 61.1 kg (134 lb 12.8 oz)   LMP  (LMP Unknown)   SpO2 98%   BMI 25.03 kg/m    Body mass index is 25.03 kg/m .  Physical Exam  Vitals and nursing note reviewed.   Constitutional:       General: She is not in acute distress.     Appearance: Normal appearance.   HENT:      Head: Normocephalic and atraumatic.   Eyes:      Extraocular Movements: Extraocular movements intact.      Pupils: Pupils are equal, round, and reactive to light.   Cardiovascular:      Rate and Rhythm: Normal rate.   Pulmonary:      Effort: Pulmonary effort is normal.   Musculoskeletal:         General: Normal range of motion.      Cervical back: Normal range of motion.      Comments: Right knee: No erythema, ecchymosis, effusion, swelling.  Extension 0-145.  No pain with varus or valgus force.  Tenderness to palpation along medial joint line.  Anabel's with tenderness laterally.  Gait with right foot externally rotated   Skin:     General: Skin is warm and dry.   Neurological:      General: No focal deficit present.      Mental Status: She is alert.   Psychiatric:         Mood and Affect: Mood normal.         Behavior: Behavior " normal.            Results for orders placed or performed in visit on 09/21/23   XR Knee Right 3 Views     Status: None    Narrative    KNEE RIGHT 3 VIEWS  DATE/TIME: 9/21/2023 8:31 AM     INDICATION: Right-sided knee pain.   COMPARISON: 7/24/2019.      Impression    IMPRESSION:  1.  Mild right knee degenerative arthrosis, slightly progressed.  Minimal medial compartment narrowing. Mild patellofemoral compartment  narrowing. Subtle subchondral lucency in the patella, likely secondary  to overlying chondromalacia.  2.  No fracture or joint malalignment.  3.  Trace joint effusion.    TORRES LEWIS MD         SYSTEM ID:  [id number]

## 2023-09-27 NOTE — PROGRESS NOTES
HISTORY OF PRESENT ILLNESS    Isabel Carroll is a 72 year old female who is seen in consultation at the request of Cecilia Moss PA-C for evaluation of chronic right knee pain.    HPI: Patient fell in a [employer] parking lot on 7/2/2009. Patient completed PT at the time and got an xray. She use to get shots in her right knee. It was doing better but in the past 3-4 weeks it buckled when she was trying to go up the small flight of stairs into her home.      Present symptoms:   Pain if walks a long time. Says she has been told she walks with foot turning out (her whole lifte)  Knee snaps with walking.   Occasional giving-way.    No swelling.   Stairs:  down stairs > up   Anterior knee.   Wants to travel a lot and do a lot of walking.     Treatments tried to this point: physical therapy has been ordered  Previous corticosteroid injection 2 years ago by Dr. Arias, helped x 18 months.   Voltaren cream helps       Past Medical History:   Diagnosis Date    Arthritis     Basal cell carcinoma of skin 04/07/2011    Benign neoplasm of colon 10/26/2011    Overview:  Colonoscopy every 5 years  Oct 2011     Bronchiectasis without complication (H) 12/15/2016    CT chest done 2016 through COPDgene study, formal report indicates cylindrical bronchiectasis. Reviewed CT with [facility] radiologists; bronchiectasis is likely present but of unclear clinical significance, and was likely present on CT from 2010 as well (different scanner and different technique).    Chorioretinal scar of left eye 12/02/2015    Chronic rhinitis 07/28/2015    Clostridium difficile infection 12/13/2018    Coronary artery disease     Coronary atherosclerosis due to calcified coronary lesion 12/22/2011    Overview:  A)  CT Chest-Date: 07/13/10, There is coronary artery and mild aortic calcification present. B)  Nuclear Stress Test on 10/01/10, No scintigraphic evidence of ischemia or infarction. EF: 71%.      Diabetes (H)     Elevated blood pressure (not  hypertension) 2006    Hypercholesteremia     Hyperlipidemia 2011    Overview:  See results review for lipid profile.     Impaired fasting glucose 2006    Nonsenile cataract     Overweight 2015    PONV (postoperative nausea and vomiting)     Primary osteoarthritis of both hands 2015    Right shoulder strain, initial encounter 12/15/2016    Shingles 2009    Sinus tachycardia 2016    Skin cancer 2013, 2015    Basal cell cancer    Vitamin D deficiency 2010        Surgical:   Past Surgical History:   Procedure Laterality Date    BIOPSY  skin cancer, three x    since , bladder 10/2020    CATARACT IOL, RT/LT       SECTION      x 2    CHOLECYSTECTOMY      COLONOSCOPY      3 polyps    COLONOSCOPY      neg    COLONOSCOPY N/A 2021    Procedure: COLONOSCOPY, FLEXIBLE, WITH LESION REMOVAL USING SNARE;  Surgeon: Prudence Hu MD;  Location: [facility] OR    COLONOSCOPY N/A 2021    Procedure: COLONOSCOPY, WITH POLYPECTOMY AND BIOPSY;  Surgeon: Prudence Hu MD;  Location: [facility] OR    COLONOSCOPY WITH CO2 INSUFFLATION N/A 2021    Procedure: COLONOSCOPY, WITH CO2 INSUFFLATION;  Surgeon: Prudence Hu MD;  Location: [facility] OR    ENDOSCOPY  2016    ENT SURGERY  2016    chronic cough    GENITOURINARY SURGERY      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 10/2020    LENGTHEN TENDON ACHILLES  2011    Procedure:LENGTHEN TENDON ACHILLES; Surgeon:AARON CÁRDENAS; Location: OR    LIVER SURGERY      Alex large cyst    MINI ARC SLING OPERATION FOR STRESS INCONTINENCE  2007    OSTEOTOMY ANKLE  2011    Procedure:OSTEOTOMY ANKLE; Calcaneous,  Flexor Digitorum Longus Transfer      REPAIR PTOSIS BILATERAL Bilateral 2023    Procedure: Bilateral upper eyelid ptosis repair;  Surgeon: Eldon Prather MD;  Location: [facility] OR    SALPINGO-OOPHORECTOMY BILATERAL  ,     Benign prophylactic for FHx ov cancer       Family Hx:  family history includes Anesthesia Reaction in an other family member; Anxiety Disorder in her mother; Asthma in her brother, nephew, niece, and another family member; Brain Hemorrhage in her father; Cancer in her paternal aunt and sister; Colon Cancer (age of onset: 76) in her father; Coronary Artery Disease in her cousin; Diabetes in her maternal aunt, maternal grandmother, and maternal uncle; Hyperlipidemia in her cousin; Macular Degeneration in her mother; Osteoporosis in her mother; Other Cancer (age of onset: 51) in her sister; Substance Abuse in her father; Thyroid Disease in her sister.    Social Hx:  reports that she quit smoking about 41 years ago. Her smoking use included cigarettes. She started smoking about 57 years ago. She has a 15.00 pack-year smoking history. She has been exposed to tobacco smoke. She has never used smokeless tobacco. She reports current alcohol use. She reports that she does not use drugs.    REVIEW OF SYSTEMS:    CONSTITUTIONAL:  NEGATIVE for fever, chills, change in weight  INTEGUMENTARY/SKIN:  NEGATIVE for worrisome rashes, moles or lesions  EYES:  NEGATIVE for vision changes or irritation  ENT/MOUTH:  NEGATIVE for ear, mouth and throat problems  RESP:  NEGATIVE for significant cough or SOB  BREAST:  NEGATIVE for masses, tenderness or discharge  CV:  NEGATIVE for chest pain, palpitations or peripheral edema  GI:  NEGATIVE for nausea, abdominal pain, heartburn, or change in bowel habits  :  Negative   MUSCULOSKELETAL:  See HPI above  NEURO:  NEGATIVE for weakness, dizziness or paresthesias  ENDOCRINE:  NEGATIVE for temperature intolerance, skin/hair changes  HEME/ALLERGY/IMMUNE:  NEGATIVE for bleeding problems  PSYCHIATRIC:  NEGATIVE for changes in mood or affect    PHYSICAL EXAM:  /72 (BP Location: Left arm, Patient Position: Sitting, Cuff Size: Adult Regular)   Pulse 70   LMP  (LMP Unknown)   SpO2 96%    GENERAL APPEARANCE: healthy, alert, and no  distress   SKIN: no suspicious lesions or rashes  NEURO: Normal strength and tone, mentation intact, and speech normal  VASCULAR:  good pulses, and cappillary refill   LYMPH: no lymphadenopathy   PSYCH:  mentation appears normal and affect normal/bright  RESP: no increased work of breathing     KNEE EXAM:   Gait: walks with normal gait  Alignment: normal, but has some external rotation of the tibia   Squat: 90 % painful.    Patellofemoral joint: mild crepitations in the patellofemoral joint.  Effusion: none  ROM: full  Tender: lateral joint line, medial facet of the patella, and lateral facet of the patella  Masses: none  Ligaments:  Lachman's Stable, Anterior and posterior drawer stable, stable to varus and valgus stress.  no pain with Varus/Valgus stress testing.    McMurrays: positive laterally  Lateral retinaculum is not tight      KNEE RIGHT 3 VIEWS  DATE/TIME: 9/21/2023   1.  mild right knee degenerative arthrosis, slightly progressed.  Minimal medial compartment narrowing. Mild patellofemoral compartment  narrowing. Subtle subchondral lucency in the patella, likely secondary  to overlying chondromalacia.  2.  No fracture or joint malalignment.  3.  Trace joint effusion.        Impression: osteoarthritis right knee, mild.   Possible mechanically significant lateral meniscus tear     Plan:  Injection Therapy: (oa/mt) :Discussed findings and diagnosis with patient.  We talked about treatment options.  The pain may be due to either arthritis or a meniscal tear; or a combination of both. I recommended corticosteroid injection .   With the patient's consent, right knee(s) injected intra-articularly with 80mg of Depomedrol and 4cc of local anesthetic after sterile prep.   MRI if injection doesn't work well, or long. Possible arthroscopy.  She wants to be ready for her trip in [place] in March.     Return to clinic ASHANTI Smallwood MD  Dept. Orthopedic Surgery  [facility]

## 2023-09-28 ENCOUNTER — OFFICE VISIT (OUTPATIENT)
Dept: ORTHOPEDICS | Facility: CLINIC | Age: 73
End: 2023-09-28
Attending: PHYSICIAN ASSISTANT
Payer: COMMERCIAL

## 2023-09-28 VITALS — DIASTOLIC BLOOD PRESSURE: 72 MMHG | SYSTOLIC BLOOD PRESSURE: 122 MMHG | OXYGEN SATURATION: 96 % | HEART RATE: 70 BPM

## 2023-09-28 DIAGNOSIS — S83.281A TEAR OF LATERAL MENISCUS OF RIGHT KNEE, CURRENT, UNSPECIFIED TEAR TYPE, INITIAL ENCOUNTER: Primary | ICD-10-CM

## 2023-09-28 DIAGNOSIS — M25.561 CHRONIC PAIN OF RIGHT KNEE: ICD-10-CM

## 2023-09-28 DIAGNOSIS — M17.11 PRIMARY OSTEOARTHRITIS OF RIGHT KNEE: ICD-10-CM

## 2023-09-28 DIAGNOSIS — G89.29 CHRONIC PAIN OF RIGHT KNEE: ICD-10-CM

## 2023-09-28 PROCEDURE — 99203 OFFICE O/P NEW LOW 30 MIN: CPT | Mod: 25 | Performed by: ORTHOPAEDIC SURGERY

## 2023-09-28 PROCEDURE — 20610 DRAIN/INJ JOINT/BURSA W/O US: CPT | Mod: RT | Performed by: ORTHOPAEDIC SURGERY

## 2023-09-28 RX ORDER — LIDOCAINE HYDROCHLORIDE 10 MG/ML
4 INJECTION, SOLUTION EPIDURAL; INFILTRATION; INTRACAUDAL; PERINEURAL
Status: SHIPPED | OUTPATIENT
Start: 2023-09-28

## 2023-09-28 RX ORDER — METHYLPREDNISOLONE ACETATE 80 MG/ML
80 INJECTION, SUSPENSION INTRA-ARTICULAR; INTRALESIONAL; INTRAMUSCULAR; SOFT TISSUE
Status: SHIPPED | OUTPATIENT
Start: 2023-09-28

## 2023-09-28 RX ADMIN — METHYLPREDNISOLONE ACETATE 80 MG: 80 INJECTION, SUSPENSION INTRA-ARTICULAR; INTRALESIONAL; INTRAMUSCULAR; SOFT TISSUE at 15:15

## 2023-09-28 RX ADMIN — LIDOCAINE HYDROCHLORIDE 4 ML: 10 INJECTION, SOLUTION EPIDURAL; INFILTRATION; INTRACAUDAL; PERINEURAL at 15:15

## 2023-09-28 ASSESSMENT — PAIN SCALES - GENERAL: PAINLEVEL: MODERATE PAIN (4)

## 2023-09-28 NOTE — LETTER
9/28/2023         RE: Isabel aCrroll  [id number] [id number]        Dear Colleague,    Thank you for referring your patient, Isabel Carroll, to the [facility]. Please see a copy of my visit note below.    HISTORY OF PRESENT ILLNESS    Isabel Carroll is a 72 year old female who is seen in consultation at the request of Cecilia Moss PA-C for evaluation of chronic right knee pain.    HPI: Patient fell in a [employer] parking lot on 7/2/2009. Patient completed PT at the time and got an xray. She use to get shots in her right knee. It was doing better but in the past 3-4 weeks it buckled when she was trying to go up the small flight of stairs into her home.      Present symptoms:   Pain if walks a long time. Says she has been told she walks with foot turning out (her whole lifte)  Knee snaps with walking.   Occasional giving-way.    No swelling.   Stairs:  down stairs > up   Anterior knee.   Wants to travel a lot and do a lot of walking.     Treatments tried to this point: physical therapy has been ordered  Previous corticosteroid injection 2 years ago by Dr. Arias, helped x 18 months.   Voltaren cream helps       Past Medical History:   Diagnosis Date     Arthritis      Basal cell carcinoma of skin 04/07/2011     Benign neoplasm of colon 10/26/2011    Overview:  Colonoscopy every 5 years  Oct 2011      Bronchiectasis without complication (H) 12/15/2016    CT chest done 2016 through COPDgene study, formal report indicates cylindrical bronchiectasis. Reviewed CT with [facility] radiologists; bronchiectasis is likely present but of unclear clinical significance, and was likely present on CT from 2010 as well (different scanner and different technique).     Chorioretinal scar of left eye 12/02/2015     Chronic rhinitis 07/28/2015     Clostridium difficile infection 12/13/2018     Coronary artery disease      Coronary atherosclerosis due to calcified  coronary lesion 2011    Overview:  A)  CT Chest-Date: 07/13/10, There is coronary artery and mild aortic calcification present. B)  Nuclear Stress Test on 10/01/10, No scintigraphic evidence of ischemia or infarction. EF: 71%.       Diabetes (H)      Elevated blood pressure (not hypertension) 2006     Hypercholesteremia      Hyperlipidemia 2011    Overview:  See results review for lipid profile.      Impaired fasting glucose 2006     Nonsenile cataract      Overweight 2015     PONV (postoperative nausea and vomiting)      Primary osteoarthritis of both hands 2015     Right shoulder strain, initial encounter 12/15/2016     Shingles 2009     Sinus tachycardia 2016     Skin cancer ,     Basal cell cancer     Vitamin D deficiency 2010        Surgical:   Past Surgical History:   Procedure Laterality Date     BIOPSY  skin cancer, three x    since , bladder 10/2020     CATARACT IOL, RT/LT        SECTION      x 2     CHOLECYSTECTOMY       COLONOSCOPY      3 polyps     COLONOSCOPY      neg     COLONOSCOPY N/A 2021    Procedure: COLONOSCOPY, FLEXIBLE, WITH LESION REMOVAL USING SNARE;  Surgeon: Prudence Hu MD;  Location: [facility] OR     COLONOSCOPY N/A 2021    Procedure: COLONOSCOPY, WITH POLYPECTOMY AND BIOPSY;  Surgeon: Prudence Hu MD;  Location: [facility] OR     COLONOSCOPY WITH CO2 INSUFFLATION N/A 2021    Procedure: COLONOSCOPY, WITH CO2 INSUFFLATION;  Surgeon: Prudence Hu MD;  Location:  OR     ENDOSCOPY       ENT SURGERY  2016    chronic cough     GENITOURINARY SURGERY      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 10/2020     LENGTHEN TENDON ACHILLES  2011    Procedure:LENGTHEN TENDON ACHILLES; Surgeon:AARON CÁRDENAS; Location:[facility]     LIVER SURGERY      Bengin large cyst     MINI ARC SLING OPERATION FOR STRESS INCONTINENCE  2007     OSTEOTOMY ANKLE  2011     Procedure:OSTEOTOMY ANKLE; Calcaneous,  Flexor Digitorum Longus Transfer       REPAIR PTOSIS BILATERAL Bilateral 6/1/2023    Procedure: Bilateral upper eyelid ptosis repair;  Surgeon: Eldon Prather MD;  Location: [facility] OR     SALPINGO-OOPHORECTOMY BILATERAL  1998, 2000    Benign prophylactic for FHx ov cancer      Family Hx:  family history includes Anesthesia Reaction in an other family member; Anxiety Disorder in her mother; Asthma in her brother, nephew, niece, and another family member; Brain Hemorrhage in her father; Cancer in her paternal aunt and sister; Colon Cancer (age of onset: 76) in her father; Coronary Artery Disease in her cousin; Diabetes in her maternal aunt, maternal grandmother, and maternal uncle; Hyperlipidemia in her cousin; Macular Degeneration in her mother; Osteoporosis in her mother; Other Cancer (age of onset: 51) in her sister; Substance Abuse in her father; Thyroid Disease in her sister.    Social Hx:  reports that she quit smoking about 41 years ago. Her smoking use included cigarettes. She started smoking about 57 years ago. She has a 15.00 pack-year smoking history. She has been exposed to tobacco smoke. She has never used smokeless tobacco. She reports current alcohol use. She reports that she does not use drugs.    REVIEW OF SYSTEMS:    CONSTITUTIONAL:  NEGATIVE for fever, chills, change in weight  INTEGUMENTARY/SKIN:  NEGATIVE for worrisome rashes, moles or lesions  EYES:  NEGATIVE for vision changes or irritation  ENT/MOUTH:  NEGATIVE for ear, mouth and throat problems  RESP:  NEGATIVE for significant cough or SOB  BREAST:  NEGATIVE for masses, tenderness or discharge  CV:  NEGATIVE for chest pain, palpitations or peripheral edema  GI:  NEGATIVE for nausea, abdominal pain, heartburn, or change in bowel habits  :  Negative   MUSCULOSKELETAL:  See HPI above  NEURO:  NEGATIVE for weakness, dizziness or paresthesias  ENDOCRINE:  NEGATIVE for temperature intolerance, skin/hair  changes  HEME/ALLERGY/IMMUNE:  NEGATIVE for bleeding problems  PSYCHIATRIC:  NEGATIVE for changes in mood or affect    PHYSICAL EXAM:  /72 (BP Location: Left arm, Patient Position: Sitting, Cuff Size: Adult Regular)   Pulse 70   LMP  (LMP Unknown)   SpO2 96%    GENERAL APPEARANCE: healthy, alert, and no distress   SKIN: no suspicious lesions or rashes  NEURO: Normal strength and tone, mentation intact, and speech normal  VASCULAR:  good pulses, and cappillary refill   LYMPH: no lymphadenopathy   PSYCH:  mentation appears normal and affect normal/bright  RESP: no increased work of breathing     KNEE EXAM:   Gait: walks with normal gait  Alignment: normal, but has some external rotation of the tibia   Squat: 90 % painful.    Patellofemoral joint: mild crepitations in the patellofemoral joint.  Effusion: none  ROM: full  Tender: lateral joint line, medial facet of the patella, and lateral facet of the patella  Masses: none  Ligaments:  Lachman's Stable, Anterior and posterior drawer stable, stable to varus and valgus stress.  no pain with Varus/Valgus stress testing.    McMurrays: positive laterally  Lateral retinaculum is not tight      KNEE RIGHT 3 VIEWS  DATE/TIME: 9/21/2023   1.  mild right knee degenerative arthrosis, slightly progressed.  Minimal medial compartment narrowing. Mild patellofemoral compartment  narrowing. Subtle subchondral lucency in the patella, likely secondary  to overlying chondromalacia.  2.  No fracture or joint malalignment.  3.  Trace joint effusion.        Impression: osteoarthritis right knee, mild.   Possible mechanically significant lateral meniscus tear     Plan:  Injection Therapy: (oa/mt) :Discussed findings and diagnosis with patient.  We talked about treatment options.  The pain may be due to either arthritis or a meniscal tear; or a combination of both. I recommended corticosteroid injection .   With the patient's consent, right knee(s) injected intra-articularly with  80mg of Depomedrol and 4cc of local anesthetic after sterile prep.   MRI if injection doesn't work well, or long. Possible arthroscopy.  She wants to be ready for her trip in [place] in March.     Return to clinic ASHANTI Smallwood MD  Dept. Orthopedic Surgery  [facility]     Large Joint Injection/Arthocentesis: R knee joint    Date/Time: 9/28/2023 3:15 PM    Performed by: Navjot Watson  Authorized by: Oni Smallwood MD    Indications:  Pain and osteoarthritis  Needle Size:  22 G  Approach:  Anterolateral  Location:  Knee      Medications:  80 mg methylPREDNISolone 80 MG/ML; 4 mL lidocaine (PF) 1 %  Outcome:  Tolerated well, no immediate complications  Procedure discussed: discussed risks, benefits, and alternatives    Consent Given by:  Patient  Timeout: timeout called immediately prior to procedure    Prep: patient was prepped and draped in usual sterile fashion          Again, thank you for allowing me to participate in the care of your patient.        Sincerely,        Oni Smallwood MD

## 2023-09-28 NOTE — PATIENT INSTRUCTIONS
AFTER VISIT SUMMARY    [facility] CORTISONE Injection Discharge Instructions    You may shower, however avoid swimming, tub baths or hot tubs for 24 hours following your procedure    You may have a mild to moderate increase in pain for several days following the injection.    It may take up to 14 days for the steroid medication to start working although you may feel the effect as early as a few days after the procedure.    You may use ice packs for 10-15 minutes, 3 to 4 times a day at the injection site for comfort    You may use anti-inflammatory medications (such as Ibuprofen or Aleve or Advil) or Tylenol for pain control if necessary    If you were fasting, you may resume your normal diet and medications after the procedure    If you have diabetes, check your blood sugar more frequently than usual as your blood sugar may be higher than normal for 10-14 days following a steroid injection. Contact your doctor who manages your diabetes if your blood sugar is higher than usual      If you experience any of the following, call [facility] [phone]  -Fever over 100 degree F  -Swelling, bleeding, redness, drainage, warmth at the injection site  - New or worsening pain

## 2023-09-28 NOTE — PROGRESS NOTES
Large Joint Injection/Arthocentesis: R knee joint    Date/Time: 9/28/2023 3:15 PM    Performed by: Navjot Watson  Authorized by: Oni Smallwood MD    Indications:  Pain and osteoarthritis  Needle Size:  22 G  Approach:  Anterolateral  Location:  Knee      Medications:  80 mg methylPREDNISolone 80 MG/ML; 4 mL lidocaine (PF) 1 %  Outcome:  Tolerated well, no immediate complications  Procedure discussed: discussed risks, benefits, and alternatives    Consent Given by:  Patient  Timeout: timeout called immediately prior to procedure    Prep: patient was prepped and draped in usual sterile fashion

## 2023-10-05 ENCOUNTER — THERAPY VISIT (OUTPATIENT)
Dept: PHYSICAL THERAPY | Facility: CLINIC | Age: 73
End: 2023-10-05
Attending: PHYSICIAN ASSISTANT
Payer: COMMERCIAL

## 2023-10-05 DIAGNOSIS — M25.561 CHRONIC PAIN OF RIGHT KNEE: ICD-10-CM

## 2023-10-05 DIAGNOSIS — G89.29 CHRONIC PAIN OF RIGHT KNEE: ICD-10-CM

## 2023-10-05 DIAGNOSIS — M25.561 ACUTE PAIN OF RIGHT KNEE: Primary | ICD-10-CM

## 2023-10-05 PROCEDURE — 97110 THERAPEUTIC EXERCISES: CPT | Mod: GP | Performed by: PHYSICAL THERAPIST

## 2023-10-05 PROCEDURE — 97161 PT EVAL LOW COMPLEX 20 MIN: CPT | Mod: GP | Performed by: PHYSICAL THERAPIST

## 2023-10-05 ASSESSMENT — ACTIVITIES OF DAILY LIVING (ADL)
WEAKNESS: THE SYMPTOM AFFECTS MY ACTIVITY SLIGHTLY
HOW_WOULD_YOU_RATE_THE_CURRENT_FUNCTION_OF_YOUR_KNEE_DURING_YOUR_USUAL_DAILY_ACTIVITIES_ON_A_SCALE_FROM_0_TO_100_WITH_100_BEING_YOUR_LEVEL_OF_KNEE_FUNCTION_PRIOR_TO_YOUR_INJURY_AND_0_BEING_THE_INABILITY_TO_PERFORM_ANY_OF_YOUR_USUAL_DAILY_ACTIVITIES?: 70
WALK: ACTIVITY IS SOMEWHAT DIFFICULT
KNEE_ACTIVITY_OF_DAILY_LIVING_SUM: 47
HOW_WOULD_YOU_RATE_THE_OVERALL_FUNCTION_OF_YOUR_KNEE_DURING_YOUR_USUAL_DAILY_ACTIVITIES?: ABNORMAL
KNEEL ON THE FRONT OF YOUR KNEE: ACTIVITY IS SOMEWHAT DIFFICULT
SIT WITH YOUR KNEE BENT: ACTIVITY IS MINIMALLY DIFFICULT
RAW_SCORE: 47
GIVING WAY, BUCKLING OR SHIFTING OF KNEE: THE SYMPTOM AFFECTS MY ACTIVITY SLIGHTLY
LIMPING: THE SYMPTOM AFFECTS MY ACTIVITY SLIGHTLY
GO UP STAIRS: ACTIVITY IS MINIMALLY DIFFICULT
GO DOWN STAIRS: ACTIVITY IS SOMEWHAT DIFFICULT
PAIN: THE SYMPTOM AFFECTS MY ACTIVITY MODERATELY
STAND: ACTIVITY IS MINIMALLY DIFFICULT
KNEE_ACTIVITY_OF_DAILY_LIVING_SCORE: 67.14
SWELLING: I DO NOT HAVE THE SYMPTOM
RISE FROM A CHAIR: ACTIVITY IS MINIMALLY DIFFICULT
AS_A_RESULT_OF_YOUR_KNEE_INJURY,_HOW_WOULD_YOU_RATE_YOUR_CURRENT_LEVEL_OF_DAILY_ACTIVITY?: NEARLY NORMAL
STIFFNESS: THE SYMPTOM AFFECTS MY ACTIVITY SLIGHTLY
SQUAT: ACTIVITY IS SOMEWHAT DIFFICULT

## 2023-10-05 NOTE — PROGRESS NOTES
PHYSICAL THERAPY EVALUATION  Type of Visit: Evaluation    See electronic medical record for Abuse and Falls Screening details.    Subjective       Presenting condition or subjective complaint: R knee pain.  Years ago she tripped an fell on knee.  Had PT and cortisone at that time.  Within the past 6 weeks weeks pain incrased in knee.  Knee buckled a few weeks back.  Ortho gave her a cortisone injection recently that helped.   Date of onset: 08/24/23    Relevant medical history: Arthritis; Cancer; Diabetes; Migraines or headaches; Osteoarthritis; Overweight; Pain at night or rest; Smoking   Dates & types of surgery: liver resectioninf 9/00;  foot 5/11;  bladder CA 1/2020;  eyelid 6/2023    Prior diagnostic imaging/testing results: X-ray 1.  Mild right knee degenerative arthrosis, slightly progressed.  Minimal medial compartment narrowing. Mild patellofemoral compartment  narrowing. Subtle subchondral lucency in the patella, likely secondary  to overlying chondromalacia.  2.  No fracture or joint malalignment.  3.  Trace joint effusion.   Prior therapy history for the same diagnosis, illness or injury: Yes      Prior Level of Function  Transfers:   Ambulation:   ADL:   IADL:     Living Environment  Social support: With a significant other or spouse   Type of home: House   Stairs to enter the home: Yes 5 Is there a railing: Yes   Ramp: No   Stairs inside the home: Yes 13 Is there a railing: Yes   Help at home: None  Equipment owned: Raised toilet seat     Employment: No retired  Hobbies/Interests: exercise, reading, travel puzzles    Patient goals for therapy:      Pain assessment:      Objective   KNEE EVALUATION  PAIN: Pain Level at Rest: 4/10  Pain Level with Use: 5/10  Pain Location: knee  Pain Quality: Aching and Sharp  Pain Frequency: constant  Pain is Worst: daytime or nighttime  Pain is Exacerbated By: prolong walking, down stairs, squatting  Pain is Relieved By: cortisone  Pain Progression:  Improved  INTEGUMENTARY (edema, incisions): WNL  POSTURE: WNL  GAIT:  Weightbearing Status:   Assistive Device(s):   Gait Deviations:  increased ER on R at foot  BALANCE/PROPRIOCEPTION: WNL  WEIGHTBEARING ALIGNMENT: Foot/Ankle WB Alignment:ER R foot  NON-WEIGHTBEARING ALIGNMENT:   ROM: AROM WNL    STRENGTH:  5-/5 at knee;  hip abd and ext 4/5  FLEXIBILITY:   SPECIAL TESTS:    Left Right   Apley's (Meniscus)     Anabel's (Meniscus)  slight   Mariza's (ITB/TFL)     Patellar Apprehension Test     Patella Tracking     Ligamentous Stability     Anterior Drawer (ACL)     Posterior Drawer (PCL)     Prone Dial Test at 30 Deg and 90 Deg (PCL/PLC)     Valgus Stress Testing at 0 Deg and 30 Deg     Varus Stress Testing at 0 Deg and 30 Deg      + patellar compression    FUNCTIONAL TESTS: Double Leg Squat: Anterior knee translation, Knee valgus, Hip internal rotation, and Improper use of glutes/hips  PALPATION:   + Tenderness At Location Left Right   Medial Joint Line  +   Lateral Joint Line     Patellar Tendon     IT Band     Incisional     Popliteal     Biceps Femoris     Semitendinosis     Semimembranosis     Glut Medius     Patellar Medial     Patellar Lateral     Patellar Superior     Patellar Inferior        JOINT MOBILITY:     Assessment & Plan   CLINICAL IMPRESSIONS  Medical Diagnosis: R knee pain    Treatment Diagnosis: R knee pain   Impression/Assessment: Patient is a 72 year old female with R knee complaints.  The following significant findings have been identified: Pain, Decreased strength, Impaired gait, Impaired muscle performance, and Decreased activity tolerance. These impairments interfere with their ability to perform self care tasks, recreational activities, household chores, driving , household mobility, and community mobility as compared to previous level of function.     Clinical Decision Making (Complexity):  Clinical Presentation: Stable/Uncomplicated  Clinical Presentation Rationale: based on medical and  personal factors listed in PT evaluation  Clinical Decision Making (Complexity): Low complexity    PLAN OF CARE  Treatment Interventions:  Interventions: Neuromuscular Re-education, Therapeutic Activity, Therapeutic Exercise    Long Term Goals     PT Goal 1  Goal Identifier: knee  Goal Description: Pt will be able to descend stairs without pain  Rationale: to maximize safety and independence with performance of ADLs and functional tasks;to maximize safety and independence within the home;to maximize safety and independence within the community;to maximize safety and independence with transportation;to maximize safety and independence with self cares  Target Date: 12/28/23      Frequency of Treatment: 1x/ week x 4 week then 2x/ month 2 month  Duration of Treatment: 12 weeks    Recommended Referrals to Other Professionals:   Education Assessment:   Learner/Method: Patient;Pictures/Video    Risks and benefits of evaluation/treatment have been explained.   Patient/Family/caregiver agrees with Plan of Care.     Evaluation Time:     PT Eval, Low Complexity Minutes (75354): 20       Signing Clinician: Antwan Jackson, PT      [facility]                                                                                   OUTPATIENT PHYSICAL THERAPY      PLAN OF TREATMENT FOR OUTPATIENT REHABILITATION   Patient's Last Name, First Name, Isabel Kurtz YOB: 1950   Provider's Name   [facility]   Medical Record No.  [id number]     Onset Date: 08/24/23  Start of Care Date: 10/05/23     Medical Diagnosis:  R knee pain      PT Treatment Diagnosis:  R knee pain Plan of Treatment  Frequency/Duration: 1x/ week x 4 week then 2x/ month 2 month/ 12 weeks    Certification date from 10/05/23 to 01/02/24         See note for plan of treatment details and functional goals     Antwan Jackson, PT                         I CERTIFY THE NEED FOR THESE  SERVICES FURNISHED UNDER        THIS PLAN OF TREATMENT AND WHILE UNDER MY CARE     (Physician attestation of this document indicates review and certification of the therapy plan).                Referring Provider:  Cecilia Moss      Initial Assessment  See Epic Evaluation- Start of Care Date: 10/05/23

## 2023-10-09 ENCOUNTER — PATIENT OUTREACH (OUTPATIENT)
Dept: GASTROENTEROLOGY | Facility: CLINIC | Age: 73
End: 2023-10-09

## 2023-10-10 ENCOUNTER — THERAPY VISIT (OUTPATIENT)
Dept: PHYSICAL THERAPY | Facility: CLINIC | Age: 73
End: 2023-10-10
Attending: PHYSICIAN ASSISTANT
Payer: COMMERCIAL

## 2023-10-10 DIAGNOSIS — M25.561 ACUTE PAIN OF RIGHT KNEE: Primary | ICD-10-CM

## 2023-10-10 PROCEDURE — 97110 THERAPEUTIC EXERCISES: CPT | Mod: GP | Performed by: PHYSICAL THERAPIST

## 2023-10-17 ENCOUNTER — THERAPY VISIT (OUTPATIENT)
Dept: PHYSICAL THERAPY | Facility: CLINIC | Age: 73
End: 2023-10-17
Attending: PHYSICIAN ASSISTANT
Payer: COMMERCIAL

## 2023-10-17 ENCOUNTER — TELEPHONE (OUTPATIENT)
Dept: FAMILY MEDICINE | Facility: CLINIC | Age: 73
End: 2023-10-17

## 2023-10-17 DIAGNOSIS — M25.561 ACUTE PAIN OF RIGHT KNEE: Primary | ICD-10-CM

## 2023-10-17 PROCEDURE — 97110 THERAPEUTIC EXERCISES: CPT | Mod: GP | Performed by: PHYSICAL THERAPIST

## 2023-10-17 PROCEDURE — 97530 THERAPEUTIC ACTIVITIES: CPT | Mod: GP | Performed by: PHYSICAL THERAPIST

## 2023-10-17 NOTE — TELEPHONE ENCOUNTER
Pt needs to be seen for acute cold/uri. She may schedule a virtual appointment with anyone available./    Dana iMlls PAC

## 2023-10-17 NOTE — TELEPHONE ENCOUNTER
Pt is calling because she has had a cough since 10/14. States that she has been taking some OTC medication and it is not helping much with the cough.    State that int he past Alisia prescribed benzonatate and it helped with the cough.    Pt has not yet established care with a provider.     Pt wants message sent to Dana because she has an appointment set on 10/26.  Pt understands that provider may not prescribe medication as she has not been seen for concern.      Aylin Hawkins RN  [facility]

## 2023-10-18 ENCOUNTER — VIRTUAL VISIT (OUTPATIENT)
Dept: FAMILY MEDICINE | Facility: CLINIC | Age: 73
End: 2023-10-18
Payer: COMMERCIAL

## 2023-10-18 DIAGNOSIS — J20.9 ACUTE BRONCHITIS WITH SYMPTOMS > 10 DAYS: Primary | ICD-10-CM

## 2023-10-18 PROCEDURE — 99213 OFFICE O/P EST LOW 20 MIN: CPT | Mod: 95 | Performed by: PHYSICIAN ASSISTANT

## 2023-10-18 RX ORDER — AZITHROMYCIN 250 MG/1
TABLET, FILM COATED ORAL
Qty: 6 TABLET | Refills: 0 | Status: SHIPPED | OUTPATIENT
Start: 2023-10-18 | End: 2023-10-23

## 2023-10-18 RX ORDER — BENZONATATE 100 MG/1
100 CAPSULE ORAL 3 TIMES DAILY PRN
Qty: 30 CAPSULE | Refills: 0 | Status: SHIPPED | OUTPATIENT
Start: 2023-10-18 | End: 2023-10-26

## 2023-10-18 NOTE — PROGRESS NOTES
"Isabel is a 72 year old who is being evaluated via a billable telephone visit.      What phone number would you like to be contacted at? [phone]   How would you like to obtain your AVS? Benjy    Distant Location (provider location):  On-site    Assessment & Plan   Problem List Items Addressed This Visit    None  Visit Diagnoses       Acute bronchitis with symptoms > 10 days    -  Primary    Relevant Medications    benzonatate (TESSALON) 100 MG capsule    azithromycin (ZITHROMAX) 250 MG tablet         Zpack  Tessalon  Follow up in 1 week if not better     15 minutes spent by me on the date of the encounter doing chart review, history and exam, documentation and further activities per the note       BMI:   Estimated body mass index is 25.03 kg/m  as calculated from the following:    Height as of 9/21/23: 1.563 m (5' 1.54\").    Weight as of 9/21/23: 61.1 kg (134 lb 12.8 oz).           Dana Mills PA-C  [facility]    James Hall is a 72 year old, presenting for the following health issues:  URI      History of Present Illness       Reason for visit:  URI    She eats 2-3 servings of fruits and vegetables daily.She consumes 0 sweetened beverage(s) daily.She exercises with enough effort to increase her heart rate 30 to 60 minutes per day.  She exercises with enough effort to increase her heart rate 3 or less days per week.   She is taking medications regularly.         Acute Illness  Acute illness concerns: Cold (Pt took covid test 10/17/2023 - results negative)  Onset/Duration: 8 -10 days  Symptoms:  Fever: No  Chills/Sweats: No  Headache (location?): No  Sinus Pressure: No  Conjunctivitis:  No  Ear Pain: no  Rhinorrhea: YES  Congestion: YES - in chest  Sore Throat: YES  Cough: YES-non-productive, ant stop coughing   Wheeze: No  Decreased Appetite: No  Nausea: No  Vomiting: No  Diarrhea: No  Dysuria/Freq.: No  Dysuria or Hematuria: No  Fatigue/Achiness: YES- " Fatigue  Sick/Strep Exposure: No  Therapies tried and outcome: Safetussin (diabetic cough med)      Review of Systems   Constitutional, HEENT, cardiovascular, pulmonary, gi and gu systems are negative, except as otherwise noted.      Objective           Vitals:  No vitals were obtained today due to virtual visit.    Physical Exam   healthy, alert, and no distress  PSYCH: Alert and oriented times 3; coherent speech, normal   rate and volume, able to articulate logical thoughts, able   to abstract reason, no tangential thoughts, no hallucinations   or delusions  Her affect is normal  RESP: No cough, no audible wheezing, able to talk in full sentences  Remainder of exam unable to be completed due to telephone visits                Phone call duration: 15 minutes

## 2023-10-26 ENCOUNTER — OFFICE VISIT (OUTPATIENT)
Dept: FAMILY MEDICINE | Facility: CLINIC | Age: 73
End: 2023-10-26
Payer: COMMERCIAL

## 2023-10-26 VITALS
WEIGHT: 134.2 LBS | SYSTOLIC BLOOD PRESSURE: 126 MMHG | BODY MASS INDEX: 25.34 KG/M2 | DIASTOLIC BLOOD PRESSURE: 74 MMHG | RESPIRATION RATE: 14 BRPM | TEMPERATURE: 97.7 F | OXYGEN SATURATION: 98 % | HEART RATE: 69 BPM | HEIGHT: 61 IN

## 2023-10-26 DIAGNOSIS — Z29.11 NEED FOR VACCINATION AGAINST RESPIRATORY SYNCYTIAL VIRUS: ICD-10-CM

## 2023-10-26 DIAGNOSIS — Z23 NEED FOR PROPHYLACTIC VACCINATION AGAINST HEPATITIS B VIRUS: Primary | ICD-10-CM

## 2023-10-26 DIAGNOSIS — R00.0 SINUS TACHYCARDIA: ICD-10-CM

## 2023-10-26 DIAGNOSIS — R05.3 CHRONIC COUGH: ICD-10-CM

## 2023-10-26 DIAGNOSIS — E78.5 HYPERLIPIDEMIA LDL GOAL <100: ICD-10-CM

## 2023-10-26 DIAGNOSIS — E11.9 TYPE 2 DIABETES MELLITUS WITHOUT COMPLICATION, WITHOUT LONG-TERM CURRENT USE OF INSULIN (H): ICD-10-CM

## 2023-10-26 LAB — HBA1C MFR BLD: 5.6 % (ref 0–5.6)

## 2023-10-26 PROCEDURE — 91320 SARSCV2 VAC 30MCG TRS-SUC IM: CPT | Performed by: PHYSICIAN ASSISTANT

## 2023-10-26 PROCEDURE — 99214 OFFICE O/P EST MOD 30 MIN: CPT | Performed by: PHYSICIAN ASSISTANT

## 2023-10-26 PROCEDURE — 36415 COLL VENOUS BLD VENIPUNCTURE: CPT | Performed by: PHYSICIAN ASSISTANT

## 2023-10-26 PROCEDURE — 83036 HEMOGLOBIN GLYCOSYLATED A1C: CPT | Performed by: PHYSICIAN ASSISTANT

## 2023-10-26 PROCEDURE — 90480 ADMN SARSCOV2 VAC 1/ONLY CMP: CPT | Performed by: PHYSICIAN ASSISTANT

## 2023-10-26 RX ORDER — METOPROLOL SUCCINATE 25 MG/1
25 TABLET, EXTENDED RELEASE ORAL 2 TIMES DAILY
Qty: 180 TABLET | Refills: 3 | Status: SHIPPED | OUTPATIENT
Start: 2023-10-26 | End: 2024-10-04

## 2023-10-26 RX ORDER — RESPIRATORY SYNCYTIAL VIRUS VACCINE 120MCG/0.5
0.5 KIT INTRAMUSCULAR ONCE
Qty: 1 EACH | Refills: 0 | Status: SHIPPED | OUTPATIENT
Start: 2023-10-26 | End: 2023-10-26

## 2023-10-26 RX ORDER — ATORVASTATIN CALCIUM 40 MG/1
40 TABLET, FILM COATED ORAL DAILY
Qty: 90 TABLET | Refills: 3 | Status: SHIPPED | OUTPATIENT
Start: 2023-10-26

## 2023-10-26 RX ORDER — GUAIFENESIN AND DEXTROMETHORPHAN HYDROBROMIDE 1200; 60 MG/1; MG/1
1 TABLET, EXTENDED RELEASE ORAL 2 TIMES DAILY
Qty: 28 TABLET | Refills: 0 | Status: SHIPPED | OUTPATIENT
Start: 2023-10-26 | End: 2024-03-26

## 2023-10-26 NOTE — PROGRESS NOTES
"  Assessment & Plan   Problem List Items Addressed This Visit          Respiratory    Chronic cough    Relevant Medications    Dextromethorphan-Guaifenesin  MG TB12       Endocrine    Hyperlipidemia LDL goal <100    Relevant Medications    atorvastatin (LIPITOR) 40 MG tablet    Diabetes mellitus, type 2 (H)    Relevant Orders    Hemoglobin A1c    Hemoglobin A1c (Completed)       Circulatory    Sinus tachycardia    Relevant Medications    metoprolol succinate ER (TOPROL XL) 25 MG 24 hr tablet     Other Visit Diagnoses       Need for prophylactic vaccination against hepatitis B virus    -  Primary    Need for vaccination against respiratory syncytial virus        Relevant Medications    respiratory syncytial virus vaccine, bivalent (ABRYSVO) injection         Patient wishes to stop Metformin and manage DM with diet and exercise only   A1C today is 5.6 -within normal limits , stop metformin and repeat A1C in 3 months again   Continue Atorvastatin - no SE    Continue Metoprolol 25 mg daily     Chronic cough - try Mucinex Dm for occasional mucous overproduction    25 minutes spent by me on the date of the encounter doing chart review, history and exam, documentation and further activities per the note       BMI:   Estimated body mass index is 25.36 kg/m  as calculated from the following:    Height as of this encounter: 1.549 m (5' 1\").    Weight as of this encounter: 60.9 kg (134 lb 3.2 oz).   Weight management plan: Discussed healthy diet and exercise guidelines        Dana Mills PA-C  [facility]    James Hall is a 72 year old, presenting for the following health issues:  Diabetes and Establish Care        10/26/2023     1:51 PM   Additional Questions   Roomed by billy   Accompanied by self         10/26/2023     1:51 PM   Patient Reported Additional Medications   Patient reports taking the following new medications no       History of Present Illness       Reason for " visit:  URI    She eats 2-3 servings of fruits and vegetables daily.She consumes 0 sweetened beverage(s) daily.She exercises with enough effort to increase her heart rate 30 to 60 minutes per day.  She exercises with enough effort to increase her heart rate 3 or less days per week.   She is taking medications regularly.         Diabetes Follow-up    How often are you checking your blood sugar? One time daily  What time of day are you checking your blood sugars (select all that apply)?  Before meals  Have you had any blood sugars above 200?  No  Have you had any blood sugars below 70?  No  What symptoms do you notice when your blood sugar is low?  Lightheadedness   What concerns do you have today about your diabetes? Other: getting of metformin and try to manage with diet    Do you have any of these symptoms? (Select all that apply)  No numbness or tingling in feet.  No redness, sores or blisters on feet.  No complaints of excessive thirst.  No reports of blurry vision.  No significant changes to weight.  Pt lost 23 lbs in 9 months by watching the diet and exercising. She wants to try stop Metformin     BP Readings from Last 2 Encounters:   10/26/23 126/74   09/28/23 122/72     Hemoglobin A1C (%)   Date Value   10/26/2023 5.6   08/01/2023 5.7 (H)   10/14/2020 5.6   11/08/2019 5.6     LDL Cholesterol Calculated (mg/dL)   Date Value   04/28/2023 44   01/26/2023 45   10/14/2020 39   05/22/2018 48         How many servings of fruits and vegetables do you eat daily?  4 or more  On average, how many sweetened beverages do you drink each day (Examples: soda, juice, sweet tea, etc.  Do NOT count diet or artificially sweetened beverages)?   0  How many days per week do you exercise enough to make your heart beat faster? 4  How many minutes a day do you exercise enough to make your heart beat faster? 30 - 60  How many days per week do you miss taking your medication? 0  Hyperlipidemia Follow-Up    Are you regularly taking any  "medication or supplement to lower your cholesterol?   Yes- statin  Are you having muscle aches or other side effects that you think could be caused by your cholesterol lowering medication?  No    Hypertension Follow-up    Do you check your blood pressure regularly outside of the clinic? Yes   Are you following a low salt diet? Yes  Are your blood pressures ever more than 140 on the top number (systolic) OR more   than 90 on the bottom number (diastolic), for example 140/90? No  How many servings of fruits and vegetables do you eat daily?  2-3  On average, how many sweetened beverages do you drink each day (Examples: soda, juice, sweet tea, etc.  Do NOT count diet or artificially sweetened beverages)?   1  How many days per week do you exercise enough to make your heart beat faster? 3  How many minutes a day do you exercise enough to make your heart beat faster? 30 - 60  How many days per week do you miss taking your medication? 0    Chronic cough    Duration: several years  Description (location/character/radiation): pt has seen pulmonary, ENT and no specific cause has been found. ENt has suggested that pt may have vocal cord irritation that causes cough. Pt repots that after each cold she gets prolonged extended mucous cough. Last URI was 2 weeks ago, pt improved on Zpack and Tessalon, but still has occasional mucous cough   Intensity:  mild  Accompanying signs and symptoms: none  History (similar episodes/previous evaluation): see above  Precipitating or alleviating factors: uri  Therapies tried and outcome: zpack and tessalon last week-improved         Review of Systems   Constitutional, HEENT, cardiovascular, pulmonary, gi and gu systems are negative, except as otherwise noted.      Objective    /74 (BP Location: Left arm, Patient Position: Sitting, Cuff Size: Adult Regular)   Pulse 69   Temp 97.7  F (36.5  C) (Oral)   Resp 14   Ht 1.549 m (5' 1\")   Wt 60.9 kg (134 lb 3.2 oz)   LMP  (LMP Unknown)   " SpO2 98%   BMI 25.36 kg/m    Body mass index is 25.36 kg/m .  Physical Exam   GENERAL: healthy, alert and no distress  NECK: no adenopathy, no asymmetry, masses, or scars and thyroid normal to palpation  RESP: lungs clear to auscultation - no rales, rhonchi or wheezes  CV: regular rate and rhythm, normal S1 S2, no S3 or S4, no murmur, click or rub, no peripheral edema and peripheral pulses strong  ABDOMEN: soft, nontender, no hepatosplenomegaly, no masses and bowel sounds normal  MS: no gross musculoskeletal defects noted, no edema    Lab on 08/01/2023   Component Date Value Ref Range Status    Hemoglobin A1C 08/01/2023 5.7 (H)  0.0 - 5.6 % Final    Normal <5.7%   Prediabetes 5.7-6.4%    Diabetes 6.5% or higher     Note: Adopted from ADA consensus guidelines.    Sodium 08/01/2023 140  136 - 145 mmol/L Final    Potassium 08/01/2023 3.9  3.4 - 5.3 mmol/L Final    Chloride 08/01/2023 102  98 - 107 mmol/L Final    Carbon Dioxide (CO2) 08/01/2023 27  22 - 29 mmol/L Final    Anion Gap 08/01/2023 11  7 - 15 mmol/L Final    Urea Nitrogen 08/01/2023 11.0  8.0 - 23.0 mg/dL Final    Creatinine 08/01/2023 0.68  0.51 - 0.95 mg/dL Final    Calcium 08/01/2023 9.4  8.8 - 10.2 mg/dL Final    Glucose 08/01/2023 115 (H)  70 - 99 mg/dL Final    GFR Estimate 08/01/2023 >90  >60 mL/min/1.73m2 Final    Glucose Whole Blood 08/01/2023 116 (H)  60 - 99 mg/dL Final     Results for orders placed or performed in visit on 10/26/23 (from the past 24 hour(s))   Hemoglobin A1c   Result Value Ref Range    Hemoglobin A1C 5.6 0.0 - 5.6 %

## 2023-10-26 NOTE — PROGRESS NOTES
Received provider signed [facility] Annual Diabetes form. Provider gave original to the patient at her appt today, 10/26/2023. Copy to TC and abstracting.  Sandi Carrera MA  [facility]   Primary Care

## 2023-11-02 ENCOUNTER — TRANSFERRED RECORDS (OUTPATIENT)
Dept: HEALTH INFORMATION MANAGEMENT | Facility: CLINIC | Age: 73
End: 2023-11-02

## 2023-11-07 ENCOUNTER — ANCILLARY PROCEDURE (OUTPATIENT)
Dept: MAMMOGRAPHY | Facility: CLINIC | Age: 73
End: 2023-11-07
Payer: COMMERCIAL

## 2023-11-07 DIAGNOSIS — Z12.31 VISIT FOR SCREENING MAMMOGRAM: ICD-10-CM

## 2023-11-07 PROCEDURE — 77063 BREAST TOMOSYNTHESIS BI: CPT | Mod: TC | Performed by: RADIOLOGY

## 2023-11-07 PROCEDURE — 77067 SCR MAMMO BI INCL CAD: CPT | Mod: TC | Performed by: RADIOLOGY

## 2023-11-14 ENCOUNTER — THERAPY VISIT (OUTPATIENT)
Dept: PHYSICAL THERAPY | Facility: CLINIC | Age: 73
End: 2023-11-14
Payer: COMMERCIAL

## 2023-11-14 DIAGNOSIS — M25.561 ACUTE PAIN OF RIGHT KNEE: Primary | ICD-10-CM

## 2023-11-14 PROCEDURE — 97110 THERAPEUTIC EXERCISES: CPT | Mod: GP | Performed by: PHYSICAL THERAPIST

## 2023-11-19 ENCOUNTER — OFFICE VISIT (OUTPATIENT)
Dept: URGENT CARE | Facility: URGENT CARE | Age: 73
End: 2023-11-19
Payer: COMMERCIAL

## 2023-11-19 VITALS
SYSTOLIC BLOOD PRESSURE: 123 MMHG | DIASTOLIC BLOOD PRESSURE: 74 MMHG | RESPIRATION RATE: 18 BRPM | WEIGHT: 133.9 LBS | OXYGEN SATURATION: 95 % | TEMPERATURE: 97.8 F | BODY MASS INDEX: 25.3 KG/M2 | HEART RATE: 66 BPM

## 2023-11-19 DIAGNOSIS — H10.023 PINK EYE DISEASE OF BOTH EYES: Primary | ICD-10-CM

## 2023-11-19 PROCEDURE — 99213 OFFICE O/P EST LOW 20 MIN: CPT | Performed by: PHYSICIAN ASSISTANT

## 2023-11-19 RX ORDER — POLYMYXIN B SULFATE AND TRIMETHOPRIM 1; 10000 MG/ML; [USP'U]/ML
1 SOLUTION OPHTHALMIC EVERY 6 HOURS
Qty: 2 ML | Refills: 0 | Status: SHIPPED | OUTPATIENT
Start: 2023-11-19 | End: 2023-11-26

## 2023-11-19 NOTE — PROGRESS NOTES
Chief Complaint   Patient presents with    Conjunctivitis     Left eye- itchiness,pain and burning               ASSESSMENT:     ICD-10-CM    1. Pink eye disease of both eyes  H10.023 polymixin b-trimethoprim (POLYTRIM) 88081-7.1 UNIT/ML-% ophthalmic solution            PLAN:  I have discussed clinical findings with patient.  Side effects of medications discussed.  Symptomatic care is discussed.  I have discussed the possibility of  worsening symptoms and indication to RTC or go to the ER if they occur.  All questions are answered, patient indicates understanding of these issues and is in agreement with plan.   Patient care instructions are discussed/given at the end of visit.   Lots of rest and fluids.      Shannon Andujar PA-C      SUBJECTIVE:  73-year-old female presents for left eye itchiness and burning for couple of days.  No decreased vision.  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  No cough, sore throat.  Did have upper respiratory infection a week or so ago, treated with azithromycin.  Mild photophobia.  No decreased vision.  Does not wear contacts.      Allergies   Allergen Reactions    Sulfa Antibiotics Nausea    Codeine Phosphate Nausea and Vomiting    Pentazocine     Pentazocine-Naloxone Hcl     Propoxyphene      Darvon    Penicillins Rash     rash and itching         Past Medical History:   Diagnosis Date    Arthritis     Basal cell carcinoma of skin 04/07/2011    Benign neoplasm of colon 10/26/2011    Overview:  Colonoscopy every 5 years  Oct 2011     Bronchiectasis without complication (H) 12/15/2016    CT chest done 2016 through COPDgene study, formal report indicates cylindrical bronchiectasis. Reviewed CT with [facility] radiologists; bronchiectasis is likely present but of unclear clinical significance, and was likely present on CT from 2010 as well (different scanner and different technique).    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 B)  Nuclear Stress Test on 10/01/10, No scintigraphic evidence of ischemia or infarction. EF: 71%.      Diabetes (H)     Elevated blood pressure (not hypertension) 09/19/2006    Hypercholesteremia     Hyperlipidemia 12/22/2011    Overview:  See results review for lipid profile.     Impaired fasting glucose 09/18/2006    Nonsenile cataract     Overweight 07/28/2015    PONV (postoperative nausea and vomiting)     Primary osteoarthritis of both hands 07/28/2015    Right shoulder strain, initial encounter 12/15/2016    Shingles 2009    Sinus tachycardia 07/21/2016    Skin cancer 2013, 2015    Basal cell cancer    Vitamin D deficiency 09/16/2010       alcohol swab prep pads, Use to swab area of injection/lise as directed.  aspirin (ASA) 81 MG EC tablet, Take 1 tablet (81 mg) by mouth daily  atorvastatin (LIPITOR) 40 MG tablet, Take 1 tablet (40 mg) by mouth daily  blood glucose (NO BRAND SPECIFIED) test strip, Use to test blood sugar 1 time daily or as directed. To accompany: Blood Glucose Monitor Brands: per insurance.  blood glucose calibration (NO BRAND SPECIFIED) solution, To accompany: Blood Glucose Monitor Brands: per insurance.  blood glucose monitoring (NO BRAND SPECIFIED) meter device kit, Use to test blood sugar 1 time daily or as directed. Preferred blood glucose meter OR supplies to accompany: Blood Glucose Monitor Brands: per insurance.  Calcium-Vitamin D (CALCIUM + D PO), Take  by mouth.  Dextromethorphan-Guaifenesin  MG TB12, Take 1 tablet by mouth 2 times daily  diphenhydrAMINE-acetaminophen (TYLENOL PM)  MG tablet, Take 1 tablet by mouth nightly as needed  FISH OIL,   MAGNESIUM OXIDE PO, Take 200 mg by mouth daily  metoprolol succinate ER (TOPROL XL) 25 MG 24 hr tablet, Take 1  tablet (25 mg) by mouth 2 times daily  Multiple Vitamins-Minerals (MULTIVITAMIN & MINERAL PO), Take  by mouth.  omeprazole (PRILOSEC) 20 MG DR capsule, Take 1 capsule (20 mg) by mouth daily  Solifenacin Succinate (VESICARE PO),   thin (NO BRAND SPECIFIED) lancets, Use with lanceting device. To accompany: Blood Glucose Monitor Brands: per insurance.    lidocaine (PF) (XYLOCAINE) 1 % injection 4 mL  methylPREDNISolone (DEPO-Medrol) injection 80 mg  ropivacaine (NAROPIN) injection 0.5 mL  triamcinolone (KENALOG-40) injection 40 mg        Social History     Tobacco Use    Smoking status: Former     Packs/day: 1.00     Years: 15.00     Additional pack years: 0.00     Total pack years: 15.00     Types: Cigarettes     Start date: 1966     Quit date: 1982     Years since quittin.5     Passive exposure: Past    Smokeless tobacco: Never    Tobacco comments:     Stopped smoking in .   Substance Use Topics    Alcohol use: Yes     Comment: 2-5 glasses of wine per week       ROS:  CONSTITUTIONAL: Negative for fatigue or fever.  EYES: as above.  ENT: Negative for sore throat   RESP: Negative for cough   CV: Negative for chest pains.  GI: Negative for vomiting.  MUSCULOSKELETAL:  Negative for significant muscle or joint pains.  NEUROLOGIC: Negative for headaches.  SKIN: Negative for rash.  PSYCH: Normal mentation for age.    OBJECTIVE:  /74 (BP Location: Right arm, Patient Position: Sitting, Cuff Size: Adult Regular)   Pulse 66   Temp 97.8  F (36.6  C) (Tympanic)   Resp 18   Wt 60.7 kg (133 lb 14.4 oz)   LMP  (LMP Unknown)   SpO2 95%   BMI 25.30 kg/m    GENERAL APPEARANCE: Healthy, alert and no distress.  EYES:Conjunctiva/sclera mild injection, left greater than right.  No current exudate.  Pupils equal reactive to light and accommodation.  EOMs intact without pain.  Clear.  EARS: No cerumen.   Ear canals w/o erythema.  TM's intact w/o erythema.    THROAT: No erythema w/o tonsillar enlargement . No  exudates.  NECK: Supple, nontender, no lymphadenopathy.  RESP: Lungs clear to auscultation - no rales, rhonchi or wheezes  CV: Regular rate and rhythm, normal S1 S2, no murmur noted.  NEURO: Awake, alert    SKIN: No rashes        Shannon Andujar PA-C

## 2023-11-21 ENCOUNTER — THERAPY VISIT (OUTPATIENT)
Dept: PHYSICAL THERAPY | Facility: CLINIC | Age: 73
End: 2023-11-21
Payer: COMMERCIAL

## 2023-11-21 DIAGNOSIS — M25.561 ACUTE PAIN OF RIGHT KNEE: Primary | ICD-10-CM

## 2023-11-21 PROCEDURE — 97110 THERAPEUTIC EXERCISES: CPT | Mod: GP | Performed by: PHYSICAL THERAPIST

## 2023-12-06 ENCOUNTER — OFFICE VISIT (OUTPATIENT)
Dept: OPHTHALMOLOGY | Facility: CLINIC | Age: 73
End: 2023-12-06
Payer: COMMERCIAL

## 2023-12-06 DIAGNOSIS — H02.403 PTOSIS OF BOTH EYELIDS: Primary | ICD-10-CM

## 2023-12-06 PROCEDURE — 99212 OFFICE O/P EST SF 10 MIN: CPT | Performed by: OPHTHALMOLOGY

## 2023-12-06 ASSESSMENT — VISUAL ACUITY
METHOD: SNELLEN - LINEAR
OD_CC: 20/25
CORRECTION_TYPE: GLASSES
OS_CC: 20/25

## 2023-12-06 ASSESSMENT — TONOMETRY
OD_IOP_MMHG: 25
IOP_METHOD: ICARE
OD_IOP_MMHG: 17
OS_IOP_MMHG: 25
IOP_METHOD: TONOPEN
OS_IOP_MMHG: 16

## 2023-12-06 NOTE — PATIENT INSTRUCTIONS
Your incisions have healed well. If at any point the cyst on the left upper eyelid appears to be enlarging, or causing you problems, please return and we can consider options. Otherwise I would ask you to return with any new eyelid issues.

## 2023-12-06 NOTE — PROGRESS NOTES
"     Chief Complaint(s) and History of Present Illness(es)     Eyelid Cyst Follow Up            Laterality: left upper lid          Comments    Patient presents for 4 month follow up of left upper eyelid cyst. Pt feels   the cyst has reduced in size considerably. No longer too concerned about   that area. Would like Dr. Colón to do a \"once over\" to make sure things look   ok and then feels she would be done with her post of visits.   No drop use currently-plans to start using Systane again soon.                 Assessment & Plan     Isabel Carroll is a 73 year old female with the following diagnoses:   Encounter Diagnosis   Name Primary?    Ptosis of both eyelids Yes     Healing well. Improvement in her lid crease scar.   Very small residual central lid crease subcutaneous cyst but much smaller than prior visits.    Reassurance provided. I would like her to return if there is any increase in size of the cyst or any new problems.         Patient disposition:   No follow-ups on file.        Attending Physician Attestation: Complete documentation of historical and exam elements from today's encounter can be found in the full encounter summary report (not reduplicated in this progress note). I personally obtained the chief complaint(s) and history of present illness. I confirmed and edited as necessary the review of systems, past medical/surgical history, family history, social history, and examination findings as documented by others; and I examined the patient myself. I personally reviewed the relevant tests, images, and reports as documented above. I formulated and edited as necessary the assessment and plan and discussed the findings and management plan with the patient.  -Eldon Prather MD      "

## 2023-12-06 NOTE — NURSING NOTE
"Chief Complaints and History of Present Illnesses   Patient presents with    Eyelid Cyst Follow Up       Chief Complaint(s) and History of Present Illness(es)       Eyelid Cyst Follow Up              Laterality: left upper lid              Comments    Patient presents for 4 month follow up of left upper eyelid cyst. Pt feels the cyst has reduced in size considerably. No longer too concerned about that area. Would like Dr. Colón to do a \"once over\" to make sure things look ok and then feels she would be done with her post of visits.   No drop use currently-plans to start using Systane again soon.                   Nasrin Ghosh, COT    "

## 2023-12-11 ENCOUNTER — OFFICE VISIT (OUTPATIENT)
Dept: FAMILY MEDICINE | Facility: CLINIC | Age: 73
End: 2023-12-11
Payer: COMMERCIAL

## 2023-12-11 VITALS
RESPIRATION RATE: 14 BRPM | OXYGEN SATURATION: 98 % | TEMPERATURE: 97.5 F | HEART RATE: 90 BPM | HEIGHT: 62 IN | DIASTOLIC BLOOD PRESSURE: 77 MMHG | BODY MASS INDEX: 24.33 KG/M2 | SYSTOLIC BLOOD PRESSURE: 132 MMHG | WEIGHT: 132.2 LBS

## 2023-12-11 DIAGNOSIS — R05.3 CHRONIC COUGH: Primary | ICD-10-CM

## 2023-12-11 DIAGNOSIS — E11.9 TYPE 2 DIABETES MELLITUS WITHOUT COMPLICATION, WITHOUT LONG-TERM CURRENT USE OF INSULIN (H): ICD-10-CM

## 2023-12-11 DIAGNOSIS — K59.01 SLOW TRANSIT CONSTIPATION: ICD-10-CM

## 2023-12-11 LAB — HBA1C MFR BLD: 5.5 % (ref 0–5.6)

## 2023-12-11 PROCEDURE — 83036 HEMOGLOBIN GLYCOSYLATED A1C: CPT | Performed by: PHYSICIAN ASSISTANT

## 2023-12-11 PROCEDURE — 99213 OFFICE O/P EST LOW 20 MIN: CPT | Performed by: PHYSICIAN ASSISTANT

## 2023-12-11 PROCEDURE — 36415 COLL VENOUS BLD VENIPUNCTURE: CPT | Performed by: PHYSICIAN ASSISTANT

## 2023-12-11 ASSESSMENT — ENCOUNTER SYMPTOMS: COUGH: 1

## 2023-12-11 NOTE — PROGRESS NOTES
Assessment & Plan   Problem List Items Addressed This Visit          Respiratory    Chronic cough - Primary    Relevant Orders    Adult ENT  Referral       Endocrine    Diabetes mellitus, type 2 (H)    Relevant Orders    Hemoglobin A1c      Acute cough has resolved. Patient still has chronic cough that she has had for 20 years. Patient wants to follow up with ENT  Referral was provided    Miralax every other day 1 capful daily, helps well -continue current regimen    Patient is off metformin and manages DM with diet-A1C is pending     15 minutes spent by me on the date of the encounter doing chart review, history and exam, documentation and further activities per the note           Dana Mills PA-C  [facility]    James Hall is a 73 year old, presenting for the following health issues:  Cough        12/11/2023     2:01 PM   Additional Questions   Roomed by Lotus       History of Present Illness       Diabetes:   She presents for follow up of diabetes.  She is checking home blood glucose one time daily.   She checks blood glucose before meals.  Blood glucose is never over 200 and never under 70. She is aware of hypoglycemia symptoms including shakiness.    She has no concerns regarding her diabetes at this time.   She is not experiencing numbness or burning in feet, excessive thirst, blurry vision, weight changes or redness, sores or blisters on feet.           She eats 4 or more servings of fruits and vegetables daily.She consumes 0 sweetened beverage(s) daily.She exercises with enough effort to increase her heart rate 20 to 29 minutes per day.  She exercises with enough effort to increase her heart rate 4 days per week.   She is taking medications regularly.         Cough -   Onset: 2 months and revieved z pack  acute illness cough has resolved . Patient still has chronic tickle that she has had for 20 years  Description: Dry cough with tickle   Intensity:  mild, but when gets a coughing   Progression of Symptoms:  same  Accompanying Signs & Symptoms: tickle  Previous history of similar problem: late 1990's started symptoms  Precipitating factors:        Worsened by: no  Alleviating factors:        Improved by: water and chewing gum  Therapies tried and outcome: None      Constipation  Onset/Duration: chronic  Description:  Frequency of bowel movements: on a daily basis  Consistency of stool: dry  Progression of Symptoms: same  Accompanying signs and symptoms:    Abdominal pain: No   Rectal pain: No   Blood in stool: No   Nausea/Vomiting: No   Weight loss or gain: No  History:   Similar problems in past: YES  History of abdominal surgery: No  Chronic laxative use: YES- Miralax   New medications: No  Precipitating or alleviating factors: miralax helps  Therapies tried and outcome: Miralax    A1C follow up-  Diabetes Follow-up    How often are you checking your blood sugar? One time daily  What time of day are you checking your blood sugars (select all that apply)?  Before meals  Have you had any blood sugars above 200?  No  Have you had any blood sugars below 70?  No  What symptoms do you notice when your blood sugar is low?   Not applicable  What concerns do you have today about your diabetes? None   Do you have any of these symptoms? (Select all that apply)  No numbness or tingling in feet.  No redness, sores or blisters on feet.  No complaints of excessive thirst.  No reports of blurry vision.  No significant changes to weight.      BP Readings from Last 2 Encounters:   12/11/23 132/77   11/19/23 123/74     Hemoglobin A1C (%)   Date Value   10/26/2023 5.6   08/01/2023 5.7 (H)   10/14/2020 5.6   11/08/2019 5.6     LDL Cholesterol Calculated (mg/dL)   Date Value   04/28/2023 44   01/26/2023 45   10/14/2020 39   05/22/2018 48           Review of Systems   Respiratory:  Positive for cough.       Constitutional, HEENT, cardiovascular, pulmonary, gi and gu systems are  "negative, except as otherwise noted.      Objective    /77   Pulse 90   Temp 97.5  F (36.4  C) (Oral)   Resp 14   Ht 1.562 m (5' 1.5\")   Wt 60 kg (132 lb 3.2 oz)   LMP  (LMP Unknown)   SpO2 98%   BMI 24.57 kg/m    Body mass index is 24.57 kg/m .  Physical Exam   GENERAL: healthy, alert and no distress  NECK: no adenopathy, no asymmetry, masses, or scars and thyroid normal to palpation  RESP: lungs clear to auscultation - no rales, rhonchi or wheezes  CV: regular rate and rhythm, normal S1 S2, no S3 or S4, no murmur, click or rub, no peripheral edema and peripheral pulses strong  ABDOMEN: soft, nontender, no hepatosplenomegaly, no masses and bowel sounds normal  MS: no gross musculoskeletal defects noted, no edema    No results found for this or any previous visit (from the past 24 hour(s)).                  "

## 2024-01-02 ENCOUNTER — THERAPY VISIT (OUTPATIENT)
Dept: PHYSICAL THERAPY | Facility: CLINIC | Age: 74
End: 2024-01-02
Payer: COMMERCIAL

## 2024-01-02 DIAGNOSIS — M25.561 ACUTE PAIN OF RIGHT KNEE: Primary | ICD-10-CM

## 2024-01-02 PROCEDURE — 97110 THERAPEUTIC EXERCISES: CPT | Mod: GP | Performed by: PHYSICAL THERAPIST

## 2024-01-02 NOTE — TELEPHONE ENCOUNTER
OK to see Dr. Morrow in September. I filled the prescription for omeprazole.  Marry Gonzalez MD      Component Value Date    CHOLHDLRATIO 1.7 01/20/2023         Hemoglobin A1c:     12/25/2014      10.4%               07/12/2016      10.3%               01/18/2019      10.7%               04/18/2019      9.3%               06/27/2019      9.3%               09/16/2019      9.1%               02/07/2020      9.4%                           Reports 4/17/2020      9.8%               06/19/2020      9.6%                  06/09/2021      9.6%      11/22/2022 6.5%    03/28/2023 9.5%     04/02/2023 8.9%    07/24/2023 13.2%    01/02/2024 10.7%      Hemoglobin A1C, POC   Date Value Ref Range Status   01/02/2024 10.7 % Final   03/28/2023 9.5 % Final   02/07/2020 9.4 % Final        Thyroid:                04/24/2017      1.630               09/16/2019      0.972               06/19/2020      2.400    04/01/2023 1.930         Lab Results   Component Value Date/Time    TSH 2.400 06/19/2020 12:11 PM    TSH 0.972 09/16/2019 02:50 PM                          Allergies & Medications:  Reviewed in chart.        Review of Systems    Vital Signs:  /80 (Site: Left Upper Arm, Position: Sitting)   Pulse 84   Ht 1.727 m (5' 8\")   Wt 58.1 kg (128 lb)   SpO2 99%   BMI 19.46 kg/m²       Physical Exam  HENT:      Head: Normocephalic.   Neck:      Thyroid: No thyroid mass or thyromegaly.      Vascular: No carotid bruit.   Cardiovascular:      Rate and Rhythm: Normal rate and regular rhythm.   Pulmonary:      Effort: Pulmonary effort is normal.      Breath sounds: Normal breath sounds.   Abdominal:      Palpations: Abdomen is soft.   Musculoskeletal:      Cervical back: Neck supple.      Right lower leg: No edema.      Left lower leg: No edema.   Feet:      Right foot:      Protective Sensation: 3 sites tested.  3 sites sensed.      Skin integrity: Skin integrity normal.      Left foot:      Protective Sensation: 3 sites tested.  0 sites sensed.      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

## 2024-01-03 PROBLEM — M25.561 ACUTE PAIN OF RIGHT KNEE: Status: RESOLVED | Noted: 2023-10-05 | Resolved: 2024-01-03

## 2024-01-03 NOTE — PROGRESS NOTES
DISCHARGE  Reason for Discharge: Patient has met all goals.  Patient chooses to discontinue therapy.    Equipment Issued: HEP    Discharge Plan: Patient to continue home program, will reach out to the clinic with any future questions or concerns    Referring Provider:  Cecilia Moss PA-C       01/02/24 0500   Appointment Info   Signing clinician's name / credentials Sarthak Diaz DPT   Total/Authorized Visits 6   Visits Used 6   Medical Diagnosis R knee pain   PT Tx Diagnosis R knee pain   Quick Adds Certification   Progress Note/Certification   Start of Care Date 10/05/23   Onset of illness/injury or Date of Surgery 08/24/23   Therapy Frequency 1x/ week x 4 week then 2x/ month 2 month   Predicted Duration 12 weeks   Certification date from 10/05/23   Certification date to 01/02/24   PT Goal 1   Goal Identifier knee   Goal Description Pt will be able to descend stairs without pain   Rationale to maximize safety and independence with performance of ADLs and functional tasks;to maximize safety and independence within the home;to maximize safety and independence within the community;to maximize safety and independence with transportation;to maximize safety and independence with self cares   Goal Progress GOAL MET   Target Date 12/28/23   Date Met 01/02/24   Subjective Report   Subjective Report The patient notes no knee pain, feels she is doing well and is ready for discharge, she notes she will reach out to the clinic with any future questions or concerns   Objective Measures   Objective Measures Objective Measure 1;Objective Measure 2;Objective Measure 3;Objective Measure 4   Treatment Interventions (PT)   Interventions Therapeutic Procedure/Exercise;Therapeutic Activity   Therapeutic Procedure/Exercise   Therapeutic Procedures Ther Proc 3;Ther Proc 2;Ther Proc 4;Ther Proc 5;Ther Proc 6   Ther Proc 1 Stationary biking   Ther Proc 1 - Details SH 3 5 min level 5   Ther Proc 2 Reverse lunge   Ther Proc 2 -  Details x10 bilaterally   Ther Proc 3 squat   Ther Proc 3 - Details 2x10@ green TB   Ther Proc 4 lunge   Ther Proc 4 - Details x10 bilaterally   Ther Proc 5 side lunge   Ther Proc 5 - Details x10 each side   Ther Proc 6 Lateral lunge   Ther Proc 6 - Details x10 each side   PTRx Ther Proc 2 Bridging #1   PTRx Ther Proc 2 - Details x20   PTRx Ther Proc 4 Prone knee flexion   PTRx Ther Proc 4 - Details 2x60s   PTRx Ther Proc 5 Hip Abduction Straight Leg Raise   PTRx Ther Proc 5 - Details x 10 in clinic with fatigue - reminder  to keep toes FW   Skilled Intervention review of HEP   Patient Response/Progress the patient demonstrates good form on each exercise   Education   Learner/Method Patient;Pictures/Video   Plan   Home program PTRX HO

## 2024-01-12 ENCOUNTER — OFFICE VISIT (OUTPATIENT)
Dept: FAMILY MEDICINE | Facility: CLINIC | Age: 74
End: 2024-01-12
Payer: COMMERCIAL

## 2024-01-12 VITALS
HEIGHT: 62 IN | BODY MASS INDEX: 24.48 KG/M2 | SYSTOLIC BLOOD PRESSURE: 121 MMHG | HEART RATE: 63 BPM | TEMPERATURE: 96.7 F | OXYGEN SATURATION: 99 % | DIASTOLIC BLOOD PRESSURE: 72 MMHG | WEIGHT: 133 LBS

## 2024-01-12 DIAGNOSIS — E11.9 TYPE 2 DIABETES MELLITUS WITHOUT COMPLICATION, WITHOUT LONG-TERM CURRENT USE OF INSULIN (H): ICD-10-CM

## 2024-01-12 DIAGNOSIS — R05.3 CHRONIC COUGH: ICD-10-CM

## 2024-01-12 DIAGNOSIS — J01.00 ACUTE NON-RECURRENT MAXILLARY SINUSITIS: Primary | ICD-10-CM

## 2024-01-12 PROCEDURE — 99214 OFFICE O/P EST MOD 30 MIN: CPT | Performed by: PHYSICIAN ASSISTANT

## 2024-01-12 RX ORDER — AZITHROMYCIN 250 MG/1
TABLET, FILM COATED ORAL
Qty: 6 TABLET | Refills: 0 | Status: SHIPPED | OUTPATIENT
Start: 2024-01-12 | End: 2024-01-17

## 2024-01-12 ASSESSMENT — PAIN SCALES - GENERAL: PAINLEVEL: SEVERE PAIN (7)

## 2024-01-12 NOTE — PROGRESS NOTES
Assessment & Plan     Acute non-recurrent maxillary sinusitis  Symptoms are consistent with bacterial etiology.  Did have URI symptoms previously but currently all right-sided maxillary pain, pressure, and thick nasal drainage.  Discussed antibiotic treatment.  She has done well on azithromycin in the past, we did discuss potential for resistance and treatment failure.  Could consider cefdinir with her penicillin allergy if not improving.  Utilize over-the-counter medications.  - azithromycin (ZITHROMAX) 250 MG tablet; Take 2 tablets (500 mg) by mouth daily for 1 day, THEN 1 tablet (250 mg) daily for 4 days.    Diabetes mellitus, type 2 (H)  Using diabetic over-the-counter cold medication, can continue to do so.  Last A1c under control.    Chronic cough  No significant worsening with current symptoms.  Has referral to ENT.                 Bethanie Montalvo PA-C  [facility]    James Hall is a 73 year old, presenting for the following health issues:  Sinus Problem      1/12/2024    11:12 AM   Additional Questions   Roomed by Yulia Villafuerte       Sinus Problem     History of Present Illness       Reason for visit:  Possible sinus infection  Symptom onset:  3-7 days ago  Symptoms include:  Rigjt nostril is closed    She eats 4 or more servings of fruits and vegetables daily.She consumes 0 sweetened beverage(s) daily.She exercises with enough effort to increase her heart rate 20 to 29 minutes per day.  She exercises with enough effort to increase her heart rate 5 days per week.   She is taking medications regularly.         Seen about a month ago for cough and she had an acute cough that was improving, baseline chronic cough.  Felt like sometime after that visit she developed a new illness.  It seemed like it had improved some however on 1/8/2024 she developed severe right nasal plugging, congestion, pressure.  Has become more painful.  Having a lot of thick drainage.  No symptoms  on the left side.  Some right ear plugging.  No new cough.  No new fever.  Using Mucinex as needed.  Has had sinus infections in the past.  Has not had issues with azithromycin failure.        Review of Systems         Objective    LMP  (LMP Unknown)   There is no height or weight on file to calculate BMI.  Physical Exam   GENERAL: healthy, alert and no distress  EYES: Eyes grossly normal to inspection, PERRL and conjunctivae and sclerae normal  HENT: normal cephalic/atraumatic, ear canals and TM's normal, nasal mucosa edematous, mainly to the right, oropharynx clear, oral mucous membranes moist, and sinuses: maxillary tenderness on right  NECK: no adenopathy, no asymmetry, masses  RESP: lungs clear to auscultation - no rales, rhonchi or wheezes  CV: regular rate and rhythm, normal S1 S2, no S3 or S4, no murmur

## 2024-01-26 ENCOUNTER — VIRTUAL VISIT (OUTPATIENT)
Dept: NUTRITION | Facility: CLINIC | Age: 74
End: 2024-01-26
Payer: COMMERCIAL

## 2024-01-26 DIAGNOSIS — E11.9 TYPE 2 DIABETES MELLITUS WITHOUT COMPLICATION, WITHOUT LONG-TERM CURRENT USE OF INSULIN (H): Primary | ICD-10-CM

## 2024-01-26 PROCEDURE — 97803 MED NUTRITION INDIV SUBSEQ: CPT | Mod: 93 | Performed by: DIETITIAN, REGISTERED

## 2024-01-26 NOTE — PATIENT INSTRUCTIONS
You continue do a wonderful job on healthy eating!  Great to see higher intake of vegetables with meals and eating more seafood- both are good for heart-health and diabetes!  Keep up the good work :)    2. Continue to stay active with your 2-3 days strength/resistance training and walking.    3. It is best to get our food and nutrients though food if possible but good supplements to consider is Calcium (if not eating 3 servings of dairy a day), Vitamin D (especially in winter in [place]- need sunlight to make it), and a general multivitamin if you feel low on a certain food group (or feel like not eating much of one).     FOLLOW UP: Please reach out with any questions. If you feel a follow up is desired in the future or if anything is changing with blood glucose, feel free to ask your provider for another referral and our scheduling team will call to get you scheduled!    Alexandra Mckenna RDN, LD, [facility]   [phone]

## 2024-01-26 NOTE — PROGRESS NOTES
Type of Service: Telephone Visit    Audio only visit done, as patient does not have access to audio-visual technology.    Individual visit provided, given no group classes are available for 2 months.     Originating Location (Patient Location): Home  Distant Location (Provider Location): Offsite  Mode of Communication:  Telephone    Telephone Visit Start Time:  1:14pm  Telephone Visit End Time (telephone visit stop time): 1:40pm    [facility]      Medical Nutrition Therapy for Diabetes  Visit Type:Reassessment and intervention    Isabel Carroll presents today for MNT and education related to type 2 diabetes and weight management.   She is accompanied by self.     ASSESSMENT:   Patient comments/concerns relating to nutrition: Says her blood glucose is going  mg/dL.  Says a good part of the year is experimentation. [place] even a small serving of white rice makes a difference in her blood glucose. Will order potatoes when eating out since sees better blood glucose. Will not avoid rice - will eat a little bit with Asian food such as 1/4 cup.  She has avoided granulated sugar- candy, sweets, cookies.  Will make low-calorie pudding and uses Lactose-free milk.  Says she went off Metformin in October. Says eating more vegetables with meals. Eating more fish -salmon, tilapia, flounder, shrimp  Says had 3 family members who had leg amputations with aunt and uncle so it motivated to take this seriously. She'd like to avoid medications if possible. Says she take a MVI, Ca, Mg, and Fish Oil for supplements.    NUTRITION HISTORY:    Breakfast: Raisin Bran -1/2 cup with 1/2 cup lactose free milk, 45 calorie bread with peanut butter OR protein shake with flax and priscilla seeds  Lunch: salad kit with lean meat and fruit  Dinner: broccoli with butter and small serving spaghetti with sauce - no more than 50 gm carbohydrates.   Snacks: few tortillas OR string cheese and almonds OR sliver of  cheesecake  Beverages: Alcohol low-calorie beer 0-1x/day, Lactose-free milk 1/2 cup/day, Pop (Diet) 0-1x/day, and Water all /day    Misses meals? none  Eats out:  1-2 meals/per week     Previous diet education:  Yes     Food allergies/intolerances: none    Diet is high in: fiber and vegetables  Diet is low in: calcium    EXERCISE: Exercising 3 times a week on Monday, Tuesday, Thursday (uses weights, band, and ball). Will do a lot of walking. Still goes out to buy her own groceries.     SOCIO/ECONOMIC:   Lives with: spouse    BLOOD GLUCOSE MONITORING:  Patient glucose self monitoring as follows: one time daily.   BG meter: unknown meter  BG results: per patient, fasting blood glucose ranges  mg/dL     BG values are: In goal  Patient's most recent   Lab Results   Component Value Date    A1C 5.5 12/11/2023    A1C 5.6 10/14/2020    is meeting goal of <7.0    MEDICATIONS:  Current Outpatient Medications   Medication    alcohol swab prep pads    aspirin (ASA) 81 MG EC tablet    atorvastatin (LIPITOR) 40 MG tablet    blood glucose (NO BRAND SPECIFIED) test strip    blood glucose calibration (NO BRAND SPECIFIED) solution    blood glucose monitoring (NO BRAND SPECIFIED) meter device kit    Calcium-Vitamin D (CALCIUM + D PO)    Dextromethorphan-Guaifenesin  MG TB12    diphenhydrAMINE-acetaminophen (TYLENOL PM)  MG tablet    FISH OIL    MAGNESIUM OXIDE PO    metoprolol succinate ER (TOPROL XL) 25 MG 24 hr tablet    Multiple Vitamins-Minerals (MULTIVITAMIN & MINERAL PO)    omeprazole (PRILOSEC) 20 MG DR capsule    Solifenacin Succinate (VESICARE PO)    thin (NO BRAND SPECIFIED) lancets     Current Facility-Administered Medications   Medication    lidocaine (PF) (XYLOCAINE) 1 % injection 4 mL    methylPREDNISolone (DEPO-Medrol) injection 80 mg    ropivacaine (NAROPIN) injection 0.5 mL    triamcinolone (KENALOG-40) injection 40 mg       LABS:  Lab Results   Component Value Date    A1C 5.5 12/11/2023    A1C 5.6  "10/14/2020     Lab Results   Component Value Date     08/01/2023     06/01/2023     04/28/2023     01/11/2021     Lab Results   Component Value Date    LDL 44 04/28/2023    LDL 39 10/14/2020     HDL Cholesterol   Date Value Ref Range Status   10/14/2020 47 (L) >49 mg/dL Final     Direct Measure HDL   Date Value Ref Range Status   04/28/2023 41 (L) >=50 mg/dL Final   ]  GFR Estimate   Date Value Ref Range Status   08/01/2023 >90 >60 mL/min/1.73m2 Final   01/11/2021 75 >60 mL/min/[1.73_m2] Final     Comment:     Non  GFR Calc  Starting 12/18/2018, serum creatinine based estimated GFR (eGFR) will be   calculated using the Chronic Kidney Disease Epidemiology Collaboration   (CKD-EPI) equation.       Lab Results   Component Value Date    CR 0.68 08/01/2023    CR 0.80 01/11/2021     No results found for: \"MICROALBUMIN\"    ANTHROPOMETRICS:  Vitals: LMP  (LMP Unknown)   Estimated body mass index is 24.72 kg/m  as calculated from the following:    Height as of 1/12/24: 1.562 m (5' 1.5\").    Weight as of 1/12/24: 60.3 kg (133 lb).       Wt Readings from Last 5 Encounters:   01/12/24 60.3 kg (133 lb)   12/11/23 60 kg (132 lb 3.2 oz)   11/19/23 60.7 kg (133 lb 14.4 oz)   10/26/23 60.9 kg (134 lb 3.2 oz)   09/21/23 61.1 kg (134 lb 12.8 oz)       Weight Change: unable to assess- no new weight but per clinic weight, is down 8 lbs from June- January.    ESTIMATED KCAL REQUIREMENTS:  1266 kcal per day (Based on last clinic weight from 1/12/24)    NUTRITION DIAGNOSIS: Impaired nutrient utilization related to diabetes as evidenced by improved blood glucose with food changes to increase fiber, reduce added sugar, and spread out carbohydrates.    NUTRITION INTERVENTION:  Nutrition Prescription: Carbohydrate Intake: 30-50 grams/meal and 15-30 grams/snacks  Education given to support: general nutrition guidelines, consistent meals, free foods, carb counting, exercise, fiber, behavior " modification, portion control, and heart healthy diet  Motivational Interviewing    Discussion: Commended Isabel on her food changes to add more fiber and vegetables, limit added sugar, eat more fish/seafood, and try to limit carbohydrate intake to 50 gm at meals and spread throughout the day. Happy to see her blood glucose has improved and after stopping Metformin, A1C stayed in a normal range with lifestyle changes. Isabel also continues to stay active with walking and doing resistance training 3 days a week. Encouraged Isabel to keep up the good work!    Isabel reports good fasting blood glucose control and did answer questions of possibility of blood glucose worsening over time if insulin production continues to decline. Reviewed importance of trying to make food fit but also health benefits of eating whole foods over processed foods to enhance fiber, vitamin, and mineral intake. Answered her question on supplements as well and if getting variety of food, may not need supplements other than Calcium if not getting 3 servings of dairy a day, and Vitamin D, as best to obtain nutrients from food. Pt verbalized understanding of concepts discussed and recommendations provided.       PATIENT'S BEHAVIOR CHANGE GOALS:   See Patient Instructions for patient stated behavior change goals. AVS was sent by [employer].    MONITOR / EVALUATE:  RD will monitor/evaluate:  Blood Glucose / A1c  Food / Beverage / Nutrient intake   Pertinent Labs  Progress toward meeting stated nutrition-related goals    FOLLOW-UP:  Follow up with RD as needed.  Call RD with questions/concerns. Isabel feels she is on the right track and has the information she needs. Will ask provider for another referral if additional follow up desired in the summer.    Alexandra Mckenna RDN, LD, [facility]   Time spent in minutes: 26 minutes   Encounter: Individual telephone visit

## 2024-02-06 ENCOUNTER — MEDICAL CORRESPONDENCE (OUTPATIENT)
Dept: HEALTH INFORMATION MANAGEMENT | Facility: CLINIC | Age: 74
End: 2024-02-06
Payer: COMMERCIAL

## 2024-02-06 ENCOUNTER — DOCUMENTATION ONLY (OUTPATIENT)
Dept: FAMILY MEDICINE | Facility: CLINIC | Age: 74
End: 2024-02-06
Payer: COMMERCIAL

## 2024-02-06 DIAGNOSIS — E11.9 TYPE 2 DIABETES MELLITUS WITHOUT COMPLICATION, WITHOUT LONG-TERM CURRENT USE OF INSULIN (H): ICD-10-CM

## 2024-02-06 DIAGNOSIS — E78.5 HYPERLIPIDEMIA LDL GOAL <100: ICD-10-CM

## 2024-02-06 DIAGNOSIS — I25.119 CORONARY ARTERY DISEASE INVOLVING NATIVE HEART WITH ANGINA PECTORIS, UNSPECIFIED VESSEL OR LESION TYPE (H): Primary | ICD-10-CM

## 2024-02-06 DIAGNOSIS — E11.9 TYPE 2 DIABETES MELLITUS WITHOUT COMPLICATION, WITHOUT LONG-TERM CURRENT USE OF INSULIN (H): Primary | ICD-10-CM

## 2024-02-06 NOTE — PROGRESS NOTES
Isabel is due for a Microalbumin but the HMPO is signed by Alisia Vizcarra. Please sign for a future collection.

## 2024-02-07 ENCOUNTER — LAB (OUTPATIENT)
Dept: LAB | Facility: CLINIC | Age: 74
End: 2024-02-07
Payer: COMMERCIAL

## 2024-02-07 DIAGNOSIS — I25.119 CORONARY ARTERY DISEASE INVOLVING NATIVE HEART WITH ANGINA PECTORIS, UNSPECIFIED VESSEL OR LESION TYPE (H): ICD-10-CM

## 2024-02-07 DIAGNOSIS — E78.5 HYPERLIPIDEMIA LDL GOAL <100: ICD-10-CM

## 2024-02-07 DIAGNOSIS — E11.9 TYPE 2 DIABETES MELLITUS WITHOUT COMPLICATION, WITHOUT LONG-TERM CURRENT USE OF INSULIN (H): ICD-10-CM

## 2024-02-07 LAB
CREAT UR-MCNC: 71.9 MG/DL
HBA1C MFR BLD: 5.7 % (ref 0–5.6)
HOLD SPECIMEN: NORMAL
MICROALBUMIN UR-MCNC: <12 MG/L
MICROALBUMIN/CREAT UR: NORMAL MG/G{CREAT}

## 2024-02-07 PROCEDURE — 83036 HEMOGLOBIN GLYCOSYLATED A1C: CPT

## 2024-02-07 PROCEDURE — 36415 COLL VENOUS BLD VENIPUNCTURE: CPT

## 2024-02-07 PROCEDURE — 82570 ASSAY OF URINE CREATININE: CPT

## 2024-02-07 PROCEDURE — 82043 UR ALBUMIN QUANTITATIVE: CPT

## 2024-02-07 PROCEDURE — 80061 LIPID PANEL: CPT | Performed by: INTERNAL MEDICINE

## 2024-02-07 PROCEDURE — 80048 BASIC METABOLIC PNL TOTAL CA: CPT

## 2024-02-08 LAB
ANION GAP SERPL CALCULATED.3IONS-SCNC: 8 MMOL/L (ref 7–15)
BUN SERPL-MCNC: 12.1 MG/DL (ref 8–23)
CALCIUM SERPL-MCNC: 9.6 MG/DL (ref 8.8–10.2)
CHLORIDE SERPL-SCNC: 100 MMOL/L (ref 98–107)
CHOLEST SERPL-MCNC: 127 MG/DL
CREAT SERPL-MCNC: 0.68 MG/DL (ref 0.51–0.95)
DEPRECATED HCO3 PLAS-SCNC: 29 MMOL/L (ref 22–29)
EGFRCR SERPLBLD CKD-EPI 2021: >90 ML/MIN/1.73M2
FASTING STATUS PATIENT QL REPORTED: YES
GLUCOSE SERPL-MCNC: 102 MG/DL (ref 70–99)
HDLC SERPL-MCNC: 49 MG/DL
LDLC SERPL CALC-MCNC: 53 MG/DL
NONHDLC SERPL-MCNC: 78 MG/DL
POTASSIUM SERPL-SCNC: 4.2 MMOL/L (ref 3.4–5.3)
SODIUM SERPL-SCNC: 137 MMOL/L (ref 135–145)
TRIGL SERPL-MCNC: 124 MG/DL

## 2024-03-03 DIAGNOSIS — E11.9 TYPE 2 DIABETES MELLITUS WITHOUT COMPLICATION, WITHOUT LONG-TERM CURRENT USE OF INSULIN (H): ICD-10-CM

## 2024-03-26 ENCOUNTER — OFFICE VISIT (OUTPATIENT)
Dept: OPTOMETRY | Facility: CLINIC | Age: 74
End: 2024-03-26
Payer: COMMERCIAL

## 2024-03-26 DIAGNOSIS — Z98.890 HISTORY OF EYELID SURGERY: ICD-10-CM

## 2024-03-26 DIAGNOSIS — H10.13 ALLERGIC CONJUNCTIVITIS OF BOTH EYES: ICD-10-CM

## 2024-03-26 DIAGNOSIS — H52.13 MYOPIA OF BOTH EYES: ICD-10-CM

## 2024-03-26 DIAGNOSIS — H52.4 PRESBYOPIA: ICD-10-CM

## 2024-03-26 DIAGNOSIS — H31.002 CHORIORETINAL SCAR OF LEFT EYE: ICD-10-CM

## 2024-03-26 DIAGNOSIS — Z96.1 PSEUDOPHAKIA OF BOTH EYES: ICD-10-CM

## 2024-03-26 DIAGNOSIS — E11.9 TYPE 2 DIABETES MELLITUS WITHOUT COMPLICATION, WITHOUT LONG-TERM CURRENT USE OF INSULIN (H): ICD-10-CM

## 2024-03-26 DIAGNOSIS — Z01.00 EXAMINATION OF EYES AND VISION: Primary | ICD-10-CM

## 2024-03-26 DIAGNOSIS — H52.222 REGULAR ASTIGMATISM OF LEFT EYE: ICD-10-CM

## 2024-03-26 PROCEDURE — 92015 DETERMINE REFRACTIVE STATE: CPT | Performed by: OPTOMETRIST

## 2024-03-26 PROCEDURE — 92014 COMPRE OPH EXAM EST PT 1/>: CPT | Performed by: OPTOMETRIST

## 2024-03-26 ASSESSMENT — VISUAL ACUITY
OD_CC+: -1
CORRECTION_TYPE: GLASSES
OD_CC: 20/25
OS_CC: 20/30
METHOD: SNELLEN - LINEAR
OS_SC: 20/25
OS_CC: 20/20
OD_SC: 20/30
OD_CC: 20/20
OD_SC+: -1

## 2024-03-26 ASSESSMENT — SLIT LAMP EXAM - LIDS
COMMENTS: NORMAL
COMMENTS: NORMAL

## 2024-03-26 ASSESSMENT — CONF VISUAL FIELD
OD_INFERIOR_NASAL_RESTRICTION: 0
OS_SUPERIOR_TEMPORAL_RESTRICTION: 0
OS_SUPERIOR_NASAL_RESTRICTION: 0
OD_SUPERIOR_NASAL_RESTRICTION: 0
OS_INFERIOR_TEMPORAL_RESTRICTION: 0
OS_INFERIOR_NASAL_RESTRICTION: 0
OS_NORMAL: 1
OD_SUPERIOR_TEMPORAL_RESTRICTION: 0
OD_NORMAL: 1
OD_INFERIOR_TEMPORAL_RESTRICTION: 0

## 2024-03-26 ASSESSMENT — CUP TO DISC RATIO
OS_RATIO: 0.3
OD_RATIO: 0.3

## 2024-03-26 ASSESSMENT — REFRACTION_WEARINGRX
OS_SPHERE: -1.00
OD_SPHERE: -0.50
SPECS_TYPE: PAL
OD_ADD: +3.00
OS_ADD: +3.00
OS_CYLINDER: +0.50
OD_CYLINDER: SPHERE
OS_AXIS: 075

## 2024-03-26 ASSESSMENT — REFRACTION_MANIFEST
OD_ADD: +3.00
OS_ADD: +3.00
OD_SPHERE: -0.50
METHOD_AUTOREFRACTION: 1
OS_SPHERE: -1.00
OS_CYLINDER: +0.50
OS_AXIS: 075
OD_CYLINDER: SPHERE

## 2024-03-26 ASSESSMENT — TONOMETRY
OD_IOP_MMHG: 18
IOP_METHOD: TONOPEN
OS_IOP_MMHG: 17

## 2024-03-26 ASSESSMENT — KERATOMETRY
OD_AXISANGLE2_DEGREES: 178
OS_AXISANGLE_DEGREES: 078
OS_K1POWER_DIOPTERS: 42.25
OD_K1POWER_DIOPTERS: 42.25
OS_K2POWER_DIOPTERS: 45.00
OD_AXISANGLE_DEGREES: 088
OD_K2POWER_DIOPTERS: 44.25
OS_AXISANGLE2_DEGREES: 168

## 2024-03-26 ASSESSMENT — EXTERNAL EXAM - RIGHT EYE: OD_EXAM: NORMAL

## 2024-03-26 ASSESSMENT — EXTERNAL EXAM - LEFT EYE: OS_EXAM: NORMAL

## 2024-03-26 NOTE — LETTER
3/26/2024         RE: Isabel Carroll  [address] [facility] [address]        Dear Colleague,    Thank you for referring your patient, Isabel Carroll, to the [facility]. Please see a copy of my visit note below.    Chief Complaint   Patient presents with     Diabetic Eye Exam        Chief Complaint(s) and History of Present Illness(es)       Diabetic Eye Exam              Vision: is stable    Diabetes Type: Type 2 and controlled with diet    Duration: 1 year    Blood Sugars: is controlled                   Lab Results   Component Value Date    A1C 5.7 02/07/2024    A1C 5.5 12/11/2023    A1C 5.6 10/26/2023    A1C 5.7 08/01/2023    A1C 6.0 04/28/2023    A1C 5.6 10/14/2020    A1C 5.6 11/08/2019    A1C 5.8 10/17/2017    A1C 5.5 10/14/2016    A1C 5.8 05/22/2016       Last Eye Exam: 3-2-2023  Dilated Previously: Yes    What are you currently using to see?  glasses    Distance Vision Acuity: Satisfied with vision    Near Vision Acuity: Satisfied with vision while reading  with glasses    Eye Comfort: itchy  Do you use eye drops? : Yes: systane  Occupation or Hobbies: retired school counselor    6/1/2023  Repair ptosis bilateral (Bilateral) with Dr. Eldon Prather    Cataract surgery both eyes   Right eye-3/7/2019  Left eye-2/21/2019 with MD Razia Haque Optometric Assistant, A.B.O.C.     Medical, surgical and family histories reviewed and updated 3/26/2024.       OBJECTIVE: See Ophthalmology exam    ASSESSMENT:    ICD-10-CM    1. Examination of eyes and vision  Z01.00 EYE EXAM (SIMPLE-NONBILLABLE)      2. Type 2 diabetes mellitus without complication, without long-term current use of insulin (H)  E11.9 EYE EXAM (SIMPLE-NONBILLABLE)    Negative diabetic retinopathy both eyes      3. Pseudophakia of both eyes  Z96.1 EYE EXAM (SIMPLE-NONBILLABLE)      4. Chorioretinal scar of left eye  H31.002 EYE EXAM (SIMPLE-NONBILLABLE)      5. History of  eyelid surgery  Z98.890 EYE EXAM (SIMPLE-NONBILLABLE)      6. Allergic conjunctivitis of both eyes  H10.13 EYE EXAM (SIMPLE-NONBILLABLE)      7. Myopia of both eyes  H52.13 REFRACTION      8. Regular astigmatism of left eye  H52.222 REFRACTION      9. Presbyopia  H52.4 REFRACTION          PLAN:    Isabel Carroll aware  eye exam results will be sent to [place]  Patient Instructions   There are not any signs of the diabetes affecting the eyes today.  It is important that you get your eyes dilated once yearly and keep good control of your diabetes.    OTC Pataday or Lastacaft to be used once daily for itchy eyes.  Use as needed.     Artificial tears- 1 drop both eyes once before bedtime and once in the morning then 2 x day as needed.      Eyeglass prescription given.  No change in eyeglass prescription.    Return in 1 year for a complete eye exam or sooner if needed.    Mj Frederick, TIP               Again, thank you for allowing me to participate in the care of your patient.        Sincerely,        Mj Frederick, OD

## 2024-03-26 NOTE — PATIENT INSTRUCTIONS
There are not any signs of the diabetes affecting the eyes today.  It is important that you get your eyes dilated once yearly and keep good control of your diabetes.    OTC Pataday or Lastacaft to be used once daily for itchy eyes.  Use as needed.     Artificial tears- 1 drop both eyes once before bedtime and once in the morning then 2 x day as needed.      Eyeglass prescription given.  No change in eyeglass prescription.    Return in 1 year for a complete eye exam or sooner if needed.    Mj Frederick, OD    The affects of the dilating drops last for 4- 6 hours.  You will be more sensitive to light and vision will be blurry up close.  Do not drive if you do not feel comfortable.  Mydriatic sunglasses were given if needed.    Patient Education   Diabetes weakens the blood vessels all over the body, including the eyes. Damage to the blood vessels in the eyes can cause swelling or bleeding into part of the eye (called the retina). This is called diabetic retinopathy ([facility]-tin--pu-the). If not treated, this disease can cause vision loss or blindness.   Symptoms may include blurred or distorted vision, but many people have no symptoms. It's important to see your eye doctor regularly to check for problems.   Early treatment and good control can help protect your vision. Here are the things you can do to help prevent vision loss:      1. Keep your blood sugar levels under tight control.      2. Bring high blood pressure under control.      3. No smoking.      4. Have yearly dilated eye exams.       Optometry Providers       Clinic Locations                                 Telephone Number   Dr. Sruthi Renee   [facility]  [place] [phone]     [facility] Hours:                [facility] Hours:       [facility] Hours:   [address]   [address]     [address] [place] [address]   [place] [address]    [place] [address]  Phone: [phone]                    Phone: [phone]     Phone: [phone]                      Monday 8:00-6:00                          Monday 8:00-6:00                          Monday 8:00-6:00              Tuesday 8:00-6:00                          Tuesday 8:00-6:00                          Tuesday 8:00-6:00              Wednesday 8:00-6:00                  Wednesday 8:00-6:00                   Wednesday 8:00-6:00      Thursday 8:00-6:00                        Thursday 8:00-6:00                         Thursday 8:00-6:00            Friday 8:00-5:00                              Friday 8:00-5:00                              Friday 8:00-5:00    [facility] Hours:   [address] [place] [address]  [phone]    Monday 9:00-6:00  Tuesday 9:00-6:00  Wednesday 9:00-6:00  Thursday 9:00-6:00  Friday 9:00-5:00  As always, Thank you for trusting us with your health care needs!

## 2024-03-26 NOTE — PROGRESS NOTES
Chief Complaint   Patient presents with    Diabetic Eye Exam        Chief Complaint(s) and History of Present Illness(es)       Diabetic Eye Exam              Vision: is stable    Diabetes Type: Type 2 and controlled with diet    Duration: 1 year    Blood Sugars: is controlled                   Lab Results   Component Value Date    A1C 5.7 02/07/2024    A1C 5.5 12/11/2023    A1C 5.6 10/26/2023    A1C 5.7 08/01/2023    A1C 6.0 04/28/2023    A1C 5.6 10/14/2020    A1C 5.6 11/08/2019    A1C 5.8 10/17/2017    A1C 5.5 10/14/2016    A1C 5.8 05/22/2016       Last Eye Exam: 3-2-2023  Dilated Previously: Yes    What are you currently using to see?  glasses    Distance Vision Acuity: Satisfied with vision    Near Vision Acuity: Satisfied with vision while reading  with glasses    Eye Comfort: itchy  Do you use eye drops? : Yes: systane  Occupation or Hobbies: retired school counselor    6/1/2023  Repair ptosis bilateral (Bilateral) with Dr. Eldon Prather    Cataract surgery both eyes   Right eye-3/7/2019  Left eye-2/21/2019 with MD Razia Haque Optometric Assistant, A.B.O.C.     Medical, surgical and family histories reviewed and updated 3/26/2024.       OBJECTIVE: See Ophthalmology exam    ASSESSMENT:    ICD-10-CM    1. Examination of eyes and vision  Z01.00 EYE EXAM (SIMPLE-NONBILLABLE)      2. Type 2 diabetes mellitus without complication, without long-term current use of insulin (H)  E11.9 EYE EXAM (SIMPLE-NONBILLABLE)    Negative diabetic retinopathy both eyes      3. Pseudophakia of both eyes  Z96.1 EYE EXAM (SIMPLE-NONBILLABLE)      4. Chorioretinal scar of left eye  H31.002 EYE EXAM (SIMPLE-NONBILLABLE)      5. History of eyelid surgery  Z98.890 EYE EXAM (SIMPLE-NONBILLABLE)      6. Allergic conjunctivitis of both eyes  H10.13 EYE EXAM (SIMPLE-NONBILLABLE)      7. Myopia of both eyes  H52.13 REFRACTION      8. Regular astigmatism of left eye  H52.222 REFRACTION      9. Presbyopia  H52.4  REFRACTION          PLAN:    Isabel Carroll aware  eye exam results will be sent to [place]  Patient Instructions   There are not any signs of the diabetes affecting the eyes today.  It is important that you get your eyes dilated once yearly and keep good control of your diabetes.    OTC Pataday or Lastacaft to be used once daily for itchy eyes.  Use as needed.     Artificial tears- 1 drop both eyes once before bedtime and once in the morning then 2 x day as needed.      Eyeglass prescription given.  No change in eyeglass prescription.    Return in 1 year for a complete eye exam or sooner if needed.    Mj Frederick, OD

## 2024-03-27 NOTE — PROGRESS NOTES
History of Present Illness - Isabel Carroll is a 73 year old female here in follow up from 8/30/2021, seen for chronic cough.    To review, this has been a very longstanding problem, over 20 years.  She has slowly developed a persistent dry tickling cough.  It can happen any time and has no pattern.  She has no chest pain, no heartburn, no pain.  No issues with swallowing or eating.  No coughing up blood.  No previous ENT surgery or head and neck disease no post nasal drainage.    She has had work up from [facility], including barium swallow, pH Probe, Allergy testing, work up from Pulmonary Medicine, and everything has been negative.    After the visit, and evaluation, I felt that this was most likely a neurogenic cough.  I started her on a Elavil titration with a goal of 10mg three times daily, and at follow up she noticed a major improvement, at least 50% better.     At the last follow up in 2021 she was still taking the Elavil, 30mg at bedtime and 20mg qAM.  She still has a very minor cough, but overall it is still better than it was before.  It is quite intermittent at this point, and she was lost to follow up until now.    She was doing well for years but then about the last 6 months it has gotten worse again.  She stopped the Elavil over 2 years ago.  She was tried on Baclofen, but it made her too drowsy.  There were no changes before things started getting worse.  She was diagnosed with DM2 in January 2023 and has changed her diet since then and has lost weight.  No increase in reflux symptoms, no major illness or URI before this started.    Past Medical History -   Patient Active Problem List   Diagnosis    Chronic rhinitis    Primary osteoarthritis of both hands    Basal cell carcinoma of skin    Coronary artery disease involving native heart with angina pectoris, unspecified vessel or lesion type (H24)    Hyperlipidemia LDL goal <100    Impaired fasting glucose    Chorioretinal scar of left eye    Sinus  tachycardia    Advance care planning    Pes planus    Chronic cough    Female pattern hair loss    FHx: colon cancer    History of Clostridioides difficile infection    Pseudophakia of both eyes    Primary osteoarthritis of both knees    Vocal cord dysfunction    Personal history of malignant neoplasm of bladder    Diabetes mellitus, type 2 (H)    Ptosis of both eyelids    Dermatochalasis of eyelid    History of eyelid surgery       Current Medications -   Current Outpatient Medications:     alcohol swab prep pads, Use to swab area of injection/lise as directed., Disp: 100 each, Rfl: 3    aspirin (ASA) 81 MG EC tablet, Take 1 tablet (81 mg) by mouth daily, Disp: 100 tablet, Rfl: 3    atorvastatin (LIPITOR) 40 MG tablet, Take 1 tablet (40 mg) by mouth daily, Disp: 90 tablet, Rfl: 3    blood glucose (NO BRAND SPECIFIED) test strip, Use to test blood sugar 1 time daily or as directed. To accompany: Blood Glucose Monitor Brands: per insurance., Disp: 100 strip, Rfl: 6    blood glucose calibration (NO BRAND SPECIFIED) solution, To accompany: Blood Glucose Monitor Brands: per insurance., Disp: 1 each, Rfl: 11    blood glucose monitoring (NO BRAND SPECIFIED) meter device kit, Use to test blood sugar 1 time daily or as directed. Preferred blood glucose meter OR supplies to accompany: Blood Glucose Monitor Brands: per insurance., Disp: 1 kit, Rfl: 0    Calcium-Vitamin D (CALCIUM + D PO), Take  by mouth., Disp: , Rfl:     diphenhydrAMINE-acetaminophen (TYLENOL PM)  MG tablet, Take 1 tablet by mouth nightly as needed, Disp: , Rfl:     FISH OIL, , Disp: , Rfl:     MAGNESIUM OXIDE PO, Take 200 mg by mouth daily, Disp: , Rfl:     metoprolol succinate ER (TOPROL XL) 25 MG 24 hr tablet, Take 1 tablet (25 mg) by mouth 2 times daily, Disp: 180 tablet, Rfl: 3    Multiple Vitamins-Minerals (MULTIVITAMIN & MINERAL PO), Take  by mouth., Disp: , Rfl:     Solifenacin Succinate (VESICARE PO), , Disp: , Rfl:     thin (NO BRAND  SPECIFIED) lancets, Use with lanceting device. To accompany: Blood Glucose Monitor Brands: per insurance., Disp: 100 each, Rfl: 6    Current Facility-Administered Medications:     lidocaine (PF) (XYLOCAINE) 1 % injection 4 mL, 4 mL, , , Oni Smallwood MD, 4 mL at 23 1515    methylPREDNISolone (DEPO-Medrol) injection 80 mg, 80 mg, , , Oni Smallwood MD, 80 mg at 23 1515    ropivacaine (NAROPIN) injection 0.5 mL, 0.5 mL, , , Gilbert Loving DO, 0.5 mL at 22 1141    triamcinolone (KENALOG-40) injection 40 mg, 40 mg, , , Gilbert Loving DO, 40 mg at 22 1141    Allergies -   Allergies   Allergen Reactions    Sulfa Antibiotics Nausea    Codeine Phosphate Nausea and Vomiting    Pentazocine     Pentazocine-Naloxone Hcl     Propoxyphene      Darvon    Penicillins Rash     rash and itching         Social History -   Social History     Socioeconomic History    Marital status:      Spouse name: Darrian    Number of children: 2    Years of education: 17.5    Highest education level: Not on file   Occupational History    Occupation: Retired     Comment: School counselor, 2015   Social Needs    Financial resource strain: Not on file    Food insecurity:     Worry: Not on file     Inability: Not on file    Transportation needs:     Medical: Not on file     Non-medical: Not on file   Tobacco Use    Smoking status: Former Smoker     Years: 16.00     Types: Cigarettes     Last attempt to quit: 1982     Years since quittin.7    Smokeless tobacco: Never Used   Substance and Sexual Activity    Alcohol use: Yes     Alcohol/week: 0.0 oz     Comment: 2-7 glasses of wine per week    Drug use: No    Sexual activity: Never     Partners: Male     Birth control/protection: Post-menopausal   Lifestyle    Physical activity:     Days per week: Not on file     Minutes per session: Not on file    Stress: Not on file   Relationships    Social connections:     Talks on phone:  Not on file     Gets together: Not on file     Attends Alevism service: Not on file     Active member of club or organization: Not on file     Attends meetings of clubs or organizations: Not on file     Relationship status: Not on file    Intimate partner violence:     Fear of current or ex partner: Not on file     Emotionally abused: Not on file     Physically abused: Not on file     Forced sexual activity: Not on file   Other Topics Concern    Parent/sibling w/ CABG, MI or angioplasty before 65F 55M? Not Asked     Service Not Asked    Blood Transfusions Not Asked    Caffeine Concern Not Asked    Occupational Exposure Not Asked    Hobby Hazards Not Asked    Sleep Concern Not Asked    Stress Concern Not Asked    Weight Concern Not Asked    Special Diet Not Asked    Back Care Not Asked    Exercise Yes     Comment: jazzercise    Bike Helmet Not Asked    Seat Belt Not Asked    Self-Exams Not Asked   Social History Narrative    Not on file       Family History -   Family History   Problem Relation Age of Onset    Other Cancer Sister 51        ovarian cancer    Cancer Sister     Thyroid Disease Sister     Colon Cancer Father 76    Brain Hemorrhage Father         accident    Substance Abuse Father         Alcohol    Macular Degeneration Mother     Anxiety Disorder Mother     Osteoporosis Mother     Diabetes Maternal Aunt     Diabetes Maternal Uncle     Cancer Paternal Aunt     Diabetes Maternal Grandmother         Diabetes diagnosis January 2023    Coronary Artery Disease Cousin     Hyperlipidemia Cousin     Anesthesia Reaction Other     Asthma Other     Asthma Niece     Asthma Nephew     Asthma Brother     Hypertension No family hx of     Cerebrovascular Disease No family hx of     Glaucoma No family hx of        Review of Systems - As per HPI and PMHx, otherwise 10+ system review of the head and neck, and general constitution is negative.    Physical Exam    /74   Pulse 69   Resp 16   Ht 1.562 m (5'  "1.5\")   Wt 61.7 kg (136 lb)   LMP  (LMP Unknown)   SpO2 100%   BMI 25.28 kg/m      General - The patient is well nourished and well developed, and appears to have good nutritional status.  Alert and oriented to person and place, answers questions and cooperates with examination appropriately.   Head and Face - Normocephalic and atraumatic, with no gross asymmetry noted of the contour of the facial features.  The facial nerve is intact, with strong symmetric movements.  Voice and Breathing - The patient was breathing comfortably without the use of accessory muscles. There was no wheezing, stridor, or stertor.  The patients voice was clear and strong, and had appropriate pitch and quality.  Ears - The tympanic membranes are normal in appearance, bony landmarks are intact.  No retraction, perforation, or masses.  No fluid or purulence was seen in the external canal or the middle ear. No evidence of infection of the middle ear or external canal, cerumen was normal in appearance.  Eyes - Extraocular movements intact, and the pupils were reactive to light.  Sclera were not icteric or injected, conjunctiva were pink and moist.  Mouth - Examination of the oral cavity showed pink, healthy oral mucosa. No lesions or ulcerations noted.  The tongue was mobile and midline, and the dentition were in good condition.    Throat - The walls of the oropharynx were smooth, pink, moist, symmetric, and had no lesions or ulcerations.  The tonsillar pillars and soft palate were symmetric.  The uvula was midline on elevation.    Neck - Normal midline excursion of the laryngotracheal complex during swallowing.  Full range of motion on passive movement.  Palpation of the occipital, submental, submandibular, internal jugular chain, and supraclavicular nodes did not demonstrate any abnormal lymph nodes or masses.  The carotid pulse was palpable bilaterally.  Palpation of the thyroid was soft and smooth, with no nodules or goiter " appreciated.  The trachea was mobile and midline.  Nose - External contour is symmetric, no gross deflection or scars.  Nasal mucosa is pink and moist with no abnormal mucus.  The septum was midline and non-obstructive, turbinates of normal size and position.  No polyps, masses, or purulence noted on examination.      A/P - Isabel Carroll is a 73 year old female  (J38.3) Vocal cord dysfunction  (primary encounter diagnosis)  (R05) Chronic cough     I will restart the Elavil titration up to a goal fo 10mg three times daily.  And once that improves the cough, stay on it for at least 3 months and then start slowly going off of it.    I have also sent in prednisone and a Pulmicort inhaler for the next time she has a URI so that the larynx isn't triggered again.

## 2024-03-29 ENCOUNTER — NURSE TRIAGE (OUTPATIENT)
Dept: FAMILY MEDICINE | Facility: CLINIC | Age: 74
End: 2024-03-29
Payer: COMMERCIAL

## 2024-03-29 NOTE — TELEPHONE ENCOUNTER
Reason for Disposition   MILD pain (e.g., does not interfere with normal activities) and pain comes and goes (cramps) lasts > 48 hours  (Exception: This same abdominal pain is a chronic symptom recurrent or ongoing AND present > 4 weeks.)    Additional Information   Negative: Passed out (i.e., fainted, collapsed and was not responding)   Negative: Shock suspected (e.g., cold/pale/clammy skin, too weak to stand, low BP, rapid pulse)   Negative: Sounds like a life-threatening emergency to the triager   Negative: Followed an abdomen (stomach) injury   Negative: Chest pain   Negative: Abdominal pain and pregnant < 20 weeks   Negative: Abdominal pain and pregnant 20 or more weeks   Negative: Pain is mainly in upper abdomen (if needed ask: 'is it mainly above the belly button?')   Negative: Abdomen bloating or swelling are main symptoms   Negative: SEVERE abdominal pain (e.g., excruciating)   Negative: Vomiting red blood or black (coffee ground) material   Negative: Blood in bowel movements  (Exception: Blood on surface of BM with constipation.)   Negative: Black or tarry bowel movements  (Exception: Chronic-unchanged black-grey BMs AND is taking iron pills or Pepto-Bismol.)   Negative: MILD TO MODERATE constant pain lasting > 2 hours   Negative: Vomiting bile (green color)   Negative: Patient sounds very sick or weak to the triager   Negative: Vomiting and abdomen looks much more swollen than usual   Negative: White of the eyes have turned yellow (i.e., jaundice)   Negative: Blood in urine (red, pink, or tea-colored)   Negative: Fever > 103 F (39.4 C)   Negative: Fever > 101 F (38.3 C) and over 60 years of age   Negative: Fever > 100.0 F (37.8 C) and has diabetes mellitus or a weak immune system (e.g., HIV positive, cancer chemotherapy, organ transplant, splenectomy, chronic steroids)   Negative: Fever > 100.0 F (37.8 C) and bedridden (e.g., CVA, chronic illness, recovering from surgery)   Negative: Pregnant or could be  "pregnant (i.e., missed last menstrual period)   Negative: MODERATE pain (e.g., interferes with normal activities that comes and goes (cramps) lasts > 24 hours  (Exception: Pain with Vomiting or Diarrhea - see that Protocol.)   Negative: Unusual vaginal discharge   Negative: Age > 60 years   Negative: Patient wants to be seen    Answer Assessment - Initial Assessment Questions  1. LOCATION: \"Where does it hurt?\"       RLQ pain   2. RADIATION: \"Does the pain shoot anywhere else?\" (e.g., chest, back)      To back  3. ONSET: \"When did the pain begin?\" (e.g., minutes, hours or days ago)       1 week ago. Got better on Sunday, but worse now. Constant  4. SUDDEN: \"Gradual or sudden onset?\"      gradual  5. PATTERN \"Does the pain come and go, or is it constant?\"     - If it comes and goes: \"How long does it last?\" \"Do you have pain now?\"      (Note: Comes and goes means the pain is intermittent. It goes away completely between bouts.)     - If constant: \"Is it getting better, staying the same, or getting worse?\"       (Note: Constant means the pain never goes away completely; most serious pain is constant and gets worse.)       Constant.   6. SEVERITY: \"How bad is the pain?\"  (e.g., Scale 1-10; mild, moderate, or severe)     - MILD (1-3): Doesn't interfere with normal activities, abdomen soft and not tender to touch.      - MODERATE (4-7): Interferes with normal activities or awakens from sleep, abdomen tender to touch.      - SEVERE (8-10): Excruciating pain, doubled over, unable to do any normal activities.        nagging  7. RECURRENT SYMPTOM: \"Have you ever had this type of stomach pain before?\" If Yes, ask: \"When was the last time?\" and \"What happened that time?\"       Yes, last time she had pain in this area she had CT and then was diagnosed with bladder cancer. Bladder scrapping - October 2020  8. CAUSE: \"What do you think is causing the stomach pain?\"      Not sure  9. RELIEVING/AGGRAVATING FACTORS: \"What makes it " "better or worse?\" (e.g., antacids, bending or twisting motion, bowel movement)      no  10. OTHER SYMPTOMS: \"Do you have any other symptoms?\" (e.g., back pain, diarrhea, fever, urination pain, vomiting)        Back pain, radiates to back.   11. PREGNANCY: \"Is there any chance you are pregnant?\" \"When was your last menstrual period?\"        no    Protocols used: Abdominal Pain - Female-A-[place]    "

## 2024-04-01 ENCOUNTER — OFFICE VISIT (OUTPATIENT)
Dept: FAMILY MEDICINE | Facility: CLINIC | Age: 74
End: 2024-04-01
Payer: COMMERCIAL

## 2024-04-01 VITALS
HEART RATE: 70 BPM | WEIGHT: 136.2 LBS | OXYGEN SATURATION: 99 % | DIASTOLIC BLOOD PRESSURE: 72 MMHG | RESPIRATION RATE: 16 BRPM | SYSTOLIC BLOOD PRESSURE: 112 MMHG | HEIGHT: 62 IN | BODY MASS INDEX: 25.06 KG/M2

## 2024-04-01 DIAGNOSIS — Z85.51 H/O PRIMARY MALIGNANT NEOPLASM OF URINARY BLADDER: ICD-10-CM

## 2024-04-01 DIAGNOSIS — I25.119 CORONARY ARTERY DISEASE INVOLVING NATIVE HEART WITH ANGINA PECTORIS, UNSPECIFIED VESSEL OR LESION TYPE (H): ICD-10-CM

## 2024-04-01 DIAGNOSIS — K59.01 SLOW TRANSIT CONSTIPATION: ICD-10-CM

## 2024-04-01 DIAGNOSIS — R10.31 RLQ ABDOMINAL PAIN: Primary | ICD-10-CM

## 2024-04-01 DIAGNOSIS — E11.9 TYPE 2 DIABETES MELLITUS WITHOUT COMPLICATION, WITHOUT LONG-TERM CURRENT USE OF INSULIN (H): ICD-10-CM

## 2024-04-01 LAB
ALBUMIN UR-MCNC: NEGATIVE MG/DL
APPEARANCE UR: CLEAR
BACTERIA #/AREA URNS HPF: ABNORMAL /HPF
BILIRUB UR QL STRIP: NEGATIVE
COLOR UR AUTO: YELLOW
ERYTHROCYTE [DISTWIDTH] IN BLOOD BY AUTOMATED COUNT: 11.6 % (ref 10–15)
GLUCOSE UR STRIP-MCNC: NEGATIVE MG/DL
HCT VFR BLD AUTO: 40.9 % (ref 35–47)
HGB BLD-MCNC: 13.9 G/DL (ref 11.7–15.7)
HGB UR QL STRIP: NEGATIVE
KETONES UR STRIP-MCNC: NEGATIVE MG/DL
LEUKOCYTE ESTERASE UR QL STRIP: ABNORMAL
MCH RBC QN AUTO: 32.2 PG (ref 26.5–33)
MCHC RBC AUTO-ENTMCNC: 34 G/DL (ref 31.5–36.5)
MCV RBC AUTO: 95 FL (ref 78–100)
NITRATE UR QL: NEGATIVE
PH UR STRIP: 7 [PH] (ref 5–7)
PLATELET # BLD AUTO: 225 10E3/UL (ref 150–450)
RBC # BLD AUTO: 4.32 10E6/UL (ref 3.8–5.2)
RBC #/AREA URNS AUTO: ABNORMAL /HPF
SP GR UR STRIP: 1.01 (ref 1–1.03)
UROBILINOGEN UR STRIP-ACNC: 0.2 E.U./DL
WBC # BLD AUTO: 4.8 10E3/UL (ref 4–11)
WBC #/AREA URNS AUTO: ABNORMAL /HPF

## 2024-04-01 PROCEDURE — 36415 COLL VENOUS BLD VENIPUNCTURE: CPT | Performed by: PHYSICIAN ASSISTANT

## 2024-04-01 PROCEDURE — 85027 COMPLETE CBC AUTOMATED: CPT | Performed by: PHYSICIAN ASSISTANT

## 2024-04-01 PROCEDURE — 99214 OFFICE O/P EST MOD 30 MIN: CPT | Performed by: PHYSICIAN ASSISTANT

## 2024-04-01 PROCEDURE — 87086 URINE CULTURE/COLONY COUNT: CPT | Performed by: PHYSICIAN ASSISTANT

## 2024-04-01 PROCEDURE — 87186 SC STD MICRODIL/AGAR DIL: CPT | Mod: 59 | Performed by: PHYSICIAN ASSISTANT

## 2024-04-01 PROCEDURE — 87088 URINE BACTERIA CULTURE: CPT | Performed by: PHYSICIAN ASSISTANT

## 2024-04-01 PROCEDURE — 81001 URINALYSIS AUTO W/SCOPE: CPT | Performed by: PHYSICIAN ASSISTANT

## 2024-04-01 PROCEDURE — 80053 COMPREHEN METABOLIC PANEL: CPT | Performed by: PHYSICIAN ASSISTANT

## 2024-04-01 ASSESSMENT — PAIN SCALES - GENERAL: PAINLEVEL: SEVERE PAIN (7)

## 2024-04-01 NOTE — PROGRESS NOTES
Assessment & Plan   Problem List Items Addressed This Visit          Nervous and Auditory    Coronary artery disease involving native heart with angina pectoris, unspecified vessel or lesion type (H24)       Endocrine    Diabetes mellitus, type 2 (H)     Other Visit Diagnoses       RLQ abdominal pain    -  Primary    Relevant Orders    UA with Microscopic - lab collect (Completed)    Urine Culture Aerobic Bacterial - lab collect    CBC with platelets (Completed)    CT Abdomen Pelvis w/o & w Contrast    Comprehensive metabolic panel (BMP + Alb, Alk Phos, ALT, AST, Total. Bili, TP)    Urine Microscopic Exam    H/O primary malignant neoplasm of urinary bladder        Relevant Orders    CT Abdomen Pelvis w/o & w Contrast    Slow transit constipation               Urinalysis is pending   If symptom are not related to UTI, patient will have CT done   Continue Miralax for constipation daily for 3-5 days       25 minutes spent by me on the date of the encounter doing chart review, history and exam, documentation and further activities per the note           Subjective   Isabel is a 73 year old, presenting for the following health issues:  Abdominal Pain        4/1/2024     1:09 PM   Additional Questions   Roomed by DM     History of Present Illness       Reason for visit:  Pain in right side of abdomen    She eats 4 or more servings of fruits and vegetables daily.She exercises with enough effort to increase her heart rate 20 to 29 minutes per day.    She is taking medications regularly.         Abdominal Pain    Duration: 10 days  Description (location/character/radiation): RLQ pain       Associated flank pain: no, has low back pain on the right   Intensity:  moderate  Accompanying signs and symptoms:        Fever/Chills: no        Gas/Bloating: YES       Nausea/vomitting: no        Diarrhea: no        Dysuria or Hematuria: no   History (previous similar pain/trauma/previous testing): patient had h/o bladder  "cancer  Precipitating or alleviating factors:       Pain worse with eating/BM/urination: yes with constipation, pain is worse       Pain relieved by BM: no   Therapies tried and outcome: patient take Miralax every other day due to chronic constipation  LMP:  not applicable    H/o of Bladder cancer. Patient sees [facility]. Patient had CT urogram in November 2023 and it was normal.       Review of Systems  Constitutional, HEENT, cardiovascular, pulmonary, gi and gu systems are negative, except as otherwise noted.      Objective    /72 (BP Location: Left arm, Patient Position: Sitting)   Pulse 70   Resp 16   Ht 1.562 m (5' 1.5\")   Wt 61.8 kg (136 lb 3.2 oz)   LMP  (LMP Unknown)   SpO2 99%   BMI 25.32 kg/m    Body mass index is 25.32 kg/m .  Physical Exam   GENERAL: alert and no distress  NECK: no adenopathy, no asymmetry, masses, or scars  RESP: lungs clear to auscultation - no rales, rhonchi or wheezes  CV: regular rate and rhythm, normal S1 S2, no S3 or S4, no murmur, click or rub, no peripheral edema  ABDOMEN: soft,  without hepatosplenomegaly or masses, tenderness suprapubic and RLQ, and bowel sounds normal  MS: no gross musculoskeletal defects noted, no edema  BACK: no CVA tenderness, no paralumbar tenderness  PSYCH: mentation appears normal, affect normal/bright    Results for orders placed or performed in visit on 04/01/24 (from the past 24 hour(s))   UA with Microscopic - lab collect   Result Value Ref Range    Color Urine Yellow Colorless, Straw, Light Yellow, Yellow    Appearance Urine Clear Clear    Glucose Urine Negative Negative mg/dL    Bilirubin Urine Negative Negative    Ketones Urine Negative Negative mg/dL    Specific Gravity Urine 1.015 1.003 - 1.035    Blood Urine Negative Negative    pH Urine 7.0 5.0 - 7.0    Protein Albumin Urine Negative Negative mg/dL    Urobilinogen Urine 0.2 0.2, 1.0 E.U./dL    Nitrite Urine Negative Negative    Leukocyte Esterase Urine Trace (A) Negative   CBC " with platelets   Result Value Ref Range    WBC Count 4.8 4.0 - 11.0 10e3/uL    RBC Count 4.32 3.80 - 5.20 10e6/uL    Hemoglobin 13.9 11.7 - 15.7 g/dL    Hematocrit 40.9 35.0 - 47.0 %    MCV 95 78 - 100 fL    MCH 32.2 26.5 - 33.0 pg    MCHC 34.0 31.5 - 36.5 g/dL    RDW 11.6 10.0 - 15.0 %    Platelet Count 225 150 - 450 10e3/uL           Signed Electronically by: Dana Mills PA-C

## 2024-04-02 LAB
ALBUMIN SERPL BCG-MCNC: 4.5 G/DL (ref 3.5–5.2)
ALP SERPL-CCNC: 132 U/L (ref 40–150)
ALT SERPL W P-5'-P-CCNC: 33 U/L (ref 0–50)
ANION GAP SERPL CALCULATED.3IONS-SCNC: 11 MMOL/L (ref 7–15)
AST SERPL W P-5'-P-CCNC: 26 U/L (ref 0–45)
BILIRUB SERPL-MCNC: 0.2 MG/DL
BUN SERPL-MCNC: 11.6 MG/DL (ref 8–23)
CALCIUM SERPL-MCNC: 9.4 MG/DL (ref 8.8–10.2)
CHLORIDE SERPL-SCNC: 104 MMOL/L (ref 98–107)
CREAT SERPL-MCNC: 0.78 MG/DL (ref 0.51–0.95)
DEPRECATED HCO3 PLAS-SCNC: 27 MMOL/L (ref 22–29)
EGFRCR SERPLBLD CKD-EPI 2021: 79 ML/MIN/1.73M2
GLUCOSE SERPL-MCNC: 95 MG/DL (ref 70–99)
POTASSIUM SERPL-SCNC: 4.2 MMOL/L (ref 3.4–5.3)
PROT SERPL-MCNC: 6.8 G/DL (ref 6.4–8.3)
SODIUM SERPL-SCNC: 142 MMOL/L (ref 135–145)

## 2024-04-04 ENCOUNTER — MYC MEDICAL ADVICE (OUTPATIENT)
Dept: FAMILY MEDICINE | Facility: CLINIC | Age: 74
End: 2024-04-04

## 2024-04-04 ENCOUNTER — OFFICE VISIT (OUTPATIENT)
Dept: OTOLARYNGOLOGY | Facility: CLINIC | Age: 74
End: 2024-04-04
Payer: COMMERCIAL

## 2024-04-04 VITALS
BODY MASS INDEX: 25.03 KG/M2 | DIASTOLIC BLOOD PRESSURE: 74 MMHG | SYSTOLIC BLOOD PRESSURE: 119 MMHG | HEART RATE: 69 BPM | RESPIRATION RATE: 16 BRPM | OXYGEN SATURATION: 100 % | HEIGHT: 62 IN | WEIGHT: 136 LBS

## 2024-04-04 DIAGNOSIS — N30.00 ACUTE CYSTITIS WITHOUT HEMATURIA: Primary | ICD-10-CM

## 2024-04-04 DIAGNOSIS — R05.3 CHRONIC COUGH: ICD-10-CM

## 2024-04-04 DIAGNOSIS — A49.8 INFECTION DUE TO CITROBACTER: ICD-10-CM

## 2024-04-04 DIAGNOSIS — J38.7 IRRITABLE LARYNX: Primary | ICD-10-CM

## 2024-04-04 LAB
BACTERIA UR CULT: ABNORMAL
BACTERIA UR CULT: ABNORMAL

## 2024-04-04 PROCEDURE — 99213 OFFICE O/P EST LOW 20 MIN: CPT | Performed by: OTOLARYNGOLOGY

## 2024-04-04 RX ORDER — MULTIVITAMIN
1 TABLET ORAL DAILY
COMMUNITY

## 2024-04-04 RX ORDER — BUDESONIDE AND FORMOTEROL FUMARATE DIHYDRATE 80; 4.5 UG/1; UG/1
2 AEROSOL RESPIRATORY (INHALATION) 2 TIMES DAILY
Qty: 10.2 G | Refills: 3 | Status: SHIPPED | OUTPATIENT
Start: 2024-04-04

## 2024-04-04 RX ORDER — PREDNISONE 10 MG/1
10 TABLET ORAL 2 TIMES DAILY
Qty: 14 TABLET | Refills: 2 | Status: SHIPPED | OUTPATIENT
Start: 2024-04-04 | End: 2024-04-11

## 2024-04-04 RX ORDER — AMITRIPTYLINE HYDROCHLORIDE 10 MG/1
TABLET ORAL
Qty: 90 TABLET | Refills: 4 | Status: SHIPPED | OUTPATIENT
Start: 2024-04-04 | End: 2024-08-22

## 2024-04-04 ASSESSMENT — PAIN SCALES - GENERAL: PAINLEVEL: NO PAIN (0)

## 2024-04-04 NOTE — LETTER
4/4/2024         RE: Isabel Carroll  [address] [facility] [id number]        Dear Colleague,    Thank you for referring your patient, Isabel Carroll, to the [facility]. Please see a copy of my visit note below.    History of Present Illness - Isabel Carroll is a 73 year old female here in follow up from 8/30/2021, seen for chronic cough.    To review, this has been a very longstanding problem, over 20 years.  She has slowly developed a persistent dry tickling cough.  It can happen any time and has no pattern.  She has no chest pain, no heartburn, no pain.  No issues with swallowing or eating.  No coughing up blood.  No previous ENT surgery or head and neck disease no post nasal drainage.    She has had work up from [facility], including barium swallow, pH Probe, Allergy testing, work up from Pulmonary Medicine, and everything has been negative.    After the visit, and evaluation, I felt that this was most likely a neurogenic cough.  I started her on a Elavil titration with a goal of 10mg three times daily, and at follow up she noticed a major improvement, at least 50% better.     At the last follow up in 2021 she was still taking the Elavil, 30mg at bedtime and 20mg qAM.  She still has a very minor cough, but overall it is still better than it was before.  It is quite intermittent at this point, and she was lost to follow up until now.    She was doing well for years but then about the last 6 months it has gotten worse again.  She stopped the Elavil over 2 years ago.  She was tried on Baclofen, but it made her too drowsy.  There were no changes before things started getting worse.  She was diagnosed with DM2 in January 2023 and has changed her diet since then and has lost weight.  No increase in reflux symptoms, no major illness or URI before this started.    Past Medical History -   Patient Active Problem List   Diagnosis     Chronic rhinitis     Primary  osteoarthritis of both hands     Basal cell carcinoma of skin     Coronary artery disease involving native heart with angina pectoris, unspecified vessel or lesion type (H24)     Hyperlipidemia LDL goal <100     Impaired fasting glucose     Chorioretinal scar of left eye     Sinus tachycardia     Advance care planning     Pes planus     Chronic cough     Female pattern hair loss     FHx: colon cancer     History of Clostridioides difficile infection     Pseudophakia of both eyes     Primary osteoarthritis of both knees     Vocal cord dysfunction     Personal history of malignant neoplasm of bladder     Diabetes mellitus, type 2 (H)     Ptosis of both eyelids     Dermatochalasis of eyelid     History of eyelid surgery       Current Medications -   Current Outpatient Medications:      alcohol swab prep pads, Use to swab area of injection/lise as directed., Disp: 100 each, Rfl: 3     aspirin (ASA) 81 MG EC tablet, Take 1 tablet (81 mg) by mouth daily, Disp: 100 tablet, Rfl: 3     atorvastatin (LIPITOR) 40 MG tablet, Take 1 tablet (40 mg) by mouth daily, Disp: 90 tablet, Rfl: 3     blood glucose (NO BRAND SPECIFIED) test strip, Use to test blood sugar 1 time daily or as directed. To accompany: Blood Glucose Monitor Brands: per insurance., Disp: 100 strip, Rfl: 6     blood glucose calibration (NO BRAND SPECIFIED) solution, To accompany: Blood Glucose Monitor Brands: per insurance., Disp: 1 each, Rfl: 11     blood glucose monitoring (NO BRAND SPECIFIED) meter device kit, Use to test blood sugar 1 time daily or as directed. Preferred blood glucose meter OR supplies to accompany: Blood Glucose Monitor Brands: per insurance., Disp: 1 kit, Rfl: 0     Calcium-Vitamin D (CALCIUM + D PO), Take  by mouth., Disp: , Rfl:      diphenhydrAMINE-acetaminophen (TYLENOL PM)  MG tablet, Take 1 tablet by mouth nightly as needed, Disp: , Rfl:      FISH OIL, , Disp: , Rfl:      MAGNESIUM OXIDE PO, Take 200 mg by mouth daily, Disp: ,  Rfl:      metoprolol succinate ER (TOPROL XL) 25 MG 24 hr tablet, Take 1 tablet (25 mg) by mouth 2 times daily, Disp: 180 tablet, Rfl: 3     Multiple Vitamins-Minerals (MULTIVITAMIN & MINERAL PO), Take  by mouth., Disp: , Rfl:      Solifenacin Succinate (VESICARE PO), , Disp: , Rfl:      thin (NO BRAND SPECIFIED) lancets, Use with lanceting device. To accompany: Blood Glucose Monitor Brands: per insurance., Disp: 100 each, Rfl: 6    Current Facility-Administered Medications:      lidocaine (PF) (XYLOCAINE) 1 % injection 4 mL, 4 mL, , , Oni Smallwood MD, 4 mL at 23 1515     methylPREDNISolone (DEPO-Medrol) injection 80 mg, 80 mg, , , Oni Smallwood MD, 80 mg at 23 1515     ropivacaine (NAROPIN) injection 0.5 mL, 0.5 mL, , , Gilbert Loving DO, 0.5 mL at 22 1141     triamcinolone (KENALOG-40) injection 40 mg, 40 mg, , , Gilbert Loving DO, 40 mg at 22 1141    Allergies -   Allergies   Allergen Reactions     Sulfa Antibiotics Nausea     Codeine Phosphate Nausea and Vomiting     Pentazocine      Pentazocine-Naloxone Hcl      Propoxyphene      Darvon     Penicillins Rash     rash and itching         Social History -   Social History     Socioeconomic History     Marital status:      Spouse name: Darrian     Number of children: 2     Years of education: 17.5     Highest education level: Not on file   Occupational History     Occupation: Retired     Comment: School counselor, 2015   Social Needs     Financial resource strain: Not on file     Food insecurity:     Worry: Not on file     Inability: Not on file     Transportation needs:     Medical: Not on file     Non-medical: Not on file   Tobacco Use     Smoking status: Former Smoker     Years: 16.00     Types: Cigarettes     Last attempt to quit: 1982     Years since quittin.7     Smokeless tobacco: Never Used   Substance and Sexual Activity     Alcohol use: Yes     Alcohol/week: 0.0 oz      Comment: 2-7 glasses of wine per week     Drug use: No     Sexual activity: Never     Partners: Male     Birth control/protection: Post-menopausal   Lifestyle     Physical activity:     Days per week: Not on file     Minutes per session: Not on file     Stress: Not on file   Relationships     Social connections:     Talks on phone: Not on file     Gets together: Not on file     Attends Ep[employer] service: Not on file     Active member of club or organization: Not on file     Attends meetings of clubs or organizations: Not on file     Relationship status: Not on file     Intimate partner violence:     Fear of current or ex partner: Not on file     Emotionally abused: Not on file     Physically abused: Not on file     Forced sexual activity: Not on file   Other Topics Concern     Parent/sibling w/ CABG, MI or angioplasty before 65F 55M? Not Asked      Service Not Asked     Blood Transfusions Not Asked     Caffeine Concern Not Asked     Occupational Exposure Not Asked     Hobby Hazards Not Asked     Sleep Concern Not Asked     Stress Concern Not Asked     Weight Concern Not Asked     Special Diet Not Asked     Back Care Not Asked     Exercise Yes     Comment: jazzercise     Bike Helmet Not Asked     Seat Belt Not Asked     Self-Exams Not Asked   Social History Narrative     Not on file       Family History -   Family History   Problem Relation Age of Onset     Other Cancer Sister 51        ovarian cancer     Cancer Sister      Thyroid Disease Sister      Colon Cancer Father 76     Brain Hemorrhage Father         accident     Substance Abuse Father         Alcohol     Macular Degeneration Mother      Anxiety Disorder Mother      Osteoporosis Mother      Diabetes Maternal Aunt      Diabetes Maternal Uncle      Cancer Paternal Aunt      Diabetes Maternal Grandmother         Diabetes diagnosis January 2023     Coronary Artery Disease Cousin      Hyperlipidemia Cousin      Anesthesia Reaction Other      Asthma Other   "    Asthma Niece      Asthma Nephew      Asthma Brother      Hypertension No family hx of      Cerebrovascular Disease No family hx of      Glaucoma No family hx of        Review of Systems - As per HPI and PMHx, otherwise 10+ system review of the head and neck, and general constitution is negative.    Physical Exam    /74   Pulse 69   Resp 16   Ht 1.562 m (5' 1.5\")   Wt 61.7 kg (136 lb)   LMP  (LMP Unknown)   SpO2 100%   BMI 25.28 kg/m      General - The patient is well nourished and well developed, and appears to have good nutritional status.  Alert and oriented to person and place, answers questions and cooperates with examination appropriately.   Head and Face - Normocephalic and atraumatic, with no gross asymmetry noted of the contour of the facial features.  The facial nerve is intact, with strong symmetric movements.  Voice and Breathing - The patient was breathing comfortably without the use of accessory muscles. There was no wheezing, stridor, or stertor.  The patients voice was clear and strong, and had appropriate pitch and quality.  Ears - The tympanic membranes are normal in appearance, bony landmarks are intact.  No retraction, perforation, or masses.  No fluid or purulence was seen in the external canal or the middle ear. No evidence of infection of the middle ear or external canal, cerumen was normal in appearance.  Eyes - Extraocular movements intact, and the pupils were reactive to light.  Sclera were not icteric or injected, conjunctiva were pink and moist.  Mouth - Examination of the oral cavity showed pink, healthy oral mucosa. No lesions or ulcerations noted.  The tongue was mobile and midline, and the dentition were in good condition.    Throat - The walls of the oropharynx were smooth, pink, moist, symmetric, and had no lesions or ulcerations.  The tonsillar pillars and soft palate were symmetric.  The uvula was midline on elevation.    Neck - Normal midline excursion of the " laryngotracheal complex during swallowing.  Full range of motion on passive movement.  Palpation of the occipital, submental, submandibular, internal jugular chain, and supraclavicular nodes did not demonstrate any abnormal lymph nodes or masses.  The carotid pulse was palpable bilaterally.  Palpation of the thyroid was soft and smooth, with no nodules or goiter appreciated.  The trachea was mobile and midline.  Nose - External contour is symmetric, no gross deflection or scars.  Nasal mucosa is pink and moist with no abnormal mucus.  The septum was midline and non-obstructive, turbinates of normal size and position.  No polyps, masses, or purulence noted on examination.      A/P - Isabel Carroll is a 73 year old female  (J38.3) Vocal cord dysfunction  (primary encounter diagnosis)  (R05) Chronic cough     I will restart the Elavil titration up to a goal fo 10mg three times daily.  And once that improves the cough, stay on it for at least 3 months and then start slowly going off of it.    I have also sent in prednisone and a Pulmicort inhaler for the next time she has a URI so that the larynx isn't triggered again.           Again, thank you for allowing me to participate in the care of your patient.        Sincerely,        Swapnil Shah MD

## 2024-04-05 ENCOUNTER — TELEPHONE (OUTPATIENT)
Dept: FAMILY MEDICINE | Facility: CLINIC | Age: 74
End: 2024-04-05

## 2024-04-05 DIAGNOSIS — N30.01 ACUTE CYSTITIS WITH HEMATURIA: ICD-10-CM

## 2024-04-05 DIAGNOSIS — A49.8 CITROBACTER INFECTION: Primary | ICD-10-CM

## 2024-04-05 RX ORDER — CIPROFLOXACIN 250 MG/1
250 TABLET, FILM COATED ORAL 2 TIMES DAILY
Qty: 14 TABLET | Refills: 0 | Status: SHIPPED | OUTPATIENT
Start: 2024-04-05 | End: 2024-04-05

## 2024-04-05 RX ORDER — NITROFURANTOIN MACROCRYSTAL 100 MG
100 CAPSULE ORAL 2 TIMES DAILY
Qty: 14 CAPSULE | Refills: 0 | Status: SHIPPED | OUTPATIENT
Start: 2024-04-05 | End: 2024-04-07

## 2024-04-05 NOTE — TELEPHONE ENCOUNTER
Pt calling to follow up on this result. She has hx of bladder infection.  She is still having pain on right side.      Pt reviewed urine results from 4/1/24.  It looks like it is positive for an infection, see urine culture. Pt has not been changed.    Does pt need antibiotic?    Tori Weiss BSN, RN

## 2024-04-05 NOTE — TELEPHONE ENCOUNTER
Urinalysis shows negative urine WBCs trace leukocyte esterase and many WBCs.     Urine culture is positive for Citrobacter.    Patient is currently taking Prednisone (prescribed by ENT), hence, quinolones were not prescribed.     Due to negative urine WBCs, avoid over-treatment with potent antibiotics. Rx for Nitrofurantoin sent.

## 2024-04-05 NOTE — TELEPHONE ENCOUNTER
Patient Returning Call    Reason for call:  Antibiotic - Macrodentin - directions say twice a day and normally say 4 times a day - macrobid is usually taken twice a day - looking for clarification as to which medication should be given     Information relayed to patient:  Message sent    Patient has additional questions:  Yes    What are your questions/concerns:  Antibiotic - Macrodentin - directions say twice a day and normally say 4 times a day - macrobid is usually taken twice a day - looking for clarification as to which medication should be given     Could we send this information to you in [facility] or would you prefer to receive a phone call?:   Patient would prefer a phone call   Okay to leave a detailed message?: Yes at Other phone number:    Rayo  [facility] [phone]

## 2024-04-07 RX ORDER — NITROFURANTOIN 25; 75 MG/1; MG/1
100 CAPSULE ORAL 2 TIMES DAILY
Qty: 14 CAPSULE | Refills: 0 | Status: SHIPPED | OUTPATIENT
Start: 2024-04-07 | End: 2024-04-15

## 2024-04-07 NOTE — TELEPHONE ENCOUNTER
Outpatient Medication Detail     Disp Refills Start End HEATHER   nitroFURantoin macrocrystal-monohydrate (MACROBID) 100 MG capsule 14 capsule 0 4/7/2024 -- No   Sig - Route: Take 1 capsule (100 mg) by mouth 2 times daily - Oral   Sent to pharmacy as: Nitrofurantoin Monohyd Macro 100 MG Oral Capsule (MACROBID)   Class: E-Prescribe   Notes to Pharmacy: Substitute for plain Nitrofurantoin Macrocyrstal.   Order: [id number]   E-Prescribing Status: Sent to pharmacy (4/7/2024 12:12 AM CDT)

## 2024-04-09 ENCOUNTER — TELEPHONE (OUTPATIENT)
Dept: FAMILY MEDICINE | Facility: CLINIC | Age: 74
End: 2024-04-09
Payer: COMMERCIAL

## 2024-04-09 NOTE — TELEPHONE ENCOUNTER
"Patient calling in with concerns of high fasting BGs in setting of recent URI and prednisone use.    Patient states she has had URI for a few days now and went to see Dr. Shah, ENT provider, and he felt that she has a vocal cord dysfunction - sent over a course of prednisone 10 mg BID to take for 7 days when patient has URI to avoid triggering the larynx.    Patient states she has been taking prednisone as prescribed BID for a few days now but yesterday AM and this AM her fasting BG has been ~155, has not had much of an appetite so isn't eating a lot of food. Patient states usual BG is 105-115, BGs usually controlled by careful nutrition and diet. No other symptoms, patient states that her URI symptoms are improving and she's starting to feel better - asking why her BGs are so elevated. Patient only checking BGs in AM.    Writer informed patient that prednisone usually raises the blood sugar/glucose level is the blood. Since she's taking the prednisone before bed (as well as in AM), may explain the higher than usual BGs in AM. Patient aware, asking if she should stop the prednisone. Informed patient that may be best advised by ENT provider, if she feels her URI symptoms are improving, looks like the prednisone was prescribed to avoid the larynx being triggered by URI, so she may be OK to stop this early due to concerns about BGs, but would recommend reaching out to ENT office.     Patient aware, however asking to speak directly to PCP about her glucoses and prednisone. Patient states she'd like to have a call back today from PCP to advise on this, since \"PCP is aware of all of my health conditions and my diabetes.\" Did let patient know PCP has a full day of patients, patient aware - states a MyC message back would be OK as well if provider unable to call her.      Routing to provider to review/advise      BETO Jaimes, RN  [facility]  "

## 2024-04-11 ENCOUNTER — ANCILLARY PROCEDURE (OUTPATIENT)
Dept: CT IMAGING | Facility: CLINIC | Age: 74
End: 2024-04-11
Attending: PHYSICIAN ASSISTANT
Payer: COMMERCIAL

## 2024-04-11 DIAGNOSIS — Z85.51 H/O PRIMARY MALIGNANT NEOPLASM OF URINARY BLADDER: ICD-10-CM

## 2024-04-11 DIAGNOSIS — R10.31 RLQ ABDOMINAL PAIN: ICD-10-CM

## 2024-04-11 PROCEDURE — 74177 CT ABD & PELVIS W/CONTRAST: CPT | Mod: GC | Performed by: RADIOLOGY

## 2024-04-11 RX ORDER — IOPAMIDOL 755 MG/ML
75 INJECTION, SOLUTION INTRAVASCULAR ONCE
Status: COMPLETED | OUTPATIENT
Start: 2024-04-11 | End: 2024-04-11

## 2024-04-11 RX ADMIN — IOPAMIDOL 75 ML: 755 INJECTION, SOLUTION INTRAVASCULAR at 13:09

## 2024-04-11 NOTE — TELEPHONE ENCOUNTER
Glucose is elevated due to prednisone. Finish the prednisone 155 of glucose is not terribly high. Once she is done with prednisone the sugar will go back to previous levels.     Dana Mills PAC

## 2024-04-12 NOTE — TELEPHONE ENCOUNTER
RN spoke with patient. RN relayed below provider message as written. Pt verbalized understanding.     Pt is wondering if CT results are back.RN did let pt know that results are pending. Informed patient she may get message through [employer]iCAR once they are released. Pt aware that CT results may be posted on veriCAR before provider has had a chance to review.     No further questions/concerns.       Naida Turner RN    [facility]

## 2024-04-15 ENCOUNTER — TELEPHONE (OUTPATIENT)
Dept: FAMILY MEDICINE | Facility: CLINIC | Age: 74
End: 2024-04-15

## 2024-04-15 ENCOUNTER — OFFICE VISIT (OUTPATIENT)
Dept: FAMILY MEDICINE | Facility: CLINIC | Age: 74
End: 2024-04-15
Payer: COMMERCIAL

## 2024-04-15 VITALS
DIASTOLIC BLOOD PRESSURE: 77 MMHG | SYSTOLIC BLOOD PRESSURE: 118 MMHG | HEART RATE: 79 BPM | OXYGEN SATURATION: 96 % | RESPIRATION RATE: 18 BRPM | BODY MASS INDEX: 25.64 KG/M2 | HEIGHT: 61 IN | WEIGHT: 135.8 LBS

## 2024-04-15 DIAGNOSIS — J01.90 ACUTE SINUSITIS WITH SYMPTOMS > 10 DAYS: Primary | ICD-10-CM

## 2024-04-15 PROCEDURE — 99213 OFFICE O/P EST LOW 20 MIN: CPT | Performed by: FAMILY MEDICINE

## 2024-04-15 RX ORDER — GUAIFENESIN 600 MG/1
1200 TABLET, EXTENDED RELEASE ORAL 2 TIMES DAILY
Qty: 10 TABLET | Refills: 0 | Status: SHIPPED | OUTPATIENT
Start: 2024-04-15 | End: 2024-04-15

## 2024-04-15 RX ORDER — GUAIFENESIN AND DEXTROMETHORPHAN HYDROBROMIDE 600; 30 MG/1; MG/1
1 TABLET, EXTENDED RELEASE ORAL EVERY 12 HOURS
Qty: 10 TABLET | Refills: 0 | Status: SHIPPED | OUTPATIENT
Start: 2024-04-15 | End: 2024-04-20

## 2024-04-15 RX ORDER — DOXYCYCLINE 100 MG/1
100 CAPSULE ORAL 2 TIMES DAILY
Qty: 14 CAPSULE | Refills: 0 | Status: SHIPPED | OUTPATIENT
Start: 2024-04-15

## 2024-04-15 ASSESSMENT — PAIN SCALES - GENERAL: PAINLEVEL: SEVERE PAIN (6)

## 2024-04-15 NOTE — TELEPHONE ENCOUNTER
[facility] Call Center    Phone Message    May a detailed message be left on voicemail: yes     Reason for Call: Other: Per Patient : I would like the care team nurse give me a call back regarding my symptoms and get a prescription medication please call me at my home phone [place] [phone]      Action Taken: Other: [facility]     Travel Screening: Not Applicable

## 2024-04-15 NOTE — PROGRESS NOTES
"  Assessment & Plan     Acute sinusitis with symptoms > 10 days   Discussed the nature and pathophysiology of sinusitis and treatment including the role of antibiotics and the need to get over the underlying trigger, URI or allergies. In view of duration and color of drainage will start antibiotic  - doxycycline hyclate (VIBRAMYCIN) 100 MG capsule  Dispense: 14 capsule; Refill: 0  - dextromethorphan-guaiFENesin (MUCINEX DM)  MG 12 hr tablet  Dispense: 10 tablet; Refill: 0      BMI  Estimated body mass index is 25.48 kg/m  as calculated from the following:    Height as of this encounter: 1.555 m (5' 1.22\").    Weight as of this encounter: 61.6 kg (135 lb 12.8 oz).   Weight management plan: Discussed healthy diet and exercise guidelines      See Patient Instructions    James Hall is a 73 year old, presenting for the following health issues:  Sinus Problem      4/15/2024     3:16 PM   Additional Questions   Roomed by CARROLL- MA   Accompanied by self     Sinus Problem        Sinus infection, directed to be seen in person - coughing, can't breathe out of R nostril, blowing out green snort:   Mostly on the Rt side  Eye mattering  Cough; Harking cough   been sick when they were in [place]; March 14  Had sinus infections before  Was achy also   Has body aches and is fatigued.  Has chronic cough: completed Prednisone    Allergies: PEN, Sulfa          Objective    /77 (BP Location: Left arm, Cuff Size: Adult Large)   Pulse 79   Resp 18   Ht 1.555 m (5' 1.22\")   Wt 61.6 kg (135 lb 12.8 oz)   LMP  (LMP Unknown)   SpO2 96%   BMI 25.48 kg/m    Body mass index is 25.48 kg/m .  Physical Exam   GENERAL: alert and no distress  HENT: tenderness over Rt maxillary sinus  RESP: lungs clear to auscultation - no rales, rhonchi or wheezes  CV: regular rate and rhythm, no murmur, click or rub, no peripheral edema  MS: no gross musculoskeletal defects noted, no edema          Signed Electronically by: Richard" Stephan Young MD

## 2024-04-15 NOTE — TELEPHONE ENCOUNTER
RN attempted to contact the patient regarding the phone message she had left.  Left message on cell phone to call back to the clinic.      If patient calls back:    Please triage symptoms and assist in refill request.      Kristina Kjellberg, MSN, RN

## 2024-04-16 NOTE — TELEPHONE ENCOUNTER
Reviewed chart, noted patient had OV yesterday with [facility] provider re: sinusitis symptoms.    Called patient and asked if she still feels she needs to be seen or if there's anything else we can help with - patient states she no longer needs appt, was seen yesterday and provider answered all her questions, currently on abx.     Patient is asking if provider can review her abd/pelvis CT done 4/11/24 - states she read the impression but would just like to be sure there's nothing to worry about, OK with response via [employer].      Routing to provider to please review abd/pelvis CT when able and send result note to her via [employer], thank you!      DILIA JaimesN, RN  [facility]

## 2024-04-17 NOTE — TELEPHONE ENCOUNTER
Mychart message sent with the provider's message per original phone message.  Closing encounter.      Kristina Kjellberg, MSN, RN     Hydroxychloroquine Counseling:  I discussed with the patient that a baseline ophthalmologic exam is needed at the start of therapy and every year thereafter while on therapy. A CBC may also be warranted for monitoring.  The side effects of this medication were discussed with the patient, including but not limited to agranulocytosis, aplastic anemia, seizures, rashes, retinopathy, and liver toxicity. Patient instructed to call the office should any adverse effect occur.  The patient verbalized understanding of the proper use and possible adverse effects of Plaquenil.  All the patient's questions and concerns were addressed.

## 2024-05-06 SDOH — HEALTH STABILITY: PHYSICAL HEALTH: ON AVERAGE, HOW MANY MINUTES DO YOU ENGAGE IN EXERCISE AT THIS LEVEL?: 50 MIN

## 2024-05-06 SDOH — HEALTH STABILITY: PHYSICAL HEALTH: ON AVERAGE, HOW MANY DAYS PER WEEK DO YOU ENGAGE IN MODERATE TO STRENUOUS EXERCISE (LIKE A BRISK WALK)?: 5 DAYS

## 2024-05-06 ASSESSMENT — SOCIAL DETERMINANTS OF HEALTH (SDOH): HOW OFTEN DO YOU GET TOGETHER WITH FRIENDS OR RELATIVES?: MORE THAN THREE TIMES A WEEK

## 2024-05-07 ENCOUNTER — OFFICE VISIT (OUTPATIENT)
Dept: FAMILY MEDICINE | Facility: CLINIC | Age: 74
End: 2024-05-07
Payer: COMMERCIAL

## 2024-05-07 VITALS
HEIGHT: 61 IN | WEIGHT: 135 LBS | OXYGEN SATURATION: 99 % | BODY MASS INDEX: 25.49 KG/M2 | TEMPERATURE: 97.6 F | RESPIRATION RATE: 16 BRPM | DIASTOLIC BLOOD PRESSURE: 78 MMHG | SYSTOLIC BLOOD PRESSURE: 137 MMHG | HEART RATE: 76 BPM

## 2024-05-07 DIAGNOSIS — E11.9 TYPE 2 DIABETES MELLITUS WITHOUT COMPLICATION, WITHOUT LONG-TERM CURRENT USE OF INSULIN (H): ICD-10-CM

## 2024-05-07 DIAGNOSIS — E28.39 ESTROGEN DEFICIENCY: ICD-10-CM

## 2024-05-07 DIAGNOSIS — Z00.00 ENCOUNTER FOR MEDICARE ANNUAL WELLNESS EXAM: Primary | ICD-10-CM

## 2024-05-07 PROCEDURE — G0439 PPPS, SUBSEQ VISIT: HCPCS | Performed by: PHYSICIAN ASSISTANT

## 2024-05-07 PROCEDURE — 99207 PR FOOT EXAM NO CHARGE: CPT | Performed by: PHYSICIAN ASSISTANT

## 2024-05-07 NOTE — PATIENT INSTRUCTIONS
"Preventive Care Advice   This is general advice we often give to help people stay healthy. Your care team may have specific advice just for you. Please talk to your care team about your own preventive care needs.  Lifestyle  Exercise at least 150 minutes each week (30 minutes a day, 5 days a week).  Do muscle strengthening activities 2 days a week. These help control your weight and prevent disease.  No smoking.  Wear sunscreen to prevent skin cancer.  Have your home tested for radon every 2 to 5 years. Radon is a colorless, odorless gas that can harm your lungs. To learn more, go to www.health.[facility].mn. and search for \"Radon in Homes.\"  Keep guns unloaded and locked up in a safe place like a safe or gun vault, or, use a gun lock and hide the keys. Always lock away bullets separately. To learn more, visit [employer].[place].gov and search for \"safe gun storage.\"  Nutrition  Eat 5 or more servings of fruits and vegetables each day.  Try wheat bread, brown rice and whole grain pasta (instead of white bread, rice, and pasta).  Get enough calcium and vitamin D. Check the label on foods and aim for 100% of the RDA (recommended daily allowance).  Regular exams  Have a dental exam and cleaning every 6 months.  See your health care team every year to talk about:  Any changes in your health.  Any medicines your care team has prescribed.  Preventive care, family planning, and ways to prevent chronic diseases.  Shots (vaccines)   HPV shots (up to age 26), if you've never had them before.  Hepatitis B shots (up to age 59), if you've never had them before.  COVID-19 shot: Get this shot when it's due.  Flu shot: Get a flu shot every year.  Tetanus shot: Get a tetanus shot every 10 years.  Pneumococcal, hepatitis A, and RSV shots: Ask your care team if you need these based on your risk.  Shingles shot (for age 50 and up).  General health tests  Diabetes screening:  Starting at age 35, Get screened for diabetes at least every 3 years.  If " you are younger than age 35, ask your care team if you should be screened for diabetes.  Cholesterol test: At age 39, start having a cholesterol test every 5 years, or more often if advised.  Bone density scan (DEXA): At age 50, ask your care team if you should have this scan for osteoporosis (brittle bones).  Hepatitis C: Get tested at least once in your life.  Abdominal aortic aneurysm screening: Talk to your doctor about having this screening if you:  Have ever smoked; and  Are biologically male; and  Are between the ages of 65 and 75.  STIs (sexually transmitted infections)  Before age 24: Ask your care team if you should be screened for STIs.  After age 24: Get screened for STIs if you're at risk. You are at risk for STIs (including HIV) if:  You are sexually active with more than one person.  You don't use condoms every time.  You or a partner was diagnosed with a sexually transmitted infection.  If you are at risk for HIV, ask about PrEP medicine to prevent HIV.  Get tested for HIV at least once in your life, whether you are at risk for HIV or not.  Cancer screening tests  Cervical cancer screening: If you have a cervix, begin getting regular cervical cancer screening tests at age 21. Most people who have regular screenings with normal results can stop after age 65. Talk about this with your provider.  Breast cancer scan (mammogram): If you've ever had breasts, begin having regular mammograms starting at age 40. This is a scan to check for breast cancer.  Colon cancer screening: It is important to start screening for colon cancer at age 45.  Have a colonoscopy test every 10 years (or more often if you're at risk) Or, ask your provider about stool tests like a FIT test every year or Cologuard test every 3 years.  To learn more about your testing options, visit: www.[employer].com/656932.pdf.  For help making a decision, visit: von/zx22930.  Prostate cancer screening test: If you have a prostate and are age 55  to 69, ask your provider if you would benefit from a yearly prostate cancer screening test.  Lung cancer screening: If you are a current or former smoker age 50 to 80, ask your care team if ongoing lung cancer screenings are right for you.  For informational purposes only. Not to replace the advice of your health care provider. Copyright   2023 [employer]. All rights reserved. Clinically reviewed by the [facility] Transitions Program. [employer] 822669 - REV 04/24.    Preventing Falls: Care Instructions  Injuries and health problems such as trouble walking or poor eyesight can increase your risk of falling. So can some medicines. But there are things you can do to help prevent falls. You can exercise to get stronger. You can also arrange your home to make it safer.    Talk to your doctor about the medicines you take. Ask if any of them increase the risk of falls and whether they can be changed or stopped.   Try to exercise regularly. It can help improve your strength and balance. This can help lower your risk of falling.     Practice fall safety and prevention.    Wear low-heeled shoes that fit well and give your feet good support. Talk to your doctor if you have foot problems that make this hard.  Carry a cellphone or wear a medical alert device that you can use to call for help.  Use stepladders instead of chairs to reach high objects. Don't climb if you're at risk for falls. Ask for help, if needed.  Wear the correct eyeglasses, if you need them.    Make your home safer.    Remove rugs, cords, clutter, and furniture from walkways.  Keep your house well lit. Use night-lights in hallways and bathrooms.  Install and use sturdy handrails on stairways.  Wear nonskid footwear, even inside. Don't walk barefoot or in socks without shoes.    Be safe outside.    Use handrails, curb cuts, and ramps whenever possible.  Keep your hands free by using a shoulder bag or backpack.  Try to walk in well-lit  "areas. Watch out for uneven ground, changes in pavement, and debris.  Be careful in the winter. Walk on the grass or gravel when sidewalks are slippery. Use de-icer on steps and walkways. Add non-slip devices to shoes.    Put grab bars and nonskid mats in your shower or tub and near the toilet. Try to use a shower chair or bath bench when bathing.   Get into a tub or shower by putting in your weaker leg first. Get out with your strong side first. Have a phone or medical alert device in the bathroom with you.   Where can you learn more?  Go to https://www.[employer].net/patiented  Enter G117 in the search box to learn more about \"Preventing Falls: Care Instructions.\"  Current as of: July 17, 2023               Content Version: 14.0    4623-0051 [employer].   Care instructions adapted under license by your healthcare professional. If you have questions about a medical condition or this instruction, always ask your healthcare professional. [employer] disclaims any warranty or liability for your use of this information.      "

## 2024-05-07 NOTE — PROGRESS NOTES
Preventive Care Visit  [facility] Mills PA-C, Family Medicine  May 7, 2024      Assessment & Plan   Problem List Items Addressed This Visit          Endocrine    Diabetes mellitus, type 2 (H)    Relevant Orders    FOOT EXAM (Completed)     Other Visit Diagnoses       Encounter for Medicare annual wellness exam    -  Primary    Estrogen deficiency        Relevant Orders    DX Bone Density             Patient has been advised of split billing requirements and indicates understanding: Yes    25 minutes spent by me on the date of the encounter doing chart review, history and exam, documentation and further activities per the note     Counseling  Appropriate preventive services were discussed with this patient, including applicable screening as appropriate for fall prevention, nutrition, physical activity, Tobacco-use cessation, weight loss and cognition.  Checklist reviewing preventive services available has been given to the patient.  Reviewed patient's diet, addressing concerns and/or questions.           James Hall is a 73 year old, presenting for the following:  Annual Visit        5/7/2024    11:18 AM   Additional Questions   Roomed by Meredith   Accompanied by self         5/7/2024    11:18 AM   Patient Reported Additional Medications   Patient reports taking the following new medications No         Health Care Directive  Patient does not have a Health Care Directive or Living Will: Patient states has Advance Directive and will bring in a copy to clinic.    HPI              5/6/2024   General Health   How would you rate your overall physical health? Excellent   Feel stress (tense, anxious, or unable to sleep) Not at all         5/6/2024   Nutrition   Diet: Carbohydrate counting         5/6/2024   Exercise   Days per week of moderate/strenous exercise 5 days   Average minutes spent exercising at this level 50 min         5/6/2024   Social Factors   Frequency of  gathering with friends or relatives More than three times a week   Worry food won't last until get money to buy more No   Food not last or not have enough money for food? No   Do you have housing?  Yes   Are you worried about losing your housing? No   Lack of transportation? No   Unable to get utilities (heat,electricity)? No         5/7/2024   Fall Risk   Fallen 2 or more times in the past year? No   Trouble with walking or balance? Yes   Gait Speed Test (Document in seconds) 4.9   Gait Speed Test Interpretation Less than or equal to 5.00 seconds - PASS          5/6/2024   Activities of Daily Living- Home Safety   Needs help with the following daily activites None of the above   Safety concerns in the home None of the above         5/6/2024   Dental   Dentist two times every year? Yes         5/6/2024   Hearing Screening   Hearing concerns? None of the above         5/6/2024   Driving Risk Screening   Patient/family members have concerns about driving No         5/6/2024   General Alertness/Fatigue Screening   Have you been more tired than usual lately? No         5/6/2024   Urinary Incontinence Screening   Bothered by leaking urine in past 6 months No         5/6/2024   TB Screening   Were you born outside of the [place]? No         Today's PHQ-2 Score:       5/6/2024     9:19 PM   PHQ-2 ( 1999 Pfizer)   Q1: Little interest or pleasure in doing things 0   Q2: Feeling down, depressed or hopeless 0   PHQ-2 Score 0   Q1: Little interest or pleasure in doing things Not at all   Q2: Feeling down, depressed or hopeless Not at all   PHQ-2 Score 0           5/6/2024   Substance Use   Alcohol more than 3/day or more than 7/wk No   Do you have a current opioid prescription? No   How severe/bad is pain from 1 to 10? 0/10 (No Pain)   Do you use any other substances recreationally? No     Social History     Tobacco Use    Smoking status: Former     Current packs/day: 0.00     Average packs/day: 1 pack/day for 15.9 years (15.9 ttl  pk-yrs)     Types: Cigarettes     Start date: 1966     Quit date: 1982     Years since quittin.0     Passive exposure: Past    Smokeless tobacco: Never    Tobacco comments:     Stopped smoking in .   Vaping Use    Vaping status: Never Used   Substance Use Topics    Alcohol use: Yes     Comment: 2-5 glasses of wine per week    Drug use: No           2023   LAST FHS-7 RESULTS   1st degree relative breast or ovarian cancer Yes   Any relative bilateral breast cancer No   Any male have breast cancer No   Any ONE woman have BOTH breast AND ovarian cancer No   Any woman with breast cancer before 50yrs No   2 or more relatives with breast AND/OR ovarian cancer No   2 or more relatives with breast AND/OR bowel cancer Yes        Mammogram Screening - After age 74- determine frequency with patient based on health status, life expectancy and patient goals    ASCVD Risk   The ASCVD Risk score (Mario MACKAY, et al., 2019) failed to calculate for the following reasons:    The valid total cholesterol range is 130 to 320 mg/dL    Fracture Risk Assessment Tool  Link to Frax Calculator  Use the information below to complete the Frax calculator  : 1950  Sex: female  Weight (kg): 61.2 kg (actual weight)  Height (cm): 154.9 cm  Previous Fragility Fracture:  No  History of parent with fractured hip:  Yes  Current Smoking:  No  Patient has been on glucocorticoids for more than 3 months (5mg/day or more): No  Rheumatoid Arthritis on Problem List:  No  Secondary Osteoporosis on Problem List:  No  Consumes 3 or more units of alcohol per day: No  Femoral Neck BMD (g/cm2)            Reviewed and updated as needed this visit by Provider   Tobacco  Allergies  Meds  Problems  Med Hx  Surg Hx  Fam Hx            Patient Active Problem List   Diagnosis    Chronic rhinitis    Primary osteoarthritis of both hands    Basal cell carcinoma of skin    Coronary artery disease involving native heart with angina  pectoris, unspecified vessel or lesion type (H24)    Hyperlipidemia LDL goal <100    Impaired fasting glucose    Chorioretinal scar of left eye    Sinus tachycardia    Advance care planning    Pes planus    Chronic cough    Female pattern hair loss    FHx: colon cancer    History of Clostridioides difficile infection    Pseudophakia of both eyes    Primary osteoarthritis of both knees    Vocal cord dysfunction    Personal history of malignant neoplasm of bladder    Diabetes mellitus, type 2 (H)    Ptosis of both eyelids    Dermatochalasis of eyelid    History of eyelid surgery     Past Surgical History:   Procedure Laterality Date    BIOPSY  skin cancer, three x    since , bladder 10/2020    CATARACT IOL, RT/LT       SECTION      x 2    CHOLECYSTECTOMY      COLONOSCOPY      3 polyps    COLONOSCOPY      neg    COLONOSCOPY N/A 2021    Procedure: COLONOSCOPY, FLEXIBLE, WITH LESION REMOVAL USING SNARE;  Surgeon: Prudence Hu MD;  Location: [facility] OR    COLONOSCOPY N/A 2021    Procedure: COLONOSCOPY, WITH POLYPECTOMY AND BIOPSY;  Surgeon: Prudence Hu MD;  Location: [facility] OR    COLONOSCOPY WITH CO2 INSUFFLATION N/A 2021    Procedure: COLONOSCOPY, WITH CO2 INSUFFLATION;  Surgeon: Prudence Hu MD;  Location: [facility] OR    ENDOSCOPY      ENT SURGERY  2016    chronic cough    GENITOURINARY SURGERY      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 10/2020    LENGTHEN TENDON ACHILLES  2011    Procedure:LENGTHEN TENDON ACHILLES; Surgeon:AARON CÁRDENAS; Location: OR    LIVER SURGERY      Alex large cyst    MINI ARC SLING OPERATION FOR STRESS INCONTINENCE  2007    OSTEOTOMY ANKLE  2011    Procedure:OSTEOTOMY ANKLE; Calcaneous,  Flexor Digitorum Longus Transfer      REPAIR PTOSIS BILATERAL Bilateral 2023    Procedure: Bilateral upper eyelid ptosis repair;  Surgeon: Eldon Prather MD;  Location: [facility] OR     SALPINGO-OOPHORECTOMY BILATERAL  ,     Benign prophylactic for FHx ov cancer       Social History     Tobacco Use    Smoking status: Former     Current packs/day: 0.00     Average packs/day: 1 pack/day for 15.9 years (15.9 ttl pk-yrs)     Types: Cigarettes     Start date: 1966     Quit date: 1982     Years since quittin.0     Passive exposure: Past    Smokeless tobacco: Never    Tobacco comments:     Stopped smoking in .   Substance Use Topics    Alcohol use: Yes     Comment: 2-5 glasses of wine per week     Family History   Problem Relation Age of Onset    Other Cancer Sister 51        ovarian cancer    Cancer Sister     Thyroid Disease Sister     Colon Cancer Father 76    Brain Hemorrhage Father         accident    Substance Abuse Father         Alcohol    Macular Degeneration Mother     Anxiety Disorder Mother     Osteoporosis Mother     Diabetes Maternal Aunt     Diabetes Maternal Uncle     Cancer Paternal Aunt     Diabetes Maternal Grandmother         Diabetes diagnosis 2023    Coronary Artery Disease Cousin     Hyperlipidemia Cousin     Anesthesia Reaction Other     Asthma Other     Asthma Niece     Asthma Nephew     Asthma Brother     Hypertension No family hx of     Cerebrovascular Disease No family hx of     Glaucoma No family hx of          Current Outpatient Medications   Medication Sig Dispense Refill    alcohol swab prep pads Use to swab area of injection/lise as directed. 100 each 3    amitriptyline (ELAVIL) 10 MG tablet Start with 10mg at bedtime for three days, then increase to 10mg twice daily for three days, then 10mg three times daily and continue 90 tablet 4    aspirin (ASA) 81 MG EC tablet Take 1 tablet (81 mg) by mouth daily 100 tablet 3    atorvastatin (LIPITOR) 40 MG tablet Take 1 tablet (40 mg) by mouth daily 90 tablet 3    blood glucose (NO BRAND SPECIFIED) test strip Use to test blood sugar 1 time daily or as directed. To accompany: Blood Glucose Monitor  Brands: per insurance. 100 strip 6    blood glucose calibration (NO BRAND SPECIFIED) solution To accompany: Blood Glucose Monitor Brands: per insurance. 1 each 11    blood glucose monitoring (NO BRAND SPECIFIED) meter device kit Use to test blood sugar 1 time daily or as directed. Preferred blood glucose meter OR supplies to accompany: Blood Glucose Monitor Brands: per insurance. 1 kit 0    budesonide-formoterol (SYMBICORT) 80-4.5 MCG/ACT Inhaler Inhale 2 puffs into the lungs 2 times daily 10.2 g 3    Calcium-Vitamin D (CALCIUM + D PO) Take  by mouth.      diphenhydrAMINE-acetaminophen (TYLENOL PM)  MG tablet Take 1 tablet by mouth nightly as needed      doxycycline hyclate (VIBRAMYCIN) 100 MG capsule Take 1 capsule (100 mg) by mouth 2 times daily 14 capsule 0    FISH OIL       MAGNESIUM OXIDE PO Take 200 mg by mouth daily      metoprolol succinate ER (TOPROL XL) 25 MG 24 hr tablet Take 1 tablet (25 mg) by mouth 2 times daily 180 tablet 3    Multiple Vitamins-Minerals (MULTIVITAMIN & MINERAL PO) Take  by mouth.      multivitamin w/minerals (MULTI-VITAMIN) tablet Take 1 tablet by mouth daily      Solifenacin Succinate (VESICARE PO)       thin (NO BRAND SPECIFIED) lancets Use with lanceting device. To accompany: Blood Glucose Monitor Brands: per insurance. 100 each 6     Allergies   Allergen Reactions    Sulfa Antibiotics Nausea    Codeine Phosphate Nausea and Vomiting    Pentazocine     Pentazocine-Naloxone Hcl     Propoxyphene      Darvon    Penicillins Rash     rash and itching       Current providers sharing in care for this patient include:  Patient Care Team:  Dana Mills PA-C as PCP - General (Family Medicine)  Mj Frederick OD (Optometry)  Yoli Zafar RN as Diabetes Educator  Eldon Prather MD as MD (Ophthalmology)  Eldon Prather MD as Assigned Surgical Provider  Alexandra Mckenna RD as Diabetes Educator (Dietitian, Registered)  Oni Smallwood MD as Assigned  "Musculoskeletal Provider  Dana Mills PA-C as Assigned PCP  Swapnil Shah MD as MD (Otolaryngology)    The following health maintenance items are reviewed in Epic and correct as of today:  Health Maintenance   Topic Date Due    COVID-19 Vaccine (6 - 2023-24 season) 02/26/2024    A1C  08/07/2024    LIPID  02/07/2025    MICROALBUMIN  02/07/2025    EYE EXAM  03/26/2025    BMP  04/01/2025    MEDICARE ANNUAL WELLNESS VISIT  05/07/2025    DIABETIC FOOT EXAM  05/07/2025    ANNUAL REVIEW OF HM ORDERS  05/07/2025    FALL RISK ASSESSMENT  05/07/2025    MAMMO SCREENING  11/07/2025    COLORECTAL CANCER SCREENING  12/03/2026    ADVANCE CARE PLANNING  05/02/2028    DEXA  12/22/2029    DTAP/TDAP/TD IMMUNIZATION (4 - Td or Tdap) 10/12/2031    HEPATITIS C SCREENING  Completed    PHQ-2 (once per calendar year)  Completed    INFLUENZA VACCINE  Completed    Pneumococcal Vaccine: 65+ Years  Completed    ZOSTER IMMUNIZATION  Completed    RSV VACCINE (Pregnancy & 60+)  Completed    IPV IMMUNIZATION  Aged Out    HPV IMMUNIZATION  Aged Out    MENINGITIS IMMUNIZATION  Aged Out    RSV MONOCLONAL ANTIBODY  Aged Out         Review of Systems  Constitutional, HEENT, cardiovascular, pulmonary, gi and gu systems are negative, except as otherwise noted.     Objective    Exam  /78 (BP Location: Left arm, Patient Position: Sitting, Cuff Size: Adult Regular)   Pulse 76   Temp 97.6  F (36.4  C) (Tympanic)   Resp 16   Ht 1.549 m (5' 1\")   Wt 61.2 kg (135 lb)   LMP  (LMP Unknown)   SpO2 99%   BMI 25.51 kg/m     Estimated body mass index is 25.51 kg/m  as calculated from the following:    Height as of this encounter: 1.549 m (5' 1\").    Weight as of this encounter: 61.2 kg (135 lb).    Physical Exam  GENERAL: alert and no distress  EYES: Eyes grossly normal to inspection, PERRL and conjunctivae and sclerae normal  HENT: ear canals and TM's normal, nose and mouth without ulcers or lesions  NECK: no adenopathy, no asymmetry, " masses, or scars  RESP: lungs clear to auscultation - no rales, rhonchi or wheezes  CV: regular rate and rhythm, normal S1 S2, no S3 or S4, no murmur, click or rub, no peripheral edema  ABDOMEN: soft, nontender, no hepatosplenomegaly, no masses and bowel sounds normal  MS: no gross musculoskeletal defects noted, no edema  SKIN: no suspicious lesions or rashes  NEURO: Normal strength and tone, mentation intact and speech normal  PSYCH: mentation appears normal, affect normal/bright  Diabetic foot exam: normal DP and PT pulses, no trophic changes or ulcerative lesions, and normal sensory exam        5/7/2024   Mini Cog   Clock Draw Score 2 Normal   3 Item Recall 3 objects recalled   Mini Cog Total Score 5              Signed Electronically by: Dana Mills PA-C

## 2024-05-17 ENCOUNTER — TELEPHONE (OUTPATIENT)
Dept: FAMILY MEDICINE | Facility: CLINIC | Age: 74
End: 2024-05-17
Payer: COMMERCIAL

## 2024-05-24 DIAGNOSIS — E11.9 TYPE 2 DIABETES MELLITUS WITHOUT COMPLICATION, WITHOUT LONG-TERM CURRENT USE OF INSULIN (H): ICD-10-CM

## 2024-05-24 RX ORDER — LANCETS
EACH MISCELLANEOUS
Qty: 100 EACH | Refills: 6 | Status: SHIPPED | OUTPATIENT
Start: 2024-05-24

## 2024-05-24 NOTE — TELEPHONE ENCOUNTER
Medication Question or Refill        What medication are you calling about (include dose and sig)?: thin (NO BRAND SPECIFIED) lancets     Preferred Pharmacy:  [facility] [address] [place], [place] - [address]  [address] [facility] [address] [facility] [address]  Phone: [phone] Fax: [phone]        Controlled Substance Agreement on file:   CSA -- Patient Level:    CSA: None found at the patient level.       Who prescribed the medication?: Alisia Vizcarra APRN CNP     Do you need a refill? Yes    Patient offered an appointment? No    Do you have any questions or concerns?  Yes: Patient states she only has 3 lancets left which will not last her through the weekend.      Could we send this information to you in [employer] or would you prefer to receive a phone call?:   No preference   Okay to leave a detailed message?: Yes at Cell number on file:    Telephone Information:   Mobile [phone]

## 2024-05-24 NOTE — TELEPHONE ENCOUNTER
Prescription approved per [facility] Refill Protocol.    Rachel Simeon RN, BSN  [facility]

## 2024-05-28 ENCOUNTER — ANCILLARY PROCEDURE (OUTPATIENT)
Dept: BONE DENSITY | Facility: CLINIC | Age: 74
End: 2024-05-28
Attending: PHYSICIAN ASSISTANT
Payer: COMMERCIAL

## 2024-05-28 DIAGNOSIS — E28.39 ESTROGEN DEFICIENCY: ICD-10-CM

## 2024-05-28 PROCEDURE — 77081 DXA BONE DENSITY APPENDICULR: CPT | Mod: TC | Performed by: PHYSICIAN ASSISTANT

## 2024-05-28 PROCEDURE — 77080 DXA BONE DENSITY AXIAL: CPT | Mod: TC | Performed by: PHYSICIAN ASSISTANT

## 2024-06-07 ENCOUNTER — MYC MEDICAL ADVICE (OUTPATIENT)
Dept: FAMILY MEDICINE | Facility: CLINIC | Age: 74
End: 2024-06-07
Payer: COMMERCIAL

## 2024-06-07 DIAGNOSIS — M81.0 AGE-RELATED OSTEOPOROSIS WITHOUT CURRENT PATHOLOGICAL FRACTURE: Primary | ICD-10-CM

## 2024-06-07 NOTE — TELEPHONE ENCOUNTER
See MyChart encounter below. Routing to provider to review and advise.    Patient requesting DEXA review, asking if she needs to be on any medications      China Barahona, RN, BSN  [facility]

## 2024-06-10 RX ORDER — ALENDRONATE SODIUM 70 MG/1
70 TABLET ORAL
Qty: 12 TABLET | Refills: 3 | Status: SHIPPED | OUTPATIENT
Start: 2024-06-10

## 2024-06-13 ENCOUNTER — TELEPHONE (OUTPATIENT)
Dept: FAMILY MEDICINE | Facility: CLINIC | Age: 74
End: 2024-06-13
Payer: COMMERCIAL

## 2024-06-13 NOTE — TELEPHONE ENCOUNTER
Pt calling to review results of Dexa scan.    Relayed provider's message below. Pt verbalized understanding.    Is going on vacation tomorrow, so will wait to start Fosamax when she returns in case she has any side effects.    Encouraged to call with any additional questions.    Antoinette Serna RN, BSN  [facility]

## 2024-07-03 ENCOUNTER — NURSE TRIAGE (OUTPATIENT)
Dept: FAMILY MEDICINE | Facility: CLINIC | Age: 74
End: 2024-07-03
Payer: COMMERCIAL

## 2024-07-03 NOTE — TELEPHONE ENCOUNTER
Called patient to ensure they are OK heading to ED - patient states that they do plan to go to  ED shortly. No questions for PCP office, agrees with plan to go to ED.     Routing as FYI to provider, does not need response as patient agreeable to triage disposition      China Barahona, RN, BSN  [facility]

## 2024-07-03 NOTE — TELEPHONE ENCOUNTER
"Nurse Triage SBAR    Is this a 2nd Level Triage? YES, LICENSED PRACTITIONER REVIEW IS REQUIRED    Situation: Patient reporting severe vomiting.    Background: Pt and  went out to eat last night. Vomiting started at 8pm.  not ill.    Assessment: Reports vomiting 8 times since last night. 2 episodes of diarrhea. Unable to keep anything down. New onset of severe pain in right eye and blurred vision. Reporting headache and fatigue. Denies fever, chest pain, SOB. No blood noted in vomit or stool.    Protocol Recommended Disposition:   Go to ED Now    Recommendation: Advised ED per triage protocol. Patient is agreeable with this and will have  drive her to nearest ED right away.      Routed to provider    Does the patient meet one of the following criteria for ADS visit consideration? 16+ years old, with an [facility] PCP     TIP  Providers, please consider if this condition is appropriate for management at one of our Acute and Diagnostic Services sites.     If patient is a good candidate, please use dotphrase <dot>triageresponse and select Refer to ADS to document.     Reason for Disposition   Severe pain in one eye    Additional Information   Negative: Shock suspected (e.g., cold/pale/clammy skin, too weak to stand, low BP, rapid pulse)   Negative: Difficult to awaken or acting confused (e.g., disoriented, slurred speech)   Negative: Sounds like a life-threatening emergency to the triager    Answer Assessment - Initial Assessment Questions  1. VOMITING SEVERITY: \"How many times have you vomited in the past 24 hours?\"      - MILD:  1 - 2 times/day     - MODERATE: 3 - 5 times/day, decreased oral intake without significant weight loss or symptoms of dehydration     - SEVERE: 6 or more times/day, vomits everything or nearly everything, with significant weight loss, symptoms of dehydration       Moderate to Severe  2. ONSET: \"When did the vomiting begin?\"       8pm last night  3. FLUIDS: \"What fluids or food " "have you vomited up today?\" \"Have you been able to keep any fluids down?\"      Unable to keep any fluids down  4. ABDOMEN PAIN: \"Are your having any abdomen pain?\" If Yes : \"How bad is it and what does it feel like?\" (e.g., crampy, dull, intermittent, constant)       none  5. DIARRHEA: \"Is there any diarrhea?\" If Yes, ask: \"How many times today?\"       Yes - 2 episodes since last night  6. CONTACTS: \"Is there anyone else in the family with the same symptoms?\"       No one else in family  7. CAUSE: \"What do you think is causing your vomiting?\"      Unsure - went out to supper last night  8. HYDRATION STATUS: \"Any signs of dehydration?\" (e.g., dry mouth [not only dry lips], too weak to stand) \"When did you last urinate?\"      Dry mouth, decreased urine output  9. OTHER SYMPTOMS: \"Do you have any other symptoms?\" (e.g., fever, headache, vertigo, vomiting blood or coffee grounds, recent head injury)      Headache, R eye pain, blurred vision in R eye  10. PREGNANCY: \"Is there any chance you are pregnant?\" \"When was your last menstrual period?\"        N/a    Protocols used: Vomiting-A-OH    "

## 2024-07-08 ENCOUNTER — TRANSFERRED RECORDS (OUTPATIENT)
Dept: HEALTH INFORMATION MANAGEMENT | Facility: CLINIC | Age: 74
End: 2024-07-08
Payer: COMMERCIAL

## 2024-08-17 ENCOUNTER — HEALTH MAINTENANCE LETTER (OUTPATIENT)
Age: 74
End: 2024-08-17

## 2024-08-22 DIAGNOSIS — J38.7 IRRITABLE LARYNX: ICD-10-CM

## 2024-08-22 DIAGNOSIS — R05.3 CHRONIC COUGH: ICD-10-CM

## 2024-08-22 NOTE — TELEPHONE ENCOUNTER
Medication:   Amitriptylin 10mg  Last written on:   04/04/2024  Quantity:   90    Refills:   4    Last office visit:   04/04/2024  Next office visit:

## 2024-08-23 RX ORDER — AMITRIPTYLINE HYDROCHLORIDE 10 MG/1
TABLET ORAL
Qty: 90 TABLET | Refills: 4 | Status: SHIPPED | OUTPATIENT
Start: 2024-08-23

## 2024-08-23 NOTE — TELEPHONE ENCOUNTER
Signed Prescriptions:                        Disp   Refills    amitriptyline (ELAVIL) 10 MG tablet        90 tab*4        Sig: Start with 10mg at bedtime for three days, then           increase to 10mg twice daily for three days, then           10mg three times daily and continue  Authorizing Provider: KULWINDER ESPINAL  Ordering User: RITU DANIELSON RN Care Coordinator, [facility] 08/23/24 7:49 AM

## 2024-08-27 ENCOUNTER — LAB (OUTPATIENT)
Dept: LAB | Facility: CLINIC | Age: 74
End: 2024-08-27
Payer: COMMERCIAL

## 2024-08-27 DIAGNOSIS — E11.9 DIABETES MELLITUS, TYPE 2 (H): Primary | ICD-10-CM

## 2024-08-27 LAB — HBA1C MFR BLD: 5.7 % (ref 0–5.6)

## 2024-08-27 PROCEDURE — 83036 HEMOGLOBIN GLYCOSYLATED A1C: CPT

## 2024-08-27 PROCEDURE — 36415 COLL VENOUS BLD VENIPUNCTURE: CPT

## 2024-09-17 ENCOUNTER — IMMUNIZATION (OUTPATIENT)
Dept: FAMILY MEDICINE | Facility: CLINIC | Age: 74
End: 2024-09-17
Payer: COMMERCIAL

## 2024-09-17 DIAGNOSIS — Z23 ENCOUNTER FOR IMMUNIZATION: Primary | ICD-10-CM

## 2024-09-17 PROCEDURE — G0008 ADMIN INFLUENZA VIRUS VAC: HCPCS

## 2024-09-17 PROCEDURE — 99207 PR NO CHARGE NURSE ONLY: CPT

## 2024-09-17 PROCEDURE — 90662 IIV NO PRSV INCREASED AG IM: CPT

## 2024-09-17 NOTE — PROGRESS NOTES
Prior to immunization administration, verified patients identity using patient s name and date of birth. Please see Immunization Activity for additional information.     Is the patient's temperature normal (100.5 or less)? Yes     Patient MEETS CRITERIA. PROCEED with vaccine administration.      Patient instructed to remain in clinic for 15 minutes afterwards, and to report any adverse reactions.      Link to Ancillary Visit Immunization Standing Orders SmartSet     Screening performed by Tamy Fraga MA on 9/17/2024 at 3:03 PM.

## 2024-10-04 DIAGNOSIS — R00.0 SINUS TACHYCARDIA: ICD-10-CM

## 2024-10-04 RX ORDER — METOPROLOL SUCCINATE 25 MG/1
25 TABLET, EXTENDED RELEASE ORAL 2 TIMES DAILY
Qty: 180 TABLET | Refills: 1 | Status: SHIPPED | OUTPATIENT
Start: 2024-10-04

## 2024-10-13 DIAGNOSIS — E78.5 HYPERLIPIDEMIA LDL GOAL <100: ICD-10-CM

## 2024-10-14 RX ORDER — ATORVASTATIN CALCIUM 40 MG/1
40 TABLET, FILM COATED ORAL DAILY
Qty: 90 TABLET | Refills: 0 | Status: SHIPPED | OUTPATIENT
Start: 2024-10-14

## 2024-10-28 ENCOUNTER — IMMUNIZATION (OUTPATIENT)
Dept: FAMILY MEDICINE | Facility: CLINIC | Age: 74
End: 2024-10-28
Payer: COMMERCIAL

## 2024-10-28 DIAGNOSIS — Z23 ENCOUNTER FOR IMMUNIZATION: Primary | ICD-10-CM

## 2024-10-28 PROCEDURE — 99207 PR NO CHARGE NURSE ONLY: CPT

## 2024-10-28 PROCEDURE — 90480 ADMN SARSCOV2 VAC 1/ONLY CMP: CPT

## 2024-10-28 PROCEDURE — 91320 SARSCV2 VAC 30MCG TRS-SUC IM: CPT

## 2024-10-28 NOTE — PROGRESS NOTES
Prior to immunization administration, verified patients identity using patient s name and date of birth. Please see Immunization Activity for additional information.     Is the patient's temperature normal (100.5 or less)? Yes     Patient MEETS CRITERIA. PROCEED with vaccine administration.      Patient instructed to remain in clinic for 15 minutes afterwards, and to report any adverse reactions.      Link to Ancillary Visit Immunization Standing Orders SmartSet     Screening performed by Leslie Williamson MA on 10/28/2024 at 1:29 PM.

## 2024-11-07 ENCOUNTER — TRANSFERRED RECORDS (OUTPATIENT)
Dept: HEALTH INFORMATION MANAGEMENT | Facility: CLINIC | Age: 74
End: 2024-11-07
Payer: COMMERCIAL

## 2024-11-15 ENCOUNTER — ANCILLARY PROCEDURE (OUTPATIENT)
Dept: MAMMOGRAPHY | Facility: CLINIC | Age: 74
End: 2024-11-15
Payer: COMMERCIAL

## 2024-11-15 DIAGNOSIS — Z12.31 VISIT FOR SCREENING MAMMOGRAM: ICD-10-CM

## 2024-11-15 PROCEDURE — 77067 SCR MAMMO BI INCL CAD: CPT | Mod: TC | Performed by: RADIOLOGY

## 2024-11-15 PROCEDURE — 77063 BREAST TOMOSYNTHESIS BI: CPT | Mod: TC | Performed by: RADIOLOGY

## 2024-11-25 ENCOUNTER — TELEPHONE (OUTPATIENT)
Dept: FAMILY MEDICINE | Facility: CLINIC | Age: 74
End: 2024-11-25
Payer: COMMERCIAL

## 2024-11-25 NOTE — TELEPHONE ENCOUNTER
General Call      Reason for Call:  Pt had a question    What are your questions or concerns:  When picking up prescriptions, pharmacy mentioned she was due for Hep A and B vaccines. Pt was wondering if it was necessary or recommended for pt.    Date of last appointment with provider: 5/7/24    Could we send this information to you in [employer] or would you prefer to receive a phone call?:   Patient would prefer a phone call   Okay to leave a detailed message?: Yes at Home number on file [phone] (home)

## 2024-11-27 NOTE — TELEPHONE ENCOUNTER
Patient notified provider recommendation.    Reviewed with patient the transmission routes of Hep A and Hep B. Reviewed what the vaccines do. Patient is agreeable to getting them done. Patient would like to have them done in clinic. Routing to provider, please place orders as indicated. Route to Nursing or Team pool so we can get patient scheduled. Francois Munguia, GLADYS, BSN

## 2025-01-08 DIAGNOSIS — E78.5 HYPERLIPIDEMIA LDL GOAL <100: ICD-10-CM

## 2025-01-08 RX ORDER — ATORVASTATIN CALCIUM 40 MG/1
40 TABLET, FILM COATED ORAL DAILY
Qty: 90 TABLET | Refills: 0 | Status: SHIPPED | OUTPATIENT
Start: 2025-01-08

## 2025-01-13 NOTE — PROGRESS NOTES
"Isabel Carroll is a 69 year old female who is being evaluated via a billable telephone visit.      The patient has been notified of following:     \"This telephone visit will be conducted via a call between you and your physician/provider. We have found that certain health care needs can be provided without the need for a physical exam.  This service lets us provide the care you need with a short phone conversation.  If a prescription is necessary we can send it directly to your pharmacy.  If lab work is needed we can place an order for that and you can then stop by our lab to have the test done at a later time.    Telephone visits are billed at different rates depending on your insurance coverage. During this emergency period, for some insurers they may be billed the same as an in-person visit.  Please reach out to your insurance provider with any questions.    If during the course of the call the physician/provider feels a telephone visit is not appropriate, you will not be charged for this service.\"    Patient has given verbal consent for Telephone visit?  Yes    What phone number would you like to be contacted at? [phone]    How would you like to obtain your AVS? Benjy Ramirez     Isabel Carroll is a 69 year old female who presents to clinic today for the following health issues:    HPI     pleasant 69 year old female initiated a virtual visit with concerns for RLQ abdominal pain that started 5 days ago. She notes that she has a little right back pain. She has had a history of diverticulitis and UTIs but this feels different. No fever. No nausea or vomiting. Normal BMs. No dysuria or urgency. Has some frequency which is normal for her in the mornings related to coffee but she's also experiencing it in the afternoons and evenings. No hematuria. No rash.    She has a history of a few abdominal surgeries: 2 C-sections, liver resection due to a growth, laparoscopy for infertility " issues.    Patient Active Problem List   Diagnosis     Overweight     Chronic rhinitis     Primary osteoarthritis of both hands     Basal cell carcinoma of skin     Coronary artery disease involving native heart with angina pectoris, unspecified vessel or lesion type (H)     Hyperlipidemia     Impaired fasting glucose     Chorioretinal scar of left eye     Sinus tachycardia     Advance care planning     Pes planus     Right shoulder strain, initial encounter     Bronchiectasis without complication (H)     Chronic cough     Female pattern hair loss     FHx: colon cancer     Clostridium difficile infection     Pseudophakia of right eye     Pseudophakia of both eyes     Primary osteoarthritis of both knees     Vocal cord dysfunction     Past Surgical History:   Procedure Laterality Date     CATARACT IOL, RT/LT        SECTION      x 2     COLONOSCOPY      3 polyps     COLONOSCOPY  2016    neg     ENDOSCOPY  2016     LENGTHEN TENDON ACHILLES  2011    Procedure:LENGTHEN TENDON ACHILLES; Surgeon:AARON CÁRDENAS; Location:[facility]     LIVER SURGERY      Bengin large cyst     MINI ARC SLING OPERATION FOR STRESS INCONTINENCE       OSTEOTOMY ANKLE  2011    Procedure:OSTEOTOMY ANKLE; Calcaneous,  Flexor Digitorum Longus Transfer       SALPINGO-OOPHORECTOMY BILATERAL  ,     Benign prophylactic for FHx ov cancer       Social History     Tobacco Use     Smoking status: Former Smoker     Years: 16.00     Types: Cigarettes     Last attempt to quit: 1982     Years since quittin.3     Smokeless tobacco: Never Used   Substance Use Topics     Alcohol use: Yes     Alcohol/week: 0.0 standard drinks     Comment: 2-7 glasses of wine per week     Family History   Problem Relation Age of Onset     Other Cancer Sister 51        ovarian cancer     Cancer Sister      Thyroid Disease Sister      Colon Cancer Father 76     Brain Hemorrhage Father         accident     Macular Degeneration Mother       Diabetes Maternal Aunt      Diabetes Maternal Uncle      Cancer Paternal Aunt      Diabetes Maternal Grandmother      Hypertension No family hx of      Cerebrovascular Disease No family hx of      Glaucoma No family hx of          Current Outpatient Medications   Medication Sig Dispense Refill     albuterol (PROAIR HFA/PROVENTIL HFA/VENTOLIN HFA) 108 (90 Base) MCG/ACT inhaler Inhale 2 puffs into the lungs every 6 hours as needed for shortness of breath / dyspnea or wheezing 1 Inhaler 3     amitriptyline (ELAVIL) 10 MG tablet 10mg in the morning, 10mg in the aftternoon, and 20mg at bedtime 360 tablet 3     aspirin 81 MG tablet Take 1 tablet by mouth daily.       atorvastatin (LIPITOR) 40 MG tablet Take 1 tablet (40 mg) by mouth daily 90 tablet 3     Calcium-Vitamin D (CALCIUM + D PO) Take  by mouth.       FISH OIL        MAGNESIUM OXIDE PO Take 200 mg by mouth daily       metoprolol succinate ER (TOPROL-XL) 25 MG 24 hr tablet Take 1 tablet (25 mg) by mouth daily 90 tablet 3     Multiple Vitamins-Minerals (MULTIVITAMIN & MINERAL PO) Take  by mouth.       Allergies   Allergen Reactions     Sulfa Drugs Nausea     Codeine Phosphate Nausea and Vomiting     Pentazocine      Propoxyphene      Darvon     Penicillins Rash     rash and itching         Reviewed and updated as needed this visit by Provider  Tobacco  Allergies  Meds  Problems  Med Hx  Surg Hx  Fam Hx         Review of Systems   Constitutional, HEENT, cardiovascular, pulmonary, gi and gu systems are negative, except as otherwise noted.       Objective   Reported vitals:  Wt 70.3 kg (155 lb)   LMP  (LMP Unknown)   BMI 29.29 kg/m     healthy, alert and no distress  PSYCH: Alert and oriented times 3; coherent speech, normal   rate and volume, able to articulate logical thoughts, able   to abstract reason, no tangential thoughts, no hallucinations   or delusions  Her affect is normal  RESP: No cough, no audible wheezing, able to talk in full  sentences  Remainder of exam unable to be completed due to telephone visits    Diagnostic Test Results:  Labs reviewed in Epic        Assessment/Plan:  1. RLQ abdominal pain  She has a history of diverticulitis and UTI however this is different. I recommend clinic evaluation for physical exam and likely labs. We will get her set up for this afternoon at [facility]. UC/ED precautions discussed.      Return in about 1 day (around 5/13/2020) for for clinic visit.      Phone call duration:  5 minutes  This should be a no charge visit - patient needs clinic evaluation    VIC Nayak CNP         MST Score 2 or >

## 2025-01-29 DIAGNOSIS — J38.7 IRRITABLE LARYNX: ICD-10-CM

## 2025-01-29 DIAGNOSIS — R05.3 CHRONIC COUGH: ICD-10-CM

## 2025-01-29 RX ORDER — AMITRIPTYLINE HYDROCHLORIDE 10 MG/1
TABLET ORAL
Qty: 90 TABLET | Refills: 4 | Status: SHIPPED | OUTPATIENT
Start: 2025-01-29

## 2025-01-29 NOTE — TELEPHONE ENCOUNTER
Requested Prescriptions   Pending Prescriptions Disp Refills    amitriptyline (ELAVIL) 10 MG tablet 90 tablet 4     Sig: Start with 10mg at bedtime for three days, then increase to 10mg twice daily for three days, then 10mg three times daily and continue       There is no refill protocol information for this order

## 2025-02-18 ENCOUNTER — TELEPHONE (OUTPATIENT)
Dept: FAMILY MEDICINE | Facility: CLINIC | Age: 75
End: 2025-02-18
Payer: COMMERCIAL

## 2025-02-18 ENCOUNTER — ANCILLARY PROCEDURE (OUTPATIENT)
Dept: GENERAL RADIOLOGY | Facility: CLINIC | Age: 75
End: 2025-02-18
Attending: NURSE PRACTITIONER
Payer: COMMERCIAL

## 2025-02-18 ENCOUNTER — NURSE TRIAGE (OUTPATIENT)
Dept: FAMILY MEDICINE | Facility: CLINIC | Age: 75
End: 2025-02-18

## 2025-02-18 ENCOUNTER — OFFICE VISIT (OUTPATIENT)
Dept: PEDIATRICS | Facility: CLINIC | Age: 75
End: 2025-02-18
Payer: COMMERCIAL

## 2025-02-18 ENCOUNTER — ANCILLARY PROCEDURE (OUTPATIENT)
Dept: CT IMAGING | Facility: CLINIC | Age: 75
End: 2025-02-18
Attending: NURSE PRACTITIONER
Payer: COMMERCIAL

## 2025-02-18 VITALS
TEMPERATURE: 98 F | HEART RATE: 70 BPM | RESPIRATION RATE: 18 BRPM | DIASTOLIC BLOOD PRESSURE: 66 MMHG | SYSTOLIC BLOOD PRESSURE: 109 MMHG | OXYGEN SATURATION: 96 %

## 2025-02-18 DIAGNOSIS — R10.84 ABDOMINAL PAIN, GENERALIZED: ICD-10-CM

## 2025-02-18 DIAGNOSIS — R10.31 RLQ ABDOMINAL PAIN: ICD-10-CM

## 2025-02-18 DIAGNOSIS — R11.0 NAUSEA: ICD-10-CM

## 2025-02-18 DIAGNOSIS — R10.84 ABDOMINAL PAIN, GENERALIZED: Primary | ICD-10-CM

## 2025-02-18 DIAGNOSIS — K35.200 ACUTE APPENDICITIS WITH GENERALIZED PERITONITIS WITHOUT GANGRENE, PERFORATION, OR ABSCESS: ICD-10-CM

## 2025-02-18 DIAGNOSIS — I25.119 CORONARY ARTERY DISEASE INVOLVING NATIVE HEART WITH ANGINA PECTORIS, UNSPECIFIED VESSEL OR LESION TYPE: ICD-10-CM

## 2025-02-18 DIAGNOSIS — Z85.51 PERSONAL HISTORY OF MALIGNANT NEOPLASM OF BLADDER: ICD-10-CM

## 2025-02-18 LAB
ALBUMIN SERPL BCG-MCNC: 4.4 G/DL (ref 3.5–5.2)
ALBUMIN UR-MCNC: NEGATIVE MG/DL
ALP SERPL-CCNC: 84 U/L (ref 40–150)
ALT SERPL W P-5'-P-CCNC: 24 U/L (ref 0–50)
ANION GAP SERPL CALCULATED.3IONS-SCNC: 9 MMOL/L (ref 7–15)
APPEARANCE UR: CLEAR
AST SERPL W P-5'-P-CCNC: 21 U/L (ref 0–45)
BACTERIA #/AREA URNS HPF: ABNORMAL /HPF
BASOPHILS # BLD AUTO: 0 10E3/UL (ref 0–0.2)
BASOPHILS NFR BLD AUTO: 0 %
BILIRUB SERPL-MCNC: 0.6 MG/DL
BILIRUB UR QL STRIP: NEGATIVE
BUN SERPL-MCNC: 13.8 MG/DL (ref 8–23)
CALCIUM SERPL-MCNC: 9.4 MG/DL (ref 8.8–10.4)
CHLORIDE SERPL-SCNC: 100 MMOL/L (ref 98–107)
COLOR UR AUTO: ABNORMAL
CREAT SERPL-MCNC: 0.63 MG/DL (ref 0.51–0.95)
CRP SERPL-MCNC: 43.3 MG/L
EGFRCR SERPLBLD CKD-EPI 2021: >90 ML/MIN/1.73M2
EOSINOPHIL # BLD AUTO: 0 10E3/UL (ref 0–0.7)
EOSINOPHIL NFR BLD AUTO: 0 %
ERYTHROCYTE [DISTWIDTH] IN BLOOD BY AUTOMATED COUNT: 11.7 % (ref 10–15)
GLUCOSE SERPL-MCNC: 153 MG/DL (ref 70–99)
GLUCOSE UR STRIP-MCNC: NEGATIVE MG/DL
HCO3 SERPL-SCNC: 27 MMOL/L (ref 22–29)
HCT VFR BLD AUTO: 40.2 % (ref 35–47)
HGB BLD-MCNC: 13.8 G/DL (ref 11.7–15.7)
HGB UR QL STRIP: NEGATIVE
IMM GRANULOCYTES # BLD: 0.1 10E3/UL
IMM GRANULOCYTES NFR BLD: 1 %
KETONES UR STRIP-MCNC: 20 MG/DL
LEUKOCYTE ESTERASE UR QL STRIP: ABNORMAL
LYMPHOCYTES # BLD AUTO: 0.9 10E3/UL (ref 0.8–5.3)
LYMPHOCYTES NFR BLD AUTO: 9 %
MCH RBC QN AUTO: 32.2 PG (ref 26.5–33)
MCHC RBC AUTO-ENTMCNC: 34.3 G/DL (ref 31.5–36.5)
MCV RBC AUTO: 94 FL (ref 78–100)
MONOCYTES # BLD AUTO: 0.6 10E3/UL (ref 0–1.3)
MONOCYTES NFR BLD AUTO: 6 %
NEUTROPHILS # BLD AUTO: 8.9 10E3/UL (ref 1.6–8.3)
NEUTROPHILS NFR BLD AUTO: 84 %
NITRATE UR QL: POSITIVE
NRBC # BLD AUTO: 0 10E3/UL
NRBC BLD AUTO-RTO: 0 /100
PH UR STRIP: 6.5 [PH] (ref 5–7)
PLATELET # BLD AUTO: 229 10E3/UL (ref 150–450)
POTASSIUM SERPL-SCNC: 4.1 MMOL/L (ref 3.4–5.3)
PROT SERPL-MCNC: 6.9 G/DL (ref 6.4–8.3)
RADIOLOGIST FLAGS: ABNORMAL
RBC # BLD AUTO: 4.28 10E6/UL (ref 3.8–5.2)
RBC #/AREA URNS AUTO: ABNORMAL /HPF
SODIUM SERPL-SCNC: 136 MMOL/L (ref 135–145)
SP GR UR STRIP: 1 (ref 1–1.03)
UROBILINOGEN UR STRIP-MCNC: NORMAL MG/DL
WBC # BLD AUTO: 10.5 10E3/UL (ref 4–11)
WBC #/AREA URNS AUTO: ABNORMAL /HPF

## 2025-02-18 PROCEDURE — 99417 PROLNG OP E/M EACH 15 MIN: CPT | Performed by: NURSE PRACTITIONER

## 2025-02-18 PROCEDURE — 81001 URINALYSIS AUTO W/SCOPE: CPT | Performed by: NURSE PRACTITIONER

## 2025-02-18 PROCEDURE — 74019 RADEX ABDOMEN 2 VIEWS: CPT | Performed by: RADIOLOGY

## 2025-02-18 PROCEDURE — 74177 CT ABD & PELVIS W/CONTRAST: CPT | Performed by: RADIOLOGY

## 2025-02-18 PROCEDURE — 85025 COMPLETE CBC W/AUTO DIFF WBC: CPT | Performed by: NURSE PRACTITIONER

## 2025-02-18 PROCEDURE — 87186 SC STD MICRODIL/AGAR DIL: CPT | Performed by: NURSE PRACTITIONER

## 2025-02-18 PROCEDURE — 99215 OFFICE O/P EST HI 40 MIN: CPT | Mod: 25 | Performed by: NURSE PRACTITIONER

## 2025-02-18 PROCEDURE — 86140 C-REACTIVE PROTEIN: CPT | Performed by: NURSE PRACTITIONER

## 2025-02-18 PROCEDURE — 87086 URINE CULTURE/COLONY COUNT: CPT | Performed by: NURSE PRACTITIONER

## 2025-02-18 PROCEDURE — 96361 HYDRATE IV INFUSION ADD-ON: CPT | Performed by: NURSE PRACTITIONER

## 2025-02-18 PROCEDURE — 36415 COLL VENOUS BLD VENIPUNCTURE: CPT | Performed by: NURSE PRACTITIONER

## 2025-02-18 PROCEDURE — 80053 COMPREHEN METABOLIC PANEL: CPT | Performed by: NURSE PRACTITIONER

## 2025-02-18 PROCEDURE — 87088 URINE BACTERIA CULTURE: CPT | Performed by: NURSE PRACTITIONER

## 2025-02-18 PROCEDURE — 96374 THER/PROPH/DIAG INJ IV PUSH: CPT | Performed by: NURSE PRACTITIONER

## 2025-02-18 RX ORDER — ONDANSETRON 2 MG/ML
4 INJECTION INTRAMUSCULAR; INTRAVENOUS
Status: ACTIVE | OUTPATIENT
Start: 2025-02-18 | End: 2025-02-19

## 2025-02-18 RX ORDER — IOPAMIDOL 755 MG/ML
74 INJECTION, SOLUTION INTRAVASCULAR ONCE
Status: COMPLETED | OUTPATIENT
Start: 2025-02-18 | End: 2025-02-18

## 2025-02-18 RX ADMIN — Medication 1000 ML: at 16:38

## 2025-02-18 RX ADMIN — IOPAMIDOL 74 ML: 755 INJECTION, SOLUTION INTRAVASCULAR at 17:27

## 2025-02-18 RX ADMIN — ONDANSETRON 4 MG: 2 INJECTION INTRAMUSCULAR; INTRAVENOUS at 16:37

## 2025-02-18 ASSESSMENT — PAIN SCALES - GENERAL: PAINLEVEL_OUTOF10: SEVERE PAIN (8)

## 2025-02-18 NOTE — PROGRESS NOTES
[facility]ute [facility] Services Clinic Visit    Assessment & Plan   Problem List Items Addressed This Visit       Coronary artery disease involving native heart with angina pectoris, unspecified vessel or lesion type    Personal history of malignant neoplasm of bladder     Other Visit Diagnoses       Abdominal pain, generalized    -  Primary    Relevant Orders    CBC with platelets differential (Completed)    Comprehensive metabolic panel (Completed)    CRP inflammation (Completed)    UA with Microscopic reflex to Culture    XR Abdomen 2 Views (Completed)    Urine Microscopic Exam    RLQ abdominal pain        Relevant Orders    CT Abdomen Pelvis w Contrast (Completed)    Nausea        Relevant Medications    sodium chloride 0.9% BOLUS 1,000 mL (Completed)    ondansetron (ZOFRAN) injection 4 mg    Acute appendicitis with generalized peritonitis without gangrene, perforation, or abscess             74-year-old patient presents to [facility] today chronic conditions include coronary artery disease, history of bladder cancer, chronic rhinitis, osteoarthritis, history of basal cell carcinoma of the skin, prediabetes, plus planus, chronic cough, history of sinus tachycardia, vocal cord dysfunction, history of C. difficile presents with with new onset right lower quadrant pain that began yesterday nausea without vomiting no fever or chills.      Patient indicates last meal was at 9 AM this morning she had an English muffin.      Physical exam patient noted to have some right lower quadrant tenderness subsequent diagnostics include CBC with white count on the upper limits of normal CMP mild elevation in glucose at 153 and CRP elevated at 43.30.  Abdominal x-ray shows moderate amount of stool. CT abdomen pelvis completed indicating a small esophageal hiatal hernia, coronary artery calcification, dilated appendix developed measuring up to 1.3 cm with mural thickening and mildperiappendiceal inflammatory stranding typical of acute  "appendicitis.  A few small hyperdense fecaliths are present within the appendix.  No abscess or evidence of perforation.  Patient also have a tiny fat-containing umbilical hernia.      Patient is given 500 of normal saline and 4 of Zofran while in clinic.      Results reviewed with patient she is transferred via private vehicle to [facility] emergency room report has been provided.  All patient and spouse's questions were addressed they verbalized understanding agree with plan.    60 minutes were spent doing chart review, history and exam, documentation and further activities per the note.      BMI  Estimated body mass index is 25.51 kg/m  as calculated from the following:    Height as of 5/7/24: 1.549 m (5' 1\").    Weight as of 5/7/24: 61.2 kg (135 lb).           No follow-ups on file.      James Hall is a 74 year old, presenting for the following health issues:  Abdominal Pain    HPI     Abdominal/Flank Pain  Onset/Duration: Yesterday   Description:   Character: Sharp and stabbing   Location: right lower quadrant  Radiation: None and Back  Intensity: severe  Progression of Symptoms:  worsening  Accompanying Signs & Symptoms:  Fever/chills: YES- no temp but has felt feverish   Gas/Bloating: YES  Nausea: YES  Vomitting: no   Diarrhea: no   Constipation:YES- probably some   Dysuria: no            Hematuria: no            Frequency: no            Incontinence of urine: no   History:            Last bowel movement: yesterday -   Trauma: no   Previous similar pain: YES- 12/29/2024 - Similar Pain - CT was clear - but feels like pain is worse    Previous tests done: CT -12/29/2024           Previous Abdominal surgery: YES- 2 C sections - Part of Liver removed in 2000  Precipitating factors:   Does the pain change with:     Food: no      Bowel Movement: no     Urination: no              Other factors: no   Therapies tried and outcome:  Ibuprofen 600 mg at 12 pm     When food last eaten: This morning about 1030 " AM         Review of Systems  Constitutional, HEENT, cardiovascular, pulmonary, GI, , musculoskeletal, neuro, skin, endocrine and psych systems are negative, except as otherwise noted.      Objective    /66 (BP Location: Left arm, Patient Position: Sitting, Cuff Size: Adult Regular)   Pulse 70   Temp 98  F (36.7  C) (Oral)   Resp 18   LMP  (LMP Unknown)   SpO2 96%   There is no height or weight on file to calculate BMI.  Physical Exam   GENERAL: alert and no distress  EYES: Eyes grossly normal to inspection,   conjunctivae and sclerae normal  Oromucosa: Dry mucous membranes noted  NECK: no adenopathy   RESP: Patient is regular nonlabored  ABDOMEN: soft, right quadrant tenderness upon palpation, no hepatosplenomegaly, no masses and bowel sounds normal  PSYCH: mentation appears normal, affect normal/bright    Results for orders placed or performed in visit on 02/18/25   CT Abdomen Pelvis w Contrast     Status: Abnormal   Result Value Ref Range    Radiologist flags Acute appendicitis (AA)     Narrative    EXAM: CT ABDOMEN PELVIS W CONTRAST  LOCATION: [facility]  DATE: 2/18/2025    INDICATION: RLQ abdominal pain  COMPARISON: CT abdomen pelvis 4/11/2024  TECHNIQUE: CT scan of the abdomen and pelvis was performed following injection of IV contrast. Multiplanar reformats were obtained. Dose reduction techniques were used.  CONTRAST: 74 mL Isovue-370    FINDINGS:   LOWER CHEST: Mild linear scarring or atelectasis left lung base. Small esophageal hiatal hernia. Coronary artery calcification.    HEPATOBILIARY: Cholecystectomy. Moderate biliary prominence from reservoir effect postcholecystectomy, unchanged. Few small benign liver cysts.    PANCREAS: Normal.    SPLEEN: Normal.    ADRENAL GLANDS: Normal.    KIDNEYS/BLADDER: Normal.    BOWEL: Dilated appendix has developed measuring up to 1.3 cm (series 2, image 64) with mural thickening and mild periappendiceal inflammatory stranding,  typical of acute appendicitis. A few small hyperdense fecaliths are present within the appendix. No   evidence of perforation including no free air. No abscess. No evidence of bowel obstruction.    LYMPH NODES: Normal.    VASCULATURE: Mild atherosclerotic calcifications. Normal caliber abdominal aorta.    PELVIC ORGANS: Few small pelvic phleboliths.    MUSCULOSKELETAL: Mild scattered hypertrophic degenerative changes in the spine. Tiny fat-containing umbilical hernia.      Impression    IMPRESSION:   Acute appendicitis.      [Critical Result: Acute appendicitis]    Finding was identified on 2/18/2025 5:40 PM CST.     Shruti Mcleod NP was contacted by me on 2/18/2025 5:45 PM CST and verbalized understanding of the critical result.    Results for orders placed or performed in visit on 02/18/25   XR Abdomen 2 Views     Status: None    Narrative    EXAM: XR ABDOMEN 2 VIEWS  LOCATION: [facility]  DATE: 2/18/2025    INDICATION:  Abdominal pain, generalized  COMPARISON: CT 4/11/2024      Impression    IMPRESSION: Stable mild asymmetric elevation of the right hemidiaphragm. Nonobstructive bowel gas pattern. No definite free air. Moderate stool burden within the cecum/ascending colon. Pelvic phleboliths. No definite renal stone.   Results for orders placed or performed in visit on 02/18/25   Comprehensive metabolic panel     Status: Abnormal   Result Value Ref Range    Sodium 136 135 - 145 mmol/L    Potassium 4.1 3.4 - 5.3 mmol/L    Carbon Dioxide (CO2) 27 22 - 29 mmol/L    Anion Gap 9 7 - 15 mmol/L    Urea Nitrogen 13.8 8.0 - 23.0 mg/dL    Creatinine 0.63 0.51 - 0.95 mg/dL    GFR Estimate >90 >60 mL/min/1.73m2    Calcium 9.4 8.8 - 10.4 mg/dL    Chloride 100 98 - 107 mmol/L    Glucose 153 (H) 70 - 99 mg/dL    Alkaline Phosphatase 84 40 - 150 U/L    AST 21 0 - 45 U/L    ALT 24 0 - 50 U/L    Protein Total 6.9 6.4 - 8.3 g/dL    Albumin 4.4 3.5 - 5.2 g/dL    Bilirubin Total 0.6 <=1.2 mg/dL   CRP  inflammation     Status: Abnormal   Result Value Ref Range    CRP Inflammation 43.30 (H) <5.00 mg/L   CBC with platelets and differential     Status: Abnormal   Result Value Ref Range    WBC Count 10.5 4.0 - 11.0 10e3/uL    RBC Count 4.28 3.80 - 5.20 10e6/uL    Hemoglobin 13.8 11.7 - 15.7 g/dL    Hematocrit 40.2 35.0 - 47.0 %    MCV 94 78 - 100 fL    MCH 32.2 26.5 - 33.0 pg    MCHC 34.3 31.5 - 36.5 g/dL    RDW 11.7 10.0 - 15.0 %    Platelet Count 229 150 - 450 10e3/uL    % Neutrophils 84 %    % Lymphocytes 9 %    % Monocytes 6 %    % Eosinophils 0 %    % Basophils 0 %    % Immature Granulocytes 1 %    NRBCs per 100 WBC 0 <1 /100    Absolute Neutrophils 8.9 (H) 1.6 - 8.3 10e3/uL    Absolute Lymphocytes 0.9 0.8 - 5.3 10e3/uL    Absolute Monocytes 0.6 0.0 - 1.3 10e3/uL    Absolute Eosinophils 0.0 0.0 - 0.7 10e3/uL    Absolute Basophils 0.0 0.0 - 0.2 10e3/uL    Absolute Immature Granulocytes 0.1 <=0.4 10e3/uL    Absolute NRBCs 0.0 10e3/uL   CBC with platelets differential     Status: Abnormal    Narrative    The following orders were created for panel order CBC with platelets differential.  Procedure                               Abnormality         Status                     ---------                               -----------         ------                     CBC with platelets and d...[[id number]]  Abnormal            Final result                 Please view results for these tests on the individual orders.     Results for orders placed or performed in visit on 02/18/25 (from the past 24 hours)   CBC with platelets differential    Narrative    The following orders were created for panel order CBC with platelets differential.  Procedure                               Abnormality         Status                     ---------                               -----------         ------                     CBC with platelets and d...[[id number]]  Abnormal            Final result                 Please view results for these  tests on the individual orders.   Comprehensive metabolic panel   Result Value Ref Range    Sodium 136 135 - 145 mmol/L    Potassium 4.1 3.4 - 5.3 mmol/L    Carbon Dioxide (CO2) 27 22 - 29 mmol/L    Anion Gap 9 7 - 15 mmol/L    Urea Nitrogen 13.8 8.0 - 23.0 mg/dL    Creatinine 0.63 0.51 - 0.95 mg/dL    GFR Estimate >90 >60 mL/min/1.73m2    Calcium 9.4 8.8 - 10.4 mg/dL    Chloride 100 98 - 107 mmol/L    Glucose 153 (H) 70 - 99 mg/dL    Alkaline Phosphatase 84 40 - 150 U/L    AST 21 0 - 45 U/L    ALT 24 0 - 50 U/L    Protein Total 6.9 6.4 - 8.3 g/dL    Albumin 4.4 3.5 - 5.2 g/dL    Bilirubin Total 0.6 <=1.2 mg/dL   CRP inflammation   Result Value Ref Range    CRP Inflammation 43.30 (H) <5.00 mg/L   CBC with platelets and differential   Result Value Ref Range    WBC Count 10.5 4.0 - 11.0 10e3/uL    RBC Count 4.28 3.80 - 5.20 10e6/uL    Hemoglobin 13.8 11.7 - 15.7 g/dL    Hematocrit 40.2 35.0 - 47.0 %    MCV 94 78 - 100 fL    MCH 32.2 26.5 - 33.0 pg    MCHC 34.3 31.5 - 36.5 g/dL    RDW 11.7 10.0 - 15.0 %    Platelet Count 229 150 - 450 10e3/uL    % Neutrophils 84 %    % Lymphocytes 9 %    % Monocytes 6 %    % Eosinophils 0 %    % Basophils 0 %    % Immature Granulocytes 1 %    NRBCs per 100 WBC 0 <1 /100    Absolute Neutrophils 8.9 (H) 1.6 - 8.3 10e3/uL    Absolute Lymphocytes 0.9 0.8 - 5.3 10e3/uL    Absolute Monocytes 0.6 0.0 - 1.3 10e3/uL    Absolute Eosinophils 0.0 0.0 - 0.7 10e3/uL    Absolute Basophils 0.0 0.0 - 0.2 10e3/uL    Absolute Immature Granulocytes 0.1 <=0.4 10e3/uL    Absolute NRBCs 0.0 10e3/uL           Signed Electronically by: Shruti Mcleod NP

## 2025-02-18 NOTE — TELEPHONE ENCOUNTER
"S-(situation): Patient calling in with severe RLQ pain.      B-(background): Had this pain before, went to ED and had constipation at that time.      A-(assessment): Patient noting severe RLQ pain.  States she feels nausea and feverish.  Notes it is hard to stand up and move due to the pain.  Did not take her temperature with a thermometer, she just feels like she has a fever.      R-(recommendations): [facility] referral was made.  Patient was agreeable to the ADS referral.  [facility] staff going to reach out to patient to arrange appointment.      Kristina Kjellberg, MSN, RN      Reason for Disposition   SEVERE abdominal pain (e.g., excruciating)    Additional Information   Negative: Passed out (e.g., fainted, lost consciousness, blacked out and was not responding)   Negative: Shock suspected (e.g., cold/pale/clammy skin, too weak to stand, low BP, rapid pulse)   Negative: Sounds like a life-threatening emergency to the triager   Negative: Followed an abdomen (stomach) injury   Negative: Chest pain   Negative: Abdominal pain and pregnant < 20 weeks   Negative: Abdominal pain and pregnant 20 or more weeks   Negative: Pain is mainly in upper abdomen (if needed ask: 'is it mainly above the belly button?')   Negative: Abdomen bloating or swelling are main symptoms    Answer Assessment - Initial Assessment Questions  1. LOCATION: \"Where does it hurt?\"       RLQ pain  2. RADIATION: \"Does the pain shoot anywhere else?\" (e.g., chest, back)      No, lower RQ only  3. ONSET: \"When did the pain begin?\" (e.g., minutes, hours or days ago)       Last night  4. SUDDEN: \"Gradual or sudden onset?\"      Sudden onset  5. PATTERN \"Does the pain come and go, or is it constant?\"      Constant  6. SEVERITY: \"How bad is the pain?\"  (e.g., Scale 1-10; mild, moderate, or severe)      Severe  7. RECURRENT SYMPTOM: \"Have you ever had this type of stomach pain before?\" If Yes, ask: \"When was the last time?\" and \"What happened that time?\"       Happened " "before, had CT-nothing on CT;  8. CAUSE: \"What do you think is causing the stomach pain?\"      Appendicitis,   9. RELIEVING/AGGRAVATING FACTORS: \"What makes it better or worse?\" (e.g., antacids, bending or twisting motion, bowel movement)      No  10. OTHER SYMPTOMS: \"Do you have any other symptoms?\" (e.g., back pain, diarrhea, fever, urination pain, vomiting)        Feverish feeling, nausea,   11. PREGNANCY: \"Is there any chance you are pregnant?\" \"When was your last menstrual period?\"        NA    Protocols used: Abdominal Pain - Female-A-[place]    "

## 2025-02-18 NOTE — TELEPHONE ENCOUNTER
Patient calling requesting to know what her glucose readings have been over the past 2 years.  Informed her that her A1C lab tests have been very good between 5.5- 5.7.    Gave her glucose readings from Epic from the last 3 years.   Patient had no further questions.       Anaid DOBBSN, RN

## 2025-02-20 LAB — BACTERIA UR CULT: ABNORMAL

## 2025-02-25 ENCOUNTER — TELEPHONE (OUTPATIENT)
Dept: FAMILY MEDICINE | Facility: CLINIC | Age: 75
End: 2025-02-25
Payer: COMMERCIAL

## 2025-02-25 NOTE — TELEPHONE ENCOUNTER
----- Message from Dana Mills sent at 2/25/2025  4:20 PM CST -----  Regarding: FW: uti  Hello,    Please schedule ED follow up and urine recheck for this patient.    Thank you   Dana Mills PAC  ----- Message -----  From: Jeannie Chau MD  Sent: 2/21/2025   9:02 AM CST  To: Dana Mills PA-C  Subject: uti                                              Hi.  Im working in [facility] and am checking UC results.  Isabel had a uti based on UC and was not given medications for it while in the [facility].  She then ended up at [facility] with an appendicitis and received some antibiotics IV while there.  They did send her home with cefuroxime 500 mg bid x7 tabs.  Im use to treating with 250 bid for 7 days but my guess is that regimen should work to treat her uti.  I have spoken to her on the phone and asked that she follow up with another UA/UC after the antibiotics are complete to make sure her uti is gone.  I told her I would message you to help facilitate this.  Will you follow up with her and get a repeat UA/UC?  Jeannie Chau MD

## 2025-02-27 ENCOUNTER — TELEPHONE (OUTPATIENT)
Dept: FAMILY MEDICINE | Facility: CLINIC | Age: 75
End: 2025-02-27

## 2025-03-08 DIAGNOSIS — E11.9 TYPE 2 DIABETES MELLITUS WITHOUT COMPLICATION, WITHOUT LONG-TERM CURRENT USE OF INSULIN (H): ICD-10-CM

## 2025-03-10 RX ORDER — BLOOD SUGAR DIAGNOSTIC
STRIP MISCELLANEOUS
Qty: 300 STRIP | Refills: 2 | Status: SHIPPED | OUTPATIENT
Start: 2025-03-10

## 2025-03-13 ENCOUNTER — OFFICE VISIT (OUTPATIENT)
Dept: FAMILY MEDICINE | Facility: CLINIC | Age: 75
End: 2025-03-13
Payer: COMMERCIAL

## 2025-03-13 VITALS
TEMPERATURE: 97.2 F | DIASTOLIC BLOOD PRESSURE: 82 MMHG | WEIGHT: 139 LBS | HEART RATE: 83 BPM | OXYGEN SATURATION: 96 % | SYSTOLIC BLOOD PRESSURE: 130 MMHG | BODY MASS INDEX: 26.26 KG/M2

## 2025-03-13 DIAGNOSIS — R00.0 SINUS TACHYCARDIA: ICD-10-CM

## 2025-03-13 DIAGNOSIS — Z09 S/P APPENDECTOMY, FOLLOW-UP EXAM: Primary | ICD-10-CM

## 2025-03-13 DIAGNOSIS — I25.119 CORONARY ARTERY DISEASE INVOLVING NATIVE HEART WITH ANGINA PECTORIS, UNSPECIFIED VESSEL OR LESION TYPE: ICD-10-CM

## 2025-03-13 DIAGNOSIS — C67.9 MALIGNANT NEOPLASM OF URINARY BLADDER, UNSPECIFIED SITE (H): ICD-10-CM

## 2025-03-13 DIAGNOSIS — E78.5 HYPERLIPIDEMIA LDL GOAL <100: ICD-10-CM

## 2025-03-13 DIAGNOSIS — E11.59 TYPE 2 DIABETES MELLITUS WITH OTHER CIRCULATORY COMPLICATION, WITHOUT LONG-TERM CURRENT USE OF INSULIN (H): ICD-10-CM

## 2025-03-13 DIAGNOSIS — N30.00 ACUTE CYSTITIS WITHOUT HEMATURIA: ICD-10-CM

## 2025-03-13 DIAGNOSIS — R05.3 CHRONIC COUGH: ICD-10-CM

## 2025-03-13 NOTE — PATIENT INSTRUCTIONS
At [facility], we strive to deliver an exceptional experience to you, every time we see you. If you receive a survey, please let us know what we are doing well and/or what we could improve upon, as we do value your feedback.  If you have MyChart, you can expect to receive results automatically within 24 hours of their completion.  Your provider will send a note interpreting your results as well.   If you do not have MyChart, you should receive your results in about a week by mail.    Your care team:                            Family Medicine Internal Medicine   MD Guillermo Faustin, MD Annalee Hodges, MD Baudilio Zamorano, MD Mita Mills, PANikkiC    Joey Tijerina, MD Pediatrics   Naida Pringle, MD Brandee Kingsley, MD Naida Collins, APRN CNP Antionette Guerra APRN CNP   MD Lindsay Maldonado, MD Suzi Escalante, CNP     Tono Holloway, CNP Same-Day Provider (No follow-up visits)   VIC Guy, DNP Darlene Gasca, VIC Perera, FNP, BC DARREN SpencerC     Clinic hours: Monday - Thursday 7 am-6 pm; Fridays 7 am-5 pm.   Urgent care: Monday - Friday 10 am- 8 pm; Saturday and Sunday 9 am-5 pm.    Clinic: [phone]       [facility]: Monday - Thursday 8 am - 7 pm; Friday 8 am - 6 pm  [facility]: [phone]

## 2025-03-13 NOTE — PROGRESS NOTES
"  Assessment & Plan   Problem List Items Addressed This Visit          Nervous and Auditory    Coronary artery disease involving native heart with angina pectoris, unspecified vessel or lesion type       Respiratory    Chronic cough       Endocrine    Hyperlipidemia LDL goal <100    Diabetes mellitus, type 2 (H)    Relevant Orders    Albumin Random Urine Quantitative with Creat Ratio       Circulatory    Sinus tachycardia       Urinary    Malignant neoplasm of urinary bladder, unspecified site (H)     Other Visit Diagnoses       S/P appendectomy, follow-up exam    -  Primary           Patient is healing well post appendectomy   UTI resolved. repeat urinalysis if develops symptoms again.    MED REC REQUIRED  Post Medication Reconciliation Status:   BMI  Estimated body mass index is 26.26 kg/m  as calculated from the following:    Height as of 5/7/24: 1.549 m (5' 1\").    Weight as of this encounter: 63 kg (139 lb).   Weight management plan: Discussed healthy diet and exercise guidelines          James Hall is a 74 year old, presenting for the following health issues:  Hospital F/U    [facility]          Hospital Follow-up Visit:    Hospital/Nursing Home/ Rehab Facility:  [facility]  Date of Admission: 2/18/2025  Date of Discharge: 2/20/2025  Reason(s) for Admission: Appendicitis, patient also has UTI  Was the patient in the ICU or did the patient experience delirium during hospitalization?  No  Do you have any other stressors you would like to discuss with your provider? No    Problems taking medications regularly:  None  Medication changes since discharge: None  Problems adhering to non-medication therapy:  None    Summary of hospitalization:  [facility] discharge summary reviewed  Diagnostic Tests/Treatments reviewed.  Follow up needed: Follow up two weeks with PCP  Other Healthcare Providers Involved in Patient s Care:         None  Update since discharge: improved.         Plan of care " communicated with patient           Diabetes Follow-up    How often are you checking your blood sugar? A few times a week  What time of day are you checking your blood sugars (select all that apply)?  Before and after meals  Have you had any blood sugars above 200?  No  Have you had any blood sugars below 70?  No  What symptoms do you notice when your blood sugar is low?  None  What concerns do you have today about your diabetes? None   Do you have any of these symptoms? (Select all that apply)  No numbness or tingling in feet.  No redness, sores or blisters on feet.  No complaints of excessive thirst.  No reports of blurry vision.  No significant changes to weight.          Hyperlipidemia Follow-Up    Are you regularly taking any medication or supplement to lower your cholesterol?   Yes- statin  Are you having muscle aches or other side effects that you think could be caused by your cholesterol lowering medication?  No    Hypertension Follow-up    Do you check your blood pressure regularly outside of the clinic? Yes   Are you following a low salt diet? Yes  Are your blood pressures ever more than 140 on the top number (systolic) OR more   than 90 on the bottom number (diastolic), for example 140/90? No    BP Readings from Last 2 Encounters:   03/13/25 130/82   02/18/25 109/66     Hemoglobin A1C (%)   Date Value   08/27/2024 5.7 (H)   02/07/2024 5.7 (H)   10/14/2020 5.6   11/08/2019 5.6     LDL Cholesterol Calculated (mg/dL)   Date Value   02/07/2024 53   04/28/2023 44   10/14/2020 39   05/22/2018 48       How many days per week do you miss taking your medication? 0  Vascular Disease Follow-up    How often do you take nitroglycerin? Never  Do you take an aspirin every day? Yes        Review of Systems  Constitutional, HEENT, cardiovascular, pulmonary, gi and gu systems are negative, except as otherwise noted.      Objective    /82 (BP Location: Left arm, Patient Position: Sitting, Cuff Size: Adult Regular)    Pulse 83   Temp 97.2  F (36.2  C) (Temporal)   Wt 63 kg (139 lb)   LMP  (LMP Unknown)   SpO2 96%   BMI 26.26 kg/m    Body mass index is 26.26 kg/m .  Physical Exam   GENERAL: alert and no distress  EYES: Eyes grossly normal to inspection, PERRL and conjunctivae and sclerae normal  HENT: ear canals and TM's normal, nose and mouth without ulcers or lesions  NECK: no adenopathy, no asymmetry, masses, or scars  RESP: lungs clear to auscultation - no rales, rhonchi or wheezes  CV: regular rate and rhythm, normal S1 S2, no S3 or S4, no murmur, click or rub, no peripheral edema  ABDOMEN: soft, nontender, without hepatosplenomegaly or masses, bowel sounds normal, and well-healed incision post appendectomy   MS: no gross musculoskeletal defects noted, no edema  SKIN: no suspicious lesions or rashes  NEURO: Normal strength and tone, mentation intact and speech normal  PSYCH: mentation appears normal, affect normal/bright    Ancillary Procedure on 02/18/2025   Component Date Value Ref Range Status    Radiologist flags 02/18/2025 Acute appendicitis (AA)   Final           Signed Electronically by: Dana Mills PA-C

## 2025-03-20 ENCOUNTER — MYC MEDICAL ADVICE (OUTPATIENT)
Dept: FAMILY MEDICINE | Facility: CLINIC | Age: 75
End: 2025-03-20
Payer: COMMERCIAL

## 2025-03-20 NOTE — TELEPHONE ENCOUNTER
Routing to provider for review. Please see [employer] message. Order pending if appropriate.     Shereen Rosales RN on 3/20/2025 at 2:53 PM

## 2025-03-23 ENCOUNTER — HEALTH MAINTENANCE LETTER (OUTPATIENT)
Age: 75
End: 2025-03-23

## 2025-03-25 ENCOUNTER — LAB (OUTPATIENT)
Dept: LAB | Facility: CLINIC | Age: 75
End: 2025-03-25
Payer: COMMERCIAL

## 2025-03-25 DIAGNOSIS — N30.00 ACUTE CYSTITIS WITHOUT HEMATURIA: ICD-10-CM

## 2025-03-25 DIAGNOSIS — E11.9 DIABETES MELLITUS, TYPE 2 (H): ICD-10-CM

## 2025-03-25 DIAGNOSIS — E11.59 TYPE 2 DIABETES MELLITUS WITH OTHER CIRCULATORY COMPLICATION, WITHOUT LONG-TERM CURRENT USE OF INSULIN (H): ICD-10-CM

## 2025-03-25 DIAGNOSIS — I25.119 CORONARY ARTERY DISEASE INVOLVING NATIVE HEART WITH ANGINA PECTORIS, UNSPECIFIED VESSEL OR LESION TYPE: Primary | ICD-10-CM

## 2025-03-25 LAB
ALBUMIN UR-MCNC: NEGATIVE MG/DL
APPEARANCE UR: CLEAR
BACTERIA #/AREA URNS HPF: ABNORMAL /HPF
BILIRUB UR QL STRIP: NEGATIVE
COLOR UR AUTO: YELLOW
GLUCOSE UR STRIP-MCNC: NEGATIVE MG/DL
HGB UR QL STRIP: NEGATIVE
KETONES UR STRIP-MCNC: NEGATIVE MG/DL
LEUKOCYTE ESTERASE UR QL STRIP: ABNORMAL
NITRATE UR QL: NEGATIVE
PH UR STRIP: 8 [PH] (ref 5–7)
RBC #/AREA URNS AUTO: ABNORMAL /HPF
SP GR UR STRIP: 1.01 (ref 1–1.03)
SQUAMOUS #/AREA URNS AUTO: ABNORMAL /LPF
UROBILINOGEN UR STRIP-ACNC: 0.2 E.U./DL
WBC #/AREA URNS AUTO: ABNORMAL /HPF
WBC CLUMPS #/AREA URNS HPF: PRESENT /HPF

## 2025-03-25 PROCEDURE — 87086 URINE CULTURE/COLONY COUNT: CPT

## 2025-03-25 PROCEDURE — 82570 ASSAY OF URINE CREATININE: CPT

## 2025-03-25 PROCEDURE — 82043 UR ALBUMIN QUANTITATIVE: CPT

## 2025-03-25 PROCEDURE — 81001 URINALYSIS AUTO W/SCOPE: CPT

## 2025-03-26 LAB
BACTERIA UR CULT: NORMAL
CREAT UR-MCNC: 21.8 MG/DL
MICROALBUMIN UR-MCNC: <12 MG/L
MICROALBUMIN/CREAT UR: NORMAL MG/G{CREAT}

## 2025-03-27 ENCOUNTER — OFFICE VISIT (OUTPATIENT)
Dept: OPTOMETRY | Facility: CLINIC | Age: 75
End: 2025-03-27
Payer: COMMERCIAL

## 2025-03-27 DIAGNOSIS — Z96.1 PSEUDOPHAKIA OF BOTH EYES: ICD-10-CM

## 2025-03-27 DIAGNOSIS — H52.222 REGULAR ASTIGMATISM OF LEFT EYE: ICD-10-CM

## 2025-03-27 DIAGNOSIS — Z01.00 EXAMINATION OF EYES AND VISION: Primary | ICD-10-CM

## 2025-03-27 DIAGNOSIS — H31.002 CHORIORETINAL SCAR OF LEFT EYE: ICD-10-CM

## 2025-03-27 DIAGNOSIS — E11.9 TYPE 2 DIABETES MELLITUS WITHOUT COMPLICATION, WITHOUT LONG-TERM CURRENT USE OF INSULIN (H): ICD-10-CM

## 2025-03-27 DIAGNOSIS — H52.13 MYOPIA OF BOTH EYES: ICD-10-CM

## 2025-03-27 DIAGNOSIS — H10.13 ALLERGIC CONJUNCTIVITIS OF BOTH EYES: ICD-10-CM

## 2025-03-27 DIAGNOSIS — Z98.890 HISTORY OF EYELID SURGERY: ICD-10-CM

## 2025-03-27 DIAGNOSIS — H52.4 PRESBYOPIA: ICD-10-CM

## 2025-03-27 ASSESSMENT — REFRACTION_WEARINGRX
OS_AXIS: 075
OS_CYLINDER: +0.50
OS_SPHERE: -1.00
OS_ADD: +3.00
SPECS_TYPE: PAL
OD_CYLINDER: SPHERE
OD_SPHERE: -0.50
OD_ADD: +3.00

## 2025-03-27 ASSESSMENT — TONOMETRY
OS_IOP_MMHG: 18
IOP_METHOD: APPLANATION
OD_IOP_MMHG: 18

## 2025-03-27 ASSESSMENT — CONF VISUAL FIELD
OS_SUPERIOR_TEMPORAL_RESTRICTION: 0
OS_NORMAL: 1
OD_SUPERIOR_NASAL_RESTRICTION: 0
OD_SUPERIOR_TEMPORAL_RESTRICTION: 0
OD_INFERIOR_NASAL_RESTRICTION: 0
OD_NORMAL: 1
OD_INFERIOR_TEMPORAL_RESTRICTION: 0
OS_SUPERIOR_NASAL_RESTRICTION: 0
OS_INFERIOR_TEMPORAL_RESTRICTION: 0
OS_INFERIOR_NASAL_RESTRICTION: 0

## 2025-03-27 ASSESSMENT — KERATOMETRY
OS_K1POWER_DIOPTERS: 42.25
OS_K2POWER_DIOPTERS: 45.00
OS_AXISANGLE2_DEGREES: 166
OD_K1POWER_DIOPTERS: 42.00
OD_AXISANGLE2_DEGREES: 180
OS_AXISANGLE_DEGREES: 076
OD_AXISANGLE_DEGREES: 090
OD_K2POWER_DIOPTERS: 44.00

## 2025-03-27 ASSESSMENT — VISUAL ACUITY
OS_SC+: -1
CORRECTION_TYPE: GLASSES
OD_CC: 20/50
OD_CC: 20/20
METHOD: SNELLEN - LINEAR
OD_SC+: -1
OS_CC: 20/50
OS_CC: 20/25
OD_SC: 20/30
OS_SC: 20/30

## 2025-03-27 ASSESSMENT — EXTERNAL EXAM - LEFT EYE: OS_EXAM: NORMAL

## 2025-03-27 ASSESSMENT — CUP TO DISC RATIO
OD_RATIO: 0.3
OS_RATIO: 0.3

## 2025-03-27 ASSESSMENT — REFRACTION_MANIFEST
OS_SPHERE: -1.00
OD_SPHERE: -0.50
OS_ADD: +3.00
OS_CYLINDER: +0.50
OS_AXIS: 075
OD_ADD: +3.00
OD_CYLINDER: SPHERE

## 2025-03-27 ASSESSMENT — EXTERNAL EXAM - RIGHT EYE: OD_EXAM: NORMAL

## 2025-03-27 NOTE — PROGRESS NOTES
Chief Complaint   Patient presents with    Diabetic Eye Exam        Chief Complaint(s) and History of Present Illness(es)       Diabetic Eye Exam              Vision: is stable    Diabetes Type: Type 2 and controlled with diet    Duration: 2 years    Blood Sugars: is controlled                   Lab Results   Component Value Date    A1C 5.7 08/27/2024    A1C 5.7 02/07/2024    A1C 5.5 12/11/2023    A1C 5.6 10/26/2023    A1C 5.7 08/01/2023    A1C 5.6 10/14/2020    A1C 5.6 11/08/2019    A1C 5.8 10/17/2017    A1C 5.5 10/14/2016    A1C 5.8 05/22/2016       Last Eye Exam: 3-  Dilated Previously: Yes    What are you currently using to see?  glasses    Distance Vision Acuity: Satisfied with vision    Near Vision Acuity: Satisfied with vision while reading  with glasses    Eye Comfort: itchy  Do you use eye drops? 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 Eldon Prather     Cataract surgery both eyes   Right eye-3/7/2019  Left eye-2/21/2019 with Anthony Coulter MD    History of chorioretinal scar- noted since seen here in 2015    Razia Baez Optometric Assistant, A.B.O.C.     Medical, surgical and family histories reviewed and updated 3/27/2025.       OBJECTIVE: See Ophthalmology exam    ASSESSMENT:    ICD-10-CM    1. Examination of eyes and vision  Z01.00 EYE EXAM (SIMPLE-NONBILLABLE)      2. Type 2 diabetes mellitus without complication, without long-term current use of insulin (H)  E11.9 EYE EXAM (SIMPLE-NONBILLABLE)    Negative diabetic retinopathy both eyes      3. Pseudophakia of both eyes  Z96.1 EYE EXAM (SIMPLE-NONBILLABLE)      4. Chorioretinal scar of left eye  H31.002 EYE EXAM (SIMPLE-NONBILLABLE)      5. History of eyelid surgery  Z98.890 EYE EXAM (SIMPLE-NONBILLABLE)      6. Allergic conjunctivitis of both eyes  H10.13 EYE EXAM (SIMPLE-NONBILLABLE)      7. Myopia of both eyes   H52.13 REFRACTION      8. Regular astigmatism of left eye  H52.222 REFRACTION      9. Presbyopia  H52.4 REFRACTION          PLAN:    Isabel Carroll aware  eye exam results will be sent to [place]  Patient Instructions   There are not any signs of the diabetes affecting the eyes today.  It is important that you get your eyes dilated once yearly and keep good control of your diabetes.     Ocusoft lid scrubs at night.     OTC Pataday or Lastacaft to be used once daily for itchy eyes.  Use as needed.      Artificial tears- 1 drop both eyes once before bedtime and once in the morning then 2 x day as needed.       Eyeglass prescription given.  No change in eyeglass prescription.     Return in 1 year for a complete eye exam or sooner if needed.     Mj Frederick, OD

## 2025-03-27 NOTE — LETTER
3/27/2025      Isabel Carroll  [address] [facility] [address]      Dear Colleague,    Thank you for referring your patient, Isabel Carroll, to the [facility]. Please see a copy of my visit note below.    Chief Complaint   Patient presents with     Diabetic Eye Exam        Chief Complaint(s) and History of Present Illness(es)       Diabetic Eye Exam              Vision: is stable    Diabetes Type: Type 2 and controlled with diet    Duration: 2 years    Blood Sugars: is controlled                   Lab Results   Component Value Date    A1C 5.7 08/27/2024    A1C 5.7 02/07/2024    A1C 5.5 12/11/2023    A1C 5.6 10/26/2023    A1C 5.7 08/01/2023    A1C 5.6 10/14/2020    A1C 5.6 11/08/2019    A1C 5.8 10/17/2017    A1C 5.5 10/14/2016    A1C 5.8 05/22/2016       Last Eye Exam: 3-  Dilated Previously: Yes    What are you currently using to see?  glasses    Distance Vision Acuity: Satisfied with vision    Near Vision Acuity: Satisfied with vision while reading  with glasses    Eye Comfort: itchy  Do you use eye drops? 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 Eldon Prather     Cataract surgery both eyes   Right eye-3/7/2019  Left eye-2/21/2019 with Anthony Coulter MD    History of chorioretinal scar- noted since seen here in 2015    Razia Baez Optometric Assistant, A.B.O.C.     Medical, surgical and family histories reviewed and updated 3/27/2025.       OBJECTIVE: See Ophthalmology exam    ASSESSMENT:    ICD-10-CM    1. Examination of eyes and vision  Z01.00 EYE EXAM (SIMPLE-NONBILLABLE)      2. Type 2 diabetes mellitus without complication, without long-term current use of insulin (H)  E11.9 EYE EXAM (SIMPLE-NONBILLABLE)    Negative diabetic retinopathy both eyes      3. Pseudophakia of both eyes  Z96.1 EYE EXAM  (SIMPLE-NONBILLABLE)      4. Chorioretinal scar of left eye  H31.002 EYE EXAM (SIMPLE-NONBILLABLE)      5. History of eyelid surgery  Z98.890 EYE EXAM (SIMPLE-NONBILLABLE)      6. Allergic conjunctivitis of both eyes  H10.13 EYE EXAM (SIMPLE-NONBILLABLE)      7. Myopia of both eyes  H52.13 REFRACTION      8. Regular astigmatism of left eye  H52.222 REFRACTION      9. Presbyopia  H52.4 REFRACTION          PLAN:    Isabel Carroll aware  eye exam results will be sent to [place]  Patient Instructions   There are not any signs of the diabetes affecting the eyes today.  It is important that you get your eyes dilated once yearly and keep good control of your diabetes.     Ocusoft lid scrubs at night.     OTC Pataday or Lastacaft to be used once daily for itchy eyes.  Use as needed.      Artificial tears- 1 drop both eyes once before bedtime and once in the morning then 2 x day as needed.       Eyeglass prescription given.  No change in eyeglass prescription.     Return in 1 year for a complete eye exam or sooner if needed.     Mj Frederick, TIP              Again, thank you for allowing me to participate in the care of your patient.        Sincerely,        Mj Frederick, TIP    Electronically signed

## 2025-03-28 NOTE — TELEPHONE ENCOUNTER
Patient is requesting results from urine testing. [employer] message to provider, please review and advise. Wilma Munguia, RN, BSN

## 2025-04-14 DIAGNOSIS — E78.5 HYPERLIPIDEMIA LDL GOAL <100: ICD-10-CM

## 2025-04-14 RX ORDER — ATORVASTATIN CALCIUM 40 MG/1
40 TABLET, FILM COATED ORAL DAILY
Qty: 90 TABLET | Refills: 2 | Status: SHIPPED | OUTPATIENT
Start: 2025-04-14

## 2025-04-29 DIAGNOSIS — M81.0 AGE-RELATED OSTEOPOROSIS WITHOUT CURRENT PATHOLOGICAL FRACTURE: ICD-10-CM

## 2025-04-29 RX ORDER — ALENDRONATE SODIUM 70 MG/1
70 TABLET ORAL WEEKLY
Qty: 12 TABLET | Refills: 0 | OUTPATIENT
Start: 2025-04-29

## 2025-05-06 ENCOUNTER — LAB (OUTPATIENT)
Dept: LAB | Facility: CLINIC | Age: 75
End: 2025-05-06
Payer: COMMERCIAL

## 2025-05-06 DIAGNOSIS — E11.59 TYPE 2 DIABETES MELLITUS WITH OTHER CIRCULATORY COMPLICATION, WITHOUT LONG-TERM CURRENT USE OF INSULIN (H): ICD-10-CM

## 2025-05-06 LAB
ANION GAP SERPL CALCULATED.3IONS-SCNC: 9 MMOL/L (ref 7–15)
BUN SERPL-MCNC: 11.8 MG/DL (ref 8–23)
CALCIUM SERPL-MCNC: 9.5 MG/DL (ref 8.8–10.4)
CHLORIDE SERPL-SCNC: 101 MMOL/L (ref 98–107)
CHOLEST SERPL-MCNC: 134 MG/DL
CREAT SERPL-MCNC: 0.66 MG/DL (ref 0.51–0.95)
EGFRCR SERPLBLD CKD-EPI 2021: >90 ML/MIN/1.73M2
ERYTHROCYTE [DISTWIDTH] IN BLOOD BY AUTOMATED COUNT: 11.9 % (ref 10–15)
EST. AVERAGE GLUCOSE BLD GHB EST-MCNC: 117 MG/DL
FASTING STATUS PATIENT QL REPORTED: YES
FASTING STATUS PATIENT QL REPORTED: YES
GLUCOSE SERPL-MCNC: 113 MG/DL (ref 70–99)
HBA1C MFR BLD: 5.7 % (ref 0–5.6)
HCO3 SERPL-SCNC: 28 MMOL/L (ref 22–29)
HCT VFR BLD AUTO: 44.3 % (ref 35–47)
HDLC SERPL-MCNC: 42 MG/DL
HGB BLD-MCNC: 14.6 G/DL (ref 11.7–15.7)
LDLC SERPL CALC-MCNC: 67 MG/DL
MCH RBC QN AUTO: 31.6 PG (ref 26.5–33)
MCHC RBC AUTO-ENTMCNC: 33 G/DL (ref 31.5–36.5)
MCV RBC AUTO: 96 FL (ref 78–100)
NONHDLC SERPL-MCNC: 92 MG/DL
PLATELET # BLD AUTO: 247 10E3/UL (ref 150–450)
POTASSIUM SERPL-SCNC: 4.1 MMOL/L (ref 3.4–5.3)
RBC # BLD AUTO: 4.62 10E6/UL (ref 3.8–5.2)
SODIUM SERPL-SCNC: 138 MMOL/L (ref 135–145)
TRIGL SERPL-MCNC: 126 MG/DL
WBC # BLD AUTO: 4.8 10E3/UL (ref 4–11)

## 2025-05-06 PROCEDURE — 80061 LIPID PANEL: CPT

## 2025-05-06 PROCEDURE — 83036 HEMOGLOBIN GLYCOSYLATED A1C: CPT

## 2025-05-06 PROCEDURE — 80048 BASIC METABOLIC PNL TOTAL CA: CPT

## 2025-05-06 PROCEDURE — 36415 COLL VENOUS BLD VENIPUNCTURE: CPT

## 2025-05-06 PROCEDURE — 85027 COMPLETE CBC AUTOMATED: CPT

## 2025-05-07 SDOH — HEALTH STABILITY: PHYSICAL HEALTH: ON AVERAGE, HOW MANY MINUTES DO YOU ENGAGE IN EXERCISE AT THIS LEVEL?: 40 MIN

## 2025-05-07 SDOH — HEALTH STABILITY: PHYSICAL HEALTH: ON AVERAGE, HOW MANY DAYS PER WEEK DO YOU ENGAGE IN MODERATE TO STRENUOUS EXERCISE (LIKE A BRISK WALK)?: 5 DAYS

## 2025-05-07 ASSESSMENT — SOCIAL DETERMINANTS OF HEALTH (SDOH): HOW OFTEN DO YOU GET TOGETHER WITH FRIENDS OR RELATIVES?: MORE THAN THREE TIMES A WEEK

## 2025-05-08 ENCOUNTER — RESULTS FOLLOW-UP (OUTPATIENT)
Dept: FAMILY MEDICINE | Facility: CLINIC | Age: 75
End: 2025-05-08

## 2025-05-12 ENCOUNTER — OFFICE VISIT (OUTPATIENT)
Dept: FAMILY MEDICINE | Facility: CLINIC | Age: 75
End: 2025-05-12
Payer: COMMERCIAL

## 2025-05-12 VITALS
DIASTOLIC BLOOD PRESSURE: 78 MMHG | BODY MASS INDEX: 26.24 KG/M2 | SYSTOLIC BLOOD PRESSURE: 128 MMHG | HEART RATE: 90 BPM | TEMPERATURE: 97 F | HEIGHT: 61 IN | RESPIRATION RATE: 18 BRPM | OXYGEN SATURATION: 97 % | WEIGHT: 139 LBS

## 2025-05-12 DIAGNOSIS — Z71.1 CONCERN ABOUT MEMORY: ICD-10-CM

## 2025-05-12 DIAGNOSIS — R00.0 SINUS TACHYCARDIA: ICD-10-CM

## 2025-05-12 DIAGNOSIS — M81.0 AGE-RELATED OSTEOPOROSIS WITHOUT CURRENT PATHOLOGICAL FRACTURE: ICD-10-CM

## 2025-05-12 DIAGNOSIS — Z12.31 VISIT FOR SCREENING MAMMOGRAM: ICD-10-CM

## 2025-05-12 DIAGNOSIS — E11.9 TYPE 2 DIABETES MELLITUS WITHOUT COMPLICATION, WITHOUT LONG-TERM CURRENT USE OF INSULIN (H): ICD-10-CM

## 2025-05-12 DIAGNOSIS — I25.119 CORONARY ARTERY DISEASE INVOLVING NATIVE HEART WITH ANGINA PECTORIS, UNSPECIFIED VESSEL OR LESION TYPE: ICD-10-CM

## 2025-05-12 DIAGNOSIS — Z00.00 ENCOUNTER FOR MEDICARE ANNUAL WELLNESS EXAM: Primary | ICD-10-CM

## 2025-05-12 PROCEDURE — 1126F AMNT PAIN NOTED NONE PRSNT: CPT | Performed by: PHYSICIAN ASSISTANT

## 2025-05-12 PROCEDURE — G0439 PPPS, SUBSEQ VISIT: HCPCS | Performed by: PHYSICIAN ASSISTANT

## 2025-05-12 PROCEDURE — 99214 OFFICE O/P EST MOD 30 MIN: CPT | Mod: 25 | Performed by: PHYSICIAN ASSISTANT

## 2025-05-12 PROCEDURE — 3074F SYST BP LT 130 MM HG: CPT | Performed by: PHYSICIAN ASSISTANT

## 2025-05-12 PROCEDURE — 3078F DIAST BP <80 MM HG: CPT | Performed by: PHYSICIAN ASSISTANT

## 2025-05-12 RX ORDER — METOPROLOL SUCCINATE 25 MG/1
25 TABLET, EXTENDED RELEASE ORAL 2 TIMES DAILY
Qty: 180 TABLET | Refills: 1 | Status: SHIPPED | OUTPATIENT
Start: 2025-05-12

## 2025-05-12 ASSESSMENT — PAIN SCALES - GENERAL: PAINLEVEL_OUTOF10: NO PAIN (0)

## 2025-05-12 NOTE — PATIENT INSTRUCTIONS
At [facility], we strive to deliver an exceptional experience to you, every time we see you. If you receive a survey, please let us know what we are doing well and/or what we could improve upon, as we do value your feedback.  If you have MyChart, you can expect to receive results automatically within 24 hours of their completion.  Your provider will send a note interpreting your results as well.   If you do not have MyChart, you should receive your results in about a week by mail.    Your care team:                            Family Medicine Internal Medicine   MD Guillermo Faustin, MD Annalee Hodges, MD Baudilio Zamorano, MD Mita Mills, PA-C    Joey Tijerina, MD Pediatrics   Naida Pringle, MD Brandee Kingsley, MD Naida Collins, APRN CNP Antionette HO CNP   Yas Lama, MD Lindsay Gasca, MD Suzi Escalante, CNP     Bess Brink, PA-C Same-Day Provider (No follow-up visits)   VIC Guy, MAIKEL Gasca, PA-C    Bethanie Montalvo PA-C     Clinic hours: Monday - Thursday 7 am-6 pm; Fridays 7 am-5 pm.   Urgent care: Monday - Friday 10 am- 8 pm; Saturday and Sunday 9 am-5 pm.    Clinic: [phone]       [facility]: Monday - Thursday 8 am - 7 pm; Friday 8 am - 6 pm  [facility]: [phone]     Patient Education   Preventive Care Advice   This is general advice given by our system to help you stay healthy. However, your care team may have specific advice just for you. Please talk to your care team about your preventive care needs.  Nutrition  Eat 5 or more servings of fruits and vegetables each day.  Try wheat bread, brown rice and whole grain pasta (instead of white bread, rice, and pasta).  Get enough calcium and vitamin D. Check the label on foods and aim for 100% of the RDA (recommended daily allowance).  Lifestyle  Exercise at least 150 minutes each week  (30  minutes a day, 5 days a week).  Do muscle strengthening activities 2 days a week. These help control your weight and prevent disease.  No smoking.  Wear sunscreen to prevent skin cancer.  Have a dental exam and cleaning every 6 months.  Yearly exams  See your health care team every year to talk about:  Any changes in your health.  Any medicines your care team has prescribed.  Preventive care, family planning, and ways to prevent chronic diseases.  Shots (vaccines)   HPV shots (up to age 26), if you've never had them before.  Hepatitis B shots (up to age 59), if you've never had them before.  COVID-19 shot: Get this shot when it's due.  Flu shot: Get a flu shot every year.  Tetanus shot: Get a tetanus shot every 10 years.  Pneumococcal, hepatitis A, and RSV shots: Ask your care team if you need these based on your risk.  Shingles shot (for age 50 and up)  General health tests  Diabetes screening:  Starting at age 35, Get screened for diabetes at least every 3 years.  If you are younger than age 35, ask your care team if you should be screened for diabetes.  Cholesterol test: At age 39, start having a cholesterol test every 5 years, or more often if advised.  Bone density scan (DEXA): At age 50, ask your care team if you should have this scan for osteoporosis (brittle bones).  Hepatitis C: Get tested at least once in your life.  STIs (sexually transmitted infections)  Before age 24: Ask your care team if you should be screened for STIs.  After age 24: Get screened for STIs if you're at risk. You are at risk for STIs (including HIV) if:  You are sexually active with more than one person.  You don't use condoms every time.  You or a partner was diagnosed with a sexually transmitted infection.  If you are at risk for HIV, ask about PrEP medicine to prevent HIV.  Get tested for HIV at least once in your life, whether you are at risk for HIV or not.  Cancer screening tests  Cervical cancer screening: If you have a cervix,  begin getting regular cervical cancer screening tests starting at age 21.  Breast cancer scan (mammogram): If you've ever had breasts, begin having regular mammograms starting at age 40. This is a scan to check for breast cancer.  Colon cancer screening: It is important to start screening for colon cancer at age 45.  Have a colonoscopy test every 10 years (or more often if you're at risk) Or, ask your provider about stool tests like a FIT test every year or Cologuard test every 3 years.  To learn more about your testing options, visit:   .  For help making a decision, visit:   https://bit.ly/wl86924.  Prostate cancer screening test: If you have a prostate, ask your care team if a prostate cancer screening test (PSA) at age 55 is right for you.  Lung cancer screening: If you are a current or former smoker ages 50 to 80, ask your care team if ongoing lung cancer screenings are right for you.  For informational purposes only. Not to replace the advice of your health care provider. Copyright   2023 [employer] [facility]. All rights reserved. Clinically reviewed by the [facility]. [employer] [id number] - REV 01/24.

## 2025-05-12 NOTE — PROGRESS NOTES
Preventive Care Visit  [facility] PA-C, Family Medicine  May 12, 2025      Assessment & Plan   Problem List Items Addressed This Visit          Nervous and Auditory    Coronary artery disease involving native heart with angina pectoris, unspecified vessel or lesion type       Endocrine    Diabetes mellitus, type 2 (H)    Relevant Orders    FOOT EXAM (Completed)       Circulatory    Sinus tachycardia    Relevant Medications    metoprolol succinate ER (TOPROL XL) 25 MG 24 hr tablet     Other Visit Diagnoses         Encounter for Medicare annual wellness exam    -  Primary      Age-related osteoporosis without current pathological fracture        Relevant Orders    DX Bone Density      Concern about memory          Visit for screening mammogram        Relevant Orders    MA Screen Bilateral w/Rony         DM- stable . Continue low carb diet and exercise routine  Osteoporosis- 1 year repeat of Dexa scan. Continue Fosamax and Vit d with calcium.   Lipid-stable, continue current medication   HTn-stable  Palpitations well controlled by Metoprolol  CAD- stable, no symptoms. Managed by cardiology, last office visit March 2025  Memory concern- not significant enough for the patient to seek neurology evaluation. Postpone 1 year per patient wishes.     Patient has been advised of split billing requirements and indicates understanding: Yes       Counseling  Appropriate preventive services were addressed with this patient via screening, questionnaire, or discussion as appropriate for fall prevention, nutrition, physical activity, Tobacco-use cessation, social engagement, weight loss and cognition.  Checklist reviewing preventive services available has been given to the patient.  Reviewed patient's diet, addressing concerns and/or questions.     Follow-up    Follow-up Visit   Expected date:  May 19, 2026 (Approximate)      Follow Up Appointment Details:     Follow-up with whom?:  PCP    Follow-Up for what?: Medicare Wellness    Welcome or Annual?: Annual Wellness    How?: In Person                 Subjective   Isabel is a 74 year old, presenting for the following:  Physical        5/12/2025     1:55 PM   Additional Questions   Roomed by Shukri   Accompanied by Daughter           HPI       Diabetes Follow-up    How often are you checking your blood sugar? One time daily  What time of day are you checking your blood sugars (select all that apply)?  Before meals  Have you had any blood sugars above 200?  No  Have you had any blood sugars below 70?  No  What symptoms do you notice when your blood sugar is low?  None  What concerns do you have today about your diabetes? None   Do you have any of these symptoms? (Select all that apply)  No numbness or tingling in feet.  No redness, sores or blisters on feet.  No complaints of excessive thirst.  No reports of blurry vision.  No significant changes to weight.          Hyperlipidemia Follow-Up    Are you regularly taking any medication or supplement to lower your cholesterol?   Yes- statin  Are you having muscle aches or other side effects that you think could be caused by your cholesterol lowering medication?  No    Hypertension Follow-up    Do you check your blood pressure regularly outside of the clinic? Yes   Are you following a low salt diet? No  Are your blood pressures ever more than 140 on the top number (systolic) OR more   than 90 on the bottom number (diastolic), for example 140/90? No    BP Readings from Last 2 Encounters:   05/12/25 128/78   03/13/25 130/82     Hemoglobin A1C (%)   Date Value   05/06/2025 5.7 (H)   08/27/2024 5.7 (H)   10/14/2020 5.6   11/08/2019 5.6     LDL Cholesterol Calculated (mg/dL)   Date Value   05/06/2025 67   02/07/2024 53   10/14/2020 39   05/22/2018 48     Concern: patient present with her daughter. Daughter reports that she has mild concerns about patient's memory. Patient forgets minimal things, she has to  write a list of errands or silly mistakes like forgetting a spoon in a  and turning it on.  Patient denies problems with words,losing things, getting lost.     Advance Care Planning    Patient states has Health Care Directive and will send to [employer].        5/7/2025   General Health   How would you rate your overall physical health? Excellent   Feel stress (tense, anxious, or unable to sleep) Not at all         5/7/2025   Nutrition   Diet: Diabetic    Carbohydrate counting       Multiple values from one day are sorted in reverse-chronological order         5/7/2025   Exercise   Days per week of moderate/strenous exercise 5 days   Average minutes spent exercising at this level 40 min         5/7/2025   Social Factors   Frequency of gathering with friends or relatives More than three times a week   Worry food won't last until get money to buy more No   Food not last or not have enough money for food? No   Do you have housing? (Housing is defined as stable permanent housing and does not include staying outside in a car, in a tent, in an abandoned building, in an overnight shelter, or couch-surfing.) Yes   Are you worried about losing your housing? No   Lack of transportation? No   Unable to get utilities (heat,electricity)? No         5/7/2025   Fall Risk   Fallen 2 or more times in the past year? No   Trouble with walking or balance? No          5/7/2025   Activities of Daily Living- Home Safety   Needs help with the following daily activites None of the above   Safety concerns in the home None of the above         5/7/2025   Dental   Dentist two times every year? Yes         5/7/2025   Hearing Screening   Hearing concerns? None of the above         5/7/2025   Driving Risk Screening   Patient/family members have concerns about driving No         5/7/2025   General Alertness/Fatigue Screening   Have you been more tired than usual lately? No         5/7/2025   Urinary Incontinence Screening   Bothered  by leaking urine in past 6 months No         Today's PHQ-2 Score:       2025     1:44 PM   PHQ-2 (  [employer])   Q1: Little interest or pleasure in doing things 0   Q2: Feeling down, depressed or hopeless 0   PHQ-2 Score 0    Q1: Little interest or pleasure in doing things Not at all   Q2: Feeling down, depressed or hopeless Not at all   PHQ-2 Score 0       Patient-reported           2025   Substance Use   Alcohol more than 3/day or more than 7/wk No   Do you have a current opioid prescription? No   How severe/bad is pain from 1 to 10? 0/10 (No Pain)   Do you use any other substances recreationally? No     Social History     Tobacco Use    Smoking status: Former     Current packs/day: 0.00     Average packs/day: 1 pack/day for 15.9 years (15.9 ttl pk-yrs)     Types: Cigarettes     Start date: 1966     Quit date: 1982     Years since quittin.0     Passive exposure: Past    Smokeless tobacco: Never    Tobacco comments:     Stopped smoking in .   Vaping Use    Vaping status: Never Used   Substance Use Topics    Alcohol use: Yes     Comment: 2-5 glasses of wine per week    Drug use: No           11/15/2024   LAST FHS-7 RESULTS   1st degree relative breast or ovarian cancer Yes   Any relative bilateral breast cancer No   Any male have breast cancer No   Any ONE woman have BOTH breast AND ovarian cancer No   Any woman with breast cancer before 50yrs No   2 or more relatives with breast AND/OR ovarian cancer No   2 or more relatives with breast AND/OR bowel cancer Yes        Mammogram Screening - After age 74- determine frequency with patient based on health status, life expectancy and patient goals    ASCVD Risk   The 10-year ASCVD risk score (Mario MACKAY, et al., 2019) is: 27.7%    Values used to calculate the score:      Age: 74 years      Sex: Female      Is Non- : No      Diabetic: Yes      Tobacco smoker: No      Systolic Blood Pressure: 128 mmHg      Is BP  treated: No      HDL Cholesterol: 42 mg/dL      Total Cholesterol: 134 mg/dL            Reviewed and updated as needed this visit by Provider   Tobacco  Allergies  Meds  Problems  Med Hx  Surg Hx  Fam Hx            Current Outpatient Medications   Medication Sig Dispense Refill    metoprolol succinate ER (TOPROL XL) 25 MG 24 hr tablet Take 1 tablet (25 mg) by mouth 2 times daily. 180 tablet 1    alcohol swab prep pads Use to swab area of injection/lise as directed. 100 each 3    alendronate (FOSAMAX) 70 MG tablet Take 1 tablet (70 mg) by mouth every 7 days 12 tablet 3    amitriptyline (ELAVIL) 10 MG tablet Start with 10mg at bedtime for three days, then increase to 10mg twice daily for three days, then 10mg three times daily and continue 90 tablet 4    aspirin (ASA) 81 MG EC tablet Take 1 tablet (81 mg) by mouth daily 100 tablet 3    atorvastatin (LIPITOR) 40 MG tablet Take 1 tablet (40 mg) by mouth daily. 90 tablet 2    blood glucose (ONETOUCH VERIO IQ) test strip USE 1 STRIP TO CHECK GLUCOSE ONCE DAILY OR  AS  DIRECTED. 300 strip 2    blood glucose calibration (NO BRAND SPECIFIED) solution To accompany: Blood Glucose Monitor Brands: per insurance. 1 each 11    blood glucose monitoring (NO BRAND SPECIFIED) meter device kit Use to test blood sugar 1 time daily or as directed. Preferred blood glucose meter OR supplies to accompany: Blood Glucose Monitor Brands: per insurance. 1 kit 0    budesonide-formoterol (SYMBICORT) 80-4.5 MCG/ACT Inhaler Inhale 2 puffs into the lungs 2 times daily 10.2 g 3    Calcium-Vitamin D (CALCIUM + D PO) Take  by mouth.      diphenhydrAMINE-acetaminophen (TYLENOL PM)  MG tablet Take 1 tablet by mouth nightly as needed      doxycycline hyclate (VIBRAMYCIN) 100 MG capsule Take 1 capsule (100 mg) by mouth 2 times daily 14 capsule 0    FISH OIL       MAGNESIUM OXIDE PO Take 200 mg by mouth daily      Multiple Vitamins-Minerals (MULTIVITAMIN & MINERAL PO) Take  by mouth.       multivitamin w/minerals (MULTI-VITAMIN) tablet Take 1 tablet by mouth daily      Solifenacin Succinate (VESICARE PO)       thin (NO BRAND SPECIFIED) lancets Use with lanceting device. To accompany: Blood Glucose Monitor Brands: per insurance. 100 each 6     Allergies   Allergen Reactions    Sulfa Antibiotics Nausea    Codeine Phosphate Nausea and Vomiting    Pentazocine     Pentazocine-Naloxone Hcl     Propoxyphene      Darvon    Penicillins Rash     rash and itching       Recent Labs   Lab Test 05/06/25  0936 02/18/25  1602 08/27/24  1521 04/01/24  1406 02/07/24  0737 08/01/23  0850 04/28/23  0829 11/05/21  0850 01/11/21  1351 10/29/20  1506   A1C 5.7*  --  5.7*  --  5.7*   < > 6.0*   < >  --   --    LDL 67  --   --   --  53  --  44   < >  --   --    HDL 42*  --   --   --  49*  --  41*   < >  --   --    TRIG 126  --   --   --  124  --  142   < >  --   --    ALT  --  24  --  33  --   --  40   < >  --  35   CR 0.66 0.63  --  0.78 0.68   < > 0.74   < > 0.80 0.60   GFRESTIMATED >90 >90  --  79 >90   < > 85   < > 75 >90   GFRESTBLACK  --   --   --   --   --   --   --   --  87 >90   POTASSIUM 4.1 4.1  --  4.2 4.2   < > 4.0   < > 3.9 3.9    < > = values in this interval not displayed.      Current providers sharing in care for this patient include:  Patient Care Team:  Dana Mills PA-C as PCP - General (Family Medicine)  Mj Frederick OD (Optometry)  Yoli Zafar RN as Diabetes Educator  Eldon Prather MD as MD (Ophthalmology)  Alexandra Mckenna RD as Diabetes Educator (Dietitian, Registered)  Dana Mills PA-C as Assigned PCP  Swapnil Shah MD as MD (Otolaryngology)  [place], Mj L, OD as Assigned Surgical Provider    The following health maintenance items are reviewed in Epic and correct as of today:  Health Maintenance   Topic Date Due    COVID-19 Vaccine (7 - 2024-25 season) 04/28/2025    A1C  11/06/2025    ANNUAL REVIEW OF HM ORDERS  03/13/2026    MICROALBUMIN  03/25/2026    EYE  "EXAM  03/27/2026    BMP  05/06/2026    LIPID  05/06/2026    MEDICARE ANNUAL WELLNESS VISIT  05/12/2026    DIABETIC FOOT EXAM  05/12/2026    FALL RISK ASSESSMENT  05/12/2026    COLORECTAL CANCER SCREENING  12/03/2026    ADVANCE CARE PLANNING  05/12/2030    DTAP/TDAP/TD IMMUNIZATION (5 - Td or Tdap) 10/12/2031    DEXA  05/28/2039    HEPATITIS C SCREENING  Completed    PHQ-2 (once per calendar year)  Completed    INFLUENZA VACCINE  Completed    Pneumococcal Vaccine: 50+ Years  Completed    ZOSTER IMMUNIZATION  Completed    RSV VACCINE  Completed    HPV IMMUNIZATION  Aged Out    MENINGITIS IMMUNIZATION  Aged Out    MAMMO SCREENING  Discontinued         Review of Systems  Constitutional, HEENT, cardiovascular, pulmonary, gi and gu systems are negative, except as otherwise noted.     Objective    Exam  /78 (BP Location: Left arm, Patient Position: Chair, Cuff Size: Adult Regular)   Pulse 90   Temp 97  F (36.1  C) (Temporal)   Resp 18   Ht 1.549 m (5' 1\")   Wt 63 kg (139 lb)   LMP  (LMP Unknown)   SpO2 97%   BMI 26.26 kg/m     Estimated body mass index is 26.26 kg/m  as calculated from the following:    Height as of this encounter: 1.549 m (5' 1\").    Weight as of this encounter: 63 kg (139 lb).    Physical Exam  GENERAL: alert and no distress  EYES: Eyes grossly normal to inspection, PERRL and conjunctivae and sclerae normal  HENT: ear canals and TM's normal, nose and mouth without ulcers or lesions  NECK: no adenopathy, no asymmetry, masses, or scars  RESP: lungs clear to auscultation - no rales, rhonchi or wheezes  CV: regular rate and rhythm, normal S1 S2, no S3 or S4, no murmur, click or rub, no peripheral edema  ABDOMEN: soft, nontender, no hepatosplenomegaly, no masses and bowel sounds normal  MS: no gross musculoskeletal defects noted, no edema  SKIN: no suspicious lesions or rashes  NEURO: Normal strength and tone, mentation intact and speech normal  PSYCH: mentation appears normal, affect " normal/bright  Diabetic foot exam: normal DP and PT pulses, no trophic changes or ulcerative lesions, and normal sensory exam        5/12/2025   Mini Cog   Clock Draw Score 2 Normal   3 Item Recall 3 objects recalled   Mini Cog Total Score 5              Signed Electronically by: Dana Mills PA-C

## 2025-05-13 ENCOUNTER — PATIENT OUTREACH (OUTPATIENT)
Dept: CARE COORDINATION | Facility: CLINIC | Age: 75
End: 2025-05-13
Payer: COMMERCIAL

## 2025-05-20 ENCOUNTER — TELEPHONE (OUTPATIENT)
Dept: FAMILY MEDICINE | Facility: CLINIC | Age: 75
End: 2025-05-20
Payer: COMMERCIAL

## 2025-05-20 NOTE — TELEPHONE ENCOUNTER
Forms/Letter Request    Type of form/letter: OTHER: [employer]        Do we have the form/letter: Yes    Who is the form from? Patient    Where did/will the form come from? Patient or family brought in       When is form/letter needed by: ASAP    How would you like the form/letter returned: Mail  Is this the correct address?: [address] [place] [address]  [address] [facility] [address]    Patient Notified form requests are processed in 5-7 business days:Yes    Could we send this information to you in [employer] or would you prefer to receive a phone call?:   Patient would like to be contacted via [employer]

## 2025-05-20 NOTE — TELEPHONE ENCOUNTER
Forms received.     Forms were signed and filled out by TC--copies placed in abstract and copies in TC copy bin. Original forms mailed to [facility] in envelopes that were provided.   Evette Naylor

## 2025-06-06 ENCOUNTER — ANCILLARY PROCEDURE (OUTPATIENT)
Dept: BONE DENSITY | Facility: CLINIC | Age: 75
End: 2025-06-06
Attending: PHYSICIAN ASSISTANT
Payer: COMMERCIAL

## 2025-06-06 DIAGNOSIS — M81.0 AGE-RELATED OSTEOPOROSIS WITHOUT CURRENT PATHOLOGICAL FRACTURE: ICD-10-CM

## 2025-06-06 PROCEDURE — 77081 DXA BONE DENSITY APPENDICULR: CPT | Mod: TC | Performed by: RADIOLOGY

## 2025-06-06 PROCEDURE — 77080 DXA BONE DENSITY AXIAL: CPT | Mod: TC | Performed by: RADIOLOGY

## 2025-06-10 DIAGNOSIS — M81.0 AGE-RELATED OSTEOPOROSIS WITHOUT CURRENT PATHOLOGICAL FRACTURE: ICD-10-CM

## 2025-06-10 RX ORDER — ALENDRONATE SODIUM 70 MG/1
70 TABLET ORAL WEEKLY
Qty: 12 TABLET | Refills: 0 | Status: SHIPPED | OUTPATIENT
Start: 2025-06-10

## 2025-06-17 ENCOUNTER — RESULTS FOLLOW-UP (OUTPATIENT)
Dept: FAMILY MEDICINE | Facility: CLINIC | Age: 75
End: 2025-06-17

## 2025-06-26 ENCOUNTER — OFFICE VISIT (OUTPATIENT)
Dept: OPTOMETRY | Facility: CLINIC | Age: 75
End: 2025-06-26
Payer: COMMERCIAL

## 2025-06-26 DIAGNOSIS — H53.8 BLURRED VISION, LEFT EYE: Primary | ICD-10-CM

## 2025-06-26 DIAGNOSIS — H26.492 LEFT POSTERIOR CAPSULAR OPACIFICATION: ICD-10-CM

## 2025-06-26 ASSESSMENT — CONF VISUAL FIELD
OD_INFERIOR_NASAL_RESTRICTION: 0
OS_SUPERIOR_TEMPORAL_RESTRICTION: 0
OD_SUPERIOR_TEMPORAL_RESTRICTION: 0
OS_NORMAL: 1
OD_NORMAL: 1
METHOD: COUNTING FINGERS
OS_INFERIOR_TEMPORAL_RESTRICTION: 0
OD_INFERIOR_TEMPORAL_RESTRICTION: 0
OS_INFERIOR_NASAL_RESTRICTION: 0
OD_SUPERIOR_NASAL_RESTRICTION: 0
OS_SUPERIOR_NASAL_RESTRICTION: 0

## 2025-06-26 ASSESSMENT — VISUAL ACUITY
OS_CC: 20/25
OS_CC+: -1
CORRECTION_TYPE: GLASSES
OD_CC: 20/20
METHOD: SNELLEN - LINEAR

## 2025-06-26 ASSESSMENT — REFRACTION_WEARINGRX
OD_CYLINDER: SPHERE
OS_SPHERE: -1.00
OD_SPHERE: -0.50
OS_ADD: +3.00
OS_AXIS: 075
OS_CYLINDER: +0.50
OD_ADD: +3.00

## 2025-06-26 ASSESSMENT — EXTERNAL EXAM - RIGHT EYE: OD_EXAM: NORMAL

## 2025-06-26 ASSESSMENT — EXTERNAL EXAM - LEFT EYE: OS_EXAM: NORMAL

## 2025-06-26 ASSESSMENT — CUP TO DISC RATIO
OS_RATIO: 0.3
OD_RATIO: 0.3

## 2025-06-26 ASSESSMENT — TONOMETRY
IOP_METHOD: APPLANATION
OS_IOP_MMHG: 18

## 2025-06-26 NOTE — LETTER
6/26/2025      Isabel Carroll  [address] [address]      Dear Colleague,    Thank you for referring your patient, Isabel Carroll, to the [facility]. Please see a copy of my visit note below.    Chief Complaint   Patient presents with     Eye Problem Left Eye       Has had blurry vision left eye all around for about the past week and a half. It is constant. She has noticed more floaters over the past couple of years. Not seeing flashes of light, curtains/veil in vision. She doesn't recall any injury or trauma to eye or head. She's not having any other symptoms.    Do you wear contact lenses? NO    Hemoglobin A1C   Date Value Ref Range Status   05/06/2025 5.7 (H) 0.0 - 5.6 % Final     Comment:     Normal <5.7%   Prediabetes 5.7-6.4%    Diabetes 6.5% or higher     Note: Adopted from ADA consensus guidelines.   10/14/2020 5.6 0 - 5.6 % Final     Comment:     Normal <5.7% Prediabetes 5.7-6.4%  Diabetes 6.5% or higher - adopted from ADA   consensus guidelines.       Last comprehensive eye exam- 3/27/2025    6/1/2023  Repair ptosis bilateral (Bilateral) with Dr. 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 Has been stable    Odilia Luna    Optometric Assistant       See Review Of Systems       Medical, surgical and family histories reviewed and updated 6/26/2025.         OBJECTIVE: See Ophthalmology exam    ASSESSMENT:    ICD-10-CM    1. Blurred vision, left eye  H53.8       2. Left posterior capsular opacification  H26.492 Adult Eye  Referral         PLAN:    Patient Instructions   Referral to Dr. Jesus Lopez at the [facility].  [phone] for Yag evaluation left eye.    Return for annual exam 3/28/2026 or sooner if needed.    Mj Frederick, OD        Again,  thank you for allowing me to participate in the care of your patient.        Sincerely,        Mj Frederick OD    Electronically signed

## 2025-06-26 NOTE — PROGRESS NOTES
Chief Complaint   Patient presents with    Eye Problem Left Eye       Has had blurry vision left eye all around for about the past week and a half. It is constant. She has noticed more floaters over the past couple of years. Not seeing flashes of light, curtains/veil in vision. She doesn't recall any injury or trauma to eye or head. She's not having any other symptoms.    Do you wear contact lenses? NO    Hemoglobin A1C   Date Value Ref Range Status   05/06/2025 5.7 (H) 0.0 - 5.6 % Final     Comment:     Normal <5.7%   Prediabetes 5.7-6.4%    Diabetes 6.5% or higher     Note: Adopted from ADA consensus guidelines.   10/14/2020 5.6 0 - 5.6 % Final     Comment:     Normal <5.7% Prediabetes 5.7-6.4%  Diabetes 6.5% or higher - adopted from ADA   consensus guidelines.       Last comprehensive eye exam- 3/27/2025    6/1/2023  Repair ptosis bilateral (Bilateral) with Dr. 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 Has been stable    Odilia Luna    Optometric Assistant       See Review Of Systems       Medical, surgical and family histories reviewed and updated 6/26/2025.         OBJECTIVE: See Ophthalmology exam    ASSESSMENT:    ICD-10-CM    1. Blurred vision, left eye  H53.8       2. Left posterior capsular opacification  H26.492 Adult Eye  Referral         PLAN:    Patient Instructions   Referral to Dr. Jesus Lopez at the [facility].  [phone] for Yag evaluation left eye.    Return for annual exam 3/28/2026 or sooner if needed.    Mj Frederick, OD      
Sara Garcia (Resident)

## 2025-06-26 NOTE — PATIENT INSTRUCTIONS
Referral to Dr. Jesus Lopez at the [facility].  [phone] for Yag evaluation left eye.    Return for annual exam 3/28/2026 or sooner if needed.    Mj Frederick OD    The affects of the dilating drops last for 4- 6 hours.  You will be more sensitive to light and vision will be blurry up close.  Do not drive if you do not feel comfortable.  Mydriatic sunglasses were given if needed.      Optometry Providers       Clinic Locations                                 Telephone Number   Dr. Sruthi Miranda Bishop    [facility]  [place] [phone]     [place] [facility] Hours:                [place] [facility] Hours:       [place] [facility] Hours:   [address]   [address] [address]     [address]  [place] [address]   [place] [address]    [place] [address]  Phone: [phone]                    Phone: [phone]     Phone: [phone]                      Monday 8:00-6:00                          Monday 8:00-6:00                          Monday 8:00-6:00              Tuesday 8:00-6:00                          Tuesday 8:00-6:00                          Tuesday 8:00-6:00              Wednesday 8:00-6:00                  Wednesday 8:00-6:00                   Wednesday 8:00-6:00      Thursday 8:00-6:00                        Thursday 8:00-6:00                         Thursday 8:00-6:00            Friday 8:00-5:00                              Friday 8:00-5:00                              Friday 8:00-5:00    [place] [facility] Hours:   [address] [facility] [place] [id number] [phone]    Monday 9:00-6:00  Tuesday 9:00-6:00  Wednesday 9:00-6:00  Thursday 9:00-6:00  Friday 9:00-5:00  As always, Thank you for trusting us with your health care needs!

## 2025-06-30 ENCOUNTER — PATIENT OUTREACH (OUTPATIENT)
Dept: CARE COORDINATION | Facility: CLINIC | Age: 75
End: 2025-06-30
Payer: COMMERCIAL

## 2025-06-30 ENCOUNTER — TELEPHONE (OUTPATIENT)
Dept: OPHTHALMOLOGY | Facility: CLINIC | Age: 75
End: 2025-06-30
Payer: COMMERCIAL

## 2025-06-30 NOTE — TELEPHONE ENCOUNTER
Pt requesting sooner appt. Ok going to [facility]. Scheduled with Dr. Turner.  Priscilla Dacosta COT 8:23 AM June 30, 2025

## 2025-06-30 NOTE — TELEPHONE ENCOUNTER
[facility] Call Center    Phone Message    May a detailed message be left on voicemail: yes     Reason for Call: Other: Patient needs to reschedule her YAG.  Please call.  Thank you.     Action Taken: Message routed to:  [facility] Ophthalmology    Travel Screening: Not Applicable     Date of Service:

## 2025-07-08 DIAGNOSIS — R05.3 CHRONIC COUGH: ICD-10-CM

## 2025-07-08 DIAGNOSIS — J38.7 IRRITABLE LARYNX: ICD-10-CM

## 2025-07-08 RX ORDER — AMITRIPTYLINE HYDROCHLORIDE 10 MG/1
TABLET ORAL
Qty: 90 TABLET | Refills: 4 | OUTPATIENT
Start: 2025-07-08

## 2025-07-08 NOTE — TELEPHONE ENCOUNTER
Refused Prescriptions:                       Disp   Refills    amitriptyline (ELAVIL) 10 MG tablet        90 tab*4        Sig: Start with 10mg at bedtime for three days, then           increase to 10mg twice daily for three days, then           10mg three times daily and continue  Refused By: ALISIA DEAL  Reason for Refusal: Patient needs appointment    Alisia Deal RN Care Coordinator, [facility] 07/08/25 1:45 PM

## 2025-07-23 ENCOUNTER — NURSE TRIAGE (OUTPATIENT)
Dept: FAMILY MEDICINE | Facility: CLINIC | Age: 75
End: 2025-07-23
Payer: COMMERCIAL

## 2025-07-23 NOTE — CONFIDENTIAL NOTE
Patient accepted by ads provider. Patient scheduled for in clinic visit on 7/24/2025.         Bethanie DOBBSN,  RN  [facility]

## 2025-07-23 NOTE — TELEPHONE ENCOUNTER
Nurse Triage SBAR    Is this a 2nd Level Triage? NO    Situation: Patient having constant RLQ abdominal pain for about 1 week.      Background: Had similar pain in past when appendix and a growth on her bladder were removed.      Assessment: Patient having RLQ abdominal pain.  She denies any bowel or bladder changes, no vomiting or abnormally colored stools.  Does note she had a bout of constipation just before this pain set in so she isn't sure if it is related or not.  Is taking Miralax daily as of now. Rates pain as moderate.  Nothing changes the pain.       Protocol Recommended Disposition:   Call ADS/Go to ED/UCC Now (Or To Office with PCP Approval)    Recommendation: Referral to ADS.  Patient would be able to be seen on 7/24/25 in the morning if accepted.       Routed to provider    Does the patient meet one of the following criteria for ADS visit consideration? 16+ years old, with an [facility] PCP     TIP  Providers, please consider if this condition is appropriate for management at one of our Acute and Diagnostic Services sites.     If patient is a good candidate, please use dotphrase <dot>triageresponse and select Refer to ADS to document.      Reason for Disposition   MILD TO MODERATE constant pain lasting > 2 hours    Additional Information   Negative: Passed out (e.g., fainted, lost consciousness, blacked out and was not responding)   Negative: Shock suspected (e.g., cold/pale/clammy skin, too weak to stand, low BP, rapid pulse)   Negative: Sounds like a life-threatening emergency to the triager   Negative: Followed an abdomen (stomach) injury   Negative: Chest pain   Negative: Abdominal pain and pregnant < 20 weeks   Negative: Abdominal pain and pregnant 20 or more weeks   Negative: Pain is mainly in upper abdomen (if needed ask: 'is it mainly above the belly button?')   Negative: Abdomen bloating or swelling are main symptoms   Negative: SEVERE abdominal pain (e.g., excruciating)   Negative: Vomiting red  "blood or black (coffee ground) material   Negative: Blood in bowel movements  (Exception: Blood on surface of BM with constipation.)   Negative: Black or tarry bowel movements  (Exception: Chronic-unchanged black-grey BMs AND is taking iron pills or Pepto-Bismol.)    Answer Assessment - Initial Assessment Questions  1. LOCATION: \"Where does it hurt?\"       RLQ pain  2. RADIATION: \"Does the pain shoot anywhere else?\" (e.g., chest, back)      No, stays put  3. ONSET: \"When did the pain begin?\" (e.g., minutes, hours or days ago)       1 week ago, Tuesday or Wednesday  4. SUDDEN: \"Gradual or sudden onset?\"      Sudden  5. PATTERN \"Does the pain come and go, or is it constant?\"      Constant pain now;   6. SEVERITY: \"How bad is the pain?\"  (e.g., Scale 1-10; mild, moderate, or severe)      4-5/10   7. RECURRENT SYMPTOM: \"Have you ever had this type of stomach pain before?\" If Yes, ask: \"When was the last time?\" and \"What happened that time?\"       Similar to when patient had appendix out and growth on bladder removed.    8. CAUSE: \"What do you think is causing the stomach pain?\"      Unsure.  Taking Miralax.    9. RELIEVING/AGGRAVATING FACTORS: \"What makes it better or worse?\" (e.g., antacids, bending or twisting motion, bowel movement)      Nothing  10. OTHER SYMPTOMS: \"Do you have any other symptoms?\" (e.g., back pain, diarrhea, fever, urination pain, vomiting)        No  11. PREGNANCY: \"Is there any chance you are pregnant?\" \"When was your last menstrual period?\"        NA    Protocols used: Abdominal Pain - Female-A-[place]    "

## 2025-07-24 ENCOUNTER — ANCILLARY PROCEDURE (OUTPATIENT)
Dept: CT IMAGING | Facility: CLINIC | Age: 75
End: 2025-07-24
Attending: PHYSICIAN ASSISTANT
Payer: COMMERCIAL

## 2025-07-24 ENCOUNTER — OFFICE VISIT (OUTPATIENT)
Dept: PEDIATRICS | Facility: CLINIC | Age: 75
End: 2025-07-24
Payer: COMMERCIAL

## 2025-07-24 VITALS
HEART RATE: 69 BPM | TEMPERATURE: 97.6 F | OXYGEN SATURATION: 100 % | SYSTOLIC BLOOD PRESSURE: 126 MMHG | RESPIRATION RATE: 18 BRPM | DIASTOLIC BLOOD PRESSURE: 76 MMHG

## 2025-07-24 DIAGNOSIS — K59.00 CONSTIPATION, UNSPECIFIED CONSTIPATION TYPE: Primary | ICD-10-CM

## 2025-07-24 DIAGNOSIS — R10.31 RLQ ABDOMINAL PAIN: ICD-10-CM

## 2025-07-24 DIAGNOSIS — N30.00 ACUTE CYSTITIS WITHOUT HEMATURIA: ICD-10-CM

## 2025-07-24 LAB
ALBUMIN SERPL BCG-MCNC: 4.5 G/DL (ref 3.5–5.2)
ALBUMIN UR-MCNC: NEGATIVE MG/DL
ALP SERPL-CCNC: 86 U/L (ref 40–150)
ALT SERPL W P-5'-P-CCNC: 28 U/L (ref 0–50)
ANION GAP SERPL CALCULATED.3IONS-SCNC: 8 MMOL/L (ref 7–15)
APPEARANCE UR: CLEAR
AST SERPL W P-5'-P-CCNC: 30 U/L (ref 0–45)
BACTERIA #/AREA URNS HPF: ABNORMAL /HPF
BASOPHILS # BLD AUTO: 0 10E3/UL (ref 0–0.2)
BASOPHILS NFR BLD AUTO: 1 %
BILIRUB SERPL-MCNC: 0.6 MG/DL
BILIRUB UR QL STRIP: NEGATIVE
BUN SERPL-MCNC: 14 MG/DL (ref 8–23)
CALCIUM SERPL-MCNC: 9.6 MG/DL (ref 8.8–10.4)
CHLORIDE SERPL-SCNC: 104 MMOL/L (ref 98–107)
COLOR UR AUTO: COLORLESS
CREAT SERPL-MCNC: 0.69 MG/DL (ref 0.51–0.95)
EGFRCR SERPLBLD CKD-EPI 2021: 90 ML/MIN/1.73M2
EOSINOPHIL # BLD AUTO: 0.1 10E3/UL (ref 0–0.7)
EOSINOPHIL NFR BLD AUTO: 2 %
ERYTHROCYTE [DISTWIDTH] IN BLOOD BY AUTOMATED COUNT: 11.9 % (ref 10–15)
GLUCOSE SERPL-MCNC: 111 MG/DL (ref 70–99)
GLUCOSE UR STRIP-MCNC: NEGATIVE MG/DL
HCO3 SERPL-SCNC: 27 MMOL/L (ref 22–29)
HCT VFR BLD AUTO: 40.6 % (ref 35–47)
HGB BLD-MCNC: 14.2 G/DL (ref 11.7–15.7)
HGB UR QL STRIP: NEGATIVE
IMM GRANULOCYTES # BLD: 0 10E3/UL
IMM GRANULOCYTES NFR BLD: 0 %
KETONES UR STRIP-MCNC: NEGATIVE MG/DL
LEUKOCYTE ESTERASE UR QL STRIP: ABNORMAL
LIPASE SERPL-CCNC: 23 U/L (ref 13–60)
LYMPHOCYTES # BLD AUTO: 1.3 10E3/UL (ref 0.8–5.3)
LYMPHOCYTES NFR BLD AUTO: 32 %
MCH RBC QN AUTO: 32.3 PG (ref 26.5–33)
MCHC RBC AUTO-ENTMCNC: 35 G/DL (ref 31.5–36.5)
MCV RBC AUTO: 93 FL (ref 78–100)
MONOCYTES # BLD AUTO: 0.4 10E3/UL (ref 0–1.3)
MONOCYTES NFR BLD AUTO: 9 %
NEUTROPHILS # BLD AUTO: 2.2 10E3/UL (ref 1.6–8.3)
NEUTROPHILS NFR BLD AUTO: 56 %
NITRATE UR QL: POSITIVE
NRBC # BLD AUTO: 0 10E3/UL
NRBC BLD AUTO-RTO: 0 /100
PH UR STRIP: 7 [PH] (ref 5–7)
PLATELET # BLD AUTO: 231 10E3/UL (ref 150–450)
POTASSIUM SERPL-SCNC: 4.6 MMOL/L (ref 3.4–5.3)
PROT SERPL-MCNC: 6.9 G/DL (ref 6.4–8.3)
RBC # BLD AUTO: 4.39 10E6/UL (ref 3.8–5.2)
RBC #/AREA URNS AUTO: ABNORMAL /HPF
SODIUM SERPL-SCNC: 139 MMOL/L (ref 135–145)
SP GR UR STRIP: 1.02 (ref 1–1.03)
SQUAMOUS #/AREA URNS AUTO: ABNORMAL /LPF
UROBILINOGEN UR STRIP-MCNC: NORMAL MG/DL
WBC # BLD AUTO: 4 10E3/UL (ref 4–11)
WBC #/AREA URNS AUTO: ABNORMAL /HPF

## 2025-07-24 PROCEDURE — 74177 CT ABD & PELVIS W/CONTRAST: CPT | Performed by: RADIOLOGY

## 2025-07-24 RX ORDER — IOPAMIDOL 755 MG/ML
78 INJECTION, SOLUTION INTRAVASCULAR ONCE
Status: COMPLETED | OUTPATIENT
Start: 2025-07-24 | End: 2025-07-24

## 2025-07-24 RX ORDER — CEPHALEXIN 500 MG/1
500 CAPSULE ORAL 2 TIMES DAILY
Qty: 14 CAPSULE | Refills: 0 | Status: SHIPPED | OUTPATIENT
Start: 2025-07-24 | End: 2025-07-24

## 2025-07-24 RX ORDER — NITROFURANTOIN 25; 75 MG/1; MG/1
100 CAPSULE ORAL 2 TIMES DAILY
Qty: 14 CAPSULE | Refills: 0 | Status: SHIPPED | OUTPATIENT
Start: 2025-07-24 | End: 2025-07-31

## 2025-07-24 RX ORDER — DOCUSATE SODIUM 100 MG/1
100 CAPSULE, LIQUID FILLED ORAL 2 TIMES DAILY
Qty: 10 CAPSULE | Refills: 0 | Status: SHIPPED | OUTPATIENT
Start: 2025-07-24 | End: 2025-07-29

## 2025-07-24 RX ADMIN — IOPAMIDOL 78 ML: 755 INJECTION, SOLUTION INTRAVASCULAR at 10:41

## 2025-07-24 ASSESSMENT — PAIN SCALES - GENERAL: PAINLEVEL_OUTOF10: MILD PAIN (2)

## 2025-07-24 NOTE — PATIENT INSTRUCTIONS
You were seen and evaluated today for concerns of right lower quadrant abdominal pain.  CT scan of the abdomen and pelvis showed a moderate stool burden, start MiraLAX, take Colace for 5 days as prescribed.  Recommend taking these medications until stools are very regular, may take even if stools become a little bit loose.  It is important to avoid the excess stool that is in the colon currently as I think this is causing your abdominal pain.    Urinalysis showed results consistent with acute cystitis or bladder infection, starting Macrobid today.    Advise urgent medical evaluation if you develop fever of 101 degrees F or greater, chest pain, difficulty breathing, palpitations, shortness of breath, difficulty swallowing, severe headache, worsening numbness or tingling or weakness, dizziness or lightheadedness, acute vision changes, acutely worsening rash, severe abdominal pain, or uncontrolled nausea/vomiting.

## 2025-07-24 NOTE — PROGRESS NOTES
Acute and Diagnostic Services Clinic Visit    Assessment & Plan     (K59.00) Constipation, unspecified constipation type  (primary encounter diagnosis)  Comment:   Plan: docusate sodium (COLACE) 100 MG capsule            (N30.00) Acute cystitis without hematuria  Comment:   Plan: nitroFURantoin macrocrystal-monohydrate         (MACROBID) 100 MG capsule, DISCONTINUED:         cephALEXin (KEFLEX) 500 MG capsule            (R10.31) RLQ abdominal pain  Comment:   Plan: CBC with platelets differential, Comprehensive         metabolic panel, UA with Microscopic reflex to         Culture, Lipase, CT Abdomen Pelvis w Contrast,         sodium chloride (PF) 0.9% PF flush 3 mL, Urine         Microscopic Exam, Urine Culture            The patient is a 74 old female with past medical history of acute appendicitis s/p appendectomy in February 2025, malignant neoplasm of urinary bladder surgically resected in 2020, coronary artery disease, sinus tachycardia, hyperlipidemia, type 2 diabetes, basal cell carcinoma of skin who presents for evaluation of right lower quadrant abdominal pain, onset 1 week ago.     Differential diagnosis includes but is not limited to constipation, small bowel obstruction, stump appendicitis, mesenteric ischemia, Meckel's diverticulum, hepatobiliary disease, pancreatitis, cancerous process.     CT abdomen pelvis completed today showed moderate to large stool burden, distal colonic diverticulosis.  No abnormalities such as renal calculi, obstruction or inflammatory changes seen to explain pain.  Interval appendectomy noted.    Laboratory workup including lipase, CMP, CBC was reassuring.  Urinalysis shows positive nitrates, trace leukocyte esterase, elevated WBCs suggestive of a urinary tract infection today.  Starting Macrobid.  Urine culture currently pending.    I am concerned that stool burden may be causing her abdominal pain today.  Restart MiraLAX, start Colace as well. Recommend taking these  medications until stools are very regular, may take even if stools become a little bit loose.  Hold Metamucil for now until stools become more regular.  Follow-up with PCP in 5 days if symptoms persist.      Recommend urgent follow up if you develop a fever (higher than 100.5 degrees), chills, body aches, flank pain, lower stomach pain, nausea/ vomiting, and blood in the urine.  All questions answered, patient discharged in stable condition.          Oni Pimentel PA-C      James Hall is a 74 year old, presenting for the following health issues:  Abdominal Pain (For the last week )    EPIFANIO Hall is a 74 old female with past medical history of acute appendicitis s/p appendectomy in February 2025, malignant neoplasm of urinary bladder surgically resected in 2020, coronary artery disease, sinus tachycardia, hyperlipidemia, type 2 diabetes, basal cell carcinoma of skin who presents for evaluation of right lower quadrant abdominal pain, onset 1 week ago.  She has previous concerns with constipation, typically takes Metamucil and MiraLAX but discontinued MiraLAX about 1 week ago due to having loose stools.  States that her right lower quadrant abdominal pain developed after discontinuing MiraLAX.  Has continued taking Metamucil over this past week.  She describes her abdominal pain is intermittent, has been sharp all week, 8/10 in severity at its worst, today 2/10.  Surgical history includes cholecystectomy, salpingo-oophorectomy bilateral, C-sections, colonoscopy in 2021.  Had a mini arc sling operation for stress incontinence in 2007.  She was evaluated in the emergency department for abdominal pain in December 2024, diagnosed with constipation.  Concerned about constipation today.    Abdominal/Flank Pain  Onset/Duration: A week   Description:   Character: Sharp  Location: right lower quadrant  Radiation: None  Intensity: moderate  Progression of Symptoms:  has been pretty sharp all week rating it a 8/10,  rating it today as 1-2/10  Accompanying Signs & Symptoms:  Fever/chills: no   Gas/Bloating: no   Nausea: no   Vomitting: no   Diarrhea: no   Constipation:YES- Intermittently - uses stool softeners to help with this   Dysuria: no            Hematuria: no            Frequency: no            Incontinence of urine: no   History:            Last bowel movement: yesterday  Trauma: no   Previous similar pain: YES- Hx of bladder cancer and appendectomy    Previous tests done: none           Previous Abdominal surgery: YES- Appendectomy in February, Two C Sections, and ex lap.  Growth removed for liver.     Precipitating factors:   Does the pain change with:     Food: no      Bowel Movement: no     Urination: no              Other factors: no   Therapies tried and outcome:  Miramax     When food last eaten: 9 pm last night         Review of Systems  Constitutional, HEENT, cardiovascular, pulmonary, GI, , musculoskeletal, neuro, skin, endocrine and psych systems are negative, except as otherwise noted.      Objective    LMP  (LMP Unknown)   There is no height or weight on file to calculate BMI.  Physical Exam  Constitutional:       General: She is not in acute distress.     Appearance: Normal appearance. She is not ill-appearing, toxic-appearing or diaphoretic.   HENT:      Head: Normocephalic and atraumatic.      Right Ear: No middle ear effusion. No mastoid tenderness. Tympanic membrane is not injected, perforated, erythematous, retracted or bulging.      Left Ear:  No middle ear effusion. No mastoid tenderness. Tympanic membrane is not injected, perforated, erythematous, retracted or bulging.      Mouth/Throat:      Mouth: Mucous membranes are moist.      Pharynx: Oropharynx is clear. No oropharyngeal exudate or posterior oropharyngeal erythema.   Cardiovascular:      Rate and Rhythm: Normal rate and regular rhythm.      Pulses: Normal pulses.      Heart sounds: Normal heart sounds. No murmur heard.  Pulmonary:       Effort: Pulmonary effort is normal. No respiratory distress.      Breath sounds: Normal breath sounds. No stridor. No wheezing or rhonchi.   Abdominal:      General: Abdomen is flat. Bowel sounds are normal. There is no distension.      Palpations: Abdomen is soft.      Tenderness: There is abdominal tenderness in the right lower quadrant. There is no right CVA tenderness, left CVA tenderness, guarding or rebound.   Musculoskeletal:      Cervical back: Neck supple. No rigidity. No muscular tenderness.   Lymphadenopathy:      Cervical: No cervical adenopathy.      Right cervical: No superficial, deep or posterior cervical adenopathy.     Left cervical: No superficial, deep or posterior cervical adenopathy.   Neurological:      Mental Status: She is alert and oriented to person, place, and time.      Sensory: No sensory deficit.                    Signed Electronically by: Oni Pimentel PA-C

## 2025-07-26 LAB
BACTERIA UR CULT: ABNORMAL
BACTERIA UR CULT: ABNORMAL

## 2025-07-29 ENCOUNTER — OFFICE VISIT (OUTPATIENT)
Dept: OPHTHALMOLOGY | Facility: CLINIC | Age: 75
End: 2025-07-29
Attending: OPHTHALMOLOGY
Payer: COMMERCIAL

## 2025-07-29 DIAGNOSIS — Z96.1 PSEUDOPHAKIA, BOTH EYES: ICD-10-CM

## 2025-07-29 DIAGNOSIS — H26.492 LEFT POSTERIOR CAPSULAR OPACIFICATION: Primary | ICD-10-CM

## 2025-07-29 PROCEDURE — 99214 OFFICE O/P EST MOD 30 MIN: CPT | Mod: 57 | Performed by: OPHTHALMOLOGY

## 2025-07-29 PROCEDURE — G0463 HOSPITAL OUTPT CLINIC VISIT: HCPCS | Performed by: OPHTHALMOLOGY

## 2025-07-29 PROCEDURE — 66821 AFTER CATARACT LASER SURGERY: CPT | Mod: LT | Performed by: OPHTHALMOLOGY

## 2025-07-29 RX ORDER — NITROFURANTOIN 25; 75 MG/1; MG/1
CAPSULE ORAL
COMMUNITY

## 2025-07-29 ASSESSMENT — REFRACTION_WEARINGRX
OS_SPHERE: -1.00
OD_SPHERE: -0.50
OS_CYLINDER: +0.50
OD_ADD: +3.00
OS_AXIS: 075
OS_ADD: +3.00
OD_CYLINDER: SPHERE

## 2025-07-29 ASSESSMENT — VISUAL ACUITY
OS_BAT_LOW: 20/30
METHOD: SNELLEN - LINEAR
OS_BAT_HIGH: 20/40
OS_CC: 20/30
OD_BAT_HIGH: 20/25
OS_BAT_MED: 20/40
OD_CC+: -1
OD_BAT_LOW: 20/20
OD_BAT_MED: 20/20
CORRECTION_TYPE: GLASSES
OD_CC: 20/20

## 2025-07-29 ASSESSMENT — CONF VISUAL FIELD
OS_INFERIOR_NASAL_RESTRICTION: 0
OS_INFERIOR_TEMPORAL_RESTRICTION: 0
OS_NORMAL: 1
METHOD: COUNTING FINGERS
OS_SUPERIOR_TEMPORAL_RESTRICTION: 0
OS_SUPERIOR_NASAL_RESTRICTION: 0
OD_INFERIOR_TEMPORAL_RESTRICTION: 0
OD_INFERIOR_NASAL_RESTRICTION: 0
OD_SUPERIOR_NASAL_RESTRICTION: 0
OD_NORMAL: 1
OD_SUPERIOR_TEMPORAL_RESTRICTION: 0

## 2025-07-29 ASSESSMENT — TONOMETRY
OS_IOP_MMHG: 22
OD_IOP_MMHG: 22
IOP_METHOD: TONOPEN

## 2025-07-29 ASSESSMENT — REFRACTION_MANIFEST
OS_CYLINDER: +0.50
OS_ADD: +3.00
OS_SPHERE: -1.25
OS_AXIS: 075

## 2025-07-29 ASSESSMENT — CUP TO DISC RATIO
OS_RATIO: 0.3
OD_RATIO: 0.3

## 2025-07-29 ASSESSMENT — EXTERNAL EXAM - LEFT EYE: OS_EXAM: NORMAL

## 2025-07-29 ASSESSMENT — EXTERNAL EXAM - RIGHT EYE: OD_EXAM: NORMAL

## 2025-07-29 NOTE — LETTER
7/29/2025       RE: Isabel Carroll  [address] [place] [address]     Dear Dr. Frederick,    Thank you for referring your patient, Isabel Carroll, to the [facility] [place] at [facility]. Please see a copy of my visit note below.    HPI       Blurred Vision Evaluation    In left eye.  Characterized as blurred vision.  Associated symptoms include itching.  Treatments tried include eye drops.  Pain was noted as 0/10. Additional comments: Patient reports vision in the left eye is getting fuzzy. Distance vision is ok but reading is difficult.    Melania Jones 8:34 AM 07/29/2025               Comments    YAG Evaluation-left eye    Systane PRN each eye  Pataday once daily OU          Last edited by Melania Jones, NP on 7/29/2025  8:35 AM.          Review of systems for the eyes was negative other than the pertinent positives/negatives listed in the HPI.      Assessment & Plan      Isabel Carroll is a 74 year old female with the following diagnoses:   1. Left posterior capsular opacification    2. Pseudophakia, both eyes       Referral from Dr. Frederick for blur left eye and posterior capsular opacity (PCO)     Posterior capsular opacity (PCO) visually significant on exam  Limited macular views due to opacity  RBA to YAG capsulotomy discussed, consent obtained, will proceed today.   Return precautions reviewed       Patient disposition:   Return in about 3 weeks (around 8/19/2025) for Dr. Baldwin for repeat refraction and dfe left.           Attending Physician Attestation:  Complete documentation of historical and exam elements from today's encounter can be found in the full encounter summary report (not reduplicated in this progress note).  I personally obtained the chief complaint(s) and history of present illness.  I confirmed and edited as necessary the review of systems, past medical/surgical history, family history, social history, and examination findings as  documented by others; and I examined the patient myself.  I personally reviewed the relevant tests, images, and reports as documented above.  I formulated and edited as necessary the assessment and plan and discussed the findings and management plan with the patient and family. . - Dex Turner MD        Main Ophthalmology Exam       External Exam         Right Left    External Normal Normal              Slit Lamp Exam         Right Left    Lids/Lashes Normal- hx of ptosis repair Normal- hx of ptosis repair    Conjunctiva/Sclera White and quiet White and quiet    Cornea Clear Clear    Anterior Chamber Deep and quiet Deep and quiet    Iris Good dilation Mod dilation    Lens Toric pciol, minimla pco PCIOL 2-3+ pco    Vitreous Normal Posterior vitreous detachment              Fundus Exam         Right Left    Disc Peripapillary atrophy Peripapillary atrophy    C/D Ratio 0.3 0.3    Macula no edema, no thickening hazy view    Vessels Normal Normal                  Base Eye Exam       Visual Acuity (Snellen - Linear)         Right Left    Dist cc 20/20 -1 20/30      Correction: Glasses              Tonometry (Tonopen, 8:43 AM)         Right Left    Pressure 22 22              Pupils         Pupils Dark Light Shape React APD    Right PERRL 3 2 Round Slow None    Left PERRL 3 2 Round Slow None              Visual Fields (Counting fingers)         Left Right     Full Full              Extraocular Movement         Right Left     Full, Ortho Full, Ortho              Neuro/Psych       Oriented x3: Yes    Mood/Affect: Normal              Dilation       Both eyes: Tropicamide 1%, 0.17 mL, 2.5% Phenylephrine @ 8:44 AM                  Additional Tests       Glare Testing         Off Low Medium High    Right 20/20 20/20 20/20 20/25    Left 20/30 20/30 20/40 20/40                  Slit Lamp and Fundus Exam       External Exam         Right Left    External Normal Normal              Slit Lamp Exam         Right Left     Lids/Lashes Normal- hx of ptosis repair Normal- hx of ptosis repair    Conjunctiva/Sclera White and quiet White and quiet    Cornea Clear Clear    Anterior Chamber Deep and quiet Deep and quiet    Iris Good dilation Mod dilation    Lens Toric pciol, minimla pco PCIOL 2-3+ pco    Vitreous Normal Posterior vitreous detachment              Fundus Exam         Right Left    Disc Peripapillary atrophy Peripapillary atrophy    C/D Ratio 0.3 0.3    Macula no edema, no thickening hazy view    Vessels Normal Normal                  Refraction       Wearing Rx         Sphere Cylinder Axis Add    Right -0.50 Sphere  +3.00    Left -1.00 +0.50 075 +3.00              Manifest Refraction         Sphere Cylinder Egan Dist VA Add Near VA    Right          Left -1.25 +0.50 075 20/25 +3.00 20/25                    Again, thank you for allowing me to participate in the care of your patient.      Sincerely,    Dex Turner MD

## 2025-07-29 NOTE — PROGRESS NOTES
HPI       Blurred Vision Evaluation    In left eye.  Characterized as blurred vision.  Associated symptoms include itching.  Treatments tried include eye drops.  Pain was noted as 0/10. Additional comments: Patient reports vision in the left eye is getting fuzzy. Distance vision is ok but reading is difficult.    Melania Jones 8:34 AM 07/29/2025               Comments    YAG Evaluation-left eye    Systane PRN each eye  Pataday once daily OU          Last edited by Melania Jones, NP on 7/29/2025  8:35 AM.          Review of systems for the eyes was negative other than the pertinent positives/negatives listed in the HPI.      Assessment & Plan      Isabel Carroll is a 74 year old female with the following diagnoses:   1. Left posterior capsular opacification    2. Pseudophakia, both eyes       Referral from Dr. Frederick for blur left eye and posterior capsular opacity (PCO)     Posterior capsular opacity (PCO) visually significant on exam  Limited macular views due to opacity  RBA to YAG capsulotomy discussed, consent obtained, will proceed today.   Return precautions reviewed       Patient disposition:   Return in about 3 weeks (around 8/19/2025) for Dr. Baldwin for repeat refraction and dfe left.           Attending Physician Attestation:  Complete documentation of historical and exam elements from today's encounter can be found in the full encounter summary report (not reduplicated in this progress note).  I personally obtained the chief complaint(s) and history of present illness.  I confirmed and edited as necessary the review of systems, past medical/surgical history, family history, social history, and examination findings as documented by others; and I examined the patient myself.  I personally reviewed the relevant tests, images, and reports as documented above.  I formulated and edited as necessary the assessment and plan and discussed the findings and management plan with the patient and family. . Nikki NOEL  MD Yue

## 2025-08-31 DIAGNOSIS — M81.0 AGE-RELATED OSTEOPOROSIS WITHOUT CURRENT PATHOLOGICAL FRACTURE: ICD-10-CM

## 2025-09-02 RX ORDER — ALENDRONATE SODIUM 70 MG/1
70 TABLET ORAL WEEKLY
Qty: 12 TABLET | Refills: 1 | Status: SHIPPED | OUTPATIENT
Start: 2025-09-02

## (undated) DEVICE — NDL 30GA 0.5" 305106

## (undated) DEVICE — SOL WATER IRRIG 500ML BOTTLE 2F7113

## (undated) DEVICE — SYR 03ML LL W/O NDL 309657

## (undated) DEVICE — EYE PREP BETADINE 5% SOLUTION 30ML 0065-0411-30

## (undated) DEVICE — GLOVE PROTEXIS MICRO 7.5  2D73PM75

## (undated) DEVICE — ESU GROUND PAD ADULT W/CORD E7507

## (undated) DEVICE — SOL WATER IRRIG 1000ML BOTTLE 07139-09

## (undated) DEVICE — PREP CHLORAPREP 26ML TINTED ORANGE  260815

## (undated) DEVICE — PACK MINOR EYE CUSTOM ASC

## (undated) DEVICE — SU PLAIN 6-0 G-1DA 18" 770G

## (undated) DEVICE — SU VICRYL 6-0 P-1 18" UND J489G

## (undated) DEVICE — LINEN TOWEL PACK X5 5464

## (undated) DEVICE — ESU EYE HIGH TEMP 65410-183

## (undated) RX ORDER — ACETAMINOPHEN 325 MG/1
TABLET ORAL
Status: DISPENSED
Start: 2023-06-01

## (undated) RX ORDER — ONDANSETRON 2 MG/ML
INJECTION INTRAMUSCULAR; INTRAVENOUS
Status: DISPENSED
Start: 2021-12-03

## (undated) RX ORDER — FENTANYL CITRATE 50 UG/ML
INJECTION, SOLUTION INTRAMUSCULAR; INTRAVENOUS
Status: DISPENSED
Start: 2023-06-01

## (undated) RX ORDER — FENTANYL CITRATE 50 UG/ML
INJECTION, SOLUTION INTRAMUSCULAR; INTRAVENOUS
Status: DISPENSED
Start: 2021-12-03